# Patient Record
Sex: FEMALE | Race: WHITE | Employment: OTHER | URBAN - METROPOLITAN AREA
[De-identification: names, ages, dates, MRNs, and addresses within clinical notes are randomized per-mention and may not be internally consistent; named-entity substitution may affect disease eponyms.]

---

## 2017-01-03 ENCOUNTER — TELEPHONE (OUTPATIENT)
Dept: INTERNAL MEDICINE | Age: 72
End: 2017-01-03

## 2017-01-03 RX ORDER — ROSUVASTATIN CALCIUM 20 MG/1
TABLET, COATED ORAL
Qty: 30 TABLET | Refills: 5 | Status: SHIPPED | OUTPATIENT
Start: 2017-01-03 | End: 2017-07-05 | Stop reason: SDUPTHER

## 2017-01-19 ENCOUNTER — OFFICE VISIT (OUTPATIENT)
Dept: INTERNAL MEDICINE | Age: 72
End: 2017-01-19

## 2017-01-19 VITALS
DIASTOLIC BLOOD PRESSURE: 60 MMHG | WEIGHT: 150 LBS | BODY MASS INDEX: 29.29 KG/M2 | SYSTOLIC BLOOD PRESSURE: 104 MMHG | RESPIRATION RATE: 16 BRPM | HEART RATE: 60 BPM

## 2017-01-19 DIAGNOSIS — I69.322 DYSARTHRIA DUE TO RECENT CEREBROVASCULAR ACCIDENT (CVA): ICD-10-CM

## 2017-01-19 DIAGNOSIS — E78.00 PURE HYPERCHOLESTEROLEMIA: Primary | ICD-10-CM

## 2017-01-19 DIAGNOSIS — K29.30 CHRONIC SUPERFICIAL GASTRITIS WITHOUT BLEEDING: ICD-10-CM

## 2017-01-19 DIAGNOSIS — E55.9 VITAMIN D DEFICIENCY: ICD-10-CM

## 2017-01-19 DIAGNOSIS — M48.061 LUMBAR SPINAL STENOSIS: ICD-10-CM

## 2017-01-19 DIAGNOSIS — F33.40 DEPRESSION, MAJOR, RECURRENT, IN REMISSION (HCC): ICD-10-CM

## 2017-01-19 LAB
A/G RATIO: 1.6 (ref 1.1–2.2)
ALBUMIN SERPL-MCNC: 4.5 G/DL (ref 3.4–5)
ALP BLD-CCNC: 110 U/L (ref 40–129)
ALT SERPL-CCNC: 21 U/L (ref 10–40)
ANION GAP SERPL CALCULATED.3IONS-SCNC: 22 MMOL/L (ref 3–16)
AST SERPL-CCNC: 30 U/L (ref 15–37)
BILIRUB SERPL-MCNC: 0.4 MG/DL (ref 0–1)
BUN BLDV-MCNC: 20 MG/DL (ref 7–20)
CALCIUM SERPL-MCNC: 9.9 MG/DL (ref 8.3–10.6)
CHLORIDE BLD-SCNC: 99 MMOL/L (ref 99–110)
CHOLESTEROL, TOTAL: 178 MG/DL (ref 0–199)
CO2: 21 MMOL/L (ref 21–32)
CREAT SERPL-MCNC: 1 MG/DL (ref 0.6–1.2)
GFR AFRICAN AMERICAN: >60
GFR NON-AFRICAN AMERICAN: 54
GLOBULIN: 2.8 G/DL
GLUCOSE BLD-MCNC: 88 MG/DL (ref 70–99)
HDLC SERPL-MCNC: 76 MG/DL (ref 40–60)
LDL CHOLESTEROL CALCULATED: 88 MG/DL
POTASSIUM SERPL-SCNC: 4 MMOL/L (ref 3.5–5.1)
SODIUM BLD-SCNC: 142 MMOL/L (ref 136–145)
TOTAL PROTEIN: 7.3 G/DL (ref 6.4–8.2)
TRIGL SERPL-MCNC: 72 MG/DL (ref 0–150)
VITAMIN D 25-HYDROXY: 48.3 NG/ML
VLDLC SERPL CALC-MCNC: 14 MG/DL

## 2017-01-19 PROCEDURE — 1036F TOBACCO NON-USER: CPT | Performed by: INTERNAL MEDICINE

## 2017-01-19 PROCEDURE — 4040F PNEUMOC VAC/ADMIN/RCVD: CPT | Performed by: INTERNAL MEDICINE

## 2017-01-19 PROCEDURE — G8399 PT W/DXA RESULTS DOCUMENT: HCPCS | Performed by: INTERNAL MEDICINE

## 2017-01-19 PROCEDURE — G8598 ASA/ANTIPLAT THER USED: HCPCS | Performed by: INTERNAL MEDICINE

## 2017-01-19 PROCEDURE — G8420 CALC BMI NORM PARAMETERS: HCPCS | Performed by: INTERNAL MEDICINE

## 2017-01-19 PROCEDURE — 3017F COLORECTAL CA SCREEN DOC REV: CPT | Performed by: INTERNAL MEDICINE

## 2017-01-19 PROCEDURE — G8427 DOCREV CUR MEDS BY ELIG CLIN: HCPCS | Performed by: INTERNAL MEDICINE

## 2017-01-19 PROCEDURE — 36415 COLL VENOUS BLD VENIPUNCTURE: CPT | Performed by: INTERNAL MEDICINE

## 2017-01-19 PROCEDURE — 1090F PRES/ABSN URINE INCON ASSESS: CPT | Performed by: INTERNAL MEDICINE

## 2017-01-19 PROCEDURE — G8484 FLU IMMUNIZE NO ADMIN: HCPCS | Performed by: INTERNAL MEDICINE

## 2017-01-19 PROCEDURE — 3014F SCREEN MAMMO DOC REV: CPT | Performed by: INTERNAL MEDICINE

## 2017-01-19 PROCEDURE — 1123F ACP DISCUSS/DSCN MKR DOCD: CPT | Performed by: INTERNAL MEDICINE

## 2017-01-19 PROCEDURE — 99214 OFFICE O/P EST MOD 30 MIN: CPT | Performed by: INTERNAL MEDICINE

## 2017-01-19 RX ORDER — CHOLECALCIFEROL (VITAMIN D3) 1250 MCG
50000 CAPSULE ORAL SEE ADMIN INSTRUCTIONS
Qty: 4 CAPSULE | Refills: 5 | Status: SHIPPED | OUTPATIENT
Start: 2017-01-19 | End: 2018-01-02 | Stop reason: SDUPTHER

## 2017-01-19 RX ORDER — CITALOPRAM 20 MG/1
20 TABLET ORAL DAILY
Qty: 30 TABLET | Refills: 5 | Status: SHIPPED | OUTPATIENT
Start: 2017-01-19 | End: 2017-07-17 | Stop reason: SDUPTHER

## 2017-01-19 ASSESSMENT — ENCOUNTER SYMPTOMS
ABDOMINAL DISTENTION: 0
GASTROINTESTINAL NEGATIVE: 1
DIARRHEA: 0
CONSTIPATION: 0
BACK PAIN: 1
ABDOMINAL PAIN: 1
RESPIRATORY NEGATIVE: 1

## 2017-02-08 ENCOUNTER — TELEPHONE (OUTPATIENT)
Dept: INTERNAL MEDICINE | Age: 72
End: 2017-02-08

## 2017-02-14 ENCOUNTER — TELEPHONE (OUTPATIENT)
Dept: INTERNAL MEDICINE | Age: 72
End: 2017-02-14

## 2017-02-24 ENCOUNTER — OFFICE VISIT (OUTPATIENT)
Dept: INTERNAL MEDICINE | Age: 72
End: 2017-02-24

## 2017-02-24 VITALS — DIASTOLIC BLOOD PRESSURE: 58 MMHG | RESPIRATION RATE: 16 BRPM | SYSTOLIC BLOOD PRESSURE: 110 MMHG | HEART RATE: 60 BPM
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
## 2017-02-24 DIAGNOSIS — M79.651 RIGHT THIGH PAIN: ICD-10-CM

## 2017-02-24 DIAGNOSIS — F33.40 DEPRESSION, MAJOR, RECURRENT, IN REMISSION (HCC): ICD-10-CM

## 2017-02-24 PROCEDURE — G8420 CALC BMI NORM PARAMETERS: HCPCS | Performed by: INTERNAL MEDICINE

## 2017-02-24 PROCEDURE — G8484 FLU IMMUNIZE NO ADMIN: HCPCS | Performed by: INTERNAL MEDICINE

## 2017-02-24 PROCEDURE — G8598 ASA/ANTIPLAT THER USED: HCPCS | Performed by: INTERNAL MEDICINE

## 2017-02-24 PROCEDURE — 99214 OFFICE O/P EST MOD 30 MIN: CPT | Performed by: INTERNAL MEDICINE

## 2017-02-24 PROCEDURE — 3017F COLORECTAL CA SCREEN DOC REV: CPT | Performed by: INTERNAL MEDICINE

## 2017-02-24 PROCEDURE — G8399 PT W/DXA RESULTS DOCUMENT: HCPCS | Performed by: INTERNAL MEDICINE

## 2017-02-24 PROCEDURE — 1123F ACP DISCUSS/DSCN MKR DOCD: CPT | Performed by: INTERNAL MEDICINE

## 2017-02-24 PROCEDURE — 4040F PNEUMOC VAC/ADMIN/RCVD: CPT | Performed by: INTERNAL MEDICINE

## 2017-02-24 PROCEDURE — G8427 DOCREV CUR MEDS BY ELIG CLIN: HCPCS | Performed by: INTERNAL MEDICINE

## 2017-02-24 PROCEDURE — 1090F PRES/ABSN URINE INCON ASSESS: CPT | Performed by: INTERNAL MEDICINE

## 2017-02-24 PROCEDURE — 3014F SCREEN MAMMO DOC REV: CPT | Performed by: INTERNAL MEDICINE

## 2017-02-24 PROCEDURE — 1036F TOBACCO NON-USER: CPT | Performed by: INTERNAL MEDICINE

## 2017-02-24 RX ORDER — FUROSEMIDE 20 MG/1
20 TABLET ORAL DAILY PRN
Qty: 30 TABLET | Refills: 5 | Status: SHIPPED | OUTPATIENT
Start: 2017-02-24 | End: 2018-05-03 | Stop reason: SDUPTHER

## 2017-02-24 RX ORDER — TRAMADOL HYDROCHLORIDE 50 MG/1
50 TABLET ORAL EVERY 6 HOURS PRN
Qty: 30 TABLET | Refills: 0 | Status: SHIPPED | OUTPATIENT
Start: 2017-02-24 | End: 2017-03-06

## 2017-02-26 ASSESSMENT — ENCOUNTER SYMPTOMS
GASTROINTESTINAL NEGATIVE: 1
RESPIRATORY NEGATIVE: 1

## 2017-03-21 RX ORDER — TRAMADOL HYDROCHLORIDE 50 MG/1
TABLET ORAL
Qty: 30 TABLET | Refills: 0 | OUTPATIENT
Start: 2017-03-21 | End: 2017-04-19 | Stop reason: SDUPTHER

## 2017-04-10 ENCOUNTER — OFFICE VISIT (OUTPATIENT)
Dept: INTERNAL MEDICINE | Age: 72
End: 2017-04-10

## 2017-04-10 ENCOUNTER — CARE COORDINATOR VISIT (OUTPATIENT)
Dept: CARE COORDINATION | Age: 72
End: 2017-04-10

## 2017-04-10 VITALS — HEART RATE: 60 BPM | SYSTOLIC BLOOD PRESSURE: 136 MMHG | DIASTOLIC BLOOD PRESSURE: 68 MMHG | RESPIRATION RATE: 16 BRPM
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
## 2017-04-10 PROCEDURE — 4040F PNEUMOC VAC/ADMIN/RCVD: CPT | Performed by: INTERNAL MEDICINE

## 2017-04-10 PROCEDURE — 1090F PRES/ABSN URINE INCON ASSESS: CPT | Performed by: INTERNAL MEDICINE

## 2017-04-10 PROCEDURE — G8427 DOCREV CUR MEDS BY ELIG CLIN: HCPCS | Performed by: INTERNAL MEDICINE

## 2017-04-10 PROCEDURE — 1123F ACP DISCUSS/DSCN MKR DOCD: CPT | Performed by: INTERNAL MEDICINE

## 2017-04-10 PROCEDURE — 3014F SCREEN MAMMO DOC REV: CPT | Performed by: INTERNAL MEDICINE

## 2017-04-10 PROCEDURE — 3017F COLORECTAL CA SCREEN DOC REV: CPT | Performed by: INTERNAL MEDICINE

## 2017-04-10 PROCEDURE — 99213 OFFICE O/P EST LOW 20 MIN: CPT | Performed by: INTERNAL MEDICINE

## 2017-04-10 PROCEDURE — G8420 CALC BMI NORM PARAMETERS: HCPCS | Performed by: INTERNAL MEDICINE

## 2017-04-10 PROCEDURE — G8598 ASA/ANTIPLAT THER USED: HCPCS | Performed by: INTERNAL MEDICINE

## 2017-04-10 PROCEDURE — G8399 PT W/DXA RESULTS DOCUMENT: HCPCS | Performed by: INTERNAL MEDICINE

## 2017-04-10 PROCEDURE — 1036F TOBACCO NON-USER: CPT | Performed by: INTERNAL MEDICINE

## 2017-04-12 ASSESSMENT — ENCOUNTER SYMPTOMS
BACK PAIN: 1
RESPIRATORY NEGATIVE: 1
GASTROINTESTINAL NEGATIVE: 1

## 2017-04-20 RX ORDER — TRAMADOL HYDROCHLORIDE 50 MG/1
TABLET ORAL
Qty: 30 TABLET | Refills: 0 | Status: SHIPPED | OUTPATIENT
Start: 2017-04-20 | End: 2018-02-05

## 2017-04-21 ENCOUNTER — CARE COORDINATION (OUTPATIENT)
Dept: CARE COORDINATION | Age: 72
End: 2017-04-21

## 2017-05-01 ENCOUNTER — OFFICE VISIT (OUTPATIENT)
Dept: INTERNAL MEDICINE | Age: 72
End: 2017-05-01

## 2017-05-01 VITALS
BODY MASS INDEX: 28.32 KG/M2 | HEART RATE: 60 BPM | DIASTOLIC BLOOD PRESSURE: 50 MMHG | SYSTOLIC BLOOD PRESSURE: 98 MMHG | WEIGHT: 145 LBS

## 2017-05-01 DIAGNOSIS — M48.061 LUMBAR SPINAL STENOSIS: ICD-10-CM

## 2017-05-01 DIAGNOSIS — K59.01 SLOW TRANSIT CONSTIPATION: Primary | ICD-10-CM

## 2017-05-01 PROCEDURE — 1123F ACP DISCUSS/DSCN MKR DOCD: CPT | Performed by: INTERNAL MEDICINE

## 2017-05-01 PROCEDURE — 99213 OFFICE O/P EST LOW 20 MIN: CPT | Performed by: INTERNAL MEDICINE

## 2017-05-01 PROCEDURE — 1090F PRES/ABSN URINE INCON ASSESS: CPT | Performed by: INTERNAL MEDICINE

## 2017-05-01 PROCEDURE — G8399 PT W/DXA RESULTS DOCUMENT: HCPCS | Performed by: INTERNAL MEDICINE

## 2017-05-01 PROCEDURE — 3014F SCREEN MAMMO DOC REV: CPT | Performed by: INTERNAL MEDICINE

## 2017-05-01 PROCEDURE — G8598 ASA/ANTIPLAT THER USED: HCPCS | Performed by: INTERNAL MEDICINE

## 2017-05-01 PROCEDURE — 1036F TOBACCO NON-USER: CPT | Performed by: INTERNAL MEDICINE

## 2017-05-01 PROCEDURE — G8427 DOCREV CUR MEDS BY ELIG CLIN: HCPCS | Performed by: INTERNAL MEDICINE

## 2017-05-01 PROCEDURE — G8420 CALC BMI NORM PARAMETERS: HCPCS | Performed by: INTERNAL MEDICINE

## 2017-05-01 PROCEDURE — 3017F COLORECTAL CA SCREEN DOC REV: CPT | Performed by: INTERNAL MEDICINE

## 2017-05-01 PROCEDURE — 4040F PNEUMOC VAC/ADMIN/RCVD: CPT | Performed by: INTERNAL MEDICINE

## 2017-05-01 RX ORDER — DOCUSATE SODIUM 100 MG/1
100 CAPSULE, LIQUID FILLED ORAL 2 TIMES DAILY
Qty: 60 CAPSULE | Refills: 5 | Status: SHIPPED | OUTPATIENT
Start: 2017-05-01 | End: 2018-04-09

## 2017-05-01 ASSESSMENT — ENCOUNTER SYMPTOMS
RESPIRATORY NEGATIVE: 1
BACK PAIN: 1
GASTROINTESTINAL NEGATIVE: 1

## 2017-05-10 RX ORDER — BUPROPION HYDROCHLORIDE 300 MG/1
TABLET ORAL
Qty: 30 TABLET | Refills: 5 | Status: SHIPPED | OUTPATIENT
Start: 2017-05-10 | End: 2017-11-07 | Stop reason: SDUPTHER

## 2017-05-23 RX ORDER — CLOPIDOGREL BISULFATE 75 MG/1
TABLET ORAL
Qty: 30 TABLET | Refills: 5 | Status: SHIPPED | OUTPATIENT
Start: 2017-05-23 | End: 2018-01-02 | Stop reason: SDUPTHER

## 2017-05-23 RX ORDER — PANTOPRAZOLE SODIUM 20 MG/1
TABLET, DELAYED RELEASE ORAL
Qty: 60 TABLET | Refills: 5 | Status: SHIPPED | OUTPATIENT
Start: 2017-05-23 | End: 2017-11-01 | Stop reason: SDUPTHER

## 2017-06-01 ENCOUNTER — OFFICE VISIT (OUTPATIENT)
Dept: INTERNAL MEDICINE | Age: 72
End: 2017-06-01

## 2017-06-01 VITALS
HEART RATE: 64 BPM | WEIGHT: 142 LBS | DIASTOLIC BLOOD PRESSURE: 54 MMHG | BODY MASS INDEX: 27.73 KG/M2 | RESPIRATION RATE: 14 BRPM | SYSTOLIC BLOOD PRESSURE: 102 MMHG

## 2017-06-01 DIAGNOSIS — E78.00 PURE HYPERCHOLESTEROLEMIA: Primary | ICD-10-CM

## 2017-06-01 DIAGNOSIS — M48.061 LUMBAR SPINAL STENOSIS: ICD-10-CM

## 2017-06-01 DIAGNOSIS — R26.0 ATAXIA INVOLVING LEGS: ICD-10-CM

## 2017-06-01 DIAGNOSIS — I10 ESSENTIAL HYPERTENSION, BENIGN: ICD-10-CM

## 2017-06-01 DIAGNOSIS — I50.22 CHRONIC SYSTOLIC CONGESTIVE HEART FAILURE (HCC): ICD-10-CM

## 2017-06-01 LAB
A/G RATIO: 1.4 (ref 1.1–2.2)
ALBUMIN SERPL-MCNC: 4.1 G/DL (ref 3.4–5)
ALP BLD-CCNC: 105 U/L (ref 40–129)
ALT SERPL-CCNC: 18 U/L (ref 10–40)
ANION GAP SERPL CALCULATED.3IONS-SCNC: 18 MMOL/L (ref 3–16)
AST SERPL-CCNC: 24 U/L (ref 15–37)
BILIRUB SERPL-MCNC: 0.3 MG/DL (ref 0–1)
BUN BLDV-MCNC: 24 MG/DL (ref 7–20)
CALCIUM SERPL-MCNC: 9.3 MG/DL (ref 8.3–10.6)
CHLORIDE BLD-SCNC: 100 MMOL/L (ref 99–110)
CHOLESTEROL, TOTAL: 151 MG/DL (ref 0–199)
CO2: 21 MMOL/L (ref 21–32)
CREAT SERPL-MCNC: 1 MG/DL (ref 0.6–1.2)
GFR AFRICAN AMERICAN: >60
GFR NON-AFRICAN AMERICAN: 54
GLOBULIN: 2.9 G/DL
GLUCOSE BLD-MCNC: 95 MG/DL (ref 70–99)
HCT VFR BLD CALC: 34.9 % (ref 36–48)
HDLC SERPL-MCNC: 70 MG/DL (ref 40–60)
HEMOGLOBIN: 11.5 G/DL (ref 12–16)
LDL CHOLESTEROL CALCULATED: 65 MG/DL
MCH RBC QN AUTO: 32.3 PG (ref 26–34)
MCHC RBC AUTO-ENTMCNC: 33 G/DL (ref 31–36)
MCV RBC AUTO: 98 FL (ref 80–100)
PDW BLD-RTO: 14.4 % (ref 12.4–15.4)
PLATELET # BLD: 239 K/UL (ref 135–450)
PMV BLD AUTO: 7.1 FL (ref 5–10.5)
POTASSIUM SERPL-SCNC: 4.2 MMOL/L (ref 3.5–5.1)
RBC # BLD: 3.56 M/UL (ref 4–5.2)
SODIUM BLD-SCNC: 139 MMOL/L (ref 136–145)
TOTAL PROTEIN: 7 G/DL (ref 6.4–8.2)
TRIGL SERPL-MCNC: 78 MG/DL (ref 0–150)
VLDLC SERPL CALC-MCNC: 16 MG/DL
WBC # BLD: 4.5 K/UL (ref 4–11)

## 2017-06-01 PROCEDURE — G8598 ASA/ANTIPLAT THER USED: HCPCS | Performed by: INTERNAL MEDICINE

## 2017-06-01 PROCEDURE — 3014F SCREEN MAMMO DOC REV: CPT | Performed by: INTERNAL MEDICINE

## 2017-06-01 PROCEDURE — 3017F COLORECTAL CA SCREEN DOC REV: CPT | Performed by: INTERNAL MEDICINE

## 2017-06-01 PROCEDURE — 1036F TOBACCO NON-USER: CPT | Performed by: INTERNAL MEDICINE

## 2017-06-01 PROCEDURE — 1090F PRES/ABSN URINE INCON ASSESS: CPT | Performed by: INTERNAL MEDICINE

## 2017-06-01 PROCEDURE — 4040F PNEUMOC VAC/ADMIN/RCVD: CPT | Performed by: INTERNAL MEDICINE

## 2017-06-01 PROCEDURE — 99214 OFFICE O/P EST MOD 30 MIN: CPT | Performed by: INTERNAL MEDICINE

## 2017-06-01 PROCEDURE — 1123F ACP DISCUSS/DSCN MKR DOCD: CPT | Performed by: INTERNAL MEDICINE

## 2017-06-01 PROCEDURE — G8427 DOCREV CUR MEDS BY ELIG CLIN: HCPCS | Performed by: INTERNAL MEDICINE

## 2017-06-01 PROCEDURE — 36415 COLL VENOUS BLD VENIPUNCTURE: CPT | Performed by: INTERNAL MEDICINE

## 2017-06-01 PROCEDURE — G8399 PT W/DXA RESULTS DOCUMENT: HCPCS | Performed by: INTERNAL MEDICINE

## 2017-06-01 PROCEDURE — G8419 CALC BMI OUT NRM PARAM NOF/U: HCPCS | Performed by: INTERNAL MEDICINE

## 2017-06-01 ASSESSMENT — ENCOUNTER SYMPTOMS
BACK PAIN: 1
RESPIRATORY NEGATIVE: 1
GASTROINTESTINAL NEGATIVE: 1

## 2017-06-26 ENCOUNTER — TELEPHONE (OUTPATIENT)
Dept: INTERNAL MEDICINE | Age: 72
End: 2017-06-26

## 2017-07-06 RX ORDER — ROSUVASTATIN CALCIUM 20 MG/1
TABLET, COATED ORAL
Qty: 30 TABLET | Refills: 3 | Status: SHIPPED | OUTPATIENT
Start: 2017-07-06 | End: 2017-10-31 | Stop reason: SDUPTHER

## 2017-07-25 RX ORDER — LISINOPRIL 10 MG/1
TABLET ORAL
Qty: 30 TABLET | Refills: 2 | Status: SHIPPED | OUTPATIENT
Start: 2017-07-25 | End: 2017-10-16 | Stop reason: SDUPTHER

## 2017-08-07 RX ORDER — CARVEDILOL 25 MG/1
TABLET ORAL
Qty: 60 TABLET | Refills: 0 | Status: SHIPPED | OUTPATIENT
Start: 2017-08-07 | End: 2017-09-05 | Stop reason: SDUPTHER

## 2017-08-18 ENCOUNTER — OFFICE VISIT (OUTPATIENT)
Dept: INTERNAL MEDICINE | Age: 72
End: 2017-08-18

## 2017-08-18 VITALS
HEART RATE: 56 BPM | SYSTOLIC BLOOD PRESSURE: 110 MMHG | DIASTOLIC BLOOD PRESSURE: 60 MMHG | WEIGHT: 147 LBS | BODY MASS INDEX: 28.71 KG/M2 | RESPIRATION RATE: 16 BRPM

## 2017-08-18 DIAGNOSIS — I25.10 CORONARY ARTERY DISEASE INVOLVING NATIVE CORONARY ARTERY OF NATIVE HEART WITHOUT ANGINA PECTORIS: ICD-10-CM

## 2017-08-18 DIAGNOSIS — I50.22 CHRONIC SYSTOLIC CONGESTIVE HEART FAILURE (HCC): Primary | ICD-10-CM

## 2017-08-18 DIAGNOSIS — S72.142D INTERTROCHANTERIC FRACTURE OF LEFT HIP, CLOSED, WITH ROUTINE HEALING, SUBSEQUENT ENCOUNTER: ICD-10-CM

## 2017-08-18 PROCEDURE — 1123F ACP DISCUSS/DSCN MKR DOCD: CPT | Performed by: INTERNAL MEDICINE

## 2017-08-18 PROCEDURE — G8598 ASA/ANTIPLAT THER USED: HCPCS | Performed by: INTERNAL MEDICINE

## 2017-08-18 PROCEDURE — 3017F COLORECTAL CA SCREEN DOC REV: CPT | Performed by: INTERNAL MEDICINE

## 2017-08-18 PROCEDURE — 4040F PNEUMOC VAC/ADMIN/RCVD: CPT | Performed by: INTERNAL MEDICINE

## 2017-08-18 PROCEDURE — 3014F SCREEN MAMMO DOC REV: CPT | Performed by: INTERNAL MEDICINE

## 2017-08-18 PROCEDURE — G8399 PT W/DXA RESULTS DOCUMENT: HCPCS | Performed by: INTERNAL MEDICINE

## 2017-08-18 PROCEDURE — 1090F PRES/ABSN URINE INCON ASSESS: CPT | Performed by: INTERNAL MEDICINE

## 2017-08-18 PROCEDURE — G8427 DOCREV CUR MEDS BY ELIG CLIN: HCPCS | Performed by: INTERNAL MEDICINE

## 2017-08-18 PROCEDURE — G8419 CALC BMI OUT NRM PARAM NOF/U: HCPCS | Performed by: INTERNAL MEDICINE

## 2017-08-18 PROCEDURE — 99214 OFFICE O/P EST MOD 30 MIN: CPT | Performed by: INTERNAL MEDICINE

## 2017-08-18 PROCEDURE — 1036F TOBACCO NON-USER: CPT | Performed by: INTERNAL MEDICINE

## 2017-08-18 ASSESSMENT — ENCOUNTER SYMPTOMS
GASTROINTESTINAL NEGATIVE: 1
RESPIRATORY NEGATIVE: 1

## 2017-10-16 RX ORDER — LISINOPRIL 10 MG/1
TABLET ORAL
Qty: 30 TABLET | Refills: 0 | Status: SHIPPED | OUTPATIENT
Start: 2017-10-16 | End: 2017-10-24 | Stop reason: SINTOL

## 2017-10-24 ENCOUNTER — OFFICE VISIT (OUTPATIENT)
Dept: INTERNAL MEDICINE | Age: 72
End: 2017-10-24

## 2017-10-24 VITALS
SYSTOLIC BLOOD PRESSURE: 130 MMHG | HEART RATE: 51 BPM | DIASTOLIC BLOOD PRESSURE: 82 MMHG | BODY MASS INDEX: 26.58 KG/M2 | WEIGHT: 150 LBS | HEIGHT: 63 IN | OXYGEN SATURATION: 94 %

## 2017-10-24 DIAGNOSIS — K80.50 BILIARY COLIC: ICD-10-CM

## 2017-10-24 DIAGNOSIS — I10 ESSENTIAL HYPERTENSION, BENIGN: ICD-10-CM

## 2017-10-24 DIAGNOSIS — I25.10 CORONARY ARTERY DISEASE INVOLVING NATIVE CORONARY ARTERY OF NATIVE HEART WITHOUT ANGINA PECTORIS: ICD-10-CM

## 2017-10-24 DIAGNOSIS — E78.00 PURE HYPERCHOLESTEROLEMIA: ICD-10-CM

## 2017-10-24 DIAGNOSIS — R05.9 COUGH: ICD-10-CM

## 2017-10-24 DIAGNOSIS — Z87.891 PERSONAL HISTORY OF TOBACCO USE: ICD-10-CM

## 2017-10-24 DIAGNOSIS — R06.09 EXERTIONAL DYSPNEA: Primary | ICD-10-CM

## 2017-10-24 DIAGNOSIS — Z23 NEED FOR INFLUENZA VACCINATION: ICD-10-CM

## 2017-10-24 DIAGNOSIS — Z12.2 ENCOUNTER FOR SCREENING FOR LUNG CANCER: ICD-10-CM

## 2017-10-24 LAB
A/G RATIO: 1.4 (ref 1.1–2.2)
ALBUMIN SERPL-MCNC: 4.5 G/DL (ref 3.4–5)
ALP BLD-CCNC: 113 U/L (ref 40–129)
ALT SERPL-CCNC: 25 U/L (ref 10–40)
ANION GAP SERPL CALCULATED.3IONS-SCNC: 16 MMOL/L (ref 3–16)
AST SERPL-CCNC: 28 U/L (ref 15–37)
BILIRUB SERPL-MCNC: 0.3 MG/DL (ref 0–1)
BUN BLDV-MCNC: 22 MG/DL (ref 7–20)
CALCIUM SERPL-MCNC: 10 MG/DL (ref 8.3–10.6)
CHLORIDE BLD-SCNC: 99 MMOL/L (ref 99–110)
CHOLESTEROL, TOTAL: 179 MG/DL (ref 0–199)
CO2: 26 MMOL/L (ref 21–32)
CREAT SERPL-MCNC: 0.9 MG/DL (ref 0.6–1.2)
GFR AFRICAN AMERICAN: >60
GFR NON-AFRICAN AMERICAN: >60
GLOBULIN: 3.2 G/DL
GLUCOSE BLD-MCNC: 96 MG/DL (ref 70–99)
HCT VFR BLD CALC: 36.9 % (ref 36–48)
HDLC SERPL-MCNC: 80 MG/DL (ref 40–60)
HEMOGLOBIN: 12.4 G/DL (ref 12–16)
LDL CHOLESTEROL CALCULATED: 80 MG/DL
MCH RBC QN AUTO: 33.6 PG (ref 26–34)
MCHC RBC AUTO-ENTMCNC: 33.7 G/DL (ref 31–36)
MCV RBC AUTO: 99.7 FL (ref 80–100)
PDW BLD-RTO: 13.4 % (ref 12.4–15.4)
PLATELET # BLD: 240 K/UL (ref 135–450)
PMV BLD AUTO: 6.9 FL (ref 5–10.5)
POTASSIUM SERPL-SCNC: 4.6 MMOL/L (ref 3.5–5.1)
RBC # BLD: 3.7 M/UL (ref 4–5.2)
SODIUM BLD-SCNC: 141 MMOL/L (ref 136–145)
TOTAL PROTEIN: 7.7 G/DL (ref 6.4–8.2)
TRIGL SERPL-MCNC: 97 MG/DL (ref 0–150)
TSH REFLEX FT4: 1.83 UIU/ML (ref 0.27–4.2)
VLDLC SERPL CALC-MCNC: 19 MG/DL
WBC # BLD: 4.8 K/UL (ref 4–11)

## 2017-10-24 PROCEDURE — 90662 IIV NO PRSV INCREASED AG IM: CPT | Performed by: INTERNAL MEDICINE

## 2017-10-24 PROCEDURE — G8427 DOCREV CUR MEDS BY ELIG CLIN: HCPCS | Performed by: INTERNAL MEDICINE

## 2017-10-24 PROCEDURE — 4040F PNEUMOC VAC/ADMIN/RCVD: CPT | Performed by: INTERNAL MEDICINE

## 2017-10-24 PROCEDURE — 3017F COLORECTAL CA SCREEN DOC REV: CPT | Performed by: INTERNAL MEDICINE

## 2017-10-24 PROCEDURE — 99214 OFFICE O/P EST MOD 30 MIN: CPT | Performed by: INTERNAL MEDICINE

## 2017-10-24 PROCEDURE — G8598 ASA/ANTIPLAT THER USED: HCPCS | Performed by: INTERNAL MEDICINE

## 2017-10-24 PROCEDURE — G0296 VISIT TO DETERM LDCT ELIG: HCPCS | Performed by: INTERNAL MEDICINE

## 2017-10-24 PROCEDURE — 3014F SCREEN MAMMO DOC REV: CPT | Performed by: INTERNAL MEDICINE

## 2017-10-24 PROCEDURE — G8417 CALC BMI ABV UP PARAM F/U: HCPCS | Performed by: INTERNAL MEDICINE

## 2017-10-24 PROCEDURE — 1036F TOBACCO NON-USER: CPT | Performed by: INTERNAL MEDICINE

## 2017-10-24 PROCEDURE — G0008 ADMIN INFLUENZA VIRUS VAC: HCPCS | Performed by: INTERNAL MEDICINE

## 2017-10-24 PROCEDURE — 1090F PRES/ABSN URINE INCON ASSESS: CPT | Performed by: INTERNAL MEDICINE

## 2017-10-24 PROCEDURE — G8484 FLU IMMUNIZE NO ADMIN: HCPCS | Performed by: INTERNAL MEDICINE

## 2017-10-24 PROCEDURE — G8399 PT W/DXA RESULTS DOCUMENT: HCPCS | Performed by: INTERNAL MEDICINE

## 2017-10-24 PROCEDURE — 1123F ACP DISCUSS/DSCN MKR DOCD: CPT | Performed by: INTERNAL MEDICINE

## 2017-10-24 RX ORDER — LOSARTAN POTASSIUM 25 MG/1
25 TABLET ORAL DAILY
Qty: 30 TABLET | Refills: 3 | Status: SHIPPED | OUTPATIENT
Start: 2017-10-24 | End: 2018-02-19 | Stop reason: SDUPTHER

## 2017-10-24 ASSESSMENT — LIFESTYLE VARIABLES
AUDIT-C TOTAL SCORE: 3
HOW MANY STANDARD DRINKS CONTAINING ALCOHOL DO YOU HAVE ON A TYPICAL DAY: 0
HAVE YOU OR SOMEONE ELSE BEEN INJURED AS A RESULT OF YOUR DRINKING: 0
AUDIT TOTAL SCORE: 3
HAS A RELATIVE, FRIEND, DOCTOR, OR ANOTHER HEALTH PROFESSIONAL EXPRESSED CONCERN ABOUT YOUR DRINKING OR SUGGESTED YOU CUT DOWN: 0
HOW OFTEN DO YOU HAVE A DRINK CONTAINING ALCOHOL: 3
HOW OFTEN DURING THE LAST YEAR HAVE YOU FOUND THAT YOU WERE NOT ABLE TO STOP DRINKING ONCE YOU HAD STARTED: 0
HOW OFTEN DURING THE LAST YEAR HAVE YOU HAD A FEELING OF GUILT OR REMORSE AFTER DRINKING: 0
HOW OFTEN DURING THE LAST YEAR HAVE YOU FAILED TO DO WHAT WAS NORMALLY EXPECTED FROM YOU BECAUSE OF DRINKING: 0
HOW OFTEN DURING THE LAST YEAR HAVE YOU BEEN UNABLE TO REMEMBER WHAT HAPPENED THE NIGHT BEFORE BECAUSE YOU HAD BEEN DRINKING: 0
HOW OFTEN DURING THE LAST YEAR HAVE YOU NEEDED AN ALCOHOLIC DRINK FIRST THING IN THE MORNING TO GET YOURSELF GOING AFTER A NIGHT OF HEAVY DRINKING: 0
HOW OFTEN DO YOU HAVE SIX OR MORE DRINKS ON ONE OCCASION: 0

## 2017-10-24 ASSESSMENT — ENCOUNTER SYMPTOMS
VOMITING: 0
DIARRHEA: 0
SHORTNESS OF BREATH: 1
ABDOMINAL PAIN: 0
BLOOD IN STOOL: 0
NAUSEA: 0
CHEST TIGHTNESS: 0
COUGH: 1

## 2017-10-24 ASSESSMENT — ANXIETY QUESTIONNAIRES: GAD7 TOTAL SCORE: 0

## 2017-10-24 ASSESSMENT — PATIENT HEALTH QUESTIONNAIRE - PHQ9: SUM OF ALL RESPONSES TO PHQ QUESTIONS 1-9: 0

## 2017-10-24 NOTE — PROGRESS NOTES
Vaccine Information Sheet, \"Influenza - Inactivated\"  given to Erma Keith, or parent/legal guardian of  Erma Keith and verbalized understanding. Patient responses:    Have you ever had a reaction to a flu vaccine? No  Are you able to eat eggs without adverse effects? Yes  Do you have any current illness? No  Have you ever had Guillian [place] Syndrome? No    Flu vaccine given per order. Please see immunization tab. Low Dose CT (LDCT) Lung Screening criteria met   Age 50-69   Pack year smoking >30   Still smoking or less than 15 year since quit   No sign or symptoms of lung cancer   > 11 months since last LDCT     Risks and benefits of lung cancer screening with LDCT scans discussed:    Significance of positive screen - False-positive LDCT results often occur. 95% of all positive results do not lead to a diagnosis of cancer. Usually further imaging can resolve most false-positive results; however, some patients may require invasive procedures. Over diagnosis risk - 10% to 12% of screen-detected lung cancer cases are over diagnosedthat is, the cancer would not have been detected in the patient's lifetime without the screening. Need for follow up screens annually to continue lung cancer screening effectiveness     Risks associated with radiation from annual LDCT- Radiation exposure is about the same as for a mammogram, which is about 1/3 of the annual background radiation exposure from everyday life. Starting screening at age 54 is not likely to increase cancer risk from radiation exposure. Patients with comorbidities resulting in life expectancy of < 10 years, or that would preclude treatment of an abnormality identified on CT, should not be screened due to lack of benefit.     To obtain maximal benefit from this screening, smoking cessation and long-term abstinence from smoking is critical

## 2017-10-24 NOTE — PROGRESS NOTES
Left pupil is round and reactive. Neck: Normal range of motion. Neck supple. No thyromegaly present. Cardiovascular: Normal rate and normal heart sounds. No murmur heard. Pulmonary/Chest: Effort normal and breath sounds normal. No respiratory distress. She has no wheezes. She has no rales. Abdominal: Soft. She exhibits no distension. There is no tenderness. There is no guarding. Musculoskeletal: Normal range of motion. She exhibits no edema. Lymphadenopathy:     She has no cervical adenopathy. Neurological: She is oriented to person, place, and time. She has normal strength and normal reflexes. She appears lethargic. She displays no tremor. No cranial nerve deficit or sensory deficit. She exhibits normal muscle tone. GCS eye subscore is 4. GCS verbal subscore is 5. GCS motor subscore is 6. Normal CN II-XII   Skin: She is not diaphoretic. No erythema. Psychiatric: She has a normal mood and affect. Her behavior is normal. Judgment and thought content normal.        Assessment/Plan:   Allyn Mahajan was seen today for medicare awv. Diagnoses and all orders for this visit:    Exertional dyspnea  She has C likely has COPD. I will do pulmonary function test and decide treatment accordingly  -     Cancel: CT Chest WO Contrast; Future  -     Spirometry With OR Without Bronchodilator; Future    Cough- chronic   Cough could be secondary to possible COPD, also ACE inhibitor is a possible cause I will stop the lisinopril start him on losartan and check for COPD  -     Cancel: CT Chest WO Contrast; Future  -     Spirometry With OR Without Bronchodilator; Future    Essential hypertension, benign  Controlled change Ace with our plan for possible cough  -     losartan (COZAAR) 25 MG tablet; Take 1 tablet by mouth daily  -     CMP/CBC/Lipid; Future  -     TSH, Reflex to T4; Future    Pure hypercholesterolemia  -     CMP/CBC/Lipid;  Future  -     TSH, Reflex to T4; Future    Coronary artery disease involving native coronary artery of native heart without angina pectoris  -     CMP/CBC/Lipid; Future  -     TSH, Reflex to T4; Future    Encounter for screening for lung cancer  Screening with low-dose CT    Personal history of tobacco use  -     OH VISIT TO DISCUSS LUNG CA SCREEN W LDCT  -     CT Lung Screening; Future  Discussed adverse side effects of smoking    Need for influenza vaccination  -     INFLUENZA, HIGH DOSE, 65 YRS +, IM, PF, PREFILL SYR, 0.5ML (FLUZONE HD)    She's not interested in pneumonia vaccine    Patient was encouraged to call the office or be seen with MD for any worsening or lack of improvement.      Nancy Ma M.D.   10/24/2017, 7:03 PM

## 2017-10-31 RX ORDER — ROSUVASTATIN CALCIUM 20 MG/1
TABLET, COATED ORAL
Qty: 30 TABLET | Refills: 0 | Status: SHIPPED | OUTPATIENT
Start: 2017-10-31 | End: 2017-11-01 | Stop reason: SDUPTHER

## 2017-11-01 RX ORDER — PANTOPRAZOLE SODIUM 20 MG/1
TABLET, DELAYED RELEASE ORAL
Qty: 60 TABLET | Refills: 5 | Status: SHIPPED | OUTPATIENT
Start: 2017-11-01 | End: 2018-08-13 | Stop reason: SDUPTHER

## 2017-11-01 RX ORDER — ROSUVASTATIN CALCIUM 20 MG/1
TABLET, COATED ORAL
Qty: 30 TABLET | Refills: 0 | Status: SHIPPED | OUTPATIENT
Start: 2017-11-01 | End: 2018-01-03 | Stop reason: SDUPTHER

## 2017-11-03 ENCOUNTER — HOSPITAL ENCOUNTER (OUTPATIENT)
Dept: PULMONOLOGY | Age: 72
Discharge: HOME | End: 2017-11-03
Attending: INTERNAL MEDICINE | Admitting: INTERNAL MEDICINE

## 2017-11-03 DIAGNOSIS — Z87.891 PERSONAL HISTORY OF TOBACCO USE: ICD-10-CM

## 2017-11-03 DIAGNOSIS — Z87.891 PERSONAL HISTORY OF NICOTINE DEPENDENCE: ICD-10-CM

## 2017-11-03 RX ORDER — ALBUTEROL SULFATE 2.5 MG/3ML
2.5 SOLUTION RESPIRATORY (INHALATION) ONCE
Status: COMPLETED | OUTPATIENT
Start: 2017-11-03 | End: 2017-11-03

## 2017-11-03 RX ADMIN — ALBUTEROL SULFATE 2.5 MG: 2.5 SOLUTION RESPIRATORY (INHALATION) at 17:47

## 2017-11-04 DIAGNOSIS — S22.078A OTHER CLOSED FRACTURE OF NINTH THORACIC VERTEBRA, INITIAL ENCOUNTER (HCC): ICD-10-CM

## 2017-11-04 DIAGNOSIS — Z13.820 SCREENING FOR OSTEOPOROSIS: Primary | ICD-10-CM

## 2017-11-06 NOTE — PROCEDURES
[facility]              [address]                              PULMONARY FUNCTION    PATIENT NAME: Erin Barraza                      :         1945  MED REC NO:   [id number]                          ROOM:  ACCOUNT NO:   [de-identified]                          ADMIT DATE:  2017  PROVIDER:     Anabella Kate MD    DATE OF PROCEDURE:  2017    Forced vital capacity, expiratory flow rates, and FEV1 to FVC ratio  normal.  No change after bronchodilator. Lung volumes and diffusion  capacity normal.    IMPRESSION:  Normal pulmonary function study.         Jayleen Bueno MD    D: 2017 12:40:46       T: 2017 13:25:01     [email or web address]  Job#: [id number]     Doc#: [id number]

## 2017-11-07 RX ORDER — BUPROPION HYDROCHLORIDE 300 MG/1
TABLET ORAL
Qty: 30 TABLET | Refills: 0 | Status: SHIPPED | OUTPATIENT
Start: 2017-11-07 | End: 2017-11-13 | Stop reason: SDUPTHER

## 2017-11-07 RX ORDER — CARVEDILOL 25 MG/1
TABLET ORAL
Qty: 60 TABLET | Refills: 0 | Status: SHIPPED | OUTPATIENT
Start: 2017-11-07 | End: 2018-01-03 | Stop reason: SDUPTHER

## 2017-11-08 ENCOUNTER — TELEPHONE (OUTPATIENT)
Dept: INTERNAL MEDICINE | Age: 72
End: 2017-11-08

## 2017-11-13 RX ORDER — CARVEDILOL 25 MG/1
TABLET ORAL
Qty: 60 TABLET | Refills: 0 | Status: SHIPPED | OUTPATIENT
Start: 2017-11-13 | End: 2018-01-03 | Stop reason: SDUPTHER

## 2017-11-13 RX ORDER — CITALOPRAM 20 MG/1
20 TABLET ORAL DAILY
Qty: 30 TABLET | Refills: 0 | Status: SHIPPED | OUTPATIENT
Start: 2017-11-13 | End: 2017-12-15 | Stop reason: SDUPTHER

## 2017-11-13 RX ORDER — BUPROPION HYDROCHLORIDE 300 MG/1
TABLET ORAL
Qty: 30 TABLET | Refills: 0 | Status: SHIPPED | OUTPATIENT
Start: 2017-11-13 | End: 2018-01-05 | Stop reason: SDUPTHER

## 2017-11-27 ENCOUNTER — OFFICE VISIT (OUTPATIENT)
Dept: INTERNAL MEDICINE | Age: 72
End: 2017-11-27

## 2017-11-27 VITALS
WEIGHT: 153 LBS | BODY MASS INDEX: 27.1 KG/M2 | OXYGEN SATURATION: 96 % | HEART RATE: 57 BPM | DIASTOLIC BLOOD PRESSURE: 66 MMHG | SYSTOLIC BLOOD PRESSURE: 128 MMHG

## 2017-11-27 DIAGNOSIS — S22.000A COMPRESSION FRACTURE OF BODY OF THORACIC VERTEBRA (HCC): Primary | ICD-10-CM

## 2017-11-27 DIAGNOSIS — M80.00XA OSTEOPOROSIS WITH CURRENT PATHOLOGICAL FRACTURE, UNSPECIFIED OSTEOPOROSIS TYPE, INITIAL ENCOUNTER: ICD-10-CM

## 2017-11-27 PROCEDURE — 3014F SCREEN MAMMO DOC REV: CPT | Performed by: INTERNAL MEDICINE

## 2017-11-27 PROCEDURE — G8598 ASA/ANTIPLAT THER USED: HCPCS | Performed by: INTERNAL MEDICINE

## 2017-11-27 PROCEDURE — G8399 PT W/DXA RESULTS DOCUMENT: HCPCS | Performed by: INTERNAL MEDICINE

## 2017-11-27 PROCEDURE — 4040F PNEUMOC VAC/ADMIN/RCVD: CPT | Performed by: INTERNAL MEDICINE

## 2017-11-27 PROCEDURE — 99213 OFFICE O/P EST LOW 20 MIN: CPT | Performed by: INTERNAL MEDICINE

## 2017-11-27 PROCEDURE — G8417 CALC BMI ABV UP PARAM F/U: HCPCS | Performed by: INTERNAL MEDICINE

## 2017-11-27 PROCEDURE — 4005F PHARM THX FOR OP RXD: CPT | Performed by: INTERNAL MEDICINE

## 2017-11-27 PROCEDURE — 3017F COLORECTAL CA SCREEN DOC REV: CPT | Performed by: INTERNAL MEDICINE

## 2017-11-27 PROCEDURE — G8484 FLU IMMUNIZE NO ADMIN: HCPCS | Performed by: INTERNAL MEDICINE

## 2017-11-27 PROCEDURE — 1123F ACP DISCUSS/DSCN MKR DOCD: CPT | Performed by: INTERNAL MEDICINE

## 2017-11-27 PROCEDURE — 1090F PRES/ABSN URINE INCON ASSESS: CPT | Performed by: INTERNAL MEDICINE

## 2017-11-27 PROCEDURE — 1036F TOBACCO NON-USER: CPT | Performed by: INTERNAL MEDICINE

## 2017-11-27 PROCEDURE — G8427 DOCREV CUR MEDS BY ELIG CLIN: HCPCS | Performed by: INTERNAL MEDICINE

## 2017-11-27 RX ORDER — ALENDRONATE SODIUM 70 MG/1
70 TABLET ORAL
Qty: 4 TABLET | Refills: 3 | Status: SHIPPED | OUTPATIENT
Start: 2017-11-27 | End: 2018-03-12 | Stop reason: SDUPTHER

## 2017-11-27 ASSESSMENT — ENCOUNTER SYMPTOMS
CHEST TIGHTNESS: 0
VOMITING: 0
NAUSEA: 0
SHORTNESS OF BREATH: 0
ABDOMINAL PAIN: 0

## 2017-11-27 NOTE — PROGRESS NOTES
Outpatient Note for established Patient - regular Visit    History Obtained From:  patient, electronic medical record    CHIEF COMPLAINT:    Chief Complaint   Patient presents with    Osteoporosis     follow up-discuss surgery       HISTORY OF PRESENT ILLNESS:   The patient is a 74 y.o. female who is here today for :discussion of a possible back surgery. The patient was worried about her referral to the spine surgeon for the findings of T9 compression fracture. She has no pain, denies leg weakness/ numbness/ she has no BM problems and has her usual urinary incontinence     Past Medical History:        Diagnosis Date    CAD (coronary artery disease)     CHF [facility]congestive heart failure) ([facility])     DVT of lower extremity (deep venous thrombosis) ([facility] [address]     Hypertension     Lupus     Urinary incontinence        Social History:   TOBACCO:   reports that she has quit smoking. Her smoking use included Cigarettes. She started smoking about 3 years ago. She has a 15.00 pack-year smoking history. She has never used smokeless tobacco.  ETOH:   reports that she drinks alcohol. Current Medications:    Prior to Admission medications    Medication Sig Start Date End Date Taking?  Authorizing Provider   alendronate (FOSAMAX) 70 MG tablet Take 1 tablet by mouth every 7 days 11/27/17  Yes Perez Murphy MD   buPROPion (WELLBUTRIN XL) 300 MG extended release tablet TAKE 1 TABLET BY MOUTH EVERY MORNING 11/13/17  Yes Perez Murphy MD   carvedilol (COREG) 25 MG tablet TAKE 1 TABLET BY MOUTH TWICE DAILY WITH MEALS 11/13/17  Yes Perez Murphy MD   citalopram (CELEXA) 20 MG tablet TAKE 1 TABLET BY MOUTH DAILY 11/13/17  Yes Perez Murphy MD   carvedilol (COREG) 25 MG tablet TAKE 1 TABLET BY MOUTH TWICE DAILY WITH MEALS 11/7/17  Yes Perez Murphy MD   rosuvastatin (CRESTOR) 20 MG tablet TAKE 1 TABLET BY MOUTH DAILY 11/1/17  Yes Perez Murphy MD   pantoprazole (PROTONIX) 20 MG tablet TAKE 1 TABLET BY MOUTH TWICE DAILY  Patient taking differently: 20 mg Daily with lunch TAKE 1 TABLET BY MOUTH TWICE DAILY 11/1/17  Yes Artist Leventhal, MD   losartan (COZAAR) 25 MG tablet Take 1 tablet by mouth daily 10/24/17  Yes Artist Leventhal, MD   clopidogrel (PLAVIX) 75 MG tablet TAKE 1 TABLET BY MOUTH DAILY 5/23/17  Yes Inez Vega MD   docusate sodium (COLACE) 100 MG capsule Take 1 capsule by mouth 2 times daily 5/1/17  Yes Inez Vega MD   traMADol (ULTRAM) 50 MG tablet TAKE 1 TABLET BY MOUTH EVERY 6 HOURS AS NEEDED FOR PAIN 4/20/17  Yes Luann Stone MD   furosemide (LASIX) 20 MG tablet Take 1 tablet by mouth daily as needed (edema) 2/24/17  Yes Inez Vega MD   Cholecalciferol (VITAMIN D3) 61979 UNITS CAPS Take 50,000 Units by mouth See Admin Instructions every other week 1/19/17  Yes Inez Vega MD   Nutritional Supplements (ENSURE ACTIVE HIGH PROTEIN PO) Take 1 Can by mouth daily as needed   Yes Historical Provider, MD   acetaminophen 650 MG TABS Take 650 mg by mouth every 6 hours as needed for Pain or Fever (Fever >100.5 F (38 C)). 12/9/13  Yes Patricio Alicia MD   therapeutic multivitamin-minerals [facility]) tablet Take 1 tablet by mouth daily. Yes Historical Provider, MD   hydroxychloroquine (PLAQUENIL) 200 MG tablet Take 200 mg by mouth daily. Yes Historical Provider, MD       REVIEW OF SYSTEMS:  Review of Systems   Constitutional: Negative for chills and fever. Respiratory: Negative for chest tightness and shortness of breath. Cardiovascular: Negative for chest pain. Gastrointestinal: Negative for abdominal pain, nausea and vomiting. Physical Exam:      Vitals: /66 (Site: Left Arm, Position: Sitting, Cuff Size: Small Adult)   Pulse 57   Wt 153 lb (69.4 kg)   SpO2 96%   BMI 27.10 kg/m²     Body mass index is 27.1 kg/m².   Wt Readings from Last 3 Encounters:   11/27/17 153 lb (69.4 kg)   10/24/17 150 lb (68 kg)   08/18/17 147 lb (66.7 kg) Physical Exam   Constitutional: She is oriented to person, place, and time. She appears well-developed and well-nourished. No distress. HENT:   Head: Normocephalic and atraumatic. Right Ear: External ear normal.   Left Ear: External ear normal.   Mouth/Throat: Oropharynx is clear and moist. No oropharyngeal exudate. Eyes: Conjunctivae and EOM are normal. Right eye exhibits no discharge. Left eye exhibits no discharge. No scleral icterus. Neck: Normal range of motion. Neck supple. Cardiovascular: Normal rate and normal heart sounds. No murmur heard. Pulmonary/Chest: Effort normal and breath sounds normal. No respiratory distress. She has no wheezes. She has no rales. Abdominal: Soft. There is no tenderness. Lymphadenopathy:     She has no cervical adenopathy. Neurological: She is alert and oriented to person, place, and time. She has normal reflexes. No sensory deficit. She exhibits normal muscle tone. GCS eye subscore is 4. GCS verbal subscore is 5. GCS motor subscore is 6. Skin: No rash noted. She is not diaphoretic. Assessment/Plan:   Trenton Castro was seen today for osteoporosis. Diagnoses and all orders for this visit:    Compression fracture of body of thoracic vertebra ([address]  We have discussed the finding- she will see a spine surgeon and he will evaluate her    -     Akanksha Ruiz MD ([facility])    Osteoporosis with current pathological fracture, unspecified osteoporosis type, initial encounter  She has no GERD, no kidney disease. I will start Tx for osteoporosis   -     Akanksha Ruiz MD ([facility])    Other orders  -     alendronate (FOSAMAX) 70 MG tablet; Take 1 tablet by mouth every 7 days    Patient was encouraged to call the office or be seen with MD for any worsening or lack of improvement.      Yvonne Proctor M.D.   11/27/2017, 6:25 PM

## 2017-12-06 ENCOUNTER — OFFICE VISIT (OUTPATIENT)
Dept: ORTHOPEDIC SURGERY | Age: 72
End: 2017-12-06

## 2017-12-06 VITALS
WEIGHT: 153 LBS | SYSTOLIC BLOOD PRESSURE: 145 MMHG | BODY MASS INDEX: 27.11 KG/M2 | HEIGHT: 63 IN | HEART RATE: 59 BPM | DIASTOLIC BLOOD PRESSURE: 72 MMHG

## 2017-12-06 DIAGNOSIS — Z98.1 S/P LUMBAR FUSION: ICD-10-CM

## 2017-12-06 DIAGNOSIS — S22.070A CLOSED WEDGE COMPRESSION FRACTURE OF NINTH THORACIC VERTEBRA, INITIAL ENCOUNTER: Primary | ICD-10-CM

## 2017-12-06 PROCEDURE — 4040F PNEUMOC VAC/ADMIN/RCVD: CPT | Performed by: PHYSICIAN ASSISTANT

## 2017-12-06 PROCEDURE — 99203 OFFICE O/P NEW LOW 30 MIN: CPT | Performed by: PHYSICIAN ASSISTANT

## 2017-12-06 PROCEDURE — G8427 DOCREV CUR MEDS BY ELIG CLIN: HCPCS | Performed by: PHYSICIAN ASSISTANT

## 2017-12-06 PROCEDURE — G8417 CALC BMI ABV UP PARAM F/U: HCPCS | Performed by: PHYSICIAN ASSISTANT

## 2017-12-06 PROCEDURE — G8484 FLU IMMUNIZE NO ADMIN: HCPCS | Performed by: PHYSICIAN ASSISTANT

## 2017-12-06 PROCEDURE — 1090F PRES/ABSN URINE INCON ASSESS: CPT | Performed by: PHYSICIAN ASSISTANT

## 2017-12-06 PROCEDURE — 3014F SCREEN MAMMO DOC REV: CPT | Performed by: PHYSICIAN ASSISTANT

## 2017-12-06 PROCEDURE — 3017F COLORECTAL CA SCREEN DOC REV: CPT | Performed by: PHYSICIAN ASSISTANT

## 2017-12-06 NOTE — PROGRESS NOTES
History of present illness:   Ms. Fercho Singleton  is a pleasant 74 y.o. female with a PMH of urinary incontinence, HTN, DVT, CHF, and CAD kindly referred by Dr. Cayla Rodriguez for consultation regarding her LBP and right leg pain. She is s/p anterior and posterior lumbar fusion by Dr. Mat Rushing in 2014. She notes persistent pain ever since her surgery. She notes her surgery was complicated with a blood clot, myocardial infarction and CVA. She is now on blood thinners permanently. She had a fall in April. She then had extensive home physical therapy for several months and feels she is doing much better at this time. She rates her back pain 5/10 and leg pain 1/10. She describes the pain as aching. Her pain is only present with activity. She notes no pain with sitting. The leg pain radiates down the anterior aspect of her leg to her right thigh, but she contributes this to her femur fracture 5 years ago. She denies numbness and tingling in her legs. She denies weakness of her legs and denies bowel or bladder dysfunction. She uses a walker. She denies any frequent falls, balance changes, or upper extremity symptoms. She states she can sit as long as she likes and stand for a maximum of 10-30 minutes. The pain moderately disrupts her sleep. She has tried epidural injections in the distant past which increased her pain. She takes Tylenol. She takes Fosamax for osteoporosis. Past medical history:  Her past medical history has been reviewed and is significant for urinary incontinence, HTN, DVT, CHF, and CAD. Past surgical history:  Her past surgical history has been reviewed and she is s/p anterior and posterior fusion L4-mid sacrum in 2014 by Dr. Mat Rushing. Her medications and allergies were reviewed. Social history:  Her social history has been reviewed and she is a former smoker; 15 pack year.     Review of symptoms:  Patient's review of symptoms was reviewed and is significant for back pain and negative for

## 2017-12-15 ENCOUNTER — TELEPHONE (OUTPATIENT)
Dept: INTERNAL MEDICINE | Age: 72
End: 2017-12-15

## 2017-12-15 RX ORDER — CITALOPRAM 20 MG/1
20 TABLET ORAL DAILY
Qty: 30 TABLET | Refills: 5 | Status: SHIPPED | OUTPATIENT
Start: 2017-12-15 | End: 2018-06-12 | Stop reason: SDUPTHER

## 2018-01-02 ENCOUNTER — OFFICE VISIT (OUTPATIENT)
Dept: INTERNAL MEDICINE | Age: 73
End: 2018-01-02

## 2018-01-02 VITALS
OXYGEN SATURATION: 95 % | HEART RATE: 60 BPM | SYSTOLIC BLOOD PRESSURE: 124 MMHG | DIASTOLIC BLOOD PRESSURE: 82 MMHG | BODY MASS INDEX: 28.17 KG/M2 | WEIGHT: 159 LBS

## 2018-01-02 DIAGNOSIS — M80.00XS OSTEOPOROSIS WITH CURRENT PATHOLOGICAL FRACTURE, UNSPECIFIED OSTEOPOROSIS TYPE, SEQUELA: ICD-10-CM

## 2018-01-02 DIAGNOSIS — I50.22 CHRONIC SYSTOLIC CONGESTIVE HEART FAILURE (HCC): ICD-10-CM

## 2018-01-02 DIAGNOSIS — I25.10 CORONARY ARTERY DISEASE INVOLVING NATIVE CORONARY ARTERY OF NATIVE HEART WITHOUT ANGINA PECTORIS: ICD-10-CM

## 2018-01-02 DIAGNOSIS — E78.00 PURE HYPERCHOLESTEROLEMIA: ICD-10-CM

## 2018-01-02 DIAGNOSIS — I50.32 HEART FAILURE, DIASTOLIC, CHRONIC (HCC): ICD-10-CM

## 2018-01-02 DIAGNOSIS — I10 ESSENTIAL HYPERTENSION, BENIGN: Primary | ICD-10-CM

## 2018-01-02 DIAGNOSIS — S22.078D OTHER CLOSED FRACTURE OF NINTH THORACIC VERTEBRA WITH ROUTINE HEALING, SUBSEQUENT ENCOUNTER: ICD-10-CM

## 2018-01-02 DIAGNOSIS — F32.5 MAJOR DEPRESSION, SINGLE EPISODE, IN COMPLETE REMISSION (HCC): ICD-10-CM

## 2018-01-02 PROCEDURE — 99213 OFFICE O/P EST LOW 20 MIN: CPT | Performed by: INTERNAL MEDICINE

## 2018-01-02 RX ORDER — CLOPIDOGREL BISULFATE 75 MG/1
TABLET ORAL
Qty: 30 TABLET | Refills: 5 | Status: SHIPPED | OUTPATIENT
Start: 2018-01-02 | End: 2018-07-23 | Stop reason: SDUPTHER

## 2018-01-02 RX ORDER — CHOLECALCIFEROL (VITAMIN D3) 1250 MCG
50000 CAPSULE ORAL SEE ADMIN INSTRUCTIONS
Qty: 4 CAPSULE | Refills: 5 | Status: SHIPPED | OUTPATIENT
Start: 2018-01-02 | End: 2018-02-19 | Stop reason: SDUPTHER

## 2018-01-02 ASSESSMENT — ENCOUNTER SYMPTOMS
SHORTNESS OF BREATH: 0
VOMITING: 0
ABDOMINAL PAIN: 0
NAUSEA: 0
CHEST TIGHTNESS: 0

## 2018-01-02 NOTE — PROGRESS NOTES
Sitting, Cuff Size: Small Adult)   Pulse 60   Wt 159 lb (72.1 kg)   SpO2 95%   BMI 28.17 kg/m²     Body mass index is 28.17 kg/m². Wt Readings from Last 3 Encounters:   01/02/18 159 lb (72.1 kg)   12/06/17 153 lb (69.4 kg)   11/27/17 153 lb (69.4 kg)     Physical Exam   Constitutional: She is oriented to person, place, and time. She appears well-developed and well-nourished. No distress. HENT:   Head: Normocephalic and atraumatic. Right Ear: External ear normal.   Left Ear: External ear normal.   Mouth/Throat: Oropharynx is clear and moist. No oropharyngeal exudate. Eyes: Conjunctivae and EOM are normal. Right eye exhibits no discharge. Left eye exhibits no discharge. No scleral icterus. Neck: Normal range of motion. Neck supple. Cardiovascular: Normal rate and normal heart sounds. No murmur heard. Pulmonary/Chest: Effort normal and breath sounds normal. No respiratory distress. She has no wheezes. She has no rales. Abdominal: Soft. There is no tenderness. Lymphadenopathy:     She has no cervical adenopathy. Neurological: She is alert and oriented to person, place, and time. She has normal reflexes. She exhibits normal muscle tone. Skin: No rash noted. She is not diaphoretic. Psychiatric: She has a normal mood and affect. Her behavior is normal. Judgment and thought content normal.          Assessment/Plan:   Sriram Barnes was seen today for osteoporosis and congestive heart failure. Diagnoses and all orders for this visit:    Essential hypertension, benign  Well controlled- no changes in medication today. Osteoporosis with current pathological fracture, unspecified osteoporosis type, sequela  On Fosamax- no changes in meds    Chronic systolic congestive heart failure (HCC)  No acute symptoms- keep current med     Pure hypercholesterolemia  Well controlled- no changes in medication today.      Coronary artery disease involving native coronary artery of native heart without angina

## 2018-01-03 RX ORDER — ROSUVASTATIN CALCIUM 20 MG/1
TABLET, COATED ORAL
Qty: 30 TABLET | Refills: 0 | Status: SHIPPED | OUTPATIENT
Start: 2018-01-03 | End: 2018-01-29 | Stop reason: SDUPTHER

## 2018-01-03 RX ORDER — CARVEDILOL 25 MG/1
TABLET ORAL
Qty: 60 TABLET | Refills: 0 | Status: SHIPPED | OUTPATIENT
Start: 2018-01-03 | End: 2018-02-05 | Stop reason: SDUPTHER

## 2018-01-05 ENCOUNTER — TELEPHONE (OUTPATIENT)
Dept: INTERNAL MEDICINE | Age: 73
End: 2018-01-05

## 2018-01-05 RX ORDER — BUPROPION HYDROCHLORIDE 300 MG/1
TABLET ORAL
Qty: 30 TABLET | Refills: 5 | Status: SHIPPED | OUTPATIENT
Start: 2018-01-05 | End: 2018-07-05 | Stop reason: SDUPTHER

## 2018-01-05 NOTE — TELEPHONE ENCOUNTER
Patient called to follow up on the message below. She is out of the medication and stated that she requested it to be refilled at her visit on 1-3-2018. Please call pt with questions or as soon as the rx has been sent in.

## 2018-01-05 NOTE — TELEPHONE ENCOUNTER
Pt needs refill on buPROPion (WELLBUTRIN XL) 300 MG extended release tablet.   Pharmacy on file verified

## 2018-01-29 RX ORDER — ROSUVASTATIN CALCIUM 20 MG/1
TABLET, COATED ORAL
Qty: 30 TABLET | Refills: 3 | Status: SHIPPED | OUTPATIENT
Start: 2018-01-29 | End: 2018-05-28 | Stop reason: SDUPTHER

## 2018-02-05 ENCOUNTER — CARE COORDINATION (OUTPATIENT)
Dept: CASE MANAGEMENT | Age: 73
End: 2018-02-05

## 2018-02-05 ENCOUNTER — TELEPHONE (OUTPATIENT)
Dept: INTERNAL MEDICINE | Age: 73
End: 2018-02-05

## 2018-02-05 DIAGNOSIS — M48.061 SPINAL STENOSIS OF LUMBAR REGION, UNSPECIFIED WHETHER NEUROGENIC CLAUDICATION PRESENT: Primary | ICD-10-CM

## 2018-02-05 RX ORDER — TRAMADOL HYDROCHLORIDE 50 MG/1
TABLET ORAL
Qty: 30 TABLET | Refills: 0 | Status: CANCELLED | OUTPATIENT
Start: 2018-02-05

## 2018-02-05 RX ORDER — TRAMADOL HYDROCHLORIDE 50 MG/1
50 TABLET ORAL EVERY 12 HOURS PRN
Qty: 14 TABLET | Refills: 0 | Status: SHIPPED | OUTPATIENT
Start: 2018-02-05 | End: 2018-02-13 | Stop reason: SDUPTHER

## 2018-02-05 RX ORDER — CARVEDILOL 25 MG/1
TABLET ORAL
Qty: 60 TABLET | Refills: 3 | Status: SHIPPED | OUTPATIENT
Start: 2018-02-05 | End: 2018-06-04 | Stop reason: SDUPTHER

## 2018-02-05 NOTE — CARE COORDINATION
Care Transition  ED Follow up Call    Reason for ED visit:  fall   How are you feeling? In pain  Status:     worsened      Do you have any questions related to your discharge instructions? no    Review of Instructions:    Discharged with new prescription? no    Review Medications:  No:    Do you have any questions related to your medications? no    Understands what to report/when to return?:  Yes   Do you have a follow up appointment scheduled? No  Specific review if indicated (symptom, services, etc):   Spoke with pt's son who stated pt is in a lot of pain since fall. Stated he called PCP office requesting refill on Ultram 50MG tabs but has not heard back yet. CTC noted communication with PCP office today. Son stated he would appreciate a quick response because her knee if hurting. Advised son that PCP may want pt to come in for appt before prescribing medication.  CTC will route to PCP      Do you have any needs or concerns I can assist you with? yes - Pt needs refill on Ultram 50MG    Katie Hubbard, [address]  [phone]         appts/Provider:    Future Appointments  Date Time Provider Giuliana Spring   5/3/2018 9:00 AM Tonya He MD [facility]

## 2018-02-08 ENCOUNTER — TELEPHONE (OUTPATIENT)
Dept: INTERNAL MEDICINE | Age: 73
End: 2018-02-08

## 2018-02-08 ENCOUNTER — HOSPITAL ENCOUNTER (OUTPATIENT)
Dept: OTHER | Age: 73
Discharge: HOME | End: 2018-02-08
Attending: INTERNAL MEDICINE | Admitting: INTERNAL MEDICINE

## 2018-02-08 ENCOUNTER — OFFICE VISIT (OUTPATIENT)
Dept: INTERNAL MEDICINE | Age: 73
End: 2018-02-08

## 2018-02-08 VITALS
SYSTOLIC BLOOD PRESSURE: 118 MMHG | HEART RATE: 62 BPM | BODY MASS INDEX: 28.17 KG/M2 | WEIGHT: 159 LBS | DIASTOLIC BLOOD PRESSURE: 68 MMHG

## 2018-02-08 DIAGNOSIS — M54.50 ACUTE MIDLINE LOW BACK PAIN WITHOUT SCIATICA: ICD-10-CM

## 2018-02-08 DIAGNOSIS — R10.30 LOWER ABDOMINAL PAIN: ICD-10-CM

## 2018-02-08 DIAGNOSIS — K59.03 DRUG-INDUCED CONSTIPATION: ICD-10-CM

## 2018-02-08 DIAGNOSIS — R29.6 RECURRENT FALLS: ICD-10-CM

## 2018-02-08 DIAGNOSIS — R10.30 LOWER ABDOMINAL PAIN: Primary | ICD-10-CM

## 2018-02-08 PROCEDURE — 99213 OFFICE O/P EST LOW 20 MIN: CPT | Performed by: INTERNAL MEDICINE

## 2018-02-08 PROCEDURE — 1090F PRES/ABSN URINE INCON ASSESS: CPT | Performed by: INTERNAL MEDICINE

## 2018-02-08 PROCEDURE — G8417 CALC BMI ABV UP PARAM F/U: HCPCS | Performed by: INTERNAL MEDICINE

## 2018-02-08 PROCEDURE — 1036F TOBACCO NON-USER: CPT | Performed by: INTERNAL MEDICINE

## 2018-02-08 PROCEDURE — G8598 ASA/ANTIPLAT THER USED: HCPCS | Performed by: INTERNAL MEDICINE

## 2018-02-08 PROCEDURE — 1123F ACP DISCUSS/DSCN MKR DOCD: CPT | Performed by: INTERNAL MEDICINE

## 2018-02-08 PROCEDURE — G8399 PT W/DXA RESULTS DOCUMENT: HCPCS | Performed by: INTERNAL MEDICINE

## 2018-02-08 PROCEDURE — 4040F PNEUMOC VAC/ADMIN/RCVD: CPT | Performed by: INTERNAL MEDICINE

## 2018-02-08 PROCEDURE — G8484 FLU IMMUNIZE NO ADMIN: HCPCS | Performed by: INTERNAL MEDICINE

## 2018-02-08 PROCEDURE — 3017F COLORECTAL CA SCREEN DOC REV: CPT | Performed by: INTERNAL MEDICINE

## 2018-02-08 PROCEDURE — G8427 DOCREV CUR MEDS BY ELIG CLIN: HCPCS | Performed by: INTERNAL MEDICINE

## 2018-02-08 RX ORDER — LACTULOSE 10 G/15ML
10 SOLUTION ORAL EVERY EVENING
Qty: 1 BOTTLE | Refills: 1 | Status: SHIPPED | OUTPATIENT
Start: 2018-02-08 | End: 2018-04-09

## 2018-02-08 RX ORDER — AMOXICILLIN 250 MG
2 CAPSULE ORAL DAILY
Qty: 30 TABLET | Refills: 1 | Status: SHIPPED | OUTPATIENT
Start: 2018-02-08 | End: 2018-03-19 | Stop reason: SDUPTHER

## 2018-02-08 ASSESSMENT — ENCOUNTER SYMPTOMS
BACK PAIN: 1
SHORTNESS OF BREATH: 1
CONSTIPATION: 1
CHEST TIGHTNESS: 0
NAUSEA: 0
ABDOMINAL PAIN: 1
VOMITING: 0

## 2018-02-08 NOTE — PROGRESS NOTES
(Site: Right Arm, Position: Sitting, Cuff Size: Small Adult)   Pulse 62   Wt 159 lb (72.1 kg)   BMI 28.17 kg/m²     Body mass index is 28.17 kg/m². Wt Readings from Last 3 Encounters:   02/08/18 159 lb (72.1 kg)   01/02/18 159 lb (72.1 kg)   12/06/17 153 lb (69.4 kg)     Physical Exam   Constitutional: She is oriented to person, place, and time. She appears well-developed and well-nourished. No distress. HENT:   Head: Normocephalic and atraumatic. Right Ear: External ear normal.   Left Ear: External ear normal.   Mouth/Throat: Oropharynx is clear and moist. No oropharyngeal exudate. Eyes: Conjunctivae and EOM are normal. Right eye exhibits no discharge. Left eye exhibits no discharge. No scleral icterus. Neck: Normal range of motion. Neck supple. Cardiovascular: Normal rate and normal heart sounds. No murmur heard. Pulmonary/Chest: Effort normal and breath sounds normal. No respiratory distress. She has no wheezes. She has no rales. Abdominal: Soft. There is tenderness. Mild distention and minimal lower abdominal tenderness. No rebound/ peritoneal signs      Lymphadenopathy:     She has no cervical adenopathy. Neurological: She is alert and oriented to person, place, and time. She has normal reflexes. She exhibits normal muscle tone. Skin: No rash noted. She is not diaphoretic. Psychiatric: She has a normal mood and affect. Her behavior is normal. Judgment and thought content normal.          Assessment/Plan:   Christy Sutton was seen today for follow-up from hospital and constipation. Diagnoses and all orders for this visit:    Lower abdominal pain  -     Cancel: XR Abdomen Kub 1 VW; Future  -     XR ABDOMEN (KUB) (SINGLE AP VIEW); Future  XR abdomen : localized ileus (XR reviewed by me and discussed with radiologist). Pt refused rectal exam for possible stool impaction althouhg most liley drug induced constipation. I will start pharmacological Tx.  If no improvement- she will

## 2018-02-09 ENCOUNTER — TELEPHONE (OUTPATIENT)
Dept: INTERNAL MEDICINE | Age: 73
End: 2018-02-09

## 2018-02-13 ENCOUNTER — TELEPHONE (OUTPATIENT)
Dept: INTERNAL MEDICINE | Age: 73
End: 2018-02-13

## 2018-02-13 DIAGNOSIS — F33.40 DEPRESSION, MAJOR, RECURRENT, IN REMISSION (HCC): Primary | ICD-10-CM

## 2018-02-13 DIAGNOSIS — M54.2 CERVICALGIA: ICD-10-CM

## 2018-02-13 DIAGNOSIS — M48.061 SPINAL STENOSIS OF LUMBAR REGION, UNSPECIFIED WHETHER NEUROGENIC CLAUDICATION PRESENT: ICD-10-CM

## 2018-02-13 RX ORDER — TRAMADOL HYDROCHLORIDE 50 MG/1
50 TABLET ORAL EVERY 12 HOURS PRN
Qty: 60 TABLET | Refills: 0 | Status: SHIPPED | OUTPATIENT
Start: 2018-02-13 | End: 2018-03-15

## 2018-02-13 NOTE — TELEPHONE ENCOUNTER
I prescribed Tramadol  Since we just started Wellbutrin lets give it a few more days before changing more depressive medications

## 2018-02-16 ENCOUNTER — HOSPITAL ENCOUNTER (OUTPATIENT)
Dept: PHYSICAL THERAPY | Age: 73
Discharge: HOME | End: 2018-02-28
Attending: INTERNAL MEDICINE | Admitting: INTERNAL MEDICINE

## 2018-02-16 NOTE — CARE COORDINATION
Physical Therapy  Cancellation/No-show Note  Patient Name:  Author Michaelle  :  1945   Date:  2018  MRN: [id number]  Cancelled visits to date: 1  Eval 18  No-shows to date: 0    For today's appointment patient:  [x]  Cancelled  []  Rescheduled appointment  []  No-show     Reason given by patient:  []  Patient ill  []  Conflicting appointment  []  No transportation    []  Conflict with work  []  No reason given  [x]  Other:     Comments: pt called to cancel today due to the expander in her back was bothering her today, will call back to re-schedule     Electronically signed by:   Tyler Covarrubias, [address] [facility], DPT [id number]

## 2018-02-19 ENCOUNTER — TELEPHONE (OUTPATIENT)
Dept: INTERNAL MEDICINE | Age: 73
End: 2018-02-19

## 2018-02-19 DIAGNOSIS — I10 ESSENTIAL HYPERTENSION, BENIGN: ICD-10-CM

## 2018-02-19 DIAGNOSIS — E55.9 VITAMIN D DEFICIENCY: ICD-10-CM

## 2018-02-19 DIAGNOSIS — R29.6 RECURRENT FALLS: Primary | ICD-10-CM

## 2018-02-19 RX ORDER — CHOLECALCIFEROL (VITAMIN D3) 1250 MCG
50000 CAPSULE ORAL SEE ADMIN INSTRUCTIONS
Qty: 4 CAPSULE | Refills: 5 | Status: SHIPPED | OUTPATIENT
Start: 2018-02-19 | End: 2019-08-19 | Stop reason: ALTCHOICE

## 2018-02-19 RX ORDER — LOSARTAN POTASSIUM 25 MG/1
25 TABLET ORAL DAILY
Qty: 30 TABLET | Refills: 2 | Status: SHIPPED | OUTPATIENT
Start: 2018-02-19 | End: 2018-05-21 | Stop reason: SDUPTHER

## 2018-02-19 NOTE — TELEPHONE ENCOUNTER
Barber Bailey is going to hold off on stool softener for a few days. This has been sent to Dr. Paradise Peters.

## 2018-02-19 NOTE — TELEPHONE ENCOUNTER
Patient's caregiver is calling because pt isn't able to leave home to have physical therapy   Pt is still having back pain and she having bowel movent   Pt has used over the counter pain patches   Caregiver has stop the lactulose (CHRONULAC) 10 GM/15ML solution because the pain started having diarrhea on Sunday that's when he stop the medication   Pt has been given the stool softener caregiver wants to know she he continue with that  Pt has some depression the caregiver is wanting an order for home physical therapy  Please call caregiver aubree

## 2018-02-21 ENCOUNTER — TELEPHONE (OUTPATIENT)
Dept: INTERNAL MEDICINE | Age: 73
End: 2018-02-21

## 2018-02-27 ENCOUNTER — OFFICE VISIT (OUTPATIENT)
Dept: PSYCHOLOGY | Age: 73
End: 2018-02-27

## 2018-02-27 DIAGNOSIS — F33.40 DEPRESSION, MAJOR, RECURRENT, IN REMISSION (HCC): Primary | ICD-10-CM

## 2018-02-27 DIAGNOSIS — M48.061 SPINAL STENOSIS OF LUMBAR REGION, UNSPECIFIED WHETHER NEUROGENIC CLAUDICATION PRESENT: ICD-10-CM

## 2018-02-27 PROCEDURE — 90791 PSYCH DIAGNOSTIC EVALUATION: CPT | Performed by: PSYCHOLOGIST

## 2018-02-27 ASSESSMENT — PATIENT HEALTH QUESTIONNAIRE - PHQ9
5. POOR APPETITE OR OVEREATING: 3
4. FEELING TIRED OR HAVING LITTLE ENERGY: 1
1. LITTLE INTEREST OR PLEASURE IN DOING THINGS: 1
SUM OF ALL RESPONSES TO PHQ9 QUESTIONS 1 & 2: 3
SUM OF ALL RESPONSES TO PHQ QUESTIONS 1-9: 8
2. FEELING DOWN, DEPRESSED OR HOPELESS: 2
6. FEELING BAD ABOUT YOURSELF - OR THAT YOU ARE A FAILURE OR HAVE LET YOURSELF OR YOUR FAMILY DOWN: 0
3. TROUBLE FALLING OR STAYING ASLEEP: 1
7. TROUBLE CONCENTRATING ON THINGS, SUCH AS READING THE NEWSPAPER OR WATCHING TELEVISION: 0
9. THOUGHTS THAT YOU WOULD BE BETTER OFF DEAD, OR OF HURTING YOURSELF: 0
10. IF YOU CHECKED OFF ANY PROBLEMS, HOW DIFFICULT HAVE THESE PROBLEMS MADE IT FOR YOU TO DO YOUR WORK, TAKE CARE OF THINGS AT HOME, OR GET ALONG WITH OTHER PEOPLE: 1
8. MOVING OR SPEAKING SO SLOWLY THAT OTHER PEOPLE COULD HAVE NOTICED. OR THE OPPOSITE, BEING SO FIGETY OR RESTLESS THAT YOU HAVE BEEN MOVING AROUND A LOT MORE THAN USUAL: 0

## 2018-02-27 NOTE — PROGRESS NOTES
tablet 2    Cholecalciferol (VITAMIN D3) 21481 units CAPS Take 50,000 Units by mouth See Admin Instructions every other week 4 capsule 5    traMADol (ULTRAM) 50 MG tablet Take 1 tablet by mouth every 12 hours as needed for Pain for up to 30 days . Take lowest dose possible to manage pain. 60 tablet 0    senna-docusate (PERICOLACE) 8.6-50 MG per tablet Take 2 tablets by mouth daily 30 tablet 1    lactulose (CHRONULAC) 10 GM/15ML solution Take 15 mLs by mouth every evening 1 Bottle 1    carvedilol (COREG) 25 MG tablet TAKE ONE TABLET BY MOUTH TWICE DAILY WITH MEALS 60 tablet 3    rosuvastatin (CRESTOR) 20 MG tablet TAKE 1 TABLET BY MOUTH DAILY 30 tablet 3    buPROPion (WELLBUTRIN XL) 300 MG extended release tablet TAKE 1 TABLET BY MOUTH EVERY MORNING 30 tablet 5    clopidogrel (PLAVIX) 75 MG tablet TAKE 1 TABLET BY MOUTH DAILY 30 tablet 5    citalopram (CELEXA) 20 MG tablet Take 1 tablet by mouth daily 30 tablet 5    alendronate (FOSAMAX) 70 MG tablet Take 1 tablet by mouth every 7 days 4 tablet 3    pantoprazole (PROTONIX) 20 MG tablet TAKE 1 TABLET BY MOUTH TWICE DAILY (Patient taking differently: 20 mg Daily with lunch TAKE 1 TABLET BY MOUTH TWICE DAILY) 60 tablet 5    docusate sodium (COLACE) 100 MG capsule Take 1 capsule by mouth 2 times daily 60 capsule 5    furosemide (LASIX) 20 MG tablet Take 1 tablet by mouth daily as needed (edema) 30 tablet 5    Nutritional Supplements (ENSURE ACTIVE HIGH PROTEIN PO) Take 1 Can by mouth daily as needed      acetaminophen 650 MG TABS Take 650 mg by mouth every 6 hours as needed for Pain or Fever (Fever >100.5 F (38 C)). 60 tablet 1    therapeutic multivitamin-minerals (THERAGRAN-M) tablet Take 1 tablet by mouth daily.  hydroxychloroquine (PLAQUENIL) 200 MG tablet Take 200 mg by mouth daily. No current facility-administered medications for this visit.         Social History:   Social History     Social History    Marital status:

## 2018-03-01 ENCOUNTER — HOSPITAL ENCOUNTER (OUTPATIENT)
Dept: OTHER | Age: 73
Discharge: HOME | End: 2018-03-31
Attending: INTERNAL MEDICINE | Admitting: INTERNAL MEDICINE

## 2018-03-05 ENCOUNTER — TELEPHONE (OUTPATIENT)
Dept: INTERNAL MEDICINE | Age: 73
End: 2018-03-05

## 2018-03-05 DIAGNOSIS — R13.10 DYSPHAGIA, UNSPECIFIED TYPE: Primary | ICD-10-CM

## 2018-03-05 DIAGNOSIS — K59.00 CONSTIPATION, UNSPECIFIED CONSTIPATION TYPE: ICD-10-CM

## 2018-03-05 NOTE — TELEPHONE ENCOUNTER
Adam Helton from Dr. Wright Found office states that she spoke with pt son Denise Argueta. He said that he DOES NOT want his mom to have colonoscopy due to sedation and holding Plavix. Per Adam Helton at [facility], she had an Upper and Lower done last year and did fine. She also states that the procedure can be done without holding plavix. This has been sent to Dr. Magana Just to advise. Can patient get speech therapy?

## 2018-03-06 ENCOUNTER — OFFICE VISIT (OUTPATIENT)
Dept: INTERNAL MEDICINE | Age: 73
End: 2018-03-06

## 2018-03-06 ENCOUNTER — OFFICE VISIT (OUTPATIENT)
Dept: PSYCHOLOGY | Age: 73
End: 2018-03-06

## 2018-03-06 VITALS
WEIGHT: 151 LBS | OXYGEN SATURATION: 96 % | HEART RATE: 60 BPM | DIASTOLIC BLOOD PRESSURE: 62 MMHG | BODY MASS INDEX: 26.76 KG/M2 | SYSTOLIC BLOOD PRESSURE: 118 MMHG

## 2018-03-06 DIAGNOSIS — R13.10 DYSPHAGIA, UNSPECIFIED TYPE: Primary | ICD-10-CM

## 2018-03-06 DIAGNOSIS — F32.A DEPRESSIVE DISORDER: ICD-10-CM

## 2018-03-06 DIAGNOSIS — R10.13 DYSPEPSIA: ICD-10-CM

## 2018-03-06 PROCEDURE — G8399 PT W/DXA RESULTS DOCUMENT: HCPCS | Performed by: INTERNAL MEDICINE

## 2018-03-06 PROCEDURE — 1090F PRES/ABSN URINE INCON ASSESS: CPT | Performed by: INTERNAL MEDICINE

## 2018-03-06 PROCEDURE — 90832 PSYTX W PT 30 MINUTES: CPT | Performed by: PSYCHOLOGIST

## 2018-03-06 PROCEDURE — 4040F PNEUMOC VAC/ADMIN/RCVD: CPT | Performed by: INTERNAL MEDICINE

## 2018-03-06 PROCEDURE — G8417 CALC BMI ABV UP PARAM F/U: HCPCS | Performed by: INTERNAL MEDICINE

## 2018-03-06 PROCEDURE — 3017F COLORECTAL CA SCREEN DOC REV: CPT | Performed by: INTERNAL MEDICINE

## 2018-03-06 PROCEDURE — 1123F ACP DISCUSS/DSCN MKR DOCD: CPT | Performed by: INTERNAL MEDICINE

## 2018-03-06 PROCEDURE — 1036F TOBACCO NON-USER: CPT | Performed by: INTERNAL MEDICINE

## 2018-03-06 PROCEDURE — G8598 ASA/ANTIPLAT THER USED: HCPCS | Performed by: INTERNAL MEDICINE

## 2018-03-06 PROCEDURE — G8484 FLU IMMUNIZE NO ADMIN: HCPCS | Performed by: INTERNAL MEDICINE

## 2018-03-06 PROCEDURE — 99213 OFFICE O/P EST LOW 20 MIN: CPT | Performed by: INTERNAL MEDICINE

## 2018-03-06 PROCEDURE — G8427 DOCREV CUR MEDS BY ELIG CLIN: HCPCS | Performed by: INTERNAL MEDICINE

## 2018-03-06 ASSESSMENT — ENCOUNTER SYMPTOMS
BLOOD IN STOOL: 0
SHORTNESS OF BREATH: 0
VOMITING: 0
CHEST TIGHTNESS: 0
NAUSEA: 0
ABDOMINAL PAIN: 0

## 2018-03-06 NOTE — PROGRESS NOTES
Outpatient Note for established Patient - regular Visit    History Obtained From:  patient, electronic medical record  Is the patient new to me ? - No    HISTORY OF PRESENT ILLNESS:   The patient is a 74 y.o. female who is here today for :  She is c/o of difficulties with swallowing her pills. She also has difficulties swallowing her food. She has less problem with liquids. She is not loosing weight (that was a measurement error). She is also c/o dyspeptic symptoms. She quit smoking 2013 , used to smoke a pack a day. Past Medical History:        Diagnosis Date    CAD (coronary artery disease)     CHF (congestive heart failure) ([facility])     DVT of lower extremity (deep venous thrombosis) ([address]     Hypertension     Lupus     Urinary incontinence        Social History:   TOBACCO:   reports that she has quit smoking. Her smoking use included Cigarettes. She started smoking about 3 years ago. She has a 15.00 pack-year smoking history. She has never used smokeless tobacco.    ETOH:   reports that she drinks alcohol. Current Medications:    Prior to Admission medications    Medication Sig Start Date End Date Taking? Authorizing Provider   losartan (COZAAR) 25 MG tablet TAKE 1 TABLET BY MOUTH DAILY 2/19/18  Yes Camilo Carrero MD   Cholecalciferol (VITAMIN D3) 33555 units CAPS Take 50,000 Units by mouth See Admin Instructions every other week 2/19/18  Yes Camilo Carrero MD   traMADol (ULTRAM) 50 MG tablet Take 1 tablet by mouth every 12 hours as needed for Pain for up to 30 days . Take lowest dose possible to manage pain.  2/13/18 3/15/18 Yes Camilo Carrero MD   senna-docusate (PERICOLACE) 8.6-50 MG per tablet Take 2 tablets by mouth daily 2/8/18  Yes Camilo Carrero MD   lactulose (CHRONULAC) 10 GM/15ML solution Take 15 mLs by mouth every evening 2/8/18  Yes Camilo Carrero MD   carvedilol (COREG) 25 MG tablet TAKE ONE TABLET BY MOUTH TWICE DAILY WITH MEALS 2/5/18  Yes Amrita Vieyra MD   rosuvastatin (CRESTOR) 20 MG tablet TAKE 1 TABLET BY MOUTH DAILY 1/29/18  Yes Amrita Vieyra MD   buPROPion (WELLBUTRIN XL) 300 MG extended release tablet TAKE 1 TABLET BY MOUTH EVERY MORNING 1/5/18  Yes Amrita Vieyra MD   clopidogrel (PLAVIX) 75 MG tablet TAKE 1 TABLET BY MOUTH DAILY 1/2/18  Yes Amrita Vieyra MD   citalopram (CELEXA) 20 MG tablet Take 1 tablet by mouth daily 12/15/17  Yes Amrita Vieyra MD   alendronate (FOSAMAX) 70 MG tablet Take 1 tablet by mouth every 7 days 11/27/17  Yes Amrita Vieyra MD   pantoprazole (PROTONIX) 20 MG tablet TAKE 1 TABLET BY MOUTH TWICE DAILY  Patient taking differently: 20 mg Daily with lunch TAKE 1 TABLET BY MOUTH TWICE DAILY 11/1/17  Yes Amrita Vieyra MD   docusate sodium (COLACE) 100 MG capsule Take 1 capsule by mouth 2 times daily 5/1/17  Yes Ericka Rasmussen MD   furosemide (LASIX) 20 MG tablet Take 1 tablet by mouth daily as needed (edema) 2/24/17  Yes Ericka Rasmussen MD   Nutritional Supplements (ENSURE ACTIVE HIGH PROTEIN PO) Take 1 Can by mouth daily as needed   Yes Historical Provider, MD   acetaminophen 650 MG TABS Take 650 mg by mouth every 6 hours as needed for Pain or Fever (Fever >100.5 F (38 C)). 12/9/13  Yes Patrica Wade MD   therapeutic multivitamin-minerals [facility]) tablet Take 1 tablet by mouth daily. Yes Historical Provider, MD   hydroxychloroquine (PLAQUENIL) 200 MG tablet Take 200 mg by mouth daily. Yes Historical Provider, MD       REVIEW OF SYSTEMS:  Review of Systems   Constitutional: Positive for appetite change. Negative for chills, fever and unexpected weight change. Respiratory: Negative for chest tightness and shortness of breath. Cardiovascular: Negative for chest pain. Gastrointestinal: Negative for abdominal pain, blood in stool, nausea and vomiting.         Dysphagia          Physical Exam:      Vitals: /62 (Site: Left Arm, Position: Sitting, Cuff

## 2018-03-06 NOTE — PROGRESS NOTES
Behavioral Health Consultation  [address] Quyen Goncalves PsyD  Psychologist  3/6/2018  9:20 AM      Time spent with Patient: 30 minutes  This is patient's second [facility] appointment. Reason for Consult:  depression  Referring Provider: Halima Munoz MD  [id number] . [address] [address], [address] [place] [address]      Feedback given to PCP. S:  During the last visit pt goals to 1) create a list of at least 20 pleasurable activities you used to enjoy or currently enjoy engaging in 2) return for f/u in one week    Shared her list but was confused abou the directins. Didn't create a list of pleasurable activities - instead created a list of barriers that get in the way of doing these. Feels overwhelmed and frustrated by her limitations and multiple problem and appointments. Doesn't know how to prioritize them so she can feel she is taking somewhat of a break. Doesn't feel like herself at all. Rarely had down time - kept busy. Enjoyed her life. Doesn't do many activities now. Feels tired all the time and feels everything is too much of a hassle.      O:  MSE:    Appearance    alert, cooperative  Sleep disturbance Yes  Fatigue Yes  Loss of pleasure Yes  Impulsive behavior No  Speech    normal volume, normal rate  Mood    \"overwhelmed\"  Affect    Congruent to thought content and mood  Thought Content    intact  Thought Process    linear and goal directed  Associations    logical connections  Insight    Good  Judgment    Intact  Orientation    oriented to person, place, time, and general circumstances  Memory    recent and remote memory intact  Attention/Concentration    intact  Suicide Assessment    Denies SI      History:    Medications:   Current Outpatient Prescriptions   Medication Sig Dispense Refill    losartan (COZAAR) 25 MG tablet TAKE 1 TABLET BY MOUTH DAILY 30 tablet 2    Cholecalciferol (VITAMIN D3) 99346 units CAPS Take 50,000 Units by mouth See Admin Instructions every other week 4 capsule 5    traMADol status: Former Smoker     Packs/day: 0.50     Years: 30.00     Types: Cigarettes     Start date: 9/24/2014    Smokeless tobacco: Never Used    Alcohol use 0.0 oz/week      Comment: One drink twice weekly    Drug use: No    Sexual activity: Not on file     Other Topics Concern    Not on file     Social History Narrative    No narrative on file       TOBACCO:   reports that she has quit smoking. Her smoking use included Cigarettes. She started smoking about 3 years ago. She has a 15.00 pack-year smoking history. She has never used smokeless tobacco.  ETOH:   reports that she drinks alcohol. Family History:   Family History   Problem Relation Age of Onset    High Blood Pressure Brother          A:  Ms. Heidy Daly continues to struggle with depressed mood due to ongoing medical problems. She continues to be receptive and has responded well to behavioral interventions.      PHQ Scores 2/27/2018 10/24/2017 4/22/2016 11/18/2013   PHQ2 Score 3 0 0 0   PHQ9 Score 8 0 0 0     Interpretation of Total Score Depression Severity: 1-4 = Minimal depression, 5-9 = Mild depression, 10-14 = Moderate depression, 15-19 = Moderately severe depression, 20-27 = Severe depression        Diagnosis:    Depressive Disorder NOS        Plan:  Pt interventions:  [place]-setting to identify pt's primary goals for [facility] visit / overall health and Supportive techniques  behavioral activation interventions, treatment planning    Pt Behavioral Change Plan:  Pt set goals to 1) add to the list started today of at least 20 pleasurable activities you used to enjoy or currently enjoy engaging in 2) return for f/u in one week

## 2018-03-12 RX ORDER — ALENDRONATE SODIUM 70 MG/1
70 TABLET ORAL
Qty: 4 TABLET | Refills: 0 | Status: SHIPPED | OUTPATIENT
Start: 2018-03-12 | End: 2018-04-09 | Stop reason: SDUPTHER

## 2018-03-13 ENCOUNTER — OFFICE VISIT (OUTPATIENT)
Dept: PSYCHOLOGY | Age: 73
End: 2018-03-13

## 2018-03-13 DIAGNOSIS — F32.A DEPRESSIVE DISORDER: Primary | ICD-10-CM

## 2018-03-13 PROCEDURE — 90832 PSYTX W PT 30 MINUTES: CPT | Performed by: PSYCHOLOGIST

## 2018-03-13 ASSESSMENT — PATIENT HEALTH QUESTIONNAIRE - PHQ9
10. IF YOU CHECKED OFF ANY PROBLEMS, HOW DIFFICULT HAVE THESE PROBLEMS MADE IT FOR YOU TO DO YOUR WORK, TAKE CARE OF THINGS AT HOME, OR GET ALONG WITH OTHER PEOPLE: 1
6. FEELING BAD ABOUT YOURSELF - OR THAT YOU ARE A FAILURE OR HAVE LET YOURSELF OR YOUR FAMILY DOWN: 2
7. TROUBLE CONCENTRATING ON THINGS, SUCH AS READING THE NEWSPAPER OR WATCHING TELEVISION: 0
4. FEELING TIRED OR HAVING LITTLE ENERGY: 2
SUM OF ALL RESPONSES TO PHQ QUESTIONS 1-9: 11
2. FEELING DOWN, DEPRESSED OR HOPELESS: 2
1. LITTLE INTEREST OR PLEASURE IN DOING THINGS: 2
8. MOVING OR SPEAKING SO SLOWLY THAT OTHER PEOPLE COULD HAVE NOTICED. OR THE OPPOSITE, BEING SO FIGETY OR RESTLESS THAT YOU HAVE BEEN MOVING AROUND A LOT MORE THAN USUAL: 1
3. TROUBLE FALLING OR STAYING ASLEEP: 0
5. POOR APPETITE OR OVEREATING: 2
9. THOUGHTS THAT YOU WOULD BE BETTER OFF DEAD, OR OF HURTING YOURSELF: 0
SUM OF ALL RESPONSES TO PHQ9 QUESTIONS 1 & 2: 4

## 2018-03-13 NOTE — PROGRESS NOTES
Behavioral Health Consultation  900 Quyen Goncalves PsyD  Psychologist  3/13/2018  9:37 AM      Time spent with Patient: 30 minutes  This is patient's third [facility] appointment. Reason for Consult:  depression  Referring Provider: Amrita Vieyra MD  [id number] . [address] [address], [address] [place] [address]      Feedback given to PCP. S:  During the last visit pt set goals to 1) add to the list started today of at least 20 pleasurable activities you used to enjoy or currently enjoy engaging in 2) return for f/u in one week    Pt shared her list of pleasurable activities. Identified many activities she used to enjoy that are physically too difficult to engage in now, like walk both of her dogs. Also identified other activities she feels are too risky as it involves going out and she is fearful of falling more. Feels badly relying on others for help. Has always been independent and valued this so this impacts willingness to engage in some activities.      O:  MSE:    Appearance    alert, cooperative  Sleep disturbance No   Fatigue Yes  Loss of pleasure Yes  Impulsive behavior No  Speech    normal volume, normal rate  Mood    \"ok\"  Affect    Congruent to thought content and mood  Thought Content    intact  Thought Process    linear and goal directed  Associations    logical connections  Insight    Good  Judgment    Intact  Orientation    oriented to person, place, time, and general circumstances  Memory    recent and remote memory intact  Attention/Concentration    intact  Suicide Assessment    Denies SI      History:    Medications:   Current Outpatient Prescriptions   Medication Sig Dispense Refill    alendronate (FOSAMAX) 70 MG tablet TAKE 1 TABLET BY MOUTH EVERY 7 DAYS 4 tablet 0    losartan (COZAAR) 25 MG tablet TAKE 1 TABLET BY MOUTH DAILY 30 tablet 2    Cholecalciferol (VITAMIN D3) 54021 units CAPS Take 50,000 Units by mouth See Admin Instructions every other week 4 capsule 5    traMADol (ULTRAM) 50 MG tablet Take 1 tablet by mouth every 12 hours as needed for Pain for up to 30 days . Take lowest dose possible to manage pain. 60 tablet 0    senna-docusate (PERICOLACE) 8.6-50 MG per tablet Take 2 tablets by mouth daily 30 tablet 1    lactulose (CHRONULAC) 10 GM/15ML solution Take 15 mLs by mouth every evening 1 Bottle 1    carvedilol (COREG) 25 MG tablet TAKE ONE TABLET BY MOUTH TWICE DAILY WITH MEALS 60 tablet 3    rosuvastatin (CRESTOR) 20 MG tablet TAKE 1 TABLET BY MOUTH DAILY 30 tablet 3    buPROPion (WELLBUTRIN XL) 300 MG extended release tablet TAKE 1 TABLET BY MOUTH EVERY MORNING 30 tablet 5    clopidogrel (PLAVIX) 75 MG tablet TAKE 1 TABLET BY MOUTH DAILY 30 tablet 5    citalopram (CELEXA) 20 MG tablet Take 1 tablet by mouth daily 30 tablet 5    pantoprazole (PROTONIX) 20 MG tablet TAKE 1 TABLET BY MOUTH TWICE DAILY (Patient taking differently: 20 mg Daily with lunch TAKE 1 TABLET BY MOUTH TWICE DAILY) 60 tablet 5    docusate sodium (COLACE) 100 MG capsule Take 1 capsule by mouth 2 times daily 60 capsule 5    furosemide (LASIX) 20 MG tablet Take 1 tablet by mouth daily as needed (edema) 30 tablet 5    Nutritional Supplements (ENSURE ACTIVE HIGH PROTEIN PO) Take 1 Can by mouth daily as needed      acetaminophen 650 MG TABS Take 650 mg by mouth every 6 hours as needed for Pain or Fever (Fever >100.5 F (38 C)). 60 tablet 1    therapeutic multivitamin-minerals (THERAGRAN-M) tablet Take 1 tablet by mouth daily.  hydroxychloroquine (PLAQUENIL) 200 MG tablet Take 200 mg by mouth daily. No current facility-administered medications for this visit.         Social History:   Social History     Social History    Marital status:      Spouse name: N/A    Number of children: 1    Years of education: 15     Occupational History    Retired      Social History Main Topics    Smoking status: Former Smoker     Packs/day: 0.50     Years: 30.00     Types: Cigarettes     Start date:

## 2018-03-16 ENCOUNTER — CARE COORDINATION (OUTPATIENT)
Dept: CARE COORDINATION | Age: 73
End: 2018-03-16

## 2018-03-19 DIAGNOSIS — K59.03 DRUG-INDUCED CONSTIPATION: ICD-10-CM

## 2018-03-19 RX ORDER — DOCUSATE SODIUM AND SENNOSIDES 8.6; 5 MG/1; MG/1
2 TABLET ORAL DAILY
Qty: 30 TABLET | Refills: 5 | Status: SHIPPED | OUTPATIENT
Start: 2018-03-19 | End: 2018-08-03

## 2018-03-20 ENCOUNTER — OFFICE VISIT (OUTPATIENT)
Dept: PSYCHOLOGY | Age: 73
End: 2018-03-20

## 2018-03-20 DIAGNOSIS — F32.A DEPRESSIVE DISORDER: Primary | ICD-10-CM

## 2018-03-20 PROCEDURE — 90832 PSYTX W PT 30 MINUTES: CPT | Performed by: PSYCHOLOGIST

## 2018-03-20 ASSESSMENT — PATIENT HEALTH QUESTIONNAIRE - PHQ9
4. FEELING TIRED OR HAVING LITTLE ENERGY: 2
3. TROUBLE FALLING OR STAYING ASLEEP: 0
7. TROUBLE CONCENTRATING ON THINGS, SUCH AS READING THE NEWSPAPER OR WATCHING TELEVISION: 0
SUM OF ALL RESPONSES TO PHQ QUESTIONS 1-9: 9
6. FEELING BAD ABOUT YOURSELF - OR THAT YOU ARE A FAILURE OR HAVE LET YOURSELF OR YOUR FAMILY DOWN: 2
2. FEELING DOWN, DEPRESSED OR HOPELESS: 2
9. THOUGHTS THAT YOU WOULD BE BETTER OFF DEAD, OR OF HURTING YOURSELF: 0
SUM OF ALL RESPONSES TO PHQ9 QUESTIONS 1 & 2: 3
1. LITTLE INTEREST OR PLEASURE IN DOING THINGS: 1
10. IF YOU CHECKED OFF ANY PROBLEMS, HOW DIFFICULT HAVE THESE PROBLEMS MADE IT FOR YOU TO DO YOUR WORK, TAKE CARE OF THINGS AT HOME, OR GET ALONG WITH OTHER PEOPLE: 1
8. MOVING OR SPEAKING SO SLOWLY THAT OTHER PEOPLE COULD HAVE NOTICED. OR THE OPPOSITE, BEING SO FIGETY OR RESTLESS THAT YOU HAVE BEEN MOVING AROUND A LOT MORE THAN USUAL: 0
5. POOR APPETITE OR OVEREATING: 2

## 2018-03-20 NOTE — PROGRESS NOTES
Behavioral Health Consultation  900 Quyen Goncalves PsyD  Psychologist  3/20/2018  9:09 AM      Time spent with Patient: 30 minutes  This is patient's fourth [facility] appointment. Reason for Consult:  depression  Referring Provider: Radha Romero MD  [id number] . [address] [address], [address] [place] [address]      Feedback given to PCP. S:  During the last visit pt set goals to 1) go to [place] with friends once in the next week 2) rate mood pre and post going to lunch 3) return for f/u in one     Pt reports she had been doing well until yesterday. Physical therapist told her she will never be able to use a cane and will always have to use a walker. Is feeling disappointed. Did go to [place] with girlfriends. Had a great time and looked forward to it. Noticed mood even before, just having planned it, had improved. Struggles with finding the positive in her situation. , Doesn't like being looked at as handicapped. Holds onto what life was like before her falls, especially her most recent fall.          O:  MSE:    Appearance    alert, cooperative  Sleep disturbance No   Fatigue Yes  Loss of pleasure Yes  Impulsive behavior No  Speech    normal volume, normal rate  Mood    \"ok\"  Affect    Congruent to thought content and mood  Thought Content    intact  Thought Process    linear and goal directed  Associations    logical connections  Insight    Good  Judgment    Intact  Orientation    oriented to person, place, time, and general circumstances  Memory    recent and remote memory intact  Attention/Concentration    intact  Suicide Assessment    Denies SI      History:    Medications:   Current Outpatient Prescriptions   Medication Sig Dispense Refill    SENNA S 8.6-50 MG per tablet TAKE 2 TABLETS BY MOUTH DAILY 30 tablet 5    alendronate (FOSAMAX) 70 MG tablet TAKE 1 TABLET BY MOUTH EVERY 7 DAYS 4 tablet 0    losartan (COZAAR) 25 MG tablet TAKE 1 TABLET BY MOUTH DAILY 30 tablet 2    use: No    Sexual activity: Not on file     Other Topics Concern    Not on file     Social History Narrative    No narrative on file       TOBACCO:   reports that she has quit smoking. Her smoking use included Cigarettes. She started smoking about 3 years ago. She has a 15.00 pack-year smoking history. She has never used smokeless tobacco.  ETOH:   reports that she drinks alcohol. Family History:   Family History   Problem Relation Age of Onset    High Blood Pressure Brother          A:  Ms. Mauricio Bañuelos continues to struggle with depressed mood due to ongoing medical problems and the barriers it creates in maintaining her previous lifestyle. She actively worked on towards goals set during the last visit and has responded well to behavioral interventions.        PHQ Scores 3/20/2018 3/13/2018 2/27/2018 10/24/2017 4/22/2016 11/18/2013   PHQ2 Score 3 4 3 0 0 0   PHQ9 Score 9 11 8 0 0 0     Interpretation of Total Score Depression Severity: 1-4 = Minimal depression, 5-9 = Mild depression, 10-14 = Moderate depression, 15-19 = Moderately severe depression, 20-27 = Severe depression      Diagnosis:    Depressive Disorder NOS        Plan:  Pt interventions:  [place]-setting to identify pt's primary goals for [facility] visit / overall health and Supportive techniques  CBT interventions, behavioral activation interventions, ACT interventions, treatment planning and reinforced pt's skill use    Pt Behavioral Change Plan:  Pt set goals to 1) go to lunch on your own, as identified in pleasurable activities list, and rate mood pre and post 2) practice cognitive strategies to address all or nothing thinking 3) return for f/u in one week

## 2018-03-22 ENCOUNTER — CARE COORDINATION (OUTPATIENT)
Dept: CARE COORDINATION | Age: 73
End: 2018-03-22

## 2018-03-22 NOTE — CARE COORDINATION
[facility] placed phone call to follow-up after introductory letter was mailed. Detailed message left requesting a return phone call. Return phone numbers provided. [facility] will continue to attempt future outreaches.

## 2018-03-27 ENCOUNTER — OFFICE VISIT (OUTPATIENT)
Dept: PSYCHOLOGY | Age: 73
End: 2018-03-27

## 2018-03-27 DIAGNOSIS — F32.A DEPRESSIVE DISORDER: Primary | ICD-10-CM

## 2018-03-27 PROCEDURE — 90832 PSYTX W PT 30 MINUTES: CPT | Performed by: PSYCHOLOGIST

## 2018-03-27 ASSESSMENT — PATIENT HEALTH QUESTIONNAIRE - PHQ9
SUM OF ALL RESPONSES TO PHQ QUESTIONS 1-9: 5
9. THOUGHTS THAT YOU WOULD BE BETTER OFF DEAD, OR OF HURTING YOURSELF: 0
8. MOVING OR SPEAKING SO SLOWLY THAT OTHER PEOPLE COULD HAVE NOTICED. OR THE OPPOSITE, BEING SO FIGETY OR RESTLESS THAT YOU HAVE BEEN MOVING AROUND A LOT MORE THAN USUAL: 1
2. FEELING DOWN, DEPRESSED OR HOPELESS: 1
10. IF YOU CHECKED OFF ANY PROBLEMS, HOW DIFFICULT HAVE THESE PROBLEMS MADE IT FOR YOU TO DO YOUR WORK, TAKE CARE OF THINGS AT HOME, OR GET ALONG WITH OTHER PEOPLE: 1
1. LITTLE INTEREST OR PLEASURE IN DOING THINGS: 1
6. FEELING BAD ABOUT YOURSELF - OR THAT YOU ARE A FAILURE OR HAVE LET YOURSELF OR YOUR FAMILY DOWN: 1
SUM OF ALL RESPONSES TO PHQ9 QUESTIONS 1 & 2: 2
4. FEELING TIRED OR HAVING LITTLE ENERGY: 0
5. POOR APPETITE OR OVEREATING: 0
7. TROUBLE CONCENTRATING ON THINGS, SUCH AS READING THE NEWSPAPER OR WATCHING TELEVISION: 1
3. TROUBLE FALLING OR STAYING ASLEEP: 0

## 2018-03-27 NOTE — PROGRESS NOTES
Behavioral Health Consultation  900 Quyen Gnocalves PsyD  Psychologist  3/27/2018  9:27 AM      Time spent with Patient: 30 minutes  This is patient's fifth [facility] appointment. Reason for Consult:  depression  Referring Provider: Halima Munoz MD  [id number] MAHAMED Santiago  [address] [facility], [address] [place] [address]      Feedback given to PCP. S:  During the last visit pt set goals to 1) go to lunch on your own, as identified in pleasurable activities list, and rate mood pre and post 2) practice cognitive strategies to address all or nothing thinking 3) return for f/u in one week      Pt reports doing \"good. \" Pt reports she went to lunch on her own and really enjoyed it. Notices she didn't have any stress around this outing. Is going out more often and isn't putting as pressure on herself and trusting her abilities more. Feels more confident. Since mood has improved has started second guessing the problems she had been experiencing with mood and whether or not they were a big deal. Some stressors recently with son. Coping well.     O:  MSE:    Appearance    alert, cooperative  Sleep disturbance No   Fatigue No  Loss of pleasure Yes  Impulsive behavior No  Speech    normal volume, normal rate  Mood    \"good\"  Affect    Congruent to thought content and mood  Thought Content    intact  Thought Process    linear and goal directed  Associations    logical connections  Insight    Good  Judgment    Intact  Orientation    oriented to person, place, time, and general circumstances  Memory    recent and remote memory intact  Attention/Concentration    intact  Suicide Assessment    Denies SI      History:    Medications:   Current Outpatient Prescriptions   Medication Sig Dispense Refill    SENNA S 8.6-50 MG per tablet TAKE 2 TABLETS BY MOUTH DAILY 30 tablet 5    alendronate (FOSAMAX) 70 MG tablet TAKE 1 TABLET BY MOUTH EVERY 7 DAYS 4 tablet 0    losartan (COZAAR) 25 MG tablet TAKE 1 TABLET BY MOUTH DAILY 30 tablet 2    Cholecalciferol (VITAMIN D3) 60740 units CAPS Take 50,000 Units by mouth See Admin Instructions every other week 4 capsule 5    lactulose (CHRONULAC) 10 GM/15ML solution Take 15 mLs by mouth every evening 1 Bottle 1    carvedilol (COREG) 25 MG tablet TAKE ONE TABLET BY MOUTH TWICE DAILY WITH MEALS 60 tablet 3    rosuvastatin (CRESTOR) 20 MG tablet TAKE 1 TABLET BY MOUTH DAILY 30 tablet 3    buPROPion (WELLBUTRIN XL) 300 MG extended release tablet TAKE 1 TABLET BY MOUTH EVERY MORNING 30 tablet 5    clopidogrel (PLAVIX) 75 MG tablet TAKE 1 TABLET BY MOUTH DAILY 30 tablet 5    citalopram (CELEXA) 20 MG tablet Take 1 tablet by mouth daily 30 tablet 5    pantoprazole (PROTONIX) 20 MG tablet TAKE 1 TABLET BY MOUTH TWICE DAILY (Patient taking differently: 20 mg Daily with lunch TAKE 1 TABLET BY MOUTH TWICE DAILY) 60 tablet 5    docusate sodium (COLACE) 100 MG capsule Take 1 capsule by mouth 2 times daily 60 capsule 5    furosemide (LASIX) 20 MG tablet Take 1 tablet by mouth daily as needed (edema) 30 tablet 5    Nutritional Supplements (ENSURE ACTIVE HIGH PROTEIN PO) Take 1 Can by mouth daily as needed      acetaminophen 650 MG TABS Take 650 mg by mouth every 6 hours as needed for Pain or Fever (Fever >100.5 F (38 C)). 60 tablet 1    therapeutic multivitamin-minerals (THERAGRAN-M) tablet Take 1 tablet by mouth daily.  hydroxychloroquine (PLAQUENIL) 200 MG tablet Take 200 mg by mouth daily. No current facility-administered medications for this visit.         Social History:   Social History     Social History    Marital status:      Spouse name: N/A    Number of children: 1    Years of education: 15     Occupational History    Retired      Social History Main Topics    Smoking status: Former Smoker     Packs/day: 0.50     Years: 30.00     Types: Cigarettes     Start date: 9/24/2014    Smokeless tobacco: Never Used    Alcohol use 0.0 oz/week      Comment: One drink twice weekly    Drug use: No    Sexual activity: Not on file     Other Topics Concern    Not on file     Social History Narrative    No narrative on file       TOBACCO:   reports that she has quit smoking. Her smoking use included Cigarettes. She started smoking about 3 years ago. She has a 15.00 pack-year smoking history. She has never used smokeless tobacco.  ETOH:   reports that she drinks alcohol. Family History:   Family History   Problem Relation Age of Onset    High Blood Pressure Brother          A:  Ms. Adolph Melara mood has improved. She reports noticing less stress and sadness around her limitations in engaging in activities. She has found she has enjoyed going out more and feels more confident. Ms. Jean Marie Boles has responded well to behavioral interventions.        PHQ Scores 3/27/2018 3/20/2018 3/13/2018 2/27/2018 10/24/2017 4/22/2016 11/18/2013   PHQ2 Score 2 3 4 3 0 0 0   PHQ9 Score 5 9 11 8 0 0 0     Interpretation of Total Score Depression Severity: 1-4 = Minimal depression, 5-9 = Mild depression, 10-14 = Moderate depression, 15-19 = Moderately severe depression, 20-27 = Severe depression      Diagnosis:    Depressive Disorder NOS        Plan:  Pt interventions:  [place]-setting to identify pt's primary goals for [facility] visit / overall health and Supportive techniques  behavioral activation interventions and reinforced pt's skill use, treatment planning    Pt Behavioral Change Plan:  Pt set goals to 1) continue to accept offers to engage in more social activities 2) continue to practice cognitive strategies for all or nothing thinking 3) return for f/u in one week

## 2018-03-30 ENCOUNTER — HOSPITAL ENCOUNTER (OUTPATIENT)
Dept: GENERAL RADIOLOGY | Age: 73
Discharge: HOME | End: 2018-03-30
Attending: INTERNAL MEDICINE | Admitting: INTERNAL MEDICINE

## 2018-03-30 DIAGNOSIS — R13.10 DYSPHAGIA: ICD-10-CM

## 2018-03-30 DIAGNOSIS — R13.10 DYSPHAGIA, UNSPECIFIED TYPE: ICD-10-CM

## 2018-04-03 ENCOUNTER — OFFICE VISIT (OUTPATIENT)
Dept: PSYCHOLOGY | Age: 73
End: 2018-04-03

## 2018-04-03 DIAGNOSIS — F32.A DEPRESSIVE DISORDER: Primary | ICD-10-CM

## 2018-04-03 PROCEDURE — 90832 PSYTX W PT 30 MINUTES: CPT | Performed by: PSYCHOLOGIST

## 2018-04-03 ASSESSMENT — PATIENT HEALTH QUESTIONNAIRE - PHQ9
10. IF YOU CHECKED OFF ANY PROBLEMS, HOW DIFFICULT HAVE THESE PROBLEMS MADE IT FOR YOU TO DO YOUR WORK, TAKE CARE OF THINGS AT HOME, OR GET ALONG WITH OTHER PEOPLE: 1
9. THOUGHTS THAT YOU WOULD BE BETTER OFF DEAD, OR OF HURTING YOURSELF: 0
SUM OF ALL RESPONSES TO PHQ9 QUESTIONS 1 & 2: 2
6. FEELING BAD ABOUT YOURSELF - OR THAT YOU ARE A FAILURE OR HAVE LET YOURSELF OR YOUR FAMILY DOWN: 0
5. POOR APPETITE OR OVEREATING: 1
3. TROUBLE FALLING OR STAYING ASLEEP: 0
1. LITTLE INTEREST OR PLEASURE IN DOING THINGS: 1
SUM OF ALL RESPONSES TO PHQ QUESTIONS 1-9: 5
2. FEELING DOWN, DEPRESSED OR HOPELESS: 1
8. MOVING OR SPEAKING SO SLOWLY THAT OTHER PEOPLE COULD HAVE NOTICED. OR THE OPPOSITE, BEING SO FIGETY OR RESTLESS THAT YOU HAVE BEEN MOVING AROUND A LOT MORE THAN USUAL: 0
4. FEELING TIRED OR HAVING LITTLE ENERGY: 2

## 2018-04-04 ENCOUNTER — TELEPHONE (OUTPATIENT)
Dept: INTERNAL MEDICINE | Age: 73
End: 2018-04-04

## 2018-04-04 DIAGNOSIS — Z12.39 SCREENING FOR BREAST CANCER: Primary | ICD-10-CM

## 2018-04-09 ENCOUNTER — HOSPITAL ENCOUNTER (OUTPATIENT)
Dept: ENDOSCOPY | Age: 73
Discharge: HOME | End: 2018-04-09
Attending: INTERNAL MEDICINE | Admitting: INTERNAL MEDICINE

## 2018-04-09 VITALS
RESPIRATION RATE: 16 BRPM | HEART RATE: 56 BPM | TEMPERATURE: 97 F | SYSTOLIC BLOOD PRESSURE: 148 MMHG | HEIGHT: 60 IN | BODY MASS INDEX: 29.45 KG/M2 | DIASTOLIC BLOOD PRESSURE: 71 MMHG | OXYGEN SATURATION: 100 % | WEIGHT: 150 LBS

## 2018-04-09 RX ORDER — ALENDRONATE SODIUM 70 MG/1
70 TABLET ORAL
Qty: 4 TABLET | Refills: 0 | Status: SHIPPED | OUTPATIENT
Start: 2018-04-09 | End: 2018-05-03

## 2018-04-09 ASSESSMENT — PAIN - FUNCTIONAL ASSESSMENT: PAIN_FUNCTIONAL_ASSESSMENT: 0-10

## 2018-04-10 ENCOUNTER — OFFICE VISIT (OUTPATIENT)
Dept: PSYCHOLOGY | Age: 73
End: 2018-04-10

## 2018-04-10 DIAGNOSIS — F32.A DEPRESSIVE DISORDER: Primary | ICD-10-CM

## 2018-04-10 PROCEDURE — 90832 PSYTX W PT 30 MINUTES: CPT | Performed by: PSYCHOLOGIST

## 2018-04-10 ASSESSMENT — PATIENT HEALTH QUESTIONNAIRE - PHQ9
4. FEELING TIRED OR HAVING LITTLE ENERGY: 1
7. TROUBLE CONCENTRATING ON THINGS, SUCH AS READING THE NEWSPAPER OR WATCHING TELEVISION: 0
SUM OF ALL RESPONSES TO PHQ9 QUESTIONS 1 & 2: 1
2. FEELING DOWN, DEPRESSED OR HOPELESS: 1
1. LITTLE INTEREST OR PLEASURE IN DOING THINGS: 0
SUM OF ALL RESPONSES TO PHQ QUESTIONS 1-9: 4
10. IF YOU CHECKED OFF ANY PROBLEMS, HOW DIFFICULT HAVE THESE PROBLEMS MADE IT FOR YOU TO DO YOUR WORK, TAKE CARE OF THINGS AT HOME, OR GET ALONG WITH OTHER PEOPLE: 0
5. POOR APPETITE OR OVEREATING: 1
6. FEELING BAD ABOUT YOURSELF - OR THAT YOU ARE A FAILURE OR HAVE LET YOURSELF OR YOUR FAMILY DOWN: 1
3. TROUBLE FALLING OR STAYING ASLEEP: 0
9. THOUGHTS THAT YOU WOULD BE BETTER OFF DEAD, OR OF HURTING YOURSELF: 0
8. MOVING OR SPEAKING SO SLOWLY THAT OTHER PEOPLE COULD HAVE NOTICED. OR THE OPPOSITE, BEING SO FIGETY OR RESTLESS THAT YOU HAVE BEEN MOVING AROUND A LOT MORE THAN USUAL: 0

## 2018-04-11 ENCOUNTER — TELEPHONE (OUTPATIENT)
Dept: INTERNAL MEDICINE | Age: 73
End: 2018-04-11

## 2018-04-12 ENCOUNTER — HOSPITAL ENCOUNTER (OUTPATIENT)
Dept: MAMMOGRAPHY | Age: 73
Discharge: HOME | End: 2018-04-12
Admitting: INTERNAL MEDICINE

## 2018-04-12 DIAGNOSIS — M81.0 AGE-RELATED OSTEOPOROSIS WITHOUT CURRENT PATHOLOGICAL FRACTURE: ICD-10-CM

## 2018-04-12 DIAGNOSIS — Z13.820 SCREENING FOR OSTEOPOROSIS: ICD-10-CM

## 2018-04-12 DIAGNOSIS — Z12.31 VISIT FOR SCREENING MAMMOGRAM: ICD-10-CM

## 2018-04-19 ENCOUNTER — OFFICE VISIT (OUTPATIENT)
Dept: PSYCHOLOGY | Age: 73
End: 2018-04-19

## 2018-04-19 DIAGNOSIS — F32.A DEPRESSIVE DISORDER: Primary | ICD-10-CM

## 2018-04-19 PROCEDURE — 90832 PSYTX W PT 30 MINUTES: CPT | Performed by: PSYCHOLOGIST

## 2018-04-19 ASSESSMENT — PATIENT HEALTH QUESTIONNAIRE - PHQ9
6. FEELING BAD ABOUT YOURSELF - OR THAT YOU ARE A FAILURE OR HAVE LET YOURSELF OR YOUR FAMILY DOWN: 0
2. FEELING DOWN, DEPRESSED OR HOPELESS: 1
8. MOVING OR SPEAKING SO SLOWLY THAT OTHER PEOPLE COULD HAVE NOTICED. OR THE OPPOSITE, BEING SO FIGETY OR RESTLESS THAT YOU HAVE BEEN MOVING AROUND A LOT MORE THAN USUAL: 0
9. THOUGHTS THAT YOU WOULD BE BETTER OFF DEAD, OR OF HURTING YOURSELF: 0
10. IF YOU CHECKED OFF ANY PROBLEMS, HOW DIFFICULT HAVE THESE PROBLEMS MADE IT FOR YOU TO DO YOUR WORK, TAKE CARE OF THINGS AT HOME, OR GET ALONG WITH OTHER PEOPLE: 0
4. FEELING TIRED OR HAVING LITTLE ENERGY: 1
1. LITTLE INTEREST OR PLEASURE IN DOING THINGS: 1
7. TROUBLE CONCENTRATING ON THINGS, SUCH AS READING THE NEWSPAPER OR WATCHING TELEVISION: 0
3. TROUBLE FALLING OR STAYING ASLEEP: 0
SUM OF ALL RESPONSES TO PHQ9 QUESTIONS 1 & 2: 2
SUM OF ALL RESPONSES TO PHQ QUESTIONS 1-9: 4
5. POOR APPETITE OR OVEREATING: 1

## 2018-04-25 ENCOUNTER — OFFICE VISIT (OUTPATIENT)
Dept: PSYCHOLOGY | Age: 73
End: 2018-04-25

## 2018-04-25 ENCOUNTER — TELEPHONE (OUTPATIENT)
Dept: INTERNAL MEDICINE | Age: 73
End: 2018-04-25

## 2018-04-25 DIAGNOSIS — F32.A DEPRESSIVE DISORDER: Primary | ICD-10-CM

## 2018-04-25 PROCEDURE — 90832 PSYTX W PT 30 MINUTES: CPT | Performed by: PSYCHOLOGIST

## 2018-04-25 ASSESSMENT — PATIENT HEALTH QUESTIONNAIRE - PHQ9
5. POOR APPETITE OR OVEREATING: 0
2. FEELING DOWN, DEPRESSED OR HOPELESS: 1
1. LITTLE INTEREST OR PLEASURE IN DOING THINGS: 1
8. MOVING OR SPEAKING SO SLOWLY THAT OTHER PEOPLE COULD HAVE NOTICED. OR THE OPPOSITE, BEING SO FIGETY OR RESTLESS THAT YOU HAVE BEEN MOVING AROUND A LOT MORE THAN USUAL: 0
6. FEELING BAD ABOUT YOURSELF - OR THAT YOU ARE A FAILURE OR HAVE LET YOURSELF OR YOUR FAMILY DOWN: 0
9. THOUGHTS THAT YOU WOULD BE BETTER OFF DEAD, OR OF HURTING YOURSELF: 0
SUM OF ALL RESPONSES TO PHQ QUESTIONS 1-9: 4
10. IF YOU CHECKED OFF ANY PROBLEMS, HOW DIFFICULT HAVE THESE PROBLEMS MADE IT FOR YOU TO DO YOUR WORK, TAKE CARE OF THINGS AT HOME, OR GET ALONG WITH OTHER PEOPLE: 1
3. TROUBLE FALLING OR STAYING ASLEEP: 0
7. TROUBLE CONCENTRATING ON THINGS, SUCH AS READING THE NEWSPAPER OR WATCHING TELEVISION: 0
SUM OF ALL RESPONSES TO PHQ9 QUESTIONS 1 & 2: 2
4. FEELING TIRED OR HAVING LITTLE ENERGY: 2

## 2018-05-03 ENCOUNTER — OFFICE VISIT (OUTPATIENT)
Dept: PSYCHOLOGY | Age: 73
End: 2018-05-03

## 2018-05-03 ENCOUNTER — OFFICE VISIT (OUTPATIENT)
Dept: INTERNAL MEDICINE | Age: 73
End: 2018-05-03

## 2018-05-03 VITALS
WEIGHT: 151 LBS | BODY MASS INDEX: 29.49 KG/M2 | DIASTOLIC BLOOD PRESSURE: 68 MMHG | OXYGEN SATURATION: 97 % | SYSTOLIC BLOOD PRESSURE: 108 MMHG | HEART RATE: 62 BPM

## 2018-05-03 DIAGNOSIS — F32.A DEPRESSIVE DISORDER: Primary | ICD-10-CM

## 2018-05-03 DIAGNOSIS — M70.61 TROCHANTERIC BURSITIS OF RIGHT HIP: ICD-10-CM

## 2018-05-03 DIAGNOSIS — I10 ESSENTIAL HYPERTENSION, BENIGN: ICD-10-CM

## 2018-05-03 DIAGNOSIS — M32.9 SYSTEMIC LUPUS ERYTHEMATOSUS, UNSPECIFIED SLE TYPE, UNSPECIFIED ORGAN INVOLVEMENT STATUS (HCC): ICD-10-CM

## 2018-05-03 DIAGNOSIS — I10 ESSENTIAL HYPERTENSION, BENIGN: Primary | ICD-10-CM

## 2018-05-03 DIAGNOSIS — I50.22 CHRONIC SYSTOLIC CONGESTIVE HEART FAILURE (HCC): ICD-10-CM

## 2018-05-03 DIAGNOSIS — I63.9 RIGHT-SIDED CEREBROVASCULAR ACCIDENT (CVA) (HCC): ICD-10-CM

## 2018-05-03 DIAGNOSIS — E78.00 PURE HYPERCHOLESTEROLEMIA: ICD-10-CM

## 2018-05-03 DIAGNOSIS — I25.10 CORONARY ARTERY DISEASE INVOLVING NATIVE CORONARY ARTERY OF NATIVE HEART WITHOUT ANGINA PECTORIS: ICD-10-CM

## 2018-05-03 DIAGNOSIS — I50.22 CHRONIC SYSTOLIC CONGESTIVE HEART FAILURE (HCC): Primary | ICD-10-CM

## 2018-05-03 LAB
A/G RATIO: 1.5 (ref 1.1–2.2)
ALBUMIN SERPL-MCNC: 4.3 G/DL (ref 3.4–5)
ALP BLD-CCNC: 116 U/L (ref 40–129)
ALT SERPL-CCNC: 19 U/L (ref 10–40)
ANION GAP SERPL CALCULATED.3IONS-SCNC: 17 MMOL/L (ref 3–16)
AST SERPL-CCNC: 24 U/L (ref 15–37)
BASOPHILS ABSOLUTE: 0.1 K/UL (ref 0–0.2)
BASOPHILS RELATIVE PERCENT: 1 %
BILIRUB SERPL-MCNC: 0.3 MG/DL (ref 0–1)
BUN BLDV-MCNC: 28 MG/DL (ref 7–20)
CALCIUM SERPL-MCNC: 9.4 MG/DL (ref 8.3–10.6)
CHLORIDE BLD-SCNC: 99 MMOL/L (ref 99–110)
CHOLESTEROL, TOTAL: 140 MG/DL (ref 0–199)
CO2: 23 MMOL/L (ref 21–32)
CREAT SERPL-MCNC: 0.8 MG/DL (ref 0.6–1.2)
EOSINOPHILS ABSOLUTE: 0.1 K/UL (ref 0–0.6)
EOSINOPHILS RELATIVE PERCENT: 2.2 %
GFR AFRICAN AMERICAN: >60
GFR NON-AFRICAN AMERICAN: >60
GLOBULIN: 2.8 G/DL
GLUCOSE BLD-MCNC: 89 MG/DL (ref 70–99)
HCT VFR BLD CALC: 36.6 % (ref 36–48)
HDLC SERPL-MCNC: 66 MG/DL (ref 40–60)
HEMOGLOBIN: 12.2 G/DL (ref 12–16)
LDL CHOLESTEROL CALCULATED: 52 MG/DL
LYMPHOCYTES ABSOLUTE: 1.2 K/UL (ref 1–5.1)
LYMPHOCYTES RELATIVE PERCENT: 19.8 %
MCH RBC QN AUTO: 33.1 PG (ref 26–34)
MCHC RBC AUTO-ENTMCNC: 33.5 G/DL (ref 31–36)
MCV RBC AUTO: 98.8 FL (ref 80–100)
MONOCYTES ABSOLUTE: 0.6 K/UL (ref 0–1.3)
MONOCYTES RELATIVE PERCENT: 10 %
NEUTROPHILS ABSOLUTE: 3.9 K/UL (ref 1.7–7.7)
NEUTROPHILS RELATIVE PERCENT: 67 %
PDW BLD-RTO: 14.6 % (ref 12.4–15.4)
PLATELET # BLD: 267 K/UL (ref 135–450)
PMV BLD AUTO: 6.7 FL (ref 5–10.5)
POTASSIUM SERPL-SCNC: 4 MMOL/L (ref 3.5–5.1)
RBC # BLD: 3.7 M/UL (ref 4–5.2)
SODIUM BLD-SCNC: 139 MMOL/L (ref 136–145)
TOTAL PROTEIN: 7.1 G/DL (ref 6.4–8.2)
TRIGL SERPL-MCNC: 112 MG/DL (ref 0–150)
VLDLC SERPL CALC-MCNC: 22 MG/DL
WBC # BLD: 5.8 K/UL (ref 4–11)

## 2018-05-03 PROCEDURE — 1036F TOBACCO NON-USER: CPT | Performed by: INTERNAL MEDICINE

## 2018-05-03 PROCEDURE — G8598 ASA/ANTIPLAT THER USED: HCPCS | Performed by: INTERNAL MEDICINE

## 2018-05-03 PROCEDURE — 3017F COLORECTAL CA SCREEN DOC REV: CPT | Performed by: INTERNAL MEDICINE

## 2018-05-03 PROCEDURE — G8417 CALC BMI ABV UP PARAM F/U: HCPCS | Performed by: INTERNAL MEDICINE

## 2018-05-03 PROCEDURE — 4040F PNEUMOC VAC/ADMIN/RCVD: CPT | Performed by: INTERNAL MEDICINE

## 2018-05-03 PROCEDURE — 1123F ACP DISCUSS/DSCN MKR DOCD: CPT | Performed by: INTERNAL MEDICINE

## 2018-05-03 PROCEDURE — 99214 OFFICE O/P EST MOD 30 MIN: CPT | Performed by: INTERNAL MEDICINE

## 2018-05-03 PROCEDURE — G8427 DOCREV CUR MEDS BY ELIG CLIN: HCPCS | Performed by: INTERNAL MEDICINE

## 2018-05-03 PROCEDURE — 90832 PSYTX W PT 30 MINUTES: CPT | Performed by: PSYCHOLOGIST

## 2018-05-03 PROCEDURE — 1090F PRES/ABSN URINE INCON ASSESS: CPT | Performed by: INTERNAL MEDICINE

## 2018-05-03 PROCEDURE — G8399 PT W/DXA RESULTS DOCUMENT: HCPCS | Performed by: INTERNAL MEDICINE

## 2018-05-03 RX ORDER — FUROSEMIDE 20 MG/1
20 TABLET ORAL DAILY PRN
Qty: 30 TABLET | Refills: 5 | Status: SHIPPED | OUTPATIENT
Start: 2018-05-03 | End: 2019-04-08 | Stop reason: SDUPTHER

## 2018-05-03 ASSESSMENT — PATIENT HEALTH QUESTIONNAIRE - PHQ9
2. FEELING DOWN, DEPRESSED OR HOPELESS: 0
7. TROUBLE CONCENTRATING ON THINGS, SUCH AS READING THE NEWSPAPER OR WATCHING TELEVISION: 0
6. FEELING BAD ABOUT YOURSELF - OR THAT YOU ARE A FAILURE OR HAVE LET YOURSELF OR YOUR FAMILY DOWN: 0
1. LITTLE INTEREST OR PLEASURE IN DOING THINGS: 0
8. MOVING OR SPEAKING SO SLOWLY THAT OTHER PEOPLE COULD HAVE NOTICED. OR THE OPPOSITE, BEING SO FIGETY OR RESTLESS THAT YOU HAVE BEEN MOVING AROUND A LOT MORE THAN USUAL: 0
SUM OF ALL RESPONSES TO PHQ9 QUESTIONS 1 & 2: 0
9. THOUGHTS THAT YOU WOULD BE BETTER OFF DEAD, OR OF HURTING YOURSELF: 0
4. FEELING TIRED OR HAVING LITTLE ENERGY: 1
SUM OF ALL RESPONSES TO PHQ QUESTIONS 1-9: 1
3. TROUBLE FALLING OR STAYING ASLEEP: 0
10. IF YOU CHECKED OFF ANY PROBLEMS, HOW DIFFICULT HAVE THESE PROBLEMS MADE IT FOR YOU TO DO YOUR WORK, TAKE CARE OF THINGS AT HOME, OR GET ALONG WITH OTHER PEOPLE: 0
5. POOR APPETITE OR OVEREATING: 0

## 2018-05-03 ASSESSMENT — ENCOUNTER SYMPTOMS
NAUSEA: 0
SHORTNESS OF BREATH: 0
CHEST TIGHTNESS: 0
BLOOD IN STOOL: 0
ABDOMINAL PAIN: 0
VOMITING: 0

## 2018-05-09 ENCOUNTER — OFFICE VISIT (OUTPATIENT)
Dept: PSYCHOLOGY | Age: 73
End: 2018-05-09

## 2018-05-09 DIAGNOSIS — F32.A DEPRESSIVE DISORDER: Primary | ICD-10-CM

## 2018-05-09 PROCEDURE — 90832 PSYTX W PT 30 MINUTES: CPT | Performed by: PSYCHOLOGIST

## 2018-05-09 ASSESSMENT — PATIENT HEALTH QUESTIONNAIRE - PHQ9
6. FEELING BAD ABOUT YOURSELF - OR THAT YOU ARE A FAILURE OR HAVE LET YOURSELF OR YOUR FAMILY DOWN: 0
7. TROUBLE CONCENTRATING ON THINGS, SUCH AS READING THE NEWSPAPER OR WATCHING TELEVISION: 0
8. MOVING OR SPEAKING SO SLOWLY THAT OTHER PEOPLE COULD HAVE NOTICED. OR THE OPPOSITE, BEING SO FIGETY OR RESTLESS THAT YOU HAVE BEEN MOVING AROUND A LOT MORE THAN USUAL: 0
SUM OF ALL RESPONSES TO PHQ9 QUESTIONS 1 & 2: 0
4. FEELING TIRED OR HAVING LITTLE ENERGY: 0
1. LITTLE INTEREST OR PLEASURE IN DOING THINGS: 0
3. TROUBLE FALLING OR STAYING ASLEEP: 0
10. IF YOU CHECKED OFF ANY PROBLEMS, HOW DIFFICULT HAVE THESE PROBLEMS MADE IT FOR YOU TO DO YOUR WORK, TAKE CARE OF THINGS AT HOME, OR GET ALONG WITH OTHER PEOPLE: 0
5. POOR APPETITE OR OVEREATING: 0
2. FEELING DOWN, DEPRESSED OR HOPELESS: 0
9. THOUGHTS THAT YOU WOULD BE BETTER OFF DEAD, OR OF HURTING YOURSELF: 0
SUM OF ALL RESPONSES TO PHQ QUESTIONS 1-9: 0

## 2018-05-21 DIAGNOSIS — I10 ESSENTIAL HYPERTENSION, BENIGN: ICD-10-CM

## 2018-05-21 RX ORDER — LOSARTAN POTASSIUM 25 MG/1
25 TABLET ORAL DAILY
Qty: 30 TABLET | Refills: 3 | Status: SHIPPED | OUTPATIENT
Start: 2018-05-21 | End: 2018-09-17 | Stop reason: SDUPTHER

## 2018-05-29 RX ORDER — ROSUVASTATIN CALCIUM 20 MG/1
TABLET, COATED ORAL
Qty: 30 TABLET | Refills: 0 | Status: SHIPPED | OUTPATIENT
Start: 2018-05-29 | End: 2018-07-05 | Stop reason: SDUPTHER

## 2018-06-04 RX ORDER — CARVEDILOL 25 MG/1
TABLET ORAL
Qty: 60 TABLET | Refills: 5 | Status: SHIPPED | OUTPATIENT
Start: 2018-06-04 | End: 2018-12-03 | Stop reason: SDUPTHER

## 2018-06-11 ENCOUNTER — TELEPHONE (OUTPATIENT)
Dept: INTERNAL MEDICINE | Age: 73
End: 2018-06-11

## 2018-06-11 DIAGNOSIS — R32 URINARY INCONTINENCE, UNSPECIFIED TYPE: Primary | ICD-10-CM

## 2018-06-12 RX ORDER — CITALOPRAM 20 MG/1
20 TABLET ORAL DAILY
Qty: 30 TABLET | Refills: 0 | Status: SHIPPED | OUTPATIENT
Start: 2018-06-12 | End: 2018-07-16 | Stop reason: SDUPTHER

## 2018-07-05 DIAGNOSIS — E78.00 PURE HYPERCHOLESTEROLEMIA: ICD-10-CM

## 2018-07-05 DIAGNOSIS — F33.40 DEPRESSION, MAJOR, RECURRENT, IN REMISSION (HCC): Primary | ICD-10-CM

## 2018-07-05 RX ORDER — BUPROPION HYDROCHLORIDE 300 MG/1
TABLET ORAL
Qty: 30 TABLET | Refills: 5 | Status: SHIPPED | OUTPATIENT
Start: 2018-07-05 | End: 2018-12-31 | Stop reason: SDUPTHER

## 2018-07-05 RX ORDER — ROSUVASTATIN CALCIUM 20 MG/1
20 TABLET, COATED ORAL DAILY
Qty: 30 TABLET | Refills: 5 | Status: SHIPPED | OUTPATIENT
Start: 2018-07-05 | End: 2018-12-31 | Stop reason: SDUPTHER

## 2018-07-16 DIAGNOSIS — F33.40 DEPRESSION, MAJOR, RECURRENT, IN REMISSION (HCC): Primary | ICD-10-CM

## 2018-07-16 RX ORDER — CITALOPRAM 20 MG/1
20 TABLET ORAL DAILY
Qty: 30 TABLET | Refills: 5 | Status: SHIPPED | OUTPATIENT
Start: 2018-07-16 | End: 2019-01-15 | Stop reason: SDUPTHER

## 2018-07-23 RX ORDER — CLOPIDOGREL BISULFATE 75 MG/1
TABLET ORAL
Qty: 30 TABLET | Refills: 3 | Status: SHIPPED | OUTPATIENT
Start: 2018-07-23 | End: 2018-11-26 | Stop reason: SDUPTHER

## 2018-08-03 ENCOUNTER — OFFICE VISIT (OUTPATIENT)
Dept: INTERNAL MEDICINE | Age: 73
End: 2018-08-03

## 2018-08-03 VITALS
BODY MASS INDEX: 29.49 KG/M2 | SYSTOLIC BLOOD PRESSURE: 118 MMHG | OXYGEN SATURATION: 97 % | DIASTOLIC BLOOD PRESSURE: 68 MMHG | HEART RATE: 60 BPM | WEIGHT: 151 LBS

## 2018-08-03 DIAGNOSIS — R11.0 NAUSEA: Primary | ICD-10-CM

## 2018-08-03 DIAGNOSIS — F41.9 ANXIETY: ICD-10-CM

## 2018-08-03 PROCEDURE — G8427 DOCREV CUR MEDS BY ELIG CLIN: HCPCS | Performed by: INTERNAL MEDICINE

## 2018-08-03 PROCEDURE — 4040F PNEUMOC VAC/ADMIN/RCVD: CPT | Performed by: INTERNAL MEDICINE

## 2018-08-03 PROCEDURE — 1123F ACP DISCUSS/DSCN MKR DOCD: CPT | Performed by: INTERNAL MEDICINE

## 2018-08-03 PROCEDURE — 3017F COLORECTAL CA SCREEN DOC REV: CPT | Performed by: INTERNAL MEDICINE

## 2018-08-03 PROCEDURE — 1036F TOBACCO NON-USER: CPT | Performed by: INTERNAL MEDICINE

## 2018-08-03 PROCEDURE — G8598 ASA/ANTIPLAT THER USED: HCPCS | Performed by: INTERNAL MEDICINE

## 2018-08-03 PROCEDURE — 99213 OFFICE O/P EST LOW 20 MIN: CPT | Performed by: INTERNAL MEDICINE

## 2018-08-03 PROCEDURE — G8417 CALC BMI ABV UP PARAM F/U: HCPCS | Performed by: INTERNAL MEDICINE

## 2018-08-03 PROCEDURE — G8399 PT W/DXA RESULTS DOCUMENT: HCPCS | Performed by: INTERNAL MEDICINE

## 2018-08-03 PROCEDURE — 1090F PRES/ABSN URINE INCON ASSESS: CPT | Performed by: INTERNAL MEDICINE

## 2018-08-03 PROCEDURE — 1101F PT FALLS ASSESS-DOCD LE1/YR: CPT | Performed by: INTERNAL MEDICINE

## 2018-08-03 ASSESSMENT — ENCOUNTER SYMPTOMS
CHEST TIGHTNESS: 0
SHORTNESS OF BREATH: 0
ABDOMINAL PAIN: 0
NAUSEA: 1
VOMITING: 0

## 2018-08-03 NOTE — PROGRESS NOTES
Outpatient Note for established Patient - regular Visit    History Obtained From:  patient, electronic medical record  Is the patient new to me ? - No    HISTORY OF PRESENT ILLNESS:   The patient is a 74 y.o. female who is here today for :  1) nausea, which she think is coming from the Senna/ Colace that were recently added. Pt denies CP/SOB/palpitations/abdominal pain/V. Past Medical History:        Diagnosis Date    CAD (coronary artery disease)     CHF (congestive heart failure) (HCC)     DVT of lower extremity (deep venous thrombosis) (HCC)     Hypertension     Lupus     TIA (transient ischemic attack)     x3    Urinary incontinence        Social History:   TOBACCO:   reports that she quit smoking about 3 years ago. Her smoking use included Cigarettes. She started smoking about 53 years ago. She has a 15.00 pack-year smoking history. She has never used smokeless tobacco.  ETOH:   reports that she drinks alcohol. Current Medications:    Prior to Admission medications    Medication Sig Start Date End Date Taking?  Authorizing Provider   clopidogrel (PLAVIX) 75 MG tablet TAKE 1 TABLET BY MOUTH DAILY 7/23/18  Yes Tomas Chauhan MD   citalopram (CELEXA) 20 MG tablet Take 1 tablet by mouth daily 7/16/18  Yes Tomas Chauhan MD   rosuvastatin (CRESTOR) 20 MG tablet Take 1 tablet by mouth daily 7/5/18  Yes Tomas Chauhan MD   buPROPion (WELLBUTRIN XL) 300 MG extended release tablet TAKE 1 TABLET BY MOUTH EVERY MORNING 7/5/18  Yes Tomas Chauhan MD   Mirabegron ER (MYRBETRIQ) 25 MG TB24 Take 1 tablet by mouth daily 6/18/18  Yes Tomas Chauhan MD   carvedilol (COREG) 25 MG tablet TAKE ONE TABLET BY MOUTH TWICE DAILY WITH MEALS 6/4/18  Yes Tomas Chauhan MD   losartan (COZAAR) 25 MG tablet TAKE 1 TABLET BY MOUTH DAILY 5/21/18  Yes Tomas Chauhan MD   furosemide (LASIX) 20 MG tablet Take 1 tablet by mouth daily as needed (edema) 5/3/18  Yes Tomas Chauhan atraumatic. Mouth/Throat: No oropharyngeal exudate. Eyes: Conjunctivae and EOM are normal. Right eye exhibits no discharge. Left eye exhibits no discharge. No scleral icterus. Neck: Normal range of motion. Neck supple. Cardiovascular: Normal rate and normal heart sounds. No murmur heard. Pulmonary/Chest: Effort normal and breath sounds normal. No respiratory distress. She has no wheezes. She has no rales. Abdominal: Soft. She exhibits no distension. There is no tenderness. Musculoskeletal: She exhibits no deformity. Lymphadenopathy:        Head (right side): No submental and no submandibular adenopathy present. Head (left side): No submental and no submandibular adenopathy present. She has no cervical adenopathy. Right cervical: No superficial cervical and no deep cervical adenopathy present. Left cervical: No superficial cervical and no deep cervical adenopathy present. Neurological: She is alert and oriented to person, place, and time. She has normal reflexes. She exhibits normal muscle tone. GCS eye subscore is 4. GCS verbal subscore is 5. GCS motor subscore is 6. Skin: No rash noted. She is not diaphoretic. Psychiatric: She has a normal mood and affect. Her behavior is normal. Thought content normal.        Assessment/Plan:   Shyam Doran was seen today for hypertension and nausea. Diagnoses and all orders for this visit:    Nausea  Mild nausea  Recent endoscopy was ok  No red flags  Possible side effect- we will stop the Senna and she will francisco if she does not feel better     Anxiety  Personal difficulties that she has  She has a good support from the environment. She will see Dr Ne Burton in a few days. Mood and affect are good.      - Patient was encouraged to call the office (and ask to see me) or be seen by other provider for any worsening or lack    of improvement in his symptoms.     - Pt was asked to schedule an appointment in 3 months, and to let me know of any

## 2018-08-08 ENCOUNTER — OFFICE VISIT (OUTPATIENT)
Dept: PSYCHOLOGY | Age: 73
End: 2018-08-08

## 2018-08-08 DIAGNOSIS — F33.40 DEPRESSION, MAJOR, RECURRENT, IN REMISSION (HCC): Primary | ICD-10-CM

## 2018-08-08 PROCEDURE — 90832 PSYTX W PT 30 MINUTES: CPT | Performed by: PSYCHOLOGIST

## 2018-08-08 ASSESSMENT — PATIENT HEALTH QUESTIONNAIRE - PHQ9
7. TROUBLE CONCENTRATING ON THINGS, SUCH AS READING THE NEWSPAPER OR WATCHING TELEVISION: 0
10. IF YOU CHECKED OFF ANY PROBLEMS, HOW DIFFICULT HAVE THESE PROBLEMS MADE IT FOR YOU TO DO YOUR WORK, TAKE CARE OF THINGS AT HOME, OR GET ALONG WITH OTHER PEOPLE: 1
SUM OF ALL RESPONSES TO PHQ QUESTIONS 1-9: 7
3. TROUBLE FALLING OR STAYING ASLEEP: 1
5. POOR APPETITE OR OVEREATING: 0
6. FEELING BAD ABOUT YOURSELF - OR THAT YOU ARE A FAILURE OR HAVE LET YOURSELF OR YOUR FAMILY DOWN: 0
4. FEELING TIRED OR HAVING LITTLE ENERGY: 3
SUM OF ALL RESPONSES TO PHQ QUESTIONS 1-9: 7
1. LITTLE INTEREST OR PLEASURE IN DOING THINGS: 1
8. MOVING OR SPEAKING SO SLOWLY THAT OTHER PEOPLE COULD HAVE NOTICED. OR THE OPPOSITE, BEING SO FIGETY OR RESTLESS THAT YOU HAVE BEEN MOVING AROUND A LOT MORE THAN USUAL: 1
2. FEELING DOWN, DEPRESSED OR HOPELESS: 1
9. THOUGHTS THAT YOU WOULD BE BETTER OFF DEAD, OR OF HURTING YOURSELF: 0
SUM OF ALL RESPONSES TO PHQ9 QUESTIONS 1 & 2: 2

## 2018-08-13 RX ORDER — PANTOPRAZOLE SODIUM 20 MG/1
TABLET, DELAYED RELEASE ORAL
Qty: 60 TABLET | Refills: 2 | Status: SHIPPED | OUTPATIENT
Start: 2018-08-13 | End: 2018-11-12 | Stop reason: SDUPTHER

## 2018-08-22 ENCOUNTER — OFFICE VISIT (OUTPATIENT)
Dept: PSYCHOLOGY | Age: 73
End: 2018-08-22

## 2018-08-22 ENCOUNTER — TELEPHONE (OUTPATIENT)
Dept: INTERNAL MEDICINE | Age: 73
End: 2018-08-22

## 2018-08-22 DIAGNOSIS — F32.A DEPRESSIVE DISORDER: Primary | ICD-10-CM

## 2018-08-22 DIAGNOSIS — M21.611 BUNION OF GREAT TOE OF RIGHT FOOT: Primary | ICD-10-CM

## 2018-08-22 PROCEDURE — 90832 PSYTX W PT 30 MINUTES: CPT | Performed by: PSYCHOLOGIST

## 2018-08-22 ASSESSMENT — PATIENT HEALTH QUESTIONNAIRE - PHQ9
9. THOUGHTS THAT YOU WOULD BE BETTER OFF DEAD, OR OF HURTING YOURSELF: 0
10. IF YOU CHECKED OFF ANY PROBLEMS, HOW DIFFICULT HAVE THESE PROBLEMS MADE IT FOR YOU TO DO YOUR WORK, TAKE CARE OF THINGS AT HOME, OR GET ALONG WITH OTHER PEOPLE: 1
SUM OF ALL RESPONSES TO PHQ9 QUESTIONS 1 & 2: 4
8. MOVING OR SPEAKING SO SLOWLY THAT OTHER PEOPLE COULD HAVE NOTICED. OR THE OPPOSITE, BEING SO FIGETY OR RESTLESS THAT YOU HAVE BEEN MOVING AROUND A LOT MORE THAN USUAL: 1
2. FEELING DOWN, DEPRESSED OR HOPELESS: 3
3. TROUBLE FALLING OR STAYING ASLEEP: 2
1. LITTLE INTEREST OR PLEASURE IN DOING THINGS: 1
6. FEELING BAD ABOUT YOURSELF - OR THAT YOU ARE A FAILURE OR HAVE LET YOURSELF OR YOUR FAMILY DOWN: 0
4. FEELING TIRED OR HAVING LITTLE ENERGY: 1
7. TROUBLE CONCENTRATING ON THINGS, SUCH AS READING THE NEWSPAPER OR WATCHING TELEVISION: 0
5. POOR APPETITE OR OVEREATING: 0
SUM OF ALL RESPONSES TO PHQ QUESTIONS 1-9: 8
SUM OF ALL RESPONSES TO PHQ QUESTIONS 1-9: 8

## 2018-08-22 NOTE — TELEPHONE ENCOUNTER
Kingston Miller- pt son came into the office stating the pt needs a referral to a Orthopedic due to pt bad foot,   Kingston Miller states that her toe is turned in and is causing her to fall when she try's to walk on it, pt fell twice this week and now has a bruise   On her shoulder    Please call Kingston Miller when referral is in

## 2018-08-22 NOTE — PROGRESS NOTES
(MYRBETRIQ) 25 MG TB24 Take 1 tablet by mouth daily 30 tablet 2    carvedilol (COREG) 25 MG tablet TAKE ONE TABLET BY MOUTH TWICE DAILY WITH MEALS 60 tablet 5    losartan (COZAAR) 25 MG tablet TAKE 1 TABLET BY MOUTH DAILY 30 tablet 3    furosemide (LASIX) 20 MG tablet Take 1 tablet by mouth daily as needed (edema) 30 tablet 5    Cholecalciferol (VITAMIN D3) 71220 units CAPS Take 50,000 Units by mouth See Admin Instructions every other week 4 capsule 5    Nutritional Supplements (ENSURE ACTIVE HIGH PROTEIN PO) Take 1 Can by mouth daily as needed      acetaminophen 650 MG TABS Take 650 mg by mouth every 6 hours as needed for Pain or Fever (Fever >100.5 F (38 C)). 60 tablet 1    hydroxychloroquine (PLAQUENIL) 200 MG tablet Take 200 mg by mouth daily. No current facility-administered medications for this visit. Social History:   Social History     Social History    Marital status:      Spouse name: N/A    Number of children: 1    Years of education: 15     Occupational History    Retired      Social History Main Topics    Smoking status: Former Smoker     Packs/day: 0.50     Years: 30.00     Types: Cigarettes     Start date: 1965     Quit date: 9/24/2014    Smokeless tobacco: Never Used    Alcohol use 0.0 oz/week      Comment: One drink twice weekly    Drug use: No    Sexual activity: Not on file     Other Topics Concern    Not on file     Social History Narrative    No narrative on file       TOBACCO:   reports that she quit smoking about 3 years ago. Her smoking use included Cigarettes. She started smoking about 53 years ago. She has a 15.00 pack-year smoking history. She has never used smokeless tobacco.  ETOH:   reports that she drinks alcohol. Family History:   Family History   Problem Relation Age of Onset    High Blood Pressure Brother          A:  Ms. Angelo Vega depression continues and she has recently been struggling with some more stressors.  She has been trying to more effectively manage her stressors and prioritizing her needs and self-care. .Ms. Sameer Ortega continues to respond positively to behavioral interventions.      PHQ Scores 8/22/2018 8/8/2018 5/9/2018 5/3/2018 4/25/2018 4/19/2018 4/10/2018   PHQ2 Score 4 2 0 0 2 2 1   PHQ9 Score 8 7 0 1 4 4 4     Interpretation of Total Score Depression Severity: 1-4 = Minimal depression, 5-9 = Mild depression, 10-14 = Moderate depression, 15-19 = Moderately severe depression, 20-27 = Severe depression          Diagnosis:    Depressive Disorder NOS      Plan:  Pt interventions:  [place]-setting to identify pt's primary goals for [facility] visit / overall health and Supportive techniques  explored cost-benefits of identified options, and reinforced pt's skill use,treatment planning    Pt Behavioral Change Plan:  Pt set goals to 1) continue to weight costs and benefits of outline options 2) keep in mind your best interest and goals 3) return for f/u in 2 weeks

## 2018-09-05 ENCOUNTER — OFFICE VISIT (OUTPATIENT)
Dept: PSYCHOLOGY | Age: 73
End: 2018-09-05

## 2018-09-05 DIAGNOSIS — F32.A DEPRESSIVE DISORDER: Primary | ICD-10-CM

## 2018-09-05 PROCEDURE — 90832 PSYTX W PT 30 MINUTES: CPT | Performed by: PSYCHOLOGIST

## 2018-09-05 ASSESSMENT — PATIENT HEALTH QUESTIONNAIRE - PHQ9
9. THOUGHTS THAT YOU WOULD BE BETTER OFF DEAD, OR OF HURTING YOURSELF: 0
SUM OF ALL RESPONSES TO PHQ QUESTIONS 1-9: 5
6. FEELING BAD ABOUT YOURSELF - OR THAT YOU ARE A FAILURE OR HAVE LET YOURSELF OR YOUR FAMILY DOWN: 0
2. FEELING DOWN, DEPRESSED OR HOPELESS: 2
7. TROUBLE CONCENTRATING ON THINGS, SUCH AS READING THE NEWSPAPER OR WATCHING TELEVISION: 0
4. FEELING TIRED OR HAVING LITTLE ENERGY: 1
SUM OF ALL RESPONSES TO PHQ9 QUESTIONS 1 & 2: 3
SUM OF ALL RESPONSES TO PHQ QUESTIONS 1-9: 5
10. IF YOU CHECKED OFF ANY PROBLEMS, HOW DIFFICULT HAVE THESE PROBLEMS MADE IT FOR YOU TO DO YOUR WORK, TAKE CARE OF THINGS AT HOME, OR GET ALONG WITH OTHER PEOPLE: 1
3. TROUBLE FALLING OR STAYING ASLEEP: 0
5. POOR APPETITE OR OVEREATING: 0
8. MOVING OR SPEAKING SO SLOWLY THAT OTHER PEOPLE COULD HAVE NOTICED. OR THE OPPOSITE, BEING SO FIGETY OR RESTLESS THAT YOU HAVE BEEN MOVING AROUND A LOT MORE THAN USUAL: 1
1. LITTLE INTEREST OR PLEASURE IN DOING THINGS: 1

## 2018-09-05 NOTE — PROGRESS NOTES
TAKE 1 TABLET BY MOUTH EVERY MORNING 30 tablet 5    Mirabegron ER (MYRBETRIQ) 25 MG TB24 Take 1 tablet by mouth daily 30 tablet 2    carvedilol (COREG) 25 MG tablet TAKE ONE TABLET BY MOUTH TWICE DAILY WITH MEALS 60 tablet 5    losartan (COZAAR) 25 MG tablet TAKE 1 TABLET BY MOUTH DAILY 30 tablet 3    furosemide (LASIX) 20 MG tablet Take 1 tablet by mouth daily as needed (edema) 30 tablet 5    Cholecalciferol (VITAMIN D3) 05243 units CAPS Take 50,000 Units by mouth See Admin Instructions every other week 4 capsule 5    Nutritional Supplements (ENSURE ACTIVE HIGH PROTEIN PO) Take 1 Can by mouth daily as needed      acetaminophen 650 MG TABS Take 650 mg by mouth every 6 hours as needed for Pain or Fever (Fever >100.5 F (38 C)). 60 tablet 1    hydroxychloroquine (PLAQUENIL) 200 MG tablet Take 200 mg by mouth daily. No current facility-administered medications for this visit. Social History:   Social History     Social History    Marital status:      Spouse name: N/A    Number of children: 1    Years of education: 15     Occupational History    Retired      Social History Main Topics    Smoking status: Former Smoker     Packs/day: 0.50     Years: 30.00     Types: Cigarettes     Start date: 1965     Quit date: 9/24/2014    Smokeless tobacco: Never Used    Alcohol use 0.0 oz/week      Comment: One drink twice weekly    Drug use: No    Sexual activity: Not on file     Other Topics Concern    Not on file     Social History Narrative    No narrative on file       TOBACCO:   reports that she quit smoking about 3 years ago. Her smoking use included Cigarettes. She started smoking about 53 years ago. She has a 15.00 pack-year smoking history. She has never used smokeless tobacco.  ETOH:   reports that she drinks alcohol. Family History:   Family History   Problem Relation Age of Onset    High Blood Pressure Brother          A:  Ms. Adams Robertson depression continues to be stable.  She has continued to struggle with stress with her son and finances. She is working on setting more limits and boundaries in her relationship with her son. Ms. Princess Castleman continues to respond positively to behavioral interventions.      PHQ Scores 9/5/2018 8/22/2018 8/8/2018 5/9/2018 5/3/2018 4/25/2018 4/19/2018   PHQ2 Score 3 4 2 0 0 2 2   PHQ9 Score 5 8 7 0 1 4 4     Interpretation of Total Score Depression Severity: 1-4 = Minimal depression, 5-9 = Mild depression, 10-14 = Moderate depression, 15-19 = Moderately severe depression, 20-27 = Severe depression          Diagnosis:    Depressive Disorder NOS      Plan:  Pt interventions:  [place]-setting to identify pt's primary goals for [facility] visit / overall health and Supportive techniques  interpersonal effectiveness training, limit and boundary setting,  and reinforced pt's skill use,treatment planning    Pt Behavioral Change Plan:  Pt set goals to 1) set limits and boundaries with your son regarding finances 2) keep in mind your best interest and goals 3) return for f/u in 2 weeks

## 2018-09-10 DIAGNOSIS — R32 URINARY INCONTINENCE, UNSPECIFIED TYPE: ICD-10-CM

## 2018-09-10 RX ORDER — MIRABEGRON 25 MG/1
1 TABLET, FILM COATED, EXTENDED RELEASE ORAL DAILY
Qty: 30 TABLET | Refills: 2 | Status: SHIPPED | OUTPATIENT
Start: 2018-09-10 | End: 2018-09-20 | Stop reason: SDUPTHER

## 2018-09-17 DIAGNOSIS — I10 ESSENTIAL HYPERTENSION, BENIGN: ICD-10-CM

## 2018-09-17 DIAGNOSIS — K59.03 DRUG-INDUCED CONSTIPATION: ICD-10-CM

## 2018-09-17 RX ORDER — DOCUSATE SODIUM AND SENNOSIDES 8.6; 5 MG/1; MG/1
2 TABLET ORAL DAILY
Qty: 30 TABLET | Refills: 2 | Status: SHIPPED | OUTPATIENT
Start: 2018-09-17 | End: 2018-12-17 | Stop reason: SDUPTHER

## 2018-09-17 RX ORDER — LOSARTAN POTASSIUM 25 MG/1
25 TABLET ORAL DAILY
Qty: 30 TABLET | Refills: 2 | Status: SHIPPED | OUTPATIENT
Start: 2018-09-17 | End: 2018-12-24 | Stop reason: SDUPTHER

## 2018-09-19 ENCOUNTER — OFFICE VISIT (OUTPATIENT)
Dept: PSYCHOLOGY | Age: 73
End: 2018-09-19

## 2018-09-19 DIAGNOSIS — F43.9 STRESS: ICD-10-CM

## 2018-09-19 DIAGNOSIS — F32.A DEPRESSIVE DISORDER: Primary | ICD-10-CM

## 2018-09-19 PROCEDURE — 90832 PSYTX W PT 30 MINUTES: CPT | Performed by: PSYCHOLOGIST

## 2018-09-19 NOTE — PROGRESS NOTES
smoking about 3 years ago. Her smoking use included Cigarettes. She started smoking about 53 years ago. She has a 15.00 pack-year smoking history. She has never used smokeless tobacco.  ETOH:   reports that she drinks alcohol. Family History:   Family History   Problem Relation Age of Onset    High Blood Pressure Brother          A:  Ms. Connie Gama depression continues to be stable although ongoing stressors have been problematic. She has been working on practicing more effective stress management skills by increasing interpersonal effectiveness and limit setting. Ms. Cindy Springer continues to respond positively to behavioral interventions.          Diagnosis:    Depressive Disorder NOS  Stress      Plan:  Pt interventions:  [place]-setting to identify pt's primary goals for [facility] visit / overall health and Supportive techniques  interpersonal effectiveness training, reinforced pt's limit and boundary setting, coping ahead planning, assertiveness training, treatment planning and reinforced pt's skill use    Pt Behavioral Change Plan:  Pt set goals to 1)plan ahead for sit down meeting with son and lay out expectations 2) continue to set limits and boundaries with others regarding finances 3) return for f/u in 2 weeks

## 2018-09-20 ENCOUNTER — TELEPHONE (OUTPATIENT)
Dept: INTERNAL MEDICINE | Age: 73
End: 2018-09-20

## 2018-09-20 DIAGNOSIS — R32 URINARY INCONTINENCE, UNSPECIFIED TYPE: ICD-10-CM

## 2018-10-03 ENCOUNTER — OFFICE VISIT (OUTPATIENT)
Dept: PSYCHOLOGY | Age: 73
End: 2018-10-03
Payer: MEDICARE

## 2018-10-03 DIAGNOSIS — F32.A DEPRESSIVE DISORDER: Primary | ICD-10-CM

## 2018-10-03 PROCEDURE — 90832 PSYTX W PT 30 MINUTES: CPT | Performed by: PSYCHOLOGIST

## 2018-10-03 ASSESSMENT — PATIENT HEALTH QUESTIONNAIRE - PHQ9
6. FEELING BAD ABOUT YOURSELF - OR THAT YOU ARE A FAILURE OR HAVE LET YOURSELF OR YOUR FAMILY DOWN: 0
7. TROUBLE CONCENTRATING ON THINGS, SUCH AS READING THE NEWSPAPER OR WATCHING TELEVISION: 0
9. THOUGHTS THAT YOU WOULD BE BETTER OFF DEAD, OR OF HURTING YOURSELF: 0
1. LITTLE INTEREST OR PLEASURE IN DOING THINGS: 1
10. IF YOU CHECKED OFF ANY PROBLEMS, HOW DIFFICULT HAVE THESE PROBLEMS MADE IT FOR YOU TO DO YOUR WORK, TAKE CARE OF THINGS AT HOME, OR GET ALONG WITH OTHER PEOPLE: 1
SUM OF ALL RESPONSES TO PHQ QUESTIONS 1-9: 7
3. TROUBLE FALLING OR STAYING ASLEEP: 2
SUM OF ALL RESPONSES TO PHQ QUESTIONS 1-9: 7
SUM OF ALL RESPONSES TO PHQ9 QUESTIONS 1 & 2: 2
5. POOR APPETITE OR OVEREATING: 0
2. FEELING DOWN, DEPRESSED OR HOPELESS: 1
4. FEELING TIRED OR HAVING LITTLE ENERGY: 3
8. MOVING OR SPEAKING SO SLOWLY THAT OTHER PEOPLE COULD HAVE NOTICED. OR THE OPPOSITE, BEING SO FIGETY OR RESTLESS THAT YOU HAVE BEEN MOVING AROUND A LOT MORE THAN USUAL: 0

## 2018-10-12 ENCOUNTER — TELEPHONE (OUTPATIENT)
Dept: INTERNAL MEDICINE CLINIC | Age: 73
End: 2018-10-12

## 2018-10-12 NOTE — TELEPHONE ENCOUNTER
Spoke to  explained Dr. Hemant Gonzalez wants to keep same dose but he wants bp checked 2 times in am 2 times in pm for 5 days and send readings.

## 2018-10-12 NOTE — TELEPHONE ENCOUNTER
Javy notices every 1 1/2 hours before patient takes her carvedilol 25 mg BID her BP spikes, this morning it was 155/87. Please call to advise if dosage can be increased.

## 2018-10-17 ENCOUNTER — TELEPHONE (OUTPATIENT)
Dept: INTERNAL MEDICINE CLINIC | Age: 73
End: 2018-10-17

## 2018-10-17 ENCOUNTER — OFFICE VISIT (OUTPATIENT)
Dept: PSYCHOLOGY | Age: 73
End: 2018-10-17
Payer: MEDICARE

## 2018-10-17 DIAGNOSIS — F32.A DEPRESSIVE DISORDER: Primary | ICD-10-CM

## 2018-10-17 DIAGNOSIS — F43.9 STRESS: ICD-10-CM

## 2018-10-17 PROCEDURE — 90832 PSYTX W PT 30 MINUTES: CPT | Performed by: PSYCHOLOGIST

## 2018-10-17 ASSESSMENT — ANXIETY QUESTIONNAIRES
6. BECOMING EASILY ANNOYED OR IRRITABLE: 2-OVER HALF THE DAYS
4. TROUBLE RELAXING: 2-OVER HALF THE DAYS
7. FEELING AFRAID AS IF SOMETHING AWFUL MIGHT HAPPEN: 2-OVER HALF THE DAYS
5. BEING SO RESTLESS THAT IT IS HARD TO SIT STILL: 0-NOT AT ALL SURE
3. WORRYING TOO MUCH ABOUT DIFFERENT THINGS: 3-NEARLY EVERY DAY
1. FEELING NERVOUS, ANXIOUS, OR ON EDGE: 2-OVER HALF THE DAYS
GAD7 TOTAL SCORE: 12
2. NOT BEING ABLE TO STOP OR CONTROL WORRYING: 1-SEVERAL DAYS

## 2018-10-17 ASSESSMENT — PATIENT HEALTH QUESTIONNAIRE - PHQ9
SUM OF ALL RESPONSES TO PHQ9 QUESTIONS 1 & 2: 2
4. FEELING TIRED OR HAVING LITTLE ENERGY: 2
3. TROUBLE FALLING OR STAYING ASLEEP: 0
2. FEELING DOWN, DEPRESSED OR HOPELESS: 1
1. LITTLE INTEREST OR PLEASURE IN DOING THINGS: 1
7. TROUBLE CONCENTRATING ON THINGS, SUCH AS READING THE NEWSPAPER OR WATCHING TELEVISION: 0
5. POOR APPETITE OR OVEREATING: 0
8. MOVING OR SPEAKING SO SLOWLY THAT OTHER PEOPLE COULD HAVE NOTICED. OR THE OPPOSITE, BEING SO FIGETY OR RESTLESS THAT YOU HAVE BEEN MOVING AROUND A LOT MORE THAN USUAL: 0
10. IF YOU CHECKED OFF ANY PROBLEMS, HOW DIFFICULT HAVE THESE PROBLEMS MADE IT FOR YOU TO DO YOUR WORK, TAKE CARE OF THINGS AT HOME, OR GET ALONG WITH OTHER PEOPLE: 1
9. THOUGHTS THAT YOU WOULD BE BETTER OFF DEAD, OR OF HURTING YOURSELF: 0
6. FEELING BAD ABOUT YOURSELF - OR THAT YOU ARE A FAILURE OR HAVE LET YOURSELF OR YOUR FAMILY DOWN: 0
SUM OF ALL RESPONSES TO PHQ QUESTIONS 1-9: 4
SUM OF ALL RESPONSES TO PHQ QUESTIONS 1-9: 4

## 2018-10-17 NOTE — PROGRESS NOTES
tablet TAKE 2 TABLETS BY MOUTH DAILY 30 tablet 2    losartan (COZAAR) 25 MG tablet TAKE 1 TABLET BY MOUTH DAILY 30 tablet 2    pantoprazole (PROTONIX) 20 MG tablet TAKE 1 TABLET BY MOUTH TWICE DAILY 60 tablet 2    clopidogrel (PLAVIX) 75 MG tablet TAKE 1 TABLET BY MOUTH DAILY 30 tablet 3    citalopram (CELEXA) 20 MG tablet Take 1 tablet by mouth daily 30 tablet 5    rosuvastatin (CRESTOR) 20 MG tablet Take 1 tablet by mouth daily 30 tablet 5    buPROPion (WELLBUTRIN XL) 300 MG extended release tablet TAKE 1 TABLET BY MOUTH EVERY MORNING 30 tablet 5    carvedilol (COREG) 25 MG tablet TAKE ONE TABLET BY MOUTH TWICE DAILY WITH MEALS 60 tablet 5    furosemide (LASIX) 20 MG tablet Take 1 tablet by mouth daily as needed (edema) 30 tablet 5    Cholecalciferol (VITAMIN D3) 92196 units CAPS Take 50,000 Units by mouth See Admin Instructions every other week 4 capsule 5    Nutritional Supplements (ENSURE ACTIVE HIGH PROTEIN PO) Take 1 Can by mouth daily as needed      acetaminophen 650 MG TABS Take 650 mg by mouth every 6 hours as needed for Pain or Fever (Fever >100.5 F (38 C)). 60 tablet 1    hydroxychloroquine (PLAQUENIL) 200 MG tablet Take 200 mg by mouth daily. No current facility-administered medications for this visit. Social History:   Social History     Social History    Marital status:      Spouse name: N/A    Number of children: 1    Years of education: 15     Occupational History    Retired      Social History Main Topics    Smoking status: Former Smoker     Packs/day: 0.50     Years: 30.00     Types: Cigarettes     Start date: 1965     Quit date: 9/24/2014    Smokeless tobacco: Never Used    Alcohol use 0.0 oz/week      Comment: One drink twice weekly    Drug use: No    Sexual activity: Not on file     Other Topics Concern    Not on file     Social History Narrative    No narrative on file       TOBACCO:   reports that she quit smoking about 4 years ago.  Her smoking use

## 2018-10-26 ENCOUNTER — HOSPITAL ENCOUNTER (OUTPATIENT)
Dept: GENERAL RADIOLOGY | Age: 73
Discharge: HOME | End: 2018-10-26
Payer: MEDICARE

## 2018-10-26 ENCOUNTER — OFFICE VISIT (OUTPATIENT)
Dept: INTERNAL MEDICINE CLINIC | Age: 73
End: 2018-10-26
Payer: MEDICARE

## 2018-10-26 ENCOUNTER — HOSPITAL ENCOUNTER (OUTPATIENT)
Age: 73
Discharge: HOME | End: 2018-10-26
Payer: MEDICARE

## 2018-10-26 VITALS
OXYGEN SATURATION: 94 % | HEART RATE: 60 BPM | SYSTOLIC BLOOD PRESSURE: 128 MMHG | DIASTOLIC BLOOD PRESSURE: 78 MMHG | BODY MASS INDEX: 30.08 KG/M2 | WEIGHT: 154 LBS

## 2018-10-26 DIAGNOSIS — R06.02 SOB (SHORTNESS OF BREATH): ICD-10-CM

## 2018-10-26 DIAGNOSIS — R06.02 SOB (SHORTNESS OF BREATH): Primary | ICD-10-CM

## 2018-10-26 DIAGNOSIS — R05.9 COUGH: ICD-10-CM

## 2018-10-26 DIAGNOSIS — J18.9 PNEUMONIA OF BOTH LUNGS DUE TO INFECTIOUS ORGANISM, UNSPECIFIED PART OF LUNG: Primary | ICD-10-CM

## 2018-10-26 LAB
A/G RATIO: 1.5 (ref 1.1–2.2)
ALBUMIN SERPL-MCNC: 4.4 G/DL (ref 3.4–5)
ALP BLD-CCNC: 111 U/L (ref 40–129)
ALT SERPL-CCNC: 15 U/L (ref 10–40)
ANION GAP SERPL CALCULATED.3IONS-SCNC: 13 MMOL/L (ref 3–16)
AST SERPL-CCNC: 24 U/L (ref 15–37)
BASOPHILS ABSOLUTE: 0 K/UL (ref 0–0.2)
BASOPHILS RELATIVE PERCENT: 1 %
BILIRUB SERPL-MCNC: 0.3 MG/DL (ref 0–1)
BUN BLDV-MCNC: 22 MG/DL (ref 7–20)
CALCIUM SERPL-MCNC: 9.6 MG/DL (ref 8.3–10.6)
CHLORIDE BLD-SCNC: 100 MMOL/L (ref 99–110)
CO2: 26 MMOL/L (ref 21–32)
CREAT SERPL-MCNC: 0.8 MG/DL (ref 0.6–1.2)
EOSINOPHILS ABSOLUTE: 0.1 K/UL (ref 0–0.6)
EOSINOPHILS RELATIVE PERCENT: 2.8 %
GFR AFRICAN AMERICAN: >60
GFR NON-AFRICAN AMERICAN: >60
GLOBULIN: 2.9 G/DL
GLUCOSE BLD-MCNC: 93 MG/DL (ref 70–99)
HCT VFR BLD CALC: 36.4 % (ref 36–48)
HEMOGLOBIN: 12.2 G/DL (ref 12–16)
INFLUENZA A ANTIGEN, POC: NORMAL
INFLUENZA B ANTIGEN, POC: NORMAL
LYMPHOCYTES ABSOLUTE: 1.1 K/UL (ref 1–5.1)
LYMPHOCYTES RELATIVE PERCENT: 25.4 %
MCH RBC QN AUTO: 32.7 PG (ref 26–34)
MCHC RBC AUTO-ENTMCNC: 33.4 G/DL (ref 31–36)
MCV RBC AUTO: 97.9 FL (ref 80–100)
MONOCYTES ABSOLUTE: 0.5 K/UL (ref 0–1.3)
MONOCYTES RELATIVE PERCENT: 11.7 %
NEUTROPHILS ABSOLUTE: 2.7 K/UL (ref 1.7–7.7)
NEUTROPHILS RELATIVE PERCENT: 59.1 %
PDW BLD-RTO: 13.7 % (ref 12.4–15.4)
PLATELET # BLD: 243 K/UL (ref 135–450)
PMV BLD AUTO: 6.8 FL (ref 5–10.5)
POTASSIUM SERPL-SCNC: 4.3 MMOL/L (ref 3.5–5.1)
RBC # BLD: 3.71 M/UL (ref 4–5.2)
SODIUM BLD-SCNC: 139 MMOL/L (ref 136–145)
TOTAL PROTEIN: 7.3 G/DL (ref 6.4–8.2)
WBC # BLD: 4.5 K/UL (ref 4–11)

## 2018-10-26 PROCEDURE — 87804 INFLUENZA ASSAY W/OPTIC: CPT | Performed by: INTERNAL MEDICINE

## 2018-10-26 PROCEDURE — G8399 PT W/DXA RESULTS DOCUMENT: HCPCS | Performed by: INTERNAL MEDICINE

## 2018-10-26 PROCEDURE — 71046 X-RAY EXAM CHEST 2 VIEWS: CPT

## 2018-10-26 PROCEDURE — G8598 ASA/ANTIPLAT THER USED: HCPCS | Performed by: INTERNAL MEDICINE

## 2018-10-26 PROCEDURE — 1090F PRES/ABSN URINE INCON ASSESS: CPT | Performed by: INTERNAL MEDICINE

## 2018-10-26 PROCEDURE — 99213 OFFICE O/P EST LOW 20 MIN: CPT | Performed by: INTERNAL MEDICINE

## 2018-10-26 PROCEDURE — 1101F PT FALLS ASSESS-DOCD LE1/YR: CPT | Performed by: INTERNAL MEDICINE

## 2018-10-26 PROCEDURE — 1036F TOBACCO NON-USER: CPT | Performed by: INTERNAL MEDICINE

## 2018-10-26 PROCEDURE — 3017F COLORECTAL CA SCREEN DOC REV: CPT | Performed by: INTERNAL MEDICINE

## 2018-10-26 PROCEDURE — 1123F ACP DISCUSS/DSCN MKR DOCD: CPT | Performed by: INTERNAL MEDICINE

## 2018-10-26 PROCEDURE — 4040F PNEUMOC VAC/ADMIN/RCVD: CPT | Performed by: INTERNAL MEDICINE

## 2018-10-26 PROCEDURE — G8417 CALC BMI ABV UP PARAM F/U: HCPCS | Performed by: INTERNAL MEDICINE

## 2018-10-26 PROCEDURE — G8427 DOCREV CUR MEDS BY ELIG CLIN: HCPCS | Performed by: INTERNAL MEDICINE

## 2018-10-26 PROCEDURE — G8484 FLU IMMUNIZE NO ADMIN: HCPCS | Performed by: INTERNAL MEDICINE

## 2018-10-26 RX ORDER — AZITHROMYCIN 250 MG/1
TABLET, FILM COATED ORAL
Qty: 1 PACKET | Refills: 0 | Status: SHIPPED | OUTPATIENT
Start: 2018-10-26 | End: 2018-10-30

## 2018-10-26 RX ORDER — AMOXICILLIN 500 MG/1
1000 CAPSULE ORAL 3 TIMES DAILY
Qty: 30 CAPSULE | Refills: 0 | Status: SHIPPED | OUTPATIENT
Start: 2018-10-26 | End: 2018-10-31 | Stop reason: SDUPTHER

## 2018-10-26 ASSESSMENT — ENCOUNTER SYMPTOMS
CHEST TIGHTNESS: 0
SINUS PRESSURE: 0
NAUSEA: 0
VOMITING: 0
ABDOMINAL PAIN: 0
COUGH: 1
SORE THROAT: 1
SHORTNESS OF BREATH: 1

## 2018-10-26 NOTE — PROGRESS NOTES
by mouth daily 7/5/18  Yes Maegan Escalante MD   buPROPion (WELLBUTRIN XL) 300 MG extended release tablet TAKE 1 TABLET BY MOUTH EVERY MORNING 7/5/18  Yes Maegan Escalante MD   carvedilol (COREG) 25 MG tablet TAKE ONE TABLET BY MOUTH TWICE DAILY WITH MEALS 6/4/18  Yes Maegan Escalante MD   furosemide (LASIX) 20 MG tablet Take 1 tablet by mouth daily as needed (edema) 5/3/18  Yes Maegan Escalante MD   Cholecalciferol (VITAMIN D3) 79039 units CAPS Take 50,000 Units by mouth See Admin Instructions every other week 2/19/18  Yes Maegan Escalante MD   Nutritional Supplements (ENSURE ACTIVE HIGH PROTEIN PO) Take 1 Can by mouth daily as needed   Yes Historical Provider, MD   acetaminophen 650 MG TABS Take 650 mg by mouth every 6 hours as needed for Pain or Fever (Fever >100.5 F (38 C)). 12/9/13  Yes Hanna Lane MD   hydroxychloroquine (PLAQUENIL) 200 MG tablet Take 200 mg by mouth daily. Yes Historical Provider, MD   amoxicillin (AMOXIL) 500 MG capsule Take 2 capsules by mouth 3 times daily for 5 days 10/26/18 10/31/18  Maegan Escalante MD   azithromycin (ZITHROMAX Z-MARU) 250 MG tablet Take 2 tablets (500 mg) on Day 1, and then take 1 tablet (250 mg) on days 2 through 5. 10/26/18 10/30/18  Maegan Escalante MD       REVIEW OF SYSTEMS:  Review of Systems   Constitutional: Negative for activity change, appetite change, chills, fever and unexpected weight change. HENT: Positive for sneezing and sore throat. Negative for sinus pressure. Eyes: Negative for visual disturbance. Respiratory: Positive for cough and shortness of breath. Negative for chest tightness. Cardiovascular: Negative for chest pain. Gastrointestinal: Negative for abdominal pain, nausea and vomiting. Skin: Negative for rash. Allergic/Immunologic: Negative for immunocompromised state. Neurological: Negative for dizziness and headaches.    Psychiatric/Behavioral: Negative for dysphoric mood and suicidal

## 2018-10-31 DIAGNOSIS — J18.9 PNEUMONIA OF BOTH LUNGS DUE TO INFECTIOUS ORGANISM, UNSPECIFIED PART OF LUNG: ICD-10-CM

## 2018-10-31 RX ORDER — AZITHROMYCIN 250 MG/1
250 TABLET, FILM COATED ORAL DAILY
Qty: 3 TABLET | Refills: 0 | Status: SHIPPED | OUTPATIENT
Start: 2018-10-31 | End: 2018-11-03

## 2018-10-31 RX ORDER — AMOXICILLIN 500 MG/1
1000 CAPSULE ORAL 3 TIMES DAILY
Qty: 18 CAPSULE | Refills: 0 | Status: SHIPPED | OUTPATIENT
Start: 2018-10-31 | End: 2018-11-03

## 2018-11-01 ENCOUNTER — TELEPHONE (OUTPATIENT)
Dept: INTERNAL MEDICINE CLINIC | Age: 73
End: 2018-11-01

## 2018-11-02 ENCOUNTER — TELEPHONE (OUTPATIENT)
Dept: INTERNAL MEDICINE CLINIC | Age: 73
End: 2018-11-02

## 2018-11-02 DIAGNOSIS — B37.0 CANDIDA, ORAL: Primary | ICD-10-CM

## 2018-11-02 DIAGNOSIS — B37.31 CANDIDA VAGINITIS: ICD-10-CM

## 2018-11-02 RX ORDER — CLOTRIMAZOLE 10 MG/1
10 LOZENGE ORAL; TOPICAL
Qty: 50 TABLET | Refills: 0 | Status: SHIPPED | OUTPATIENT
Start: 2018-11-02 | End: 2018-11-12

## 2018-11-06 ENCOUNTER — TELEPHONE (OUTPATIENT)
Dept: INTERNAL MEDICINE CLINIC | Age: 73
End: 2018-11-06

## 2018-11-06 DIAGNOSIS — B37.9 YEAST INFECTION: Primary | ICD-10-CM

## 2018-11-06 RX ORDER — FLUCONAZOLE 100 MG/1
150 TABLET ORAL DAILY
Qty: 1.5 TABLET | Refills: 0 | Status: SHIPPED | OUTPATIENT
Start: 2018-11-06 | End: 2018-11-07

## 2018-11-07 ENCOUNTER — OFFICE VISIT (OUTPATIENT)
Dept: PSYCHOLOGY | Age: 73
End: 2018-11-07
Payer: MEDICARE

## 2018-11-07 ENCOUNTER — OFFICE VISIT (OUTPATIENT)
Dept: INTERNAL MEDICINE CLINIC | Age: 73
End: 2018-11-07
Payer: MEDICARE

## 2018-11-07 VITALS
SYSTOLIC BLOOD PRESSURE: 110 MMHG | BODY MASS INDEX: 29.88 KG/M2 | OXYGEN SATURATION: 96 % | DIASTOLIC BLOOD PRESSURE: 88 MMHG | HEART RATE: 59 BPM | WEIGHT: 153 LBS

## 2018-11-07 DIAGNOSIS — F32.A DEPRESSIVE DISORDER: Primary | ICD-10-CM

## 2018-11-07 DIAGNOSIS — I50.9 OTHER CONGESTIVE HEART FAILURE (HCC): ICD-10-CM

## 2018-11-07 DIAGNOSIS — B37.0 ORAL CANDIDA: Primary | ICD-10-CM

## 2018-11-07 PROCEDURE — 90832 PSYTX W PT 30 MINUTES: CPT | Performed by: PSYCHOLOGIST

## 2018-11-07 PROCEDURE — 3017F COLORECTAL CA SCREEN DOC REV: CPT | Performed by: INTERNAL MEDICINE

## 2018-11-07 PROCEDURE — G8482 FLU IMMUNIZE ORDER/ADMIN: HCPCS | Performed by: INTERNAL MEDICINE

## 2018-11-07 PROCEDURE — 1101F PT FALLS ASSESS-DOCD LE1/YR: CPT | Performed by: INTERNAL MEDICINE

## 2018-11-07 PROCEDURE — 4040F PNEUMOC VAC/ADMIN/RCVD: CPT | Performed by: INTERNAL MEDICINE

## 2018-11-07 PROCEDURE — 1123F ACP DISCUSS/DSCN MKR DOCD: CPT | Performed by: INTERNAL MEDICINE

## 2018-11-07 PROCEDURE — 1036F TOBACCO NON-USER: CPT | Performed by: INTERNAL MEDICINE

## 2018-11-07 PROCEDURE — 1090F PRES/ABSN URINE INCON ASSESS: CPT | Performed by: INTERNAL MEDICINE

## 2018-11-07 PROCEDURE — G8427 DOCREV CUR MEDS BY ELIG CLIN: HCPCS | Performed by: INTERNAL MEDICINE

## 2018-11-07 PROCEDURE — 99213 OFFICE O/P EST LOW 20 MIN: CPT | Performed by: INTERNAL MEDICINE

## 2018-11-07 PROCEDURE — G8598 ASA/ANTIPLAT THER USED: HCPCS | Performed by: INTERNAL MEDICINE

## 2018-11-07 PROCEDURE — G8399 PT W/DXA RESULTS DOCUMENT: HCPCS | Performed by: INTERNAL MEDICINE

## 2018-11-07 PROCEDURE — G8417 CALC BMI ABV UP PARAM F/U: HCPCS | Performed by: INTERNAL MEDICINE

## 2018-11-07 ASSESSMENT — PATIENT HEALTH QUESTIONNAIRE - PHQ9
1. LITTLE INTEREST OR PLEASURE IN DOING THINGS: 2
8. MOVING OR SPEAKING SO SLOWLY THAT OTHER PEOPLE COULD HAVE NOTICED. OR THE OPPOSITE, BEING SO FIGETY OR RESTLESS THAT YOU HAVE BEEN MOVING AROUND A LOT MORE THAN USUAL: 0
2. FEELING DOWN, DEPRESSED OR HOPELESS: 3
SUM OF ALL RESPONSES TO PHQ QUESTIONS 1-9: 10
SUM OF ALL RESPONSES TO PHQ QUESTIONS 1-9: 10
7. TROUBLE CONCENTRATING ON THINGS, SUCH AS READING THE NEWSPAPER OR WATCHING TELEVISION: 1
4. FEELING TIRED OR HAVING LITTLE ENERGY: 2
10. IF YOU CHECKED OFF ANY PROBLEMS, HOW DIFFICULT HAVE THESE PROBLEMS MADE IT FOR YOU TO DO YOUR WORK, TAKE CARE OF THINGS AT HOME, OR GET ALONG WITH OTHER PEOPLE: 1
9. THOUGHTS THAT YOU WOULD BE BETTER OFF DEAD, OR OF HURTING YOURSELF: 0
3. TROUBLE FALLING OR STAYING ASLEEP: 1
6. FEELING BAD ABOUT YOURSELF - OR THAT YOU ARE A FAILURE OR HAVE LET YOURSELF OR YOUR FAMILY DOWN: 0
5. POOR APPETITE OR OVEREATING: 1
SUM OF ALL RESPONSES TO PHQ9 QUESTIONS 1 & 2: 5

## 2018-11-07 ASSESSMENT — ANXIETY QUESTIONNAIRES
6. BECOMING EASILY ANNOYED OR IRRITABLE: 2-OVER HALF THE DAYS
7. FEELING AFRAID AS IF SOMETHING AWFUL MIGHT HAPPEN: 2-OVER HALF THE DAYS
1. FEELING NERVOUS, ANXIOUS, OR ON EDGE: 1-SEVERAL DAYS
5. BEING SO RESTLESS THAT IT IS HARD TO SIT STILL: 0-NOT AT ALL SURE
GAD7 TOTAL SCORE: 14
4. TROUBLE RELAXING: 3-NEARLY EVERY DAY
3. WORRYING TOO MUCH ABOUT DIFFERENT THINGS: 3-NEARLY EVERY DAY
2. NOT BEING ABLE TO STOP OR CONTROL WORRYING: 3-NEARLY EVERY DAY

## 2018-11-07 NOTE — PROGRESS NOTES
Negative for abdominal pain, nausea and vomiting. Genitourinary: Positive for vaginal pain (vaginal itch ). Negative for hematuria. Skin: Negative for rash. Allergic/Immunologic: Negative for immunocompromised state. Neurological: Negative for dizziness and headaches. Psychiatric/Behavioral: Negative for dysphoric mood and suicidal ideas. Physical Exam:      Vitals: /88 (Site: Right Upper Arm, Position: Sitting, Cuff Size: Small Adult)   Pulse 59   Wt 153 lb (69.4 kg)   SpO2 96%   BMI 29.88 kg/m²     Body mass index is 29.88 kg/m². Wt Readings from Last 3 Encounters:   11/07/18 153 lb (69.4 kg)   10/26/18 154 lb (69.9 kg)   08/03/18 151 lb (68.5 kg)     Physical Exam   Constitutional: She is oriented to person, place, and time. She appears well-developed and well-nourished. No distress. HENT:   Head: Normocephalic and atraumatic. Mouth/Throat: Oropharynx is clear and moist. No oropharyngeal exudate. Mild fungal infection in oral cavity    Eyes: Conjunctivae and EOM are normal. Right eye exhibits no discharge. Left eye exhibits no discharge. No scleral icterus. Neck: Normal range of motion. Neck supple. Cardiovascular: Normal rate and normal heart sounds. No murmur heard. Pulmonary/Chest: Effort normal and breath sounds normal. No respiratory distress. She has no wheezes. She has no rales. Abdominal: Soft. She exhibits no distension. There is no tenderness. Musculoskeletal: She exhibits no deformity. Lymphadenopathy:        Head (right side): No submental and no submandibular adenopathy present. Head (left side): No submental and no submandibular adenopathy present. She has no cervical adenopathy. Right cervical: No superficial cervical and no deep cervical adenopathy present. Left cervical: No superficial cervical and no deep cervical adenopathy present. Neurological: She is alert and oriented to person, place, and time. She has normal reflexes.

## 2018-11-13 ENCOUNTER — TELEPHONE (OUTPATIENT)
Dept: INTERNAL MEDICINE CLINIC | Age: 73
End: 2018-11-13

## 2018-11-13 RX ORDER — PANTOPRAZOLE SODIUM 20 MG/1
TABLET, DELAYED RELEASE ORAL
Qty: 60 TABLET | Refills: 2 | Status: SHIPPED | OUTPATIENT
Start: 2018-11-13 | End: 2019-02-04 | Stop reason: SDUPTHER

## 2018-11-13 NOTE — TELEPHONE ENCOUNTER
Dr Ed Mccoy asking if yeast is oral or vaginal. Pt caregiver states vaginal and it is really irritating her.

## 2018-11-14 ENCOUNTER — TELEPHONE (OUTPATIENT)
Dept: CASE MANAGEMENT | Age: 73
End: 2018-11-14

## 2018-11-14 NOTE — TELEPHONE ENCOUNTER
CT Lung screening completed 11/03/17-Recommended annual screening (due Nov.2018). Reminder letter mailed to patient.

## 2018-11-20 ENCOUNTER — TELEPHONE (OUTPATIENT)
Dept: INTERNAL MEDICINE CLINIC | Age: 73
End: 2018-11-20

## 2018-11-20 DIAGNOSIS — B37.9 YEAST INFECTION: Primary | ICD-10-CM

## 2018-11-20 NOTE — TELEPHONE ENCOUNTER
Lili Lee calling back stating pt is suffering, and is not feeling well at all due to the Thrush  pls call Lili Lee to advise w/ medical care

## 2018-11-20 NOTE — TELEPHONE ENCOUNTER
Patient still having problem with the yeast infection (vaginal and thrush). Patient was given oral antifungals and diflucan (?) but nothing is working.

## 2018-11-21 ENCOUNTER — NURSE ONLY (OUTPATIENT)
Dept: INTERNAL MEDICINE CLINIC | Age: 73
End: 2018-11-21
Payer: MEDICARE

## 2018-11-21 ENCOUNTER — OFFICE VISIT (OUTPATIENT)
Dept: PSYCHOLOGY | Age: 73
End: 2018-11-21
Payer: MEDICARE

## 2018-11-21 DIAGNOSIS — F32.A DEPRESSIVE DISORDER: Primary | ICD-10-CM

## 2018-11-21 DIAGNOSIS — Z23 NEED FOR INFLUENZA VACCINATION: Primary | ICD-10-CM

## 2018-11-21 PROCEDURE — 90832 PSYTX W PT 30 MINUTES: CPT | Performed by: PSYCHOLOGIST

## 2018-11-21 PROCEDURE — 90662 IIV NO PRSV INCREASED AG IM: CPT | Performed by: INTERNAL MEDICINE

## 2018-11-21 PROCEDURE — G0008 ADMIN INFLUENZA VIRUS VAC: HCPCS | Performed by: INTERNAL MEDICINE

## 2018-11-21 ASSESSMENT — ANXIETY QUESTIONNAIRES
6. BECOMING EASILY ANNOYED OR IRRITABLE: 2-OVER HALF THE DAYS
GAD7 TOTAL SCORE: 15
1. FEELING NERVOUS, ANXIOUS, OR ON EDGE: 2-OVER HALF THE DAYS
2. NOT BEING ABLE TO STOP OR CONTROL WORRYING: 3-NEARLY EVERY DAY
4. TROUBLE RELAXING: 2-OVER HALF THE DAYS
3. WORRYING TOO MUCH ABOUT DIFFERENT THINGS: 3-NEARLY EVERY DAY
5. BEING SO RESTLESS THAT IT IS HARD TO SIT STILL: 1-SEVERAL DAYS
7. FEELING AFRAID AS IF SOMETHING AWFUL MIGHT HAPPEN: 2-OVER HALF THE DAYS

## 2018-11-21 ASSESSMENT — PATIENT HEALTH QUESTIONNAIRE - PHQ9
6. FEELING BAD ABOUT YOURSELF - OR THAT YOU ARE A FAILURE OR HAVE LET YOURSELF OR YOUR FAMILY DOWN: 0
5. POOR APPETITE OR OVEREATING: 1
3. TROUBLE FALLING OR STAYING ASLEEP: 1
SUM OF ALL RESPONSES TO PHQ QUESTIONS 1-9: 7
9. THOUGHTS THAT YOU WOULD BE BETTER OFF DEAD, OR OF HURTING YOURSELF: 0
SUM OF ALL RESPONSES TO PHQ9 QUESTIONS 1 & 2: 2
SUM OF ALL RESPONSES TO PHQ QUESTIONS 1-9: 7
7. TROUBLE CONCENTRATING ON THINGS, SUCH AS READING THE NEWSPAPER OR WATCHING TELEVISION: 0
1. LITTLE INTEREST OR PLEASURE IN DOING THINGS: 1
4. FEELING TIRED OR HAVING LITTLE ENERGY: 2
8. MOVING OR SPEAKING SO SLOWLY THAT OTHER PEOPLE COULD HAVE NOTICED. OR THE OPPOSITE, BEING SO FIGETY OR RESTLESS THAT YOU HAVE BEEN MOVING AROUND A LOT MORE THAN USUAL: 1
10. IF YOU CHECKED OFF ANY PROBLEMS, HOW DIFFICULT HAVE THESE PROBLEMS MADE IT FOR YOU TO DO YOUR WORK, TAKE CARE OF THINGS AT HOME, OR GET ALONG WITH OTHER PEOPLE: 1
2. FEELING DOWN, DEPRESSED OR HOPELESS: 1

## 2018-11-21 NOTE — PROGRESS NOTES
not apply route Valid for 5 years from 11/7/18 1 each 0    Mirabegron ER (MYRBETRIQ) 25 MG TB24 Take 2 tablets daily (Patient taking differently: Take 1 tablet daily) 60 tablet 2    SENNA S 8.6-50 MG per tablet TAKE 2 TABLETS BY MOUTH DAILY 30 tablet 2    losartan (COZAAR) 25 MG tablet TAKE 1 TABLET BY MOUTH DAILY 30 tablet 2    clopidogrel (PLAVIX) 75 MG tablet TAKE 1 TABLET BY MOUTH DAILY 30 tablet 3    citalopram (CELEXA) 20 MG tablet Take 1 tablet by mouth daily 30 tablet 5    rosuvastatin (CRESTOR) 20 MG tablet Take 1 tablet by mouth daily 30 tablet 5    buPROPion (WELLBUTRIN XL) 300 MG extended release tablet TAKE 1 TABLET BY MOUTH EVERY MORNING 30 tablet 5    carvedilol (COREG) 25 MG tablet TAKE ONE TABLET BY MOUTH TWICE DAILY WITH MEALS 60 tablet 5    furosemide (LASIX) 20 MG tablet Take 1 tablet by mouth daily as needed (edema) 30 tablet 5    Cholecalciferol (VITAMIN D3) 48020 units CAPS Take 50,000 Units by mouth See Admin Instructions every other week 4 capsule 5    Nutritional Supplements (ENSURE ACTIVE HIGH PROTEIN PO) Take 1 Can by mouth daily as needed      acetaminophen 650 MG TABS Take 650 mg by mouth every 6 hours as needed for Pain or Fever (Fever >100.5 F (38 C)). 60 tablet 1    hydroxychloroquine (PLAQUENIL) 200 MG tablet Take 200 mg by mouth daily. No current facility-administered medications for this visit.         Social History:   Social History     Social History    Marital status:      Spouse name: N/A    Number of children: 1    Years of education: 15     Occupational History    Retired      Social History Main Topics    Smoking status: Former Smoker     Packs/day: 0.50     Years: 30.00     Types: Cigarettes     Start date: 1965     Quit date: 9/24/2014    Smokeless tobacco: Never Used    Alcohol use 0.0 oz/week      Comment: One drink twice weekly    Drug use: No    Sexual activity: Not on file     Other Topics Concern    Not on file     Social History

## 2018-11-26 RX ORDER — CLOPIDOGREL BISULFATE 75 MG/1
TABLET ORAL
Qty: 30 TABLET | Refills: 3 | Status: SHIPPED | OUTPATIENT
Start: 2018-11-26 | End: 2019-04-01 | Stop reason: SDUPTHER

## 2018-11-26 ASSESSMENT — ENCOUNTER SYMPTOMS
CHEST TIGHTNESS: 0
ABDOMINAL PAIN: 0
SHORTNESS OF BREATH: 0
NAUSEA: 0
VOMITING: 0

## 2018-12-03 DIAGNOSIS — I10 ESSENTIAL HYPERTENSION, BENIGN: ICD-10-CM

## 2018-12-03 DIAGNOSIS — M32.9 SYSTEMIC LUPUS ERYTHEMATOSUS, UNSPECIFIED SLE TYPE, UNSPECIFIED ORGAN INVOLVEMENT STATUS (HCC): Primary | ICD-10-CM

## 2018-12-04 ENCOUNTER — OFFICE VISIT (OUTPATIENT)
Dept: PSYCHOLOGY | Age: 73
End: 2018-12-04
Payer: MEDICARE

## 2018-12-04 DIAGNOSIS — F32.A DEPRESSIVE DISORDER: Primary | ICD-10-CM

## 2018-12-04 PROCEDURE — 90832 PSYTX W PT 30 MINUTES: CPT | Performed by: PSYCHOLOGIST

## 2018-12-04 ASSESSMENT — PATIENT HEALTH QUESTIONNAIRE - PHQ9
SUM OF ALL RESPONSES TO PHQ QUESTIONS 1-9: 7
7. TROUBLE CONCENTRATING ON THINGS, SUCH AS READING THE NEWSPAPER OR WATCHING TELEVISION: 1
4. FEELING TIRED OR HAVING LITTLE ENERGY: 1
9. THOUGHTS THAT YOU WOULD BE BETTER OFF DEAD, OR OF HURTING YOURSELF: 0
5. POOR APPETITE OR OVEREATING: 1
SUM OF ALL RESPONSES TO PHQ9 QUESTIONS 1 & 2: 2
8. MOVING OR SPEAKING SO SLOWLY THAT OTHER PEOPLE COULD HAVE NOTICED. OR THE OPPOSITE, BEING SO FIGETY OR RESTLESS THAT YOU HAVE BEEN MOVING AROUND A LOT MORE THAN USUAL: 1
1. LITTLE INTEREST OR PLEASURE IN DOING THINGS: 1
6. FEELING BAD ABOUT YOURSELF - OR THAT YOU ARE A FAILURE OR HAVE LET YOURSELF OR YOUR FAMILY DOWN: 0
2. FEELING DOWN, DEPRESSED OR HOPELESS: 1
SUM OF ALL RESPONSES TO PHQ QUESTIONS 1-9: 7
10. IF YOU CHECKED OFF ANY PROBLEMS, HOW DIFFICULT HAVE THESE PROBLEMS MADE IT FOR YOU TO DO YOUR WORK, TAKE CARE OF THINGS AT HOME, OR GET ALONG WITH OTHER PEOPLE: 1
3. TROUBLE FALLING OR STAYING ASLEEP: 1

## 2018-12-04 ASSESSMENT — ANXIETY QUESTIONNAIRES
4. TROUBLE RELAXING: 3-NEARLY EVERY DAY
6. BECOMING EASILY ANNOYED OR IRRITABLE: 2-OVER HALF THE DAYS
GAD7 TOTAL SCORE: 15
1. FEELING NERVOUS, ANXIOUS, OR ON EDGE: 2-OVER HALF THE DAYS
5. BEING SO RESTLESS THAT IT IS HARD TO SIT STILL: 1-SEVERAL DAYS
7. FEELING AFRAID AS IF SOMETHING AWFUL MIGHT HAPPEN: 1-SEVERAL DAYS
3. WORRYING TOO MUCH ABOUT DIFFERENT THINGS: 3-NEARLY EVERY DAY
2. NOT BEING ABLE TO STOP OR CONTROL WORRYING: 3-NEARLY EVERY DAY

## 2018-12-06 ENCOUNTER — TELEPHONE (OUTPATIENT)
Dept: INTERNAL MEDICINE CLINIC | Age: 73
End: 2018-12-06

## 2018-12-06 RX ORDER — CARVEDILOL 25 MG/1
TABLET ORAL
Qty: 60 TABLET | Refills: 5 | Status: SHIPPED | OUTPATIENT
Start: 2018-12-06 | End: 2019-05-27 | Stop reason: SDUPTHER

## 2018-12-06 RX ORDER — TRAMADOL HYDROCHLORIDE 50 MG/1
TABLET ORAL
Qty: 14 TABLET | Refills: 0 | OUTPATIENT
Start: 2018-12-06 | End: 2018-12-13

## 2018-12-17 DIAGNOSIS — K59.03 DRUG-INDUCED CONSTIPATION: ICD-10-CM

## 2018-12-17 RX ORDER — DOCUSATE SODIUM AND SENNOSIDES 8.6; 5 MG/1; MG/1
2 TABLET ORAL DAILY
Qty: 30 TABLET | Refills: 3 | Status: SHIPPED | OUTPATIENT
Start: 2018-12-17 | End: 2018-12-31 | Stop reason: DRUGHIGH

## 2018-12-24 DIAGNOSIS — I10 ESSENTIAL HYPERTENSION, BENIGN: ICD-10-CM

## 2018-12-24 RX ORDER — LOSARTAN POTASSIUM 25 MG/1
25 TABLET ORAL DAILY
Qty: 30 TABLET | Refills: 3 | Status: SHIPPED | OUTPATIENT
Start: 2018-12-24 | End: 2019-04-29 | Stop reason: SDUPTHER

## 2018-12-26 ENCOUNTER — TELEPHONE (OUTPATIENT)
Dept: INTERNAL MEDICINE CLINIC | Age: 73
End: 2018-12-26

## 2018-12-26 ENCOUNTER — OFFICE VISIT (OUTPATIENT)
Dept: INTERNAL MEDICINE CLINIC | Age: 73
End: 2018-12-26
Payer: MEDICARE

## 2018-12-26 VITALS
SYSTOLIC BLOOD PRESSURE: 120 MMHG | DIASTOLIC BLOOD PRESSURE: 82 MMHG | HEART RATE: 59 BPM | BODY MASS INDEX: 30.66 KG/M2 | OXYGEN SATURATION: 97 % | WEIGHT: 157 LBS

## 2018-12-26 DIAGNOSIS — R19.7 DIARRHEA, UNSPECIFIED TYPE: Primary | ICD-10-CM

## 2018-12-26 DIAGNOSIS — R19.7 DIARRHEA, UNSPECIFIED TYPE: ICD-10-CM

## 2018-12-26 DIAGNOSIS — B37.0 ORAL THRUSH: ICD-10-CM

## 2018-12-26 LAB
A/G RATIO: 1.4 (ref 1.1–2.2)
ALBUMIN SERPL-MCNC: 4.2 G/DL (ref 3.4–5)
ALP BLD-CCNC: 157 U/L (ref 40–129)
ALT SERPL-CCNC: 26 U/L (ref 10–40)
ANION GAP SERPL CALCULATED.3IONS-SCNC: 16 MMOL/L (ref 3–16)
AST SERPL-CCNC: 28 U/L (ref 15–37)
BILIRUB SERPL-MCNC: <0.2 MG/DL (ref 0–1)
BUN BLDV-MCNC: 24 MG/DL (ref 7–20)
CALCIUM SERPL-MCNC: 9.6 MG/DL (ref 8.3–10.6)
CHLORIDE BLD-SCNC: 100 MMOL/L (ref 99–110)
CO2: 25 MMOL/L (ref 21–32)
CREAT SERPL-MCNC: 0.9 MG/DL (ref 0.6–1.2)
GFR AFRICAN AMERICAN: >60
GFR NON-AFRICAN AMERICAN: >60
GLOBULIN: 2.9 G/DL
GLUCOSE BLD-MCNC: 91 MG/DL (ref 70–99)
HCT VFR BLD CALC: 36.4 % (ref 36–48)
HEMOGLOBIN: 12.2 G/DL (ref 12–16)
MCH RBC QN AUTO: 33 PG (ref 26–34)
MCHC RBC AUTO-ENTMCNC: 33.5 G/DL (ref 31–36)
MCV RBC AUTO: 98.6 FL (ref 80–100)
PDW BLD-RTO: 13.3 % (ref 12.4–15.4)
PLATELET # BLD: 247 K/UL (ref 135–450)
PMV BLD AUTO: 6.9 FL (ref 5–10.5)
POTASSIUM SERPL-SCNC: 4.2 MMOL/L (ref 3.5–5.1)
RBC # BLD: 3.69 M/UL (ref 4–5.2)
SODIUM BLD-SCNC: 141 MMOL/L (ref 136–145)
TOTAL PROTEIN: 7.1 G/DL (ref 6.4–8.2)
WBC # BLD: 4.5 K/UL (ref 4–11)

## 2018-12-26 PROCEDURE — 1090F PRES/ABSN URINE INCON ASSESS: CPT | Performed by: INTERNAL MEDICINE

## 2018-12-26 PROCEDURE — G8399 PT W/DXA RESULTS DOCUMENT: HCPCS | Performed by: INTERNAL MEDICINE

## 2018-12-26 PROCEDURE — G8417 CALC BMI ABV UP PARAM F/U: HCPCS | Performed by: INTERNAL MEDICINE

## 2018-12-26 PROCEDURE — 1101F PT FALLS ASSESS-DOCD LE1/YR: CPT | Performed by: INTERNAL MEDICINE

## 2018-12-26 PROCEDURE — G8482 FLU IMMUNIZE ORDER/ADMIN: HCPCS | Performed by: INTERNAL MEDICINE

## 2018-12-26 PROCEDURE — 3017F COLORECTAL CA SCREEN DOC REV: CPT | Performed by: INTERNAL MEDICINE

## 2018-12-26 PROCEDURE — 1036F TOBACCO NON-USER: CPT | Performed by: INTERNAL MEDICINE

## 2018-12-26 PROCEDURE — 99213 OFFICE O/P EST LOW 20 MIN: CPT | Performed by: INTERNAL MEDICINE

## 2018-12-26 PROCEDURE — 4040F PNEUMOC VAC/ADMIN/RCVD: CPT | Performed by: INTERNAL MEDICINE

## 2018-12-26 PROCEDURE — 1123F ACP DISCUSS/DSCN MKR DOCD: CPT | Performed by: INTERNAL MEDICINE

## 2018-12-26 PROCEDURE — G8598 ASA/ANTIPLAT THER USED: HCPCS | Performed by: INTERNAL MEDICINE

## 2018-12-26 PROCEDURE — G8427 DOCREV CUR MEDS BY ELIG CLIN: HCPCS | Performed by: INTERNAL MEDICINE

## 2018-12-26 ASSESSMENT — ENCOUNTER SYMPTOMS
CHEST TIGHTNESS: 0
SHORTNESS OF BREATH: 0
VOMITING: 0
DIARRHEA: 1
NAUSEA: 0
ABDOMINAL PAIN: 0

## 2018-12-26 NOTE — TELEPHONE ENCOUNTER
Pt son reports that patient continues to have diarrhea 2-3X per week for 4-5 weeks, and not feeling well. Care giver request a blood test be completed. Please advise.

## 2018-12-27 DIAGNOSIS — R74.8 ALKALINE PHOSPHATASE ELEVATION: Primary | ICD-10-CM

## 2018-12-28 DIAGNOSIS — R74.8 ALKALINE PHOSPHATASE ELEVATION: ICD-10-CM

## 2018-12-28 DIAGNOSIS — R19.7 DIARRHEA, UNSPECIFIED TYPE: ICD-10-CM

## 2018-12-28 LAB
ALP BLD-CCNC: 133 U/L (ref 40–129)
WHITE BLOOD CELLS (WBC), STOOL: NORMAL

## 2018-12-29 LAB
CRYPTOSPORIDIUM ANTIGEN STOOL: NORMAL
E HISTOLYTICA ANTIGEN STOOL: NORMAL
GIARDIA ANTIGEN STOOL: NORMAL

## 2018-12-31 ENCOUNTER — TELEPHONE (OUTPATIENT)
Dept: INTERNAL MEDICINE CLINIC | Age: 73
End: 2018-12-31

## 2018-12-31 DIAGNOSIS — K64.4 EXTERNAL HEMORRHOIDS: Primary | ICD-10-CM

## 2018-12-31 DIAGNOSIS — E78.00 PURE HYPERCHOLESTEROLEMIA: ICD-10-CM

## 2018-12-31 DIAGNOSIS — F33.40 DEPRESSION, MAJOR, RECURRENT, IN REMISSION (HCC): ICD-10-CM

## 2018-12-31 DIAGNOSIS — K59.03 DRUG-INDUCED CONSTIPATION: ICD-10-CM

## 2018-12-31 RX ORDER — BUPROPION HYDROCHLORIDE 300 MG/1
TABLET ORAL
Qty: 30 TABLET | Refills: 2 | Status: SHIPPED | OUTPATIENT
Start: 2018-12-31 | End: 2019-04-01 | Stop reason: SDUPTHER

## 2018-12-31 RX ORDER — ROSUVASTATIN CALCIUM 20 MG/1
20 TABLET, COATED ORAL DAILY
Qty: 30 TABLET | Refills: 2 | Status: SHIPPED | OUTPATIENT
Start: 2018-12-31 | End: 2019-04-01 | Stop reason: SDUPTHER

## 2018-12-31 RX ORDER — DIAPER,BRIEF,INFANT-TODD,DISP
EACH MISCELLANEOUS
Qty: 1 TUBE | Refills: 1 | Status: SHIPPED | OUTPATIENT
Start: 2018-12-31 | End: 2019-01-07

## 2018-12-31 RX ORDER — AMOXICILLIN 250 MG
1 CAPSULE ORAL EVERY OTHER DAY
Qty: 30 TABLET | Refills: 3 | Status: SHIPPED
Start: 2018-12-31 | End: 2019-02-07 | Stop reason: ALTCHOICE

## 2018-12-31 RX ORDER — CHOLECALCIFEROL (VITAMIN D3) 1250 MCG
CAPSULE ORAL
Qty: 4 CAPSULE | Refills: 2 | Status: SHIPPED | OUTPATIENT
Start: 2018-12-31 | End: 2019-06-10 | Stop reason: SDUPTHER

## 2019-01-02 ENCOUNTER — TELEPHONE (OUTPATIENT)
Dept: INTERNAL MEDICINE CLINIC | Age: 74
End: 2019-01-02

## 2019-01-02 ENCOUNTER — OFFICE VISIT (OUTPATIENT)
Dept: PSYCHOLOGY | Age: 74
End: 2019-01-02
Payer: MEDICARE

## 2019-01-02 DIAGNOSIS — F32.A DEPRESSIVE DISORDER: Primary | ICD-10-CM

## 2019-01-02 PROCEDURE — 90832 PSYTX W PT 30 MINUTES: CPT | Performed by: PSYCHOLOGIST

## 2019-01-02 ASSESSMENT — PATIENT HEALTH QUESTIONNAIRE - PHQ9
8. MOVING OR SPEAKING SO SLOWLY THAT OTHER PEOPLE COULD HAVE NOTICED. OR THE OPPOSITE, BEING SO FIGETY OR RESTLESS THAT YOU HAVE BEEN MOVING AROUND A LOT MORE THAN USUAL: 1
3. TROUBLE FALLING OR STAYING ASLEEP: 1
SUM OF ALL RESPONSES TO PHQ9 QUESTIONS 1 & 2: 3
2. FEELING DOWN, DEPRESSED OR HOPELESS: 2
5. POOR APPETITE OR OVEREATING: 0
SUM OF ALL RESPONSES TO PHQ QUESTIONS 1-9: 9
9. THOUGHTS THAT YOU WOULD BE BETTER OFF DEAD, OR OF HURTING YOURSELF: 0
1. LITTLE INTEREST OR PLEASURE IN DOING THINGS: 1
7. TROUBLE CONCENTRATING ON THINGS, SUCH AS READING THE NEWSPAPER OR WATCHING TELEVISION: 1
10. IF YOU CHECKED OFF ANY PROBLEMS, HOW DIFFICULT HAVE THESE PROBLEMS MADE IT FOR YOU TO DO YOUR WORK, TAKE CARE OF THINGS AT HOME, OR GET ALONG WITH OTHER PEOPLE: 1
6. FEELING BAD ABOUT YOURSELF - OR THAT YOU ARE A FAILURE OR HAVE LET YOURSELF OR YOUR FAMILY DOWN: 1
4. FEELING TIRED OR HAVING LITTLE ENERGY: 2
SUM OF ALL RESPONSES TO PHQ QUESTIONS 1-9: 9

## 2019-01-02 ASSESSMENT — ANXIETY QUESTIONNAIRES
GAD7 TOTAL SCORE: 11
6. BECOMING EASILY ANNOYED OR IRRITABLE: 2-OVER HALF THE DAYS
7. FEELING AFRAID AS IF SOMETHING AWFUL MIGHT HAPPEN: 2-OVER HALF THE DAYS
5. BEING SO RESTLESS THAT IT IS HARD TO SIT STILL: 0-NOT AT ALL SURE
4. TROUBLE RELAXING: 2-OVER HALF THE DAYS
2. NOT BEING ABLE TO STOP OR CONTROL WORRYING: 2-OVER HALF THE DAYS
1. FEELING NERVOUS, ANXIOUS, OR ON EDGE: 1-SEVERAL DAYS
3. WORRYING TOO MUCH ABOUT DIFFERENT THINGS: 2-OVER HALF THE DAYS

## 2019-01-15 DIAGNOSIS — F33.40 DEPRESSION, MAJOR, RECURRENT, IN REMISSION (HCC): ICD-10-CM

## 2019-01-15 RX ORDER — CITALOPRAM 20 MG/1
20 TABLET ORAL DAILY
Qty: 30 TABLET | Refills: 5 | Status: SHIPPED | OUTPATIENT
Start: 2019-01-15 | End: 2019-07-22 | Stop reason: SDUPTHER

## 2019-02-04 RX ORDER — PANTOPRAZOLE SODIUM 20 MG/1
TABLET, DELAYED RELEASE ORAL
Qty: 60 TABLET | Refills: 0 | Status: SHIPPED | OUTPATIENT
Start: 2019-02-04 | End: 2019-03-11 | Stop reason: SDUPTHER

## 2019-02-07 ENCOUNTER — OFFICE VISIT (OUTPATIENT)
Dept: INTERNAL MEDICINE CLINIC | Age: 74
End: 2019-02-07
Payer: MEDICARE

## 2019-02-07 VITALS
BODY MASS INDEX: 30.66 KG/M2 | SYSTOLIC BLOOD PRESSURE: 110 MMHG | WEIGHT: 157 LBS | HEART RATE: 66 BPM | DIASTOLIC BLOOD PRESSURE: 70 MMHG | OXYGEN SATURATION: 98 %

## 2019-02-07 DIAGNOSIS — F32.A DEPRESSIVE DISORDER: ICD-10-CM

## 2019-02-07 DIAGNOSIS — M32.9 SYSTEMIC LUPUS ERYTHEMATOSUS, UNSPECIFIED SLE TYPE, UNSPECIFIED ORGAN INVOLVEMENT STATUS (HCC): ICD-10-CM

## 2019-02-07 DIAGNOSIS — I25.10 CORONARY ARTERY DISEASE INVOLVING NATIVE CORONARY ARTERY OF NATIVE HEART WITHOUT ANGINA PECTORIS: ICD-10-CM

## 2019-02-07 DIAGNOSIS — E78.00 PURE HYPERCHOLESTEROLEMIA: ICD-10-CM

## 2019-02-07 DIAGNOSIS — F33.40 DEPRESSION, MAJOR, RECURRENT, IN REMISSION (HCC): ICD-10-CM

## 2019-02-07 DIAGNOSIS — J84.9 ILD (INTERSTITIAL LUNG DISEASE) (HCC): ICD-10-CM

## 2019-02-07 DIAGNOSIS — I10 ESSENTIAL HYPERTENSION, BENIGN: Primary | ICD-10-CM

## 2019-02-07 DIAGNOSIS — I50.9 OTHER CONGESTIVE HEART FAILURE (HCC): ICD-10-CM

## 2019-02-07 PROCEDURE — 1090F PRES/ABSN URINE INCON ASSESS: CPT | Performed by: INTERNAL MEDICINE

## 2019-02-07 PROCEDURE — 99214 OFFICE O/P EST MOD 30 MIN: CPT | Performed by: INTERNAL MEDICINE

## 2019-02-07 PROCEDURE — G8427 DOCREV CUR MEDS BY ELIG CLIN: HCPCS | Performed by: INTERNAL MEDICINE

## 2019-02-07 PROCEDURE — 1036F TOBACCO NON-USER: CPT | Performed by: INTERNAL MEDICINE

## 2019-02-07 PROCEDURE — G8417 CALC BMI ABV UP PARAM F/U: HCPCS | Performed by: INTERNAL MEDICINE

## 2019-02-07 PROCEDURE — G8399 PT W/DXA RESULTS DOCUMENT: HCPCS | Performed by: INTERNAL MEDICINE

## 2019-02-07 PROCEDURE — G8482 FLU IMMUNIZE ORDER/ADMIN: HCPCS | Performed by: INTERNAL MEDICINE

## 2019-02-07 PROCEDURE — 1123F ACP DISCUSS/DSCN MKR DOCD: CPT | Performed by: INTERNAL MEDICINE

## 2019-02-07 PROCEDURE — 1101F PT FALLS ASSESS-DOCD LE1/YR: CPT | Performed by: INTERNAL MEDICINE

## 2019-02-07 PROCEDURE — G8598 ASA/ANTIPLAT THER USED: HCPCS | Performed by: INTERNAL MEDICINE

## 2019-02-07 PROCEDURE — 4040F PNEUMOC VAC/ADMIN/RCVD: CPT | Performed by: INTERNAL MEDICINE

## 2019-02-07 ASSESSMENT — ENCOUNTER SYMPTOMS
SHORTNESS OF BREATH: 0
CHEST TIGHTNESS: 0
ABDOMINAL PAIN: 0
NAUSEA: 0
VOMITING: 0

## 2019-02-12 ENCOUNTER — OFFICE VISIT (OUTPATIENT)
Dept: PSYCHOLOGY | Age: 74
End: 2019-02-12
Payer: MEDICARE

## 2019-02-12 DIAGNOSIS — F32.A DEPRESSIVE DISORDER: Primary | ICD-10-CM

## 2019-02-12 PROCEDURE — 90832 PSYTX W PT 30 MINUTES: CPT | Performed by: PSYCHOLOGIST

## 2019-02-12 ASSESSMENT — PATIENT HEALTH QUESTIONNAIRE - PHQ9
2. FEELING DOWN, DEPRESSED OR HOPELESS: 3
SUM OF ALL RESPONSES TO PHQ9 QUESTIONS 1 & 2: 4
3. TROUBLE FALLING OR STAYING ASLEEP: 1
SUM OF ALL RESPONSES TO PHQ QUESTIONS 1-9: 9
8. MOVING OR SPEAKING SO SLOWLY THAT OTHER PEOPLE COULD HAVE NOTICED. OR THE OPPOSITE, BEING SO FIGETY OR RESTLESS THAT YOU HAVE BEEN MOVING AROUND A LOT MORE THAN USUAL: 1
7. TROUBLE CONCENTRATING ON THINGS, SUCH AS READING THE NEWSPAPER OR WATCHING TELEVISION: 1
4. FEELING TIRED OR HAVING LITTLE ENERGY: 2
10. IF YOU CHECKED OFF ANY PROBLEMS, HOW DIFFICULT HAVE THESE PROBLEMS MADE IT FOR YOU TO DO YOUR WORK, TAKE CARE OF THINGS AT HOME, OR GET ALONG WITH OTHER PEOPLE: 1
9. THOUGHTS THAT YOU WOULD BE BETTER OFF DEAD, OR OF HURTING YOURSELF: 0
6. FEELING BAD ABOUT YOURSELF - OR THAT YOU ARE A FAILURE OR HAVE LET YOURSELF OR YOUR FAMILY DOWN: 0
5. POOR APPETITE OR OVEREATING: 0
1. LITTLE INTEREST OR PLEASURE IN DOING THINGS: 1
SUM OF ALL RESPONSES TO PHQ QUESTIONS 1-9: 9

## 2019-02-12 ASSESSMENT — ANXIETY QUESTIONNAIRES
3. WORRYING TOO MUCH ABOUT DIFFERENT THINGS: 3-NEARLY EVERY DAY
5. BEING SO RESTLESS THAT IT IS HARD TO SIT STILL: 1-SEVERAL DAYS
1. FEELING NERVOUS, ANXIOUS, OR ON EDGE: 2-OVER HALF THE DAYS
7. FEELING AFRAID AS IF SOMETHING AWFUL MIGHT HAPPEN: 2-OVER HALF THE DAYS
2. NOT BEING ABLE TO STOP OR CONTROL WORRYING: 3-NEARLY EVERY DAY
6. BECOMING EASILY ANNOYED OR IRRITABLE: 2-OVER HALF THE DAYS
GAD7 TOTAL SCORE: 16
4. TROUBLE RELAXING: 3-NEARLY EVERY DAY

## 2019-03-08 ENCOUNTER — OFFICE VISIT (OUTPATIENT)
Dept: PSYCHOLOGY | Age: 74
End: 2019-03-08
Payer: MEDICARE

## 2019-03-08 DIAGNOSIS — F32.A DEPRESSIVE DISORDER: Primary | ICD-10-CM

## 2019-03-08 PROCEDURE — 90832 PSYTX W PT 30 MINUTES: CPT | Performed by: PSYCHOLOGIST

## 2019-03-08 ASSESSMENT — ANXIETY QUESTIONNAIRES
5. BEING SO RESTLESS THAT IT IS HARD TO SIT STILL: 1-SEVERAL DAYS
7. FEELING AFRAID AS IF SOMETHING AWFUL MIGHT HAPPEN: 3-NEARLY EVERY DAY
GAD7 TOTAL SCORE: 17
4. TROUBLE RELAXING: 2-OVER HALF THE DAYS
1. FEELING NERVOUS, ANXIOUS, OR ON EDGE: 3-NEARLY EVERY DAY
2. NOT BEING ABLE TO STOP OR CONTROL WORRYING: 3-NEARLY EVERY DAY
3. WORRYING TOO MUCH ABOUT DIFFERENT THINGS: 3-NEARLY EVERY DAY
6. BECOMING EASILY ANNOYED OR IRRITABLE: 2-OVER HALF THE DAYS

## 2019-03-08 ASSESSMENT — PATIENT HEALTH QUESTIONNAIRE - PHQ9
2. FEELING DOWN, DEPRESSED OR HOPELESS: 2
SUM OF ALL RESPONSES TO PHQ9 QUESTIONS 1 & 2: 3
10. IF YOU CHECKED OFF ANY PROBLEMS, HOW DIFFICULT HAVE THESE PROBLEMS MADE IT FOR YOU TO DO YOUR WORK, TAKE CARE OF THINGS AT HOME, OR GET ALONG WITH OTHER PEOPLE: 1
1. LITTLE INTEREST OR PLEASURE IN DOING THINGS: 1
SUM OF ALL RESPONSES TO PHQ QUESTIONS 1-9: 11
9. THOUGHTS THAT YOU WOULD BE BETTER OFF DEAD, OR OF HURTING YOURSELF: 0
3. TROUBLE FALLING OR STAYING ASLEEP: 3
4. FEELING TIRED OR HAVING LITTLE ENERGY: 2
5. POOR APPETITE OR OVEREATING: 0
SUM OF ALL RESPONSES TO PHQ QUESTIONS 1-9: 11
7. TROUBLE CONCENTRATING ON THINGS, SUCH AS READING THE NEWSPAPER OR WATCHING TELEVISION: 1
8. MOVING OR SPEAKING SO SLOWLY THAT OTHER PEOPLE COULD HAVE NOTICED. OR THE OPPOSITE, BEING SO FIGETY OR RESTLESS THAT YOU HAVE BEEN MOVING AROUND A LOT MORE THAN USUAL: 1
6. FEELING BAD ABOUT YOURSELF - OR THAT YOU ARE A FAILURE OR HAVE LET YOURSELF OR YOUR FAMILY DOWN: 1

## 2019-03-12 RX ORDER — PANTOPRAZOLE SODIUM 20 MG/1
TABLET, DELAYED RELEASE ORAL
Qty: 60 TABLET | Refills: 0 | Status: SHIPPED | OUTPATIENT
Start: 2019-03-12 | End: 2019-04-08 | Stop reason: SDUPTHER

## 2019-03-15 ENCOUNTER — OFFICE VISIT (OUTPATIENT)
Dept: PULMONOLOGY | Age: 74
End: 2019-03-15
Payer: MEDICARE

## 2019-03-15 VITALS
OXYGEN SATURATION: 97 % | HEIGHT: 60 IN | WEIGHT: 160 LBS | SYSTOLIC BLOOD PRESSURE: 110 MMHG | HEART RATE: 58 BPM | DIASTOLIC BLOOD PRESSURE: 64 MMHG | BODY MASS INDEX: 31.41 KG/M2

## 2019-03-15 DIAGNOSIS — M32.9 SYSTEMIC LUPUS ERYTHEMATOSUS, UNSPECIFIED SLE TYPE, UNSPECIFIED ORGAN INVOLVEMENT STATUS (HCC): Primary | ICD-10-CM

## 2019-03-15 DIAGNOSIS — J84.9 INTERSTITIAL LUNG DISEASE (HCC): ICD-10-CM

## 2019-03-15 PROCEDURE — G8598 ASA/ANTIPLAT THER USED: HCPCS | Performed by: INTERNAL MEDICINE

## 2019-03-15 PROCEDURE — 1036F TOBACCO NON-USER: CPT | Performed by: INTERNAL MEDICINE

## 2019-03-15 PROCEDURE — 4040F PNEUMOC VAC/ADMIN/RCVD: CPT | Performed by: INTERNAL MEDICINE

## 2019-03-15 PROCEDURE — G8417 CALC BMI ABV UP PARAM F/U: HCPCS | Performed by: INTERNAL MEDICINE

## 2019-03-15 PROCEDURE — 1101F PT FALLS ASSESS-DOCD LE1/YR: CPT | Performed by: INTERNAL MEDICINE

## 2019-03-15 PROCEDURE — G8482 FLU IMMUNIZE ORDER/ADMIN: HCPCS | Performed by: INTERNAL MEDICINE

## 2019-03-15 PROCEDURE — 1090F PRES/ABSN URINE INCON ASSESS: CPT | Performed by: INTERNAL MEDICINE

## 2019-03-15 PROCEDURE — 1123F ACP DISCUSS/DSCN MKR DOCD: CPT | Performed by: INTERNAL MEDICINE

## 2019-03-15 PROCEDURE — G8399 PT W/DXA RESULTS DOCUMENT: HCPCS | Performed by: INTERNAL MEDICINE

## 2019-03-15 PROCEDURE — 99204 OFFICE O/P NEW MOD 45 MIN: CPT | Performed by: INTERNAL MEDICINE

## 2019-03-15 PROCEDURE — G8427 DOCREV CUR MEDS BY ELIG CLIN: HCPCS | Performed by: INTERNAL MEDICINE

## 2019-03-15 RX ORDER — TRAMADOL HYDROCHLORIDE 50 MG/1
50 TABLET ORAL EVERY 6 HOURS PRN
COMMUNITY
End: 2019-09-13 | Stop reason: SDUPTHER

## 2019-04-01 DIAGNOSIS — E78.00 PURE HYPERCHOLESTEROLEMIA: ICD-10-CM

## 2019-04-01 DIAGNOSIS — R32 URINARY INCONTINENCE, UNSPECIFIED TYPE: ICD-10-CM

## 2019-04-01 DIAGNOSIS — F33.40 DEPRESSION, MAJOR, RECURRENT, IN REMISSION (HCC): ICD-10-CM

## 2019-04-02 ENCOUNTER — OFFICE VISIT (OUTPATIENT)
Dept: PSYCHOLOGY | Age: 74
End: 2019-04-02
Payer: MEDICARE

## 2019-04-02 DIAGNOSIS — F32.A DEPRESSIVE DISORDER: Primary | ICD-10-CM

## 2019-04-02 PROCEDURE — 90832 PSYTX W PT 30 MINUTES: CPT | Performed by: PSYCHOLOGIST

## 2019-04-02 ASSESSMENT — ANXIETY QUESTIONNAIRES
4. TROUBLE RELAXING: 2-OVER HALF THE DAYS
3. WORRYING TOO MUCH ABOUT DIFFERENT THINGS: 2-OVER HALF THE DAYS
2. NOT BEING ABLE TO STOP OR CONTROL WORRYING: 1-SEVERAL DAYS
1. FEELING NERVOUS, ANXIOUS, OR ON EDGE: 1-SEVERAL DAYS
6. BECOMING EASILY ANNOYED OR IRRITABLE: 1-SEVERAL DAYS
7. FEELING AFRAID AS IF SOMETHING AWFUL MIGHT HAPPEN: 3-NEARLY EVERY DAY
GAD7 TOTAL SCORE: 11
5. BEING SO RESTLESS THAT IT IS HARD TO SIT STILL: 1-SEVERAL DAYS

## 2019-04-02 ASSESSMENT — PATIENT HEALTH QUESTIONNAIRE - PHQ9
SUM OF ALL RESPONSES TO PHQ QUESTIONS 1-9: 2
1. LITTLE INTEREST OR PLEASURE IN DOING THINGS: 1
SUM OF ALL RESPONSES TO PHQ QUESTIONS 1-9: 2
2. FEELING DOWN, DEPRESSED OR HOPELESS: 1
SUM OF ALL RESPONSES TO PHQ9 QUESTIONS 1 & 2: 2

## 2019-04-02 NOTE — PROGRESS NOTES
Behavioral Health Consultation  [address] Quyen Goncalves PsyD  Psychologist  4/2/2019  10:28 AM      Time spent with Patient: 30 minutes  This is patient's twenty fourth [facility] appointment. Reason for Consult:  depression  Referring Provider: Prashanth David MD  [id number] . [id number] [address], [address] [place] [address]      Feedback given to PCP. S:  During the last visit pt set goals to 1) continue to set limits and boundaries 2) return for f/u in 3 weeks    Pt reports she is \"good\" overall. Has been focusing more on her problems which isn't great. Is concerned about how wobbly she is. Wonders if she has too high expectations for herself so gets disappointed. Still reading more and enjoys this. Not happy about her weight gain. Is on the fence about physical therapy again but again wonders about if she expects too much. Has been disappointed with it in the past.      O:  MSE:    Appearance    alert, cooperative  Sleep disturbance Yes  Fatigue Yes  Loss of pleasure Yes  Impulsive behavior No  Speech    normal volume, normal rate  Mood   \"good\"  Affect    Congruent to thought content and mood  Thought Content    intact  Thought Process    linear and goal directed  Associations    logical connections  Insight    Good  Judgment    Intact  Orientation    oriented to person, place, time, and general circumstances  Memory    recent and remote memory intact  Attention/Concentration    intact  Suicide Assessment    Denies SI      History:    Medications:   Current Outpatient Medications   Medication Sig Dispense Refill    traMADol (ULTRAM) 50 MG tablet Take 50 mg by mouth every 6 hours as needed for Pain.  pantoprazole (PROTONIX) 20 MG tablet TAKE 1 TABLET BY MOUTH TWICE DAILY 60 tablet 0    methylcellulose (CITRUCEL) oral powder Take 2 g by mouth every other day Take by mouth daily.       citalopram (CELEXA) 20 MG tablet TAKE 1 TABLET BY MOUTH DAILY 30 tablet 5    Cholecalciferol (VITAMIN D3) 14782 units CAPS TAKE 1 CAPSULE EVERY OTHER WEEK 4 capsule 2    rosuvastatin (CRESTOR) 20 MG tablet TAKE 1 TABLET BY MOUTH DAILY 30 tablet 2    buPROPion (WELLBUTRIN XL) 300 MG extended release tablet TAKE 1 TABLET BY MOUTH EVERY MORNING 30 tablet 2    losartan (COZAAR) 25 MG tablet TAKE 1 TABLET BY MOUTH DAILY 30 tablet 3    carvedilol (COREG) 25 MG tablet TAKE ONE TABLET BY MOUTH TWICE DAILY WITH MEALS 60 tablet 5    clopidogrel (PLAVIX) 75 MG tablet TAKE 1 TABLET BY MOUTH DAILY 30 tablet 3    Handicap Placard MISC by Does not apply route Valid for 5 years from 11/7/18 1 each 0    Mirabegron ER (MYRBETRIQ) 25 MG TB24 Take 2 tablets daily (Patient taking differently: Take 1 tablet daily) 60 tablet 2    furosemide (LASIX) 20 MG tablet Take 1 tablet by mouth daily as needed (edema) (Patient taking differently: Take 20 mg by mouth daily ) 30 tablet 5    Cholecalciferol (VITAMIN D3) 74398 units CAPS Take 50,000 Units by mouth See Admin Instructions every other week 4 capsule 5    Nutritional Supplements (ENSURE ACTIVE HIGH PROTEIN PO) Take 1 Can by mouth daily as needed      acetaminophen 650 MG TABS Take 650 mg by mouth every 6 hours as needed for Pain or Fever (Fever >100.5 F (38 C)). 60 tablet 1    hydroxychloroquine (PLAQUENIL) 200 MG tablet Take 200 mg by mouth daily. No current facility-administered medications for this visit.         Social History:   Social History     Socioeconomic History    Marital status:      Spouse name: Not on file    Number of children: 1    Years of education: 15    Highest education level: Not on file   Occupational History    Occupation: Retired   Social Needs    Financial resource strain: Not on file    Food insecurity:     Worry: Not on file     Inability: Not on file   [employer] needs:     Medical: Not on file     Non-medical: Not on file   Tobacco Use    Smoking status: Former Smoker     Packs/day: 0.50     Years: 30.00     Pack years: 15.00     Types: Cigarettes     Start date:      Last attempt to quit: 2014     Years since quittin.5    Smokeless tobacco: Never Used   Substance and Sexual Activity    Alcohol use: Yes     Alcohol/week: 0.0 oz     Comment: One drink twice weekly    Drug use: No    Sexual activity: Not on file   Lifestyle    Physical activity:     Days per week: Not on file     Minutes per session: Not on file    Stress: Not on file   Relationships    Social connections:     Talks on phone: Not on file     Gets together: Not on file     Attends Jew service: Not on file     Active member of club or organization: Not on file     Attends meetings of clubs or organizations: Not on file     Relationship status: Not on file    Intimate partner violence:     Fear of current or ex partner: Not on file     Emotionally abused: Not on file     Physically abused: Not on file     Forced sexual activity: Not on file   Other Topics Concern    Not on file   Social History Narrative    Not on file       TOBACCO:   reports that she quit smoking about 4 years ago. Her smoking use included cigarettes. She started smoking about 54 years ago. She has a 15.00 pack-year smoking history. She has never used smokeless tobacco.  ETOH:   reports that she drinks alcohol. Family History:   Family History   Problem Relation Age of Onset    High Blood Pressure Brother          A:  Ms. Nadine Dennison mood has improved overall since the last visit and has been stable. She continues to be active and engaged and responds positively to behavioral interventions.       PHQ Scores 2019 3/8/2019 2019 2019 2018 2018 2018   PHQ2 Score 2 3 4 3 2 2 5   PHQ9 Score 2 11 9 9 7 7 10     Interpretation of Total Score Depression Severity: 1-4 = Minimal depression, 5-9 = Mild depression, 10-14 = Moderate depression, 15-19 = Moderately severe depression, 20-27 = Severe depression    BLANCA 7 SCORE 2019 3/8/2019 2019 2019 2018 11/21/2018 11/7/2018   BLANCA-7 Total Score 11 17 16 11 15 15 14     Interpretation of BLANCA-7 score: 5-9 = mild anxiety, 10-14 = moderate anxiety, 15+ = severe anxiety. Recommend referral to behavioral health for scores 10 or greater.       Diagnosis:    Depressive Disorder NOS  Stress      Plan:  Pt interventions:  [place]-setting to identify pt's primary goals for [facility] visit / overall health and Supportive techniques  CBT interventions, ACT interventions, treatment planning and reinforced pt's skill use    Pt Behavioral Change Plan:  Pt set goals to 1) consider PT and reframe expectations for more physical activity rather than no longer using your walker 2) return for f/u in 3 weeks

## 2019-04-03 RX ORDER — ROSUVASTATIN CALCIUM 20 MG/1
20 TABLET, COATED ORAL DAILY
Qty: 30 TABLET | Refills: 5 | Status: SHIPPED | OUTPATIENT
Start: 2019-04-03 | End: 2019-10-13 | Stop reason: SDUPTHER

## 2019-04-03 RX ORDER — BUPROPION HYDROCHLORIDE 300 MG/1
TABLET ORAL
Qty: 30 TABLET | Refills: 5 | Status: SHIPPED | OUTPATIENT
Start: 2019-04-03 | End: 2019-09-12 | Stop reason: SDUPTHER

## 2019-04-03 RX ORDER — CLOPIDOGREL BISULFATE 75 MG/1
TABLET ORAL
Qty: 30 TABLET | Refills: 5 | Status: SHIPPED | OUTPATIENT
Start: 2019-04-03 | End: 2019-10-13 | Stop reason: SDUPTHER

## 2019-04-08 DIAGNOSIS — I50.22 CHRONIC SYSTOLIC CONGESTIVE HEART FAILURE (HCC): ICD-10-CM

## 2019-04-08 RX ORDER — FUROSEMIDE 20 MG/1
TABLET ORAL
Qty: 30 TABLET | Refills: 0 | Status: SHIPPED | OUTPATIENT
Start: 2019-04-08 | End: 2019-05-06 | Stop reason: SDUPTHER

## 2019-04-08 RX ORDER — PANTOPRAZOLE SODIUM 20 MG/1
TABLET, DELAYED RELEASE ORAL
Qty: 60 TABLET | Refills: 0 | Status: SHIPPED | OUTPATIENT
Start: 2019-04-08 | End: 2019-05-06 | Stop reason: SDUPTHER

## 2019-04-29 DIAGNOSIS — I10 ESSENTIAL HYPERTENSION, BENIGN: ICD-10-CM

## 2019-04-29 RX ORDER — LOSARTAN POTASSIUM 25 MG/1
25 TABLET ORAL DAILY
Qty: 30 TABLET | Refills: 2 | Status: SHIPPED | OUTPATIENT
Start: 2019-04-29 | End: 2019-08-01 | Stop reason: SDUPTHER

## 2019-05-02 ENCOUNTER — OFFICE VISIT (OUTPATIENT)
Dept: PSYCHOLOGY | Age: 74
End: 2019-05-02
Payer: MEDICARE

## 2019-05-02 ENCOUNTER — TELEPHONE (OUTPATIENT)
Dept: INTERNAL MEDICINE CLINIC | Age: 74
End: 2019-05-02

## 2019-05-02 DIAGNOSIS — F33.40 DEPRESSION, MAJOR, RECURRENT, IN REMISSION (HCC): Primary | ICD-10-CM

## 2019-05-02 PROCEDURE — 90832 PSYTX W PT 30 MINUTES: CPT | Performed by: PSYCHOLOGIST

## 2019-05-02 ASSESSMENT — PATIENT HEALTH QUESTIONNAIRE - PHQ9
8. MOVING OR SPEAKING SO SLOWLY THAT OTHER PEOPLE COULD HAVE NOTICED. OR THE OPPOSITE, BEING SO FIGETY OR RESTLESS THAT YOU HAVE BEEN MOVING AROUND A LOT MORE THAN USUAL: 2
SUM OF ALL RESPONSES TO PHQ QUESTIONS 1-9: 11
6. FEELING BAD ABOUT YOURSELF - OR THAT YOU ARE A FAILURE OR HAVE LET YOURSELF OR YOUR FAMILY DOWN: 1
2. FEELING DOWN, DEPRESSED OR HOPELESS: 2
3. TROUBLE FALLING OR STAYING ASLEEP: 1
SUM OF ALL RESPONSES TO PHQ9 QUESTIONS 1 & 2: 3
7. TROUBLE CONCENTRATING ON THINGS, SUCH AS READING THE NEWSPAPER OR WATCHING TELEVISION: 0
10. IF YOU CHECKED OFF ANY PROBLEMS, HOW DIFFICULT HAVE THESE PROBLEMS MADE IT FOR YOU TO DO YOUR WORK, TAKE CARE OF THINGS AT HOME, OR GET ALONG WITH OTHER PEOPLE: 1
SUM OF ALL RESPONSES TO PHQ QUESTIONS 1-9: 11
1. LITTLE INTEREST OR PLEASURE IN DOING THINGS: 1
9. THOUGHTS THAT YOU WOULD BE BETTER OFF DEAD, OR OF HURTING YOURSELF: 0
5. POOR APPETITE OR OVEREATING: 1
4. FEELING TIRED OR HAVING LITTLE ENERGY: 3

## 2019-05-02 ASSESSMENT — ANXIETY QUESTIONNAIRES
5. BEING SO RESTLESS THAT IT IS HARD TO SIT STILL: 1-SEVERAL DAYS
3. WORRYING TOO MUCH ABOUT DIFFERENT THINGS: 2-OVER HALF THE DAYS
4. TROUBLE RELAXING: 1-SEVERAL DAYS
1. FEELING NERVOUS, ANXIOUS, OR ON EDGE: 2-OVER HALF THE DAYS
GAD7 TOTAL SCORE: 14
7. FEELING AFRAID AS IF SOMETHING AWFUL MIGHT HAPPEN: 3-NEARLY EVERY DAY
2. NOT BEING ABLE TO STOP OR CONTROL WORRYING: 2-OVER HALF THE DAYS
6. BECOMING EASILY ANNOYED OR IRRITABLE: 3-NEARLY EVERY DAY

## 2019-05-02 NOTE — PROGRESS NOTES
Behavioral Health Consultation  900 Quyen Goncalves PsyD  Psychologist  5/2/2019  9:25 AM      Time spent with Patient: 30 minutes  This is patient's twenty fifth [facility] appointment. Reason for Consult:  depression  Referring Provider: Ryann Daugherty MD  [id number] MAHAMED Imanestuardo Dietrich  [address] [facility], [address] [facility]      Feedback given to PCP. S:  During the last visit Pt set goals to 1) consider PT and reframe expectations for more physical activity rather than no longer using your walker 2) return for f/u in 3 weeks    Pt reports she is \"ok. \" Depression stable. Continues to read regularly. Hasn't gone back to PT. Only really wants to go if it will work. Has mixed feelings about making an inconvenience for Amanda Baker to drive her to and from if it isn't for any good. No new stressors with son - he has made a payment to repay her so feels it's some progress. Continues to have limited finances herself which is a hard adjustment and increases depression and stress.     O:  MSE:    Appearance    alert, cooperative  Sleep disturbance Yes  Fatigue Yes  Loss of pleasure Yes  Impulsive behavior No  Speech    normal volume, normal rate  Mood   \"ok\"  Affect    Congruent to thought content and mood  Thought Content    intact  Thought Process    linear and goal directed  Associations    logical connections  Insight    Good  Judgment    Intact  Orientation    oriented to person, place, time, and general circumstances  Memory    recent and remote memory intact  Attention/Concentration    intact  Suicide Assessment    Denies SI      History:    Medications:   Current Outpatient Medications   Medication Sig Dispense Refill    losartan (COZAAR) 25 MG tablet TAKE 1 TABLET BY MOUTH DAILY 30 tablet 2    furosemide (LASIX) 20 MG tablet TAKE 1 TABLET BY MOUTH DAILY AS NEEDED FOR SWELLING 30 tablet 0    pantoprazole (PROTONIX) 20 MG tablet TAKE 1 TABLET BY MOUTH TWICE DAILY 60 tablet 0    rosuvastatin (CRESTOR) 20 MG tablet TAKE 1 TABLET BY MOUTH DAILY 30 tablet 5    clopidogrel (PLAVIX) 75 MG tablet TAKE 1 TABLET BY MOUTH DAILY 30 tablet 5    buPROPion (WELLBUTRIN XL) 300 MG extended release tablet TAKE 1 TABLET BY MOUTH EVERY MORNING 30 tablet 5    Mirabegron ER (MYRBETRIQ) 25 MG TB24 TAKE 2 TABLETS BY MOUTH DAILY 60 tablet 5    traMADol (ULTRAM) 50 MG tablet Take 50 mg by mouth every 6 hours as needed for Pain.  methylcellulose (CITRUCEL) oral powder Take 2 g by mouth every other day Take by mouth daily.  citalopram (CELEXA) 20 MG tablet TAKE 1 TABLET BY MOUTH DAILY 30 tablet 5    Cholecalciferol (VITAMIN D3) 37752 units CAPS TAKE 1 CAPSULE EVERY OTHER WEEK 4 capsule 2    carvedilol (COREG) 25 MG tablet TAKE ONE TABLET BY MOUTH TWICE DAILY WITH MEALS 60 tablet 5    Handicap Placard MISC by Does not apply route Valid for 5 years from 11/7/18 1 each 0    Cholecalciferol (VITAMIN D3) 58711 units CAPS Take 50,000 Units by mouth See Admin Instructions every other week 4 capsule 5    Nutritional Supplements (ENSURE ACTIVE HIGH PROTEIN PO) Take 1 Can by mouth daily as needed      acetaminophen 650 MG TABS Take 650 mg by mouth every 6 hours as needed for Pain or Fever (Fever >100.5 F (38 C)). 60 tablet 1    hydroxychloroquine (PLAQUENIL) 200 MG tablet Take 200 mg by mouth daily. No current facility-administered medications for this visit.         Social History:   Social History     Socioeconomic History    Marital status:      Spouse name: Not on file    Number of children: 1    Years of education: 15    Highest education level: Not on file   Occupational History    Occupation: Retired   Social Needs    Financial resource strain: Not on file    Food insecurity:     Worry: Not on file     Inability: Not on file   [employer] needs:     Medical: Not on file     Non-medical: Not on file   Tobacco Use    Smoking status: Former Smoker     Packs/day: 0.50     Years: 30.00     Pack years: 15.00     Types: Cigarettes     Start date:      Last attempt to quit: 2014     Years since quittin.6    Smokeless tobacco: Never Used   Substance and Sexual Activity    Alcohol use: Yes     Alcohol/week: 0.0 oz     Comment: One drink twice weekly    Drug use: No    Sexual activity: Not on file   Lifestyle    Physical activity:     Days per week: Not on file     Minutes per session: Not on file    Stress: Not on file   Relationships    Social connections:     Talks on phone: Not on file     Gets together: Not on file     Attends Advent service: Not on file     Active member of club or organization: Not on file     Attends meetings of clubs or organizations: Not on file     Relationship status: Not on file    Intimate partner violence:     Fear of current or ex partner: Not on file     Emotionally abused: Not on file     Physically abused: Not on file     Forced sexual activity: Not on file   Other Topics Concern    Not on file   Social History Narrative    Not on file       TOBACCO:   reports that she quit smoking about 4 years ago. Her smoking use included cigarettes. She started smoking about 54 years ago. She has a 15.00 pack-year smoking history. She has never used smokeless tobacco.  ETOH:   reports that she drinks alcohol. Family History:   Family History   Problem Relation Age of Onset    High Blood Pressure Brother          A:  Ms. Laxmi Forman mood continues to be stable. She continues to be active and engaged and responds positively to behavioral interventions.       PHQ Scores 2019 2019 3/8/2019 2019 2019 2018 2018   PHQ2 Score 3 2 3 4 3 2 2   PHQ9 Score 11 2 11 9 9 7 7     Interpretation of Total Score Depression Severity: 1-4 = Minimal depression, 5-9 = Mild depression, 10-14 = Moderate depression, 15-19 = Moderately severe depression, 20-27 = Severe depression    BLANCA 7 SCORE 2019 2019 3/8/2019 2019 2019 2018 2018   BLANCA-7 Total Score 14 11 17 16 11 15 15     Interpretation of BLANCA-7 score: 5-9 = mild anxiety, 10-14 = moderate anxiety, 15+ = severe anxiety. Recommend referral to behavioral health for scores 10 or greater.         Diagnosis:    Depressive Disorder NOS  Stress      Plan:  Pt interventions:  [place]-setting to identify pt's primary goals for [facility] visit / overall health and Supportive techniques  cost benefit ratio, ACT interventions, behavioral activation interventions, treatment planning and reinforced pt's skill use    Pt Behavioral Change Plan:  Pt set goals to 1) consider costs and benefits of starting PT again 2) return for f/u in 5 weeks

## 2019-05-02 NOTE — TELEPHONE ENCOUNTER
Pt's son wants to know if pt can take any ANTIHISTAMINES, has been battling with allergies a week now.

## 2019-05-06 DIAGNOSIS — I50.22 CHRONIC SYSTOLIC CONGESTIVE HEART FAILURE (HCC): ICD-10-CM

## 2019-05-06 RX ORDER — FUROSEMIDE 20 MG/1
TABLET ORAL
Qty: 30 TABLET | Refills: 2 | Status: SHIPPED | OUTPATIENT
Start: 2019-05-06 | End: 2019-08-08 | Stop reason: SDUPTHER

## 2019-05-06 RX ORDER — PANTOPRAZOLE SODIUM 20 MG/1
TABLET, DELAYED RELEASE ORAL
Qty: 60 TABLET | Refills: 1 | Status: SHIPPED | OUTPATIENT
Start: 2019-05-06 | End: 2019-07-08 | Stop reason: SDUPTHER

## 2019-05-08 ENCOUNTER — HOSPITAL ENCOUNTER (OUTPATIENT)
Age: 74
Discharge: HOME | End: 2019-05-08
Payer: MEDICARE

## 2019-05-08 ENCOUNTER — OFFICE VISIT (OUTPATIENT)
Dept: INTERNAL MEDICINE CLINIC | Age: 74
End: 2019-05-08
Payer: MEDICARE

## 2019-05-08 ENCOUNTER — HOSPITAL ENCOUNTER (OUTPATIENT)
Dept: GENERAL RADIOLOGY | Age: 74
Discharge: HOME | End: 2019-05-08
Payer: MEDICARE

## 2019-05-08 VITALS
DIASTOLIC BLOOD PRESSURE: 66 MMHG | WEIGHT: 161 LBS | BODY MASS INDEX: 31.61 KG/M2 | HEIGHT: 60 IN | SYSTOLIC BLOOD PRESSURE: 120 MMHG

## 2019-05-08 DIAGNOSIS — M25.511 RIGHT SHOULDER PAIN, UNSPECIFIED CHRONICITY: ICD-10-CM

## 2019-05-08 DIAGNOSIS — I50.9 OTHER CONGESTIVE HEART FAILURE (HCC): Primary | ICD-10-CM

## 2019-05-08 DIAGNOSIS — R74.8 ALKALINE PHOSPHATASE ELEVATION: ICD-10-CM

## 2019-05-08 DIAGNOSIS — E78.00 PURE HYPERCHOLESTEROLEMIA: ICD-10-CM

## 2019-05-08 DIAGNOSIS — R29.6 RECURRENT FALLS: ICD-10-CM

## 2019-05-08 DIAGNOSIS — I10 ESSENTIAL HYPERTENSION, BENIGN: ICD-10-CM

## 2019-05-08 DIAGNOSIS — E66.09 CLASS 1 OBESITY DUE TO EXCESS CALORIES WITHOUT SERIOUS COMORBIDITY WITH BODY MASS INDEX (BMI) OF 31.0 TO 31.9 IN ADULT: ICD-10-CM

## 2019-05-08 PROCEDURE — G8427 DOCREV CUR MEDS BY ELIG CLIN: HCPCS | Performed by: INTERNAL MEDICINE

## 2019-05-08 PROCEDURE — G8399 PT W/DXA RESULTS DOCUMENT: HCPCS | Performed by: INTERNAL MEDICINE

## 2019-05-08 PROCEDURE — 1036F TOBACCO NON-USER: CPT | Performed by: INTERNAL MEDICINE

## 2019-05-08 PROCEDURE — 73030 X-RAY EXAM OF SHOULDER: CPT

## 2019-05-08 PROCEDURE — 4040F PNEUMOC VAC/ADMIN/RCVD: CPT | Performed by: INTERNAL MEDICINE

## 2019-05-08 PROCEDURE — G8598 ASA/ANTIPLAT THER USED: HCPCS | Performed by: INTERNAL MEDICINE

## 2019-05-08 PROCEDURE — 1090F PRES/ABSN URINE INCON ASSESS: CPT | Performed by: INTERNAL MEDICINE

## 2019-05-08 PROCEDURE — G8417 CALC BMI ABV UP PARAM F/U: HCPCS | Performed by: INTERNAL MEDICINE

## 2019-05-08 PROCEDURE — 99214 OFFICE O/P EST MOD 30 MIN: CPT | Performed by: INTERNAL MEDICINE

## 2019-05-08 PROCEDURE — 1123F ACP DISCUSS/DSCN MKR DOCD: CPT | Performed by: INTERNAL MEDICINE

## 2019-05-08 ASSESSMENT — ENCOUNTER SYMPTOMS
BLOOD IN STOOL: 0
VOMITING: 0
CONSTIPATION: 0
ABDOMINAL PAIN: 0
DIARRHEA: 0
CHEST TIGHTNESS: 0
SHORTNESS OF BREATH: 0
NAUSEA: 0

## 2019-05-08 NOTE — PROGRESS NOTES
Outpatient Note for established Patient - regular Visit    History Obtained From:  patient, electronic medical record  Is the patient new to me ? - No    HISTORY OF PRESENT ILLNESS:   The patient is a 66 y.o. female who is here today for :  Hamilton Jim was seen today for medication check. She is on Citrucel for her constipation . Her  Is stating small size stool, do diarrhea/ constipation. Pt denies CP/SOB/palpitations/abdominal pain/N/V. No abdominal swelling. She follows Dr Giovanny Pompa. She has seen him last time    BP is controlled today, she takes her medications, no reported side effects. She is c/o depression/ anxiety mainly due to her frustration from not beeing able to loose weight  No suicidal ideation. Sh wants to do PT for her gait. She does have recurrent falls, no syncope/ dizziness  R shoulder pain since she fell 8 weeks   She hit her head on the counter top- no headaches/ vision changes   No tingling/ numbness/ weakness in hands/ legs. Lab Results   Component Value Date    CHOL 140 05/03/2018    CHOL 179 10/24/2017    CHOL 151 06/01/2017     Lab Results   Component Value Date    TRIG 112 05/03/2018    TRIG 97 10/24/2017    TRIG 78 06/01/2017     Lab Results   Component Value Date    HDL 66 (H) 05/03/2018    HDL 80 (H) 10/24/2017    HDL 70 (H) 06/01/2017     Lab Results   Component Value Date    LDLCALC 52 05/03/2018    [address] 80 10/24/2017    LDLCALC 65 06/01/2017     Lab Results   Component Value Date    LABVLDL 22 05/03/2018    LABVLDL 19 10/24/2017    LABVLDL 16 06/01/2017   she is on Crestor, no reported side effects    Past Medical History:        Diagnosis Date    CAD (coronary artery disease)     CHF (congestive heart failure) (HCC)     DVT of lower extremity (deep venous thrombosis) (HCC)     Hypertension     Lupus     TIA (transient ischemic attack)     x3    Urinary incontinence      Social History:   TOBACCO:   reports that she quit smoking about 4 years ago.  Her smoking use included cigarettes. She started smoking about 54 years ago. She has a 15.00 pack-year smoking history. She has never used smokeless tobacco.    ETOH:   reports that she drinks alcohol. Current Medications:    Prior to Admission medications    Medication Sig Start Date End Date Taking? Authorizing Provider   furosemide (LASIX) 20 MG tablet TAKE 1 TABLET BY MOUTH DAILY AS NEEDED FOR SWELLING 5/6/19  Yes Abundio Schlatter, MD   pantoprazole (PROTONIX) 20 MG tablet TAKE 1 TABLET BY MOUTH TWICE DAILY 5/6/19  Yes Abundio Schlatter, MD   losartan (COZAAR) 25 MG tablet TAKE 1 TABLET BY MOUTH DAILY 4/29/19  Yes Abundio Schlatter, MD   rosuvastatin (CRESTOR) 20 MG tablet TAKE 1 TABLET BY MOUTH DAILY 4/3/19  Yes Abundio Schlatter, MD   clopidogrel (PLAVIX) 75 MG tablet TAKE 1 TABLET BY MOUTH DAILY 4/3/19  Yes Abundio Schlatter, MD   buPROPion (WELLBUTRIN XL) 300 MG extended release tablet TAKE 1 TABLET BY MOUTH EVERY MORNING 4/3/19  Yes Abundio Schlatter, MD   Mirabegron ER (MYRBETRIQ) 25 MG TB24 TAKE 2 TABLETS BY MOUTH DAILY 4/3/19  Yes Abundio Schlatter, MD   traMADol (ULTRAM) 50 MG tablet Take 50 mg by mouth every 6 hours as needed for Pain. Yes Historical Provider, MD   methylcellulose (CITRUCEL) oral powder Take 2 g by mouth every other day Take by mouth daily.    Yes Historical Provider, MD   citalopram (CELEXA) 20 MG tablet TAKE 1 TABLET BY MOUTH DAILY 1/15/19  Yes Abundio Schlatter, MD   Cholecalciferol (VITAMIN D3) 92498 units CAPS TAKE 1 CAPSULE EVERY OTHER WEEK 12/31/18  Yes Abundio Schlatter, MD   carvedilol (COREG) 25 MG tablet TAKE ONE TABLET BY MOUTH TWICE DAILY WITH MEALS 12/6/18  Yes Abundio Schlatter, MD   Handicap Placard MISC by Does not apply route Valid for 5 years from 11/7/18 11/7/18  Yes Abundio Schlatter, MD   Cholecalciferol (VITAMIN D3) 40210 units CAPS Take 50,000 Units by mouth See Admin Instructions every other week 2/19/18  Yes Juanjo Hernandez wheezes. She has no rales. Abdominal: Soft. She exhibits no distension and no mass. There is no tenderness. There is no guarding. No swelling    Musculoskeletal: She exhibits no edema or deformity. Lymphadenopathy:        Head (right side): No submental and no submandibular adenopathy present. Head (left side): No submental and no submandibular adenopathy present. She has no cervical adenopathy. Right cervical: No superficial cervical and no deep cervical adenopathy present. Left cervical: No superficial cervical and no deep cervical adenopathy present. Neurological: She is alert and oriented to person, place, and time. She has normal reflexes. No sensory deficit. She exhibits normal muscle tone. GCS eye subscore is 4. GCS verbal subscore is 5. GCS motor subscore is 6. Skin: No rash noted. She is not diaphoretic. Psychiatric: She has a normal mood and affect. Her behavior is normal. Thought content normal.      Assessment/Plan:   Hamilton Jim was seen today for medication check. Diagnoses and all orders for this visit:    Other congestive heart failure ([address]  Well controlled- no changes in medication today. Essential hypertension, benign  Well controlled- no changes in medication today. Monitor BP   -     COMPREHENSIVE METABOLIC PANEL; Future    Pure hypercholesterolemia  Well controlled- no changes in medication today. No need to recheck today     Right shoulder pain, unspecified chronicity  likely r.c. Tear but since she had trauma I will snd for XR  Minimal head trauma , 8 weeks ago, pt does not have and did not have any symptoms including headaches/ neuro deficits. No need for imaging for now. -     XR SHOULDER RIGHT (MIN 2 VIEWS);  Future    Recurrent falls  No neuro deficit  dysequilibrium   I will refer to PT and she will let e know if not better   -     [facility]    Alkaline phosphatase elevation  Was already trending down  asymptomatic  No red flags  Recheck labs, keep GI f/u   -     COMPREHENSIVE METABOLIC PANEL; Future      - Patient was encouraged to callthe office (and ask to see me) or be seen by other provider for any worsening or lack    of improvement in his symptoms.     - Pt was asked toschedule an appointment in 4 months, and to let me know of any scheduling difficulties. Additional patients instructions (if given): There are no Patient Instructions on file for this visit.     Fredis Rosenberg M.D.   5/8/2019, 8:30 AM

## 2019-05-20 NOTE — TELEPHONE ENCOUNTER
Patient's caregiver calling to see if she can get an increase in her citalopram (CELEXA) 20 MG tablet [8    Please call to advise.

## 2019-05-27 DIAGNOSIS — I10 ESSENTIAL HYPERTENSION, BENIGN: ICD-10-CM

## 2019-05-29 ENCOUNTER — OFFICE VISIT (OUTPATIENT)
Dept: ORTHOPEDIC SURGERY | Age: 74
End: 2019-05-29
Payer: MEDICARE

## 2019-05-29 VITALS — HEIGHT: 60 IN | BODY MASS INDEX: 31.6 KG/M2 | WEIGHT: 160.94 LBS

## 2019-05-29 DIAGNOSIS — M12.811 ROTATOR CUFF ARTHROPATHY OF RIGHT SHOULDER: Primary | ICD-10-CM

## 2019-05-29 PROCEDURE — 1090F PRES/ABSN URINE INCON ASSESS: CPT | Performed by: ORTHOPAEDIC SURGERY

## 2019-05-29 PROCEDURE — 1123F ACP DISCUSS/DSCN MKR DOCD: CPT | Performed by: ORTHOPAEDIC SURGERY

## 2019-05-29 PROCEDURE — G8399 PT W/DXA RESULTS DOCUMENT: HCPCS | Performed by: ORTHOPAEDIC SURGERY

## 2019-05-29 PROCEDURE — 99213 OFFICE O/P EST LOW 20 MIN: CPT | Performed by: ORTHOPAEDIC SURGERY

## 2019-05-29 PROCEDURE — 1036F TOBACCO NON-USER: CPT | Performed by: ORTHOPAEDIC SURGERY

## 2019-05-29 PROCEDURE — G8427 DOCREV CUR MEDS BY ELIG CLIN: HCPCS | Performed by: ORTHOPAEDIC SURGERY

## 2019-05-29 PROCEDURE — 4040F PNEUMOC VAC/ADMIN/RCVD: CPT | Performed by: ORTHOPAEDIC SURGERY

## 2019-05-29 PROCEDURE — G8598 ASA/ANTIPLAT THER USED: HCPCS | Performed by: ORTHOPAEDIC SURGERY

## 2019-05-29 PROCEDURE — G8417 CALC BMI ABV UP PARAM F/U: HCPCS | Performed by: ORTHOPAEDIC SURGERY

## 2019-05-29 RX ORDER — CARVEDILOL 25 MG/1
TABLET ORAL
Qty: 60 TABLET | Refills: 2 | Status: SHIPPED | OUTPATIENT
Start: 2019-05-29 | End: 2019-09-10 | Stop reason: SDUPTHER

## 2019-05-29 ASSESSMENT — ENCOUNTER SYMPTOMS
ALLERGIC/IMMUNOLOGIC NEGATIVE: 1
ROS SKIN COMMENTS: SKIN CONDITION / CANCER
BACK PAIN: 1
SHORTNESS OF BREATH: 1
GASTROINTESTINAL NEGATIVE: 1
EYES NEGATIVE: 1

## 2019-05-29 NOTE — PROGRESS NOTES
and duodinitis , neg H pylori        Family History   Problem Relation Age of Onset    High Blood Pressure Brother        Social History     Socioeconomic History    Marital status:      Spouse name: None    Number of children: 1    Years of education: 15    Highest education level: None   Occupational History    Occupation: Retired   Social Needs    Financial resource strain: None    Food insecurity:     Worry: None     Inability: None    Transportation needs:     Medical: None     Non-medical: None   Tobacco Use    Smoking status: Former Smoker     Packs/day: 0.50     Years: 30.00     Pack years: 15.00     Types: Cigarettes     Start date:      Last attempt to quit: 2014     Years since quittin.6    Smokeless tobacco: Never Used   Substance and Sexual Activity    Alcohol use:  Yes     Alcohol/week: 0.0 oz     Comment: One drink twice weekly    Drug use: No    Sexual activity: None   Lifestyle    Physical activity:     Days per week: None     Minutes per session: None    Stress: None   Relationships    Social connections:     Talks on phone: None     Gets together: None     Attends Rastafari service: None     Active member of club or organization: None     Attends meetings of clubs or organizations: None     Relationship status: None    Intimate partner violence:     Fear of current or ex partner: None     Emotionally abused: None     Physically abused: None     Forced sexual activity: None   Other Topics Concern    None   Social History Narrative    None       Current Outpatient Medications   Medication Sig Dispense Refill    carvedilol (COREG) 25 MG tablet TAKE ONE TABLET BY MOUTH TWICE DAILY WITH MEALS 60 tablet 2    furosemide (LASIX) 20 MG tablet TAKE 1 TABLET BY MOUTH DAILY AS NEEDED FOR SWELLING 30 tablet 2    pantoprazole (PROTONIX) 20 MG tablet TAKE 1 TABLET BY MOUTH TWICE DAILY 60 tablet 1    losartan (COZAAR) 25 MG tablet TAKE 1 TABLET BY MOUTH DAILY 30 tablet 2    rosuvastatin (CRESTOR) 20 MG tablet TAKE 1 TABLET BY MOUTH DAILY 30 tablet 5    clopidogrel (PLAVIX) 75 MG tablet TAKE 1 TABLET BY MOUTH DAILY 30 tablet 5    buPROPion (WELLBUTRIN XL) 300 MG extended release tablet TAKE 1 TABLET BY MOUTH EVERY MORNING 30 tablet 5    Mirabegron ER (MYRBETRIQ) 25 MG TB24 TAKE 2 TABLETS BY MOUTH DAILY 60 tablet 5    traMADol (ULTRAM) 50 MG tablet Take 50 mg by mouth every 6 hours as needed for Pain.  methylcellulose (CITRUCEL) oral powder Take 2 g by mouth every other day Take by mouth daily.  citalopram (CELEXA) 20 MG tablet TAKE 1 TABLET BY MOUTH DAILY 30 tablet 5    Cholecalciferol (VITAMIN D3) 62716 units CAPS TAKE 1 CAPSULE EVERY OTHER WEEK 4 capsule 2    Handicap Placard MISC by Does not apply route Valid for 5 years from 11/7/18 1 each 0    Cholecalciferol (VITAMIN D3) 60776 units CAPS Take 50,000 Units by mouth See Admin Instructions every other week 4 capsule 5    Nutritional Supplements (ENSURE ACTIVE HIGH PROTEIN PO) Take 1 Can by mouth daily as needed      acetaminophen 650 MG TABS Take 650 mg by mouth every 6 hours as needed for Pain or Fever (Fever >100.5 F (38 C)). 60 tablet 1    hydroxychloroquine (PLAQUENIL) 200 MG tablet Take 200 mg by mouth daily. No current facility-administered medications for this visit. Allergies   Allergen Reactions    Medrol [Methylprednisolone]      multiple side effects , able to tolerate prednisone without side effects     Oxycontin [Oxycodone Hcl]      Pruritis, vomiting     Vicodin [Hydrocodone-Acetaminophen] Nausea Only       Vital signs:  Ht 5' (1.524 m)   Wt 160 lb 15 oz (73 kg)   BMI 31.43 kg/m²        Neuro: Alert & oriented x 3,  normal,  no focal deficits noted. Normal affect.   Eyes: sclera clear  Ears: Normal external ear  Mouth:  No perioral lesions  Pulm: Respirations unlabored and regular  Pulse: Regular rate    Skin: Warm, well perfused        Right shoulder exam    Inspection:  No gross deformities    Palpation: Mild to moderate tenderness overlying the rotator cuff footprint, not so much over the bicipital groove, or AC joint    Active ROM: 80° forward flexion, 70° abduction, 50° external rotation with the elbow at the side, and internal rotation to the L2    Passive ROM: Similar    Strength: 4-/5 strength testing in abduction in the scapular plane, 4+/5 resisted external rotation with the elbow at the side, 5/5 resisted internal rotation with the elbow at the side    Stability:  no gross instability    Neurovascular: Neurovascularly intact    Special Tests: Positive Lance's, positive Kennedy's, equivocal speed's, positive Martin. Left comparison shoulder exam    Inspection:  No gross deformities    Palpation: No significant tenderness overlying the rotator cuff footprint, bicipital groove, or AC joint    Active ROM: Near full range of motion    Passive ROM: Similar    Strength: 5/5 strength testing in abduction in the scapular plane, 5/5 resisted external rotation with the elbow at the side, 5/5 resisted internal rotation with the elbow at the side    Stability:  no gross instability    Neurovascular: Neurovascularly intact      Diagnostics:  Radiology:     X-rays of the right shoulder including Grashey, scapular Y and axillary views reviewed from previous: These x-rays show some superior migration of the humeral head indicative of large rotator cuff tear/rotator cuff tear arthropathy. She is very mild a.c. joint arthrosis. No signs of fractures or bony lesions    Impression: Superior migration of humeral head      Assessment:    1. Right shoulder weakness  2. Concern for a large rotator cuff tear/rotator cuff arthropathy    Plan: Roya Ribera is a very pleasant 68-year-old female who has a right shoulder rotator cuff tear/rotator cuff arthropathy we would like to have her undergo some physical therapy for rotator cuff strengthening here at the [facility].   If she continues to have issues and pain we could consider an injection. Since she had a history of multiple strokes and is on blood thinners she is not a candidate for surgery at this time. We would like to see how she does with conservative treatment measures and see her back in 4 weeks. Reynold Perry is in agreement with this plan. All questions were answered to patient's satisfaction and was encouraged to call with any further questions. Orders Placed This Encounter   Procedures    [facility]     Referral Priority:   Routine     Referral Type:   Eval and Treat     Referral Reason:   Specialty Services Required     Requested Specialty:   Physical Therapy     Number of Visits Requested:   [address] [place] Hardy, D.O. Clinical Fellow, [address] [place]  Date:    5/29/2019      The encounter with Megan Hardy was supervised by Dr. Cesar Beverly, who personally examined the patient and reviewed the plan. This dictation was performed with a verbal recognition program (DRAGON) and it was checked for errors. It is possible that there are still dictated errors within this office note. If so, please bring any errors to my attention for an addendum. All efforts were made to ensure that this office note is accurate.  _________________  I was physically present and personally supervised the Orthopaedic Sports Medicine Fellow in the evaluation and development of a treatment plan for this patient. I personally interviewed the patient and performed a physical examination. In addition, I discussed the patient's condition and treatment options with them. I have also reviewed and agree with the past medical, family and social history unless otherwise noted. All of the patient's questions were answered. Noemi Beverly MD, [facility]  5/29/2019

## 2019-05-29 NOTE — PROGRESS NOTES
Review of Systems   Constitutional: Negative. HENT:        Eye or hearing impairment   Eyes: Negative. Respiratory: Positive for shortness of breath. Difficulty breathing   Cardiovascular: Positive for chest pain and leg swelling. Heart attack, blood clots in leg or lungs, poor circulation   Gastrointestinal: Negative. Endocrine: Negative. Genitourinary: Positive for frequency. Loss of urine / incontinence    Musculoskeletal: Positive for back pain and neck pain. Fractures or other injuries   Skin:        Skin condition / cancer   Allergic/Immunologic: Negative. Neurological: Positive for dizziness.         Stroke, chronic pain   Hematological:        Excessive bleeding after surgery   Psychiatric/Behavioral:        Depression or other problems

## 2019-05-30 ENCOUNTER — TELEPHONE (OUTPATIENT)
Dept: INTERNAL MEDICINE CLINIC | Age: 74
End: 2019-05-30

## 2019-05-30 NOTE — TELEPHONE ENCOUNTER
Lm for Sisi March to inform not much to do can call doc that recommended PT to see what else they can do as well

## 2019-05-30 NOTE — TELEPHONE ENCOUNTER
Nisa Deleon called and would like you to call regarding right shoulder injury. There is a tear in the shoulder and they want her to do physical therapy but he does not want her to do it at her age. Please call to advise.

## 2019-06-04 ENCOUNTER — HOSPITAL ENCOUNTER (OUTPATIENT)
Dept: PHYSICAL THERAPY | Age: 74
Setting detail: THERAPIES SERIES
Discharge: HOME | End: 2019-06-04
Payer: MEDICARE

## 2019-06-04 PROCEDURE — 97162 PT EVAL MOD COMPLEX 30 MIN: CPT

## 2019-06-04 PROCEDURE — 97530 THERAPEUTIC ACTIVITIES: CPT

## 2019-06-04 PROCEDURE — 97110 THERAPEUTIC EXERCISES: CPT

## 2019-06-04 NOTE — PROGRESS NOTES
Physical Therapy  Initial Assessment  Date: 2019  Patient Name: Rabia Henry  MRN: [id number]  : 1945     Treatment Diagnosis: Unsteady gait    Restrictions       Subjective   General  Chart Reviewed: Yes  Patient assessed for rehabilitation services?: Yes  Additional Pertinent Hx: CAD, CHF, DVT, HTN, SLE, TIA, UI,  femury surgery R, L hip ORIF , lumbar fusion   Family / Caregiver Present: Yes( caregiver)  Referring Practitioner: Rosario Allen MD  Referral Date : 19  Diagnosis: Recurrent falls (R29.6 ICD-10-CM)  PT Visit Information  PT Insurance Information: Medicare  Subjective  Subjective: Pt is 66year old female here with caregiver with reports of  chronic falls and gait difficulties \" for several years\". Pt has h/o of multiple surgeries including R femur ORIF, L hip ORIF and lumbar fusions with TIA's during hospitalizations. Per caregiver,  R foot drop occured gradually and wears \"ankle brace off and on but has not worn for about 9 months\". Pt has ondergone several courses of in home therapy and was told \"not to use cane and not to be alone\". Pt has been suing rollater walker for at least 5 years. She lives with 24/7 caregiver who has told her not to get up and walk by herself when he leaves to go the store but falls have occurred in the bathroom when she has gone to the bathroom unattended. Pt reports having low back pain when walking \" over 100 yards\" but her chief complaint is weakness in the arms and legs. She describes difficulty getting up from the couch she watches TV and requires assist from her caregiver to stand each time unless getting up from a sturdy chair with arms. She suffered a recent fall when standing to manage her bills at a counter at home but denies injury. Pt also reports feeling short of breath at times but denies having to be on O2.  Per pt and caregiver, pt has been referred by a different MD for treatment of R shoulder but have elected not to pursue this since in favor of addressing her walking and balance issues since these are most limiting at this time. Pain is present in the low back when walking extended distances such as when she takes walks in her home for several minutes for exercise. The pain is described as sharp and aching from 0-6/10 - currently 0/10 when seated. PLOF: Increasing falls and weakness in legs with gait difficulty over the past year        Vision/Hearing  Vision  Vision: Within Functional Limits  Hearing  Hearing: Exceptions to [facility]  Hearing Exceptions: Bilateral hearing aid;Hard of hearing/hearing concerns(currently not wearing hearing aids)    Orientation- alert to self and generally to situation. Poor historian for details- relies on caregiver       Social/Functional History  Social/Functional History  Lives With: Other- 24/7 caregiver who frequently leaves to attend arrands  Type of Home: House  Home Layout: Able to Live on Main level with bedroom/bathroom  Home Access: Stairs to enter with rails  Entrance Stairs - Number of Steps: 1  Bathroom Shower/Tub: Walk-in shower  Bathroom Toilet: Handicap height  Bathroom Equipment: Grab bars in shower;Grab bars around toilet, shower seat  Home Equipment: 4 wheeled walker; Alert Cam Garcia Help From: caregiver for all tasks  ADL Assistance: Needs assistance  Homemaking Assistance: Needs assistance  Homemaking Responsibilities: No  Ambulation Assistance: SBA with rolator walker(per caregiver- advises pt not to walk alone including trips to the bathroom due to fall risk)  Active : No  Leisure & Hobbies: Takes care of her dogs- lets them out; reading; manages bills    Objective     Observation/Palpation  Observation:  Forward flexed posture to R standing, B knee flexed R>L         Motor Control  Gross Motor?: Exceptions  Comments: mild bradykinesa  Coordination  Heel to Shin: (slow)  Additional Measures  Flexibility: Moderate restriction B hip flexors  Special Tests: TUG=32.11 sec with Rollater walker, heavy struggle to stand using of B UE to stand  Other: Gait speed 1.4 ft/sec Rollator walker, short scuffing strides, increased knee flexion mid stance  Sensation  Overall Sensation Status: WFL  Bed mobility  Bridging: Modified independent   Rolling to Right: Modified independent  Supine to Sit: Modified independent  Sit to Supine: Modified independent  Comment: slow and effortful  Transfers  Sit to Stand: Modified independent  Stand to sit: Modified independent  Comment: slow and effortful    ROM:  R and L LE:  hip flexion 100 with stiffness and mild low back discomfort, hip abduction ~ 30 deg with pain and stiffness, knee flexion/extension WFL    Strength:   R hip flexion 3/5, hip abduction 3-/5, knee extension 4-/5, knee flexion 3+/5, ankle DF 4/5  L hip flexion 3/5, hip abduction 3-/5, knee extension 4-/5, knee flexion 3+/5, ankle DF 4/5          Balance  Sitting - Static: Good  Sitting - Dynamic: Good  Standing - Static: Fair;-  Standing - Dynamic: Fair;-    Assessment   Conditions Requiring Skilled Therapeutic Intervention  Body structures, Functions, Activity limitations: Decreased functional mobility ; Decreased ROM; Decreased strength;Decreased endurance;Decreased balance; Increased Pain  Treatment Diagnosis: Unsteady gait  Assessment: Pt is 66year old female with multiple comorbidities presenting with B LE weakness, B hip stiffness, aberrant posture, decreased balance per TUG score, increased falls risk per frequent near falls and history of repeated falls and slow gait speed that interferes with her ability to mobilize within her environment without reliance on caregiver or risk of injury. Pt is eager to reduce need for caregiver assist and reduce fall risk when walking and completing daily activities or attending outings.    Prognosis: Fair  Decision Making: Medium Complexity  Patient Education: Role of PT and POC; home safety and strategies to increase ease of getting up and down from

## 2019-06-05 NOTE — PLAN OF CARE
Outpatient Physical Therapy  Phone: [phone] Fax: [phone]    To: Referring Practitioner: Zak Jc MD  From: Shayne Horn, PT   Date: 2019  Patient: Mary Cisneros     : 1945 MRN: [id number]  Diagnosis: Diagnosis: Recurrent falls (R29.6 ICD-10-CM)   Treatment Diagnosis: Treatment Diagnosis: Unsteady gait     Physical Therapy Certification/Re-Certification Form  Dear Zak Jc MD  The following patient has been evaluated for physical therapy services and for therapy to continue, Medicare requires monthly physician review of the treatment plan. Please review the attached evaluation and/or summary of the patient's plan of care, and verify that you agree therapy should continue by signing the attached document and sending it back to our office.     Plan of Care/Treatment to date:  [x] Therapeutic Exercise (Review/Progress HEP and provide verbal/tactile cueing for activities related to strengthening, flexibility,  endurance, ROM.)       [x] Therapeutic Activity (Provide verbal/tactile cueing for dynamic activities to promote functional tasks.)          [x] Gait Training (Provide verbal/tactile/visual cueing for facilitation of normalized gait pattern without or with the least restrictive AD to decrease pain and/or risk for falling.)          [x] Neuromuscular Re-education (Review/Progress HEP and provide verbal/tactile cueing for activities related to improving balance, coordination, kinesthetic sense, posture, motor skill, proprioception.)         [x] Manual Therapy (Provide manual therapy to mobilize soft tissue/joints for the purpose of modulating pain, promoting relaxation, increasing ROM, reducing/eliminating soft tissue swelling/inflammation/tightness, improving soft tissue extensibility)   [] Dry Needling    [] Aquatic Therapy (Facilitate muscle relaxation and increases peripheral circulation; stimulates body awareness, balance, and trunk stability.) Signature:________________________________Date:__________________  By signing above, therapists plan is approved by physician

## 2019-06-05 NOTE — FLOWSHEET NOTE
pain  Long term goals  Time Frame for Long term goals : 6 weeks  Long term goal 1: Pt will demonstrate increased functional strength and balance per TUG score of less than 18 seconds with walker  Long term goal 2: Pt will walk in house with walker including trips to the bathroom using walker with 70% confidence or better  Long term goal 3: Pt will achieve gait speed of 1.9 ft/sec or better with walker to reflect improved health and function when walking for exercise in her home and when attending outings  Long term goal 4: Pt will report no falls or near falls using safe transfer methods  Long term goal 5: Pt and caregiver will be independent with progressive HEP    Plan:    Plan of care initiated    Plan for Next Session: Advance LE strengthening, gait and balance drills      Next MD visit;    Unknown     Timed Code Treatment Minutes: TA=17, TE=9  Total Treatment Minutes: 52 ( eval= mod)    Electronically signed by:   Robert Block, PT #[id number]      Exercise/Equipment Resistance/Repetitions Other comments   See HEP above

## 2019-06-06 ENCOUNTER — OFFICE VISIT (OUTPATIENT)
Dept: PSYCHOLOGY | Age: 74
End: 2019-06-06
Payer: MEDICARE

## 2019-06-06 DIAGNOSIS — F33.40 DEPRESSION, MAJOR, RECURRENT, IN REMISSION (HCC): Primary | ICD-10-CM

## 2019-06-06 PROCEDURE — 90832 PSYTX W PT 30 MINUTES: CPT | Performed by: PSYCHOLOGIST

## 2019-06-06 ASSESSMENT — PATIENT HEALTH QUESTIONNAIRE - PHQ9
SUM OF ALL RESPONSES TO PHQ QUESTIONS 1-9: 5
4. FEELING TIRED OR HAVING LITTLE ENERGY: 2
3. TROUBLE FALLING OR STAYING ASLEEP: 1
8. MOVING OR SPEAKING SO SLOWLY THAT OTHER PEOPLE COULD HAVE NOTICED. OR THE OPPOSITE, BEING SO FIGETY OR RESTLESS THAT YOU HAVE BEEN MOVING AROUND A LOT MORE THAN USUAL: 0
9. THOUGHTS THAT YOU WOULD BE BETTER OFF DEAD, OR OF HURTING YOURSELF: 0
10. IF YOU CHECKED OFF ANY PROBLEMS, HOW DIFFICULT HAVE THESE PROBLEMS MADE IT FOR YOU TO DO YOUR WORK, TAKE CARE OF THINGS AT HOME, OR GET ALONG WITH OTHER PEOPLE: 2
SUM OF ALL RESPONSES TO PHQ QUESTIONS 1-9: 5
2. FEELING DOWN, DEPRESSED OR HOPELESS: 2
7. TROUBLE CONCENTRATING ON THINGS, SUCH AS READING THE NEWSPAPER OR WATCHING TELEVISION: 0
5. POOR APPETITE OR OVEREATING: 0
6. FEELING BAD ABOUT YOURSELF - OR THAT YOU ARE A FAILURE OR HAVE LET YOURSELF OR YOUR FAMILY DOWN: 0
1. LITTLE INTEREST OR PLEASURE IN DOING THINGS: 0
SUM OF ALL RESPONSES TO PHQ9 QUESTIONS 1 & 2: 2

## 2019-06-06 ASSESSMENT — ANXIETY QUESTIONNAIRES
2. NOT BEING ABLE TO STOP OR CONTROL WORRYING: 3-NEARLY EVERY DAY
3. WORRYING TOO MUCH ABOUT DIFFERENT THINGS: 3-NEARLY EVERY DAY
GAD7 TOTAL SCORE: 19
5. BEING SO RESTLESS THAT IT IS HARD TO SIT STILL: 1-SEVERAL DAYS
1. FEELING NERVOUS, ANXIOUS, OR ON EDGE: 3-NEARLY EVERY DAY
6. BECOMING EASILY ANNOYED OR IRRITABLE: 3-NEARLY EVERY DAY
7. FEELING AFRAID AS IF SOMETHING AWFUL MIGHT HAPPEN: 3-NEARLY EVERY DAY
4. TROUBLE RELAXING: 3-NEARLY EVERY DAY

## 2019-06-06 NOTE — PROGRESS NOTES
DAILY WITH MEALS 60 tablet 2    furosemide (LASIX) 20 MG tablet TAKE 1 TABLET BY MOUTH DAILY AS NEEDED FOR SWELLING 30 tablet 2    pantoprazole (PROTONIX) 20 MG tablet TAKE 1 TABLET BY MOUTH TWICE DAILY 60 tablet 1    losartan (COZAAR) 25 MG tablet TAKE 1 TABLET BY MOUTH DAILY 30 tablet 2    rosuvastatin (CRESTOR) 20 MG tablet TAKE 1 TABLET BY MOUTH DAILY 30 tablet 5    clopidogrel (PLAVIX) 75 MG tablet TAKE 1 TABLET BY MOUTH DAILY 30 tablet 5    buPROPion (WELLBUTRIN XL) 300 MG extended release tablet TAKE 1 TABLET BY MOUTH EVERY MORNING 30 tablet 5    Mirabegron ER (MYRBETRIQ) 25 MG TB24 TAKE 2 TABLETS BY MOUTH DAILY 60 tablet 5    traMADol (ULTRAM) 50 MG tablet Take 50 mg by mouth every 6 hours as needed for Pain.  methylcellulose (CITRUCEL) oral powder Take 2 g by mouth every other day Take by mouth daily.  citalopram (CELEXA) 20 MG tablet TAKE 1 TABLET BY MOUTH DAILY 30 tablet 5    Cholecalciferol (VITAMIN D3) 39939 units CAPS TAKE 1 CAPSULE EVERY OTHER WEEK 4 capsule 2    Handicap Placard MISC by Does not apply route Valid for 5 years from 11/7/18 1 each 0    Cholecalciferol (VITAMIN D3) 72170 units CAPS Take 50,000 Units by mouth See Admin Instructions every other week 4 capsule 5    Nutritional Supplements (ENSURE ACTIVE HIGH PROTEIN PO) Take 1 Can by mouth daily as needed      acetaminophen 650 MG TABS Take 650 mg by mouth every 6 hours as needed for Pain or Fever (Fever >100.5 F (38 C)). 60 tablet 1    hydroxychloroquine (PLAQUENIL) 200 MG tablet Take 200 mg by mouth daily. No current facility-administered medications for this visit.         Social History:   Social History     Socioeconomic History    Marital status:      Spouse name: Not on file    Number of children: 1    Years of education: 15    Highest education level: Not on file   Occupational History    Occupation: Retired   Social Needs    Financial resource strain: Not on file    Food insecurity: Worry: Not on file     Inability: Not on file    Transportation needs:     Medical: Not on file     Non-medical: Not on file   Tobacco Use    Smoking status: Former Smoker     Packs/day: 0.50     Years: 30.00     Pack years: 15.00     Types: Cigarettes     Start date:      Last attempt to quit: 2014     Years since quittin.7    Smokeless tobacco: Never Used   Substance and Sexual Activity    Alcohol use: Yes     Alcohol/week: 0.0 oz     Comment: One drink twice weekly    Drug use: No    Sexual activity: Not on file   Lifestyle    Physical activity:     Days per week: Not on file     Minutes per session: Not on file    Stress: Not on file   Relationships    Social connections:     Talks on phone: Not on file     Gets together: Not on file     Attends Jain service: Not on file     Active member of club or organization: Not on file     Attends meetings of clubs or organizations: Not on file     Relationship status: Not on file    Intimate partner violence:     Fear of current or ex partner: Not on file     Emotionally abused: Not on file     Physically abused: Not on file     Forced sexual activity: Not on file   Other Topics Concern    Not on file   Social History Narrative    Not on file       TOBACCO:   reports that she quit smoking about 4 years ago. Her smoking use included cigarettes. She started smoking about 54 years ago. She has a 15.00 pack-year smoking history. She has never used smokeless tobacco.  ETOH:   reports that she drinks alcohol. Family History:   Family History   Problem Relation Age of Onset    High Blood Pressure Brother          A:  Ms. Mary Beth Harrington anxiety has worsened since the last visit as she has been struggling with more stressors, including health related. She struggles with motivation to engage in additional treatment needed as she feels overwhelmed and tired. Ms. Vickie Hill continues to be active and engaged and responds positively to behavioral interventions. Pt's PCP referred pt to psychiatry to discuss pt's request for medication change. Pt is not interested in scheduling an additional provider so this writer did curbside consult with psychiatry. Also consulted with pt's PCP re: med change recommendations. PHQ Scores 6/6/2019 5/2/2019 4/2/2019 3/8/2019 2/12/2019 1/2/2019 12/4/2018   PHQ2 Score 2 3 2 3 4 3 2   PHQ9 Score 5 11 2 11 9 9 7     Interpretation of Total Score Depression Severity: 1-4 = Minimal depression, 5-9 = Mild depression, 10-14 = Moderate depression, 15-19 = Moderately severe depression, 20-27 = Severe depression    BLANCA 7 SCORE 6/6/2019 5/2/2019 4/2/2019 3/8/2019 2/12/2019 1/2/2019 12/4/2018   BLANCA-7 Total Score 19 14 11 17 16 11 15     Interpretation of BLANCA-7 score: 5-9 = mild anxiety, 10-14 = moderate anxiety, 15+ = severe anxiety. Recommend referral to behavioral health for scores 10 or greater.         Diagnosis:    Depressive Disorder NOS  Stress      Plan:  Pt interventions:  Letcher-setting to identify pt's primary goals for [facility] visit / overall health and Supportive techniques  consult with psychiatry and pt's PCP, ACT interventions, behavioral activation interventions, treatment planning and reinforced pt's skill use    Pt Behavioral Change Plan:  Pt set goals to 1) continue to follow through with PT and remind self about long term gain 2) return for f/u in 2weeks

## 2019-06-10 RX ORDER — CHOLECALCIFEROL (VITAMIN D3) 1250 MCG
CAPSULE ORAL
Qty: 4 CAPSULE | Refills: 0 | Status: SHIPPED | OUTPATIENT
Start: 2019-06-10 | End: 2019-08-01 | Stop reason: SDUPTHER

## 2019-06-14 ENCOUNTER — HOSPITAL ENCOUNTER (OUTPATIENT)
Dept: PHYSICAL THERAPY | Age: 74
Setting detail: THERAPIES SERIES
Discharge: HOME | End: 2019-06-14
Payer: MEDICARE

## 2019-06-19 ENCOUNTER — TELEPHONE (OUTPATIENT)
Dept: INTERNAL MEDICINE CLINIC | Age: 74
End: 2019-06-19

## 2019-06-19 ENCOUNTER — HOSPITAL ENCOUNTER (OUTPATIENT)
Dept: PHYSICAL THERAPY | Age: 74
Setting detail: THERAPIES SERIES
Discharge: HOME | End: 2019-06-19
Payer: MEDICARE

## 2019-06-19 DIAGNOSIS — R74.8 ALKALINE PHOSPHATASE ELEVATION: ICD-10-CM

## 2019-06-19 DIAGNOSIS — I10 ESSENTIAL HYPERTENSION, BENIGN: ICD-10-CM

## 2019-06-19 LAB
A/G RATIO: 1.5 (ref 1.1–2.2)
ALBUMIN SERPL-MCNC: 4.6 G/DL (ref 3.4–5)
ALP BLD-CCNC: 118 U/L (ref 40–129)
ALT SERPL-CCNC: 21 U/L (ref 10–40)
ANION GAP SERPL CALCULATED.3IONS-SCNC: 14 MMOL/L (ref 3–16)
AST SERPL-CCNC: 29 U/L (ref 15–37)
BILIRUB SERPL-MCNC: 0.3 MG/DL (ref 0–1)
BUN BLDV-MCNC: 24 MG/DL (ref 7–20)
CALCIUM SERPL-MCNC: 9.9 MG/DL (ref 8.3–10.6)
CHLORIDE BLD-SCNC: 101 MMOL/L (ref 99–110)
CO2: 24 MMOL/L (ref 21–32)
CREAT SERPL-MCNC: 1.1 MG/DL (ref 0.6–1.2)
GFR AFRICAN AMERICAN: 58
GFR NON-AFRICAN AMERICAN: 48
GLOBULIN: 3.1 G/DL
GLUCOSE BLD-MCNC: 92 MG/DL (ref 70–99)
POTASSIUM SERPL-SCNC: 4.4 MMOL/L (ref 3.5–5.1)
SODIUM BLD-SCNC: 139 MMOL/L (ref 136–145)
TOTAL PROTEIN: 7.7 G/DL (ref 6.4–8.2)

## 2019-06-19 PROCEDURE — 97530 THERAPEUTIC ACTIVITIES: CPT

## 2019-06-19 PROCEDURE — 97110 THERAPEUTIC EXERCISES: CPT

## 2019-06-19 NOTE — TELEPHONE ENCOUNTER
Erin Kitchen pt caregiver stated the following:  Pt fell back and hurt right wrist and elbow. Pt is refusing to go to ER. Right thumb is swollen, can move wrist, move elbow. Care giver is requesting an Xray.   Please call

## 2019-06-19 NOTE — FLOWSHEET NOTE
Physical Therapy Daily Treatment Note  Date:  2019    Patient Name:  Red Chavez    :  1945  MRN: [id number]    Restrictions/Precaution:  Additional Pertinent Hx: CAD, CHF, DVT, HTN, SLE, TIA, UI,  femury surgery R, L hip ORIF , lumbar fusion         Medical/Treatment Diagnosis Information:  · Diagnosis: Recurrent falls (R29.6 ICD-10-CM)  · Treatment Diagnosis: Unsteady gait  Insurance/Certification information:  PT Insurance Information: Medicare  Physician Information:  Referring Practitioner: Abundio Schlatter, MD  Plan of care signed (Y/N):  Y  Visit# / total visits:    Pain level: 010       Subjective: Subjective: Pt is 66year old female here with caregiver with reports of  chronic falls and gait difficulties \" for several years\". Pt has h/o of multiple surgeries including R femur ORIF, L hip ORIF and lumbar fusions with TIA's during hospitalizations. Per caregiver,  R foot drop occured gradually and wears \"ankle brace off and on but has not worn for about 9 months\". Pt has ondergone several courses of in home therapy and was told \"not to use cane and not to be alone\". Pt has been suing rollater walker for at least 5 years. She lives with 24/7 caregiver who has told her not to get up and walk by herself when he leaves to go the store but falls have occurred in the bathroom when she has gone to the bathroom unattended. Pt reports having low back pain when walking \" over 100 yards\" but her chief complaint is weakness in the arms and legs. She describes difficulty getting up from the couch she watches TV and requires assist from her caregiver to stand each time unless getting up from a sturdy chair with arms. She suffered a recent fall when standing to manage her bills at a counter at home but denies injury. Pt also reports feeling short of breath at times but denies having to be on O2.  Per pt and caregiver, pt has been referred by a different MD for treatment of R shoulder but have elected not to pursue this since in favor of addressing her walking and balance issues since these are most limiting at this time. Pain is present in the low back when walking extended distances such as when she takes walks in her home for several minutes for exercise. The pain is described as sharp and aching from 4-10/10 but is currently 0/10 when seated. PLOF: Increasing falls and weakness in legs with gait difficulty over the past year     6/19/19:Pt admits having soreness in inner thighs from exercises and did not do them as much. Struggled with sit to stand exercise from having to use arms to help. Objective:   O2 96% after sitting rest break  Gait speed . 96 ft/sec with Rolator walker, partial step through, R foot inversion     -Observation: Pt here with caregiver.    -Exercises: see flow sheet below    -Home Exercise Program:  Patient instructed in and demonstrates good understanding of HEP. Printed HEP sheet issued. 6/4/19: sit to stand from chair, supine hip abduction slides  6/19/19: sit to stand from chair with arms and pillow to raise height, gluteal sets, seated marches    -Other Therapeutic Activities:   Increased time required to complete all exercises with correct breathing technique and to move through comfortable ROM. Advised to complete all exercises in smaller bouts to gradually increased endurance and tolerance  Educated regarding benefit of adherence to HEP to gradually increase flexibility, ROM and strength. Pt verbalized understanding      -Manual Treatments: NA    -Modalities: NA     Assessment/Treatment/Activity Tolerance:  [x] Patient tolerated treatment well [x] Patient limited by fatique  [x] Patient limited by pain  [] Patient limited by other medical complications  [] Other:  Rest breaks required due to fatigue and mild SOB. No increased pain or soreness reported post session.  Pt somewhat apprehensive during exercises due to fear of pain with h/o lumbar fusion and overall challenge of activity. Goals:  Short term goals  Time Frame for Short term goals: 3 weeks  Short term goal 1: Pt will demonstrate increased functional strength and balance per TUG score of less than 24 seconds with walker  Short term goal 2: Pt will report increased confidence walking in her home with walker to 50%  Short term goal 3: Pt will report increased confidence to 50% by her ability to get out of car   Short term goal 4: Pt will complete HEP with supervision with good compliance and no increased back pain  Long term goals  Time Frame for Long term goals : 6 weeks  Long term goal 1: Pt will demonstrate increased functional strength and balance per TUG score of less than 18 seconds with walker  Long term goal 2: Pt will walk in house with walker including trips to the bathroom using walker with 70% confidence or better  Long term goal 3: Pt will achieve gait speed of 1.9 ft/sec or better with walker to reflect improved health and function when walking for exercise in her home and when attending outings  Long term goal 4: Pt will report no falls or near falls using safe transfer methods  Long term goal 5: Pt and caregiver will be independent with progressive HEP    Plan:    Plan of care initiated    Plan for Next Session: Advance LE strengthening, gait and balance drills      Next MD visit;    Unknown     Timed Code Treatment Minutes: TA=12, TE=42  Total Treatment Minutes: 54     Electronically signed by:   Gurjit Page, PT #[id number]        ---Elevate HOB for breathing and comfort    Exercise/Equipment Resistance/Repetitions Other comments   Nustep Seat 8, Lv2 UE 10, x 5 min Assist required to safely transfer off and on seat   Sit to stand From Rolator walker 2 x 5 Locked using , fatigued   Hip abd slides X 10 R and L Maxi tube to decrease friction, ROM limited by stiffness, cues to avoid painful ROM   Gluteal sets Supine hold 5 x 10 Increased time to instruct correct breathing technique   Seated em BALDERAS 10 R and L

## 2019-06-20 ENCOUNTER — HOSPITAL ENCOUNTER (OUTPATIENT)
Age: 74
Discharge: HOME | End: 2019-06-20
Payer: MEDICARE

## 2019-06-20 ENCOUNTER — HOSPITAL ENCOUNTER (OUTPATIENT)
Dept: GENERAL RADIOLOGY | Age: 74
Discharge: HOME | End: 2019-06-20
Payer: MEDICARE

## 2019-06-20 ENCOUNTER — OFFICE VISIT (OUTPATIENT)
Dept: INTERNAL MEDICINE CLINIC | Age: 74
End: 2019-06-20
Payer: MEDICARE

## 2019-06-20 ENCOUNTER — OFFICE VISIT (OUTPATIENT)
Dept: PSYCHOLOGY | Age: 74
End: 2019-06-20
Payer: MEDICARE

## 2019-06-20 VITALS
RESPIRATION RATE: 12 BRPM | HEART RATE: 59 BPM | WEIGHT: 160 LBS | SYSTOLIC BLOOD PRESSURE: 110 MMHG | BODY MASS INDEX: 31.41 KG/M2 | OXYGEN SATURATION: 97 % | DIASTOLIC BLOOD PRESSURE: 54 MMHG | HEIGHT: 60 IN

## 2019-06-20 DIAGNOSIS — F33.40 DEPRESSION, MAJOR, RECURRENT, IN REMISSION (HCC): Primary | ICD-10-CM

## 2019-06-20 DIAGNOSIS — M25.531 RIGHT WRIST PAIN: ICD-10-CM

## 2019-06-20 DIAGNOSIS — M25.531 RIGHT WRIST PAIN: Primary | ICD-10-CM

## 2019-06-20 PROCEDURE — G8399 PT W/DXA RESULTS DOCUMENT: HCPCS | Performed by: INTERNAL MEDICINE

## 2019-06-20 PROCEDURE — 99213 OFFICE O/P EST LOW 20 MIN: CPT | Performed by: INTERNAL MEDICINE

## 2019-06-20 PROCEDURE — G8427 DOCREV CUR MEDS BY ELIG CLIN: HCPCS | Performed by: INTERNAL MEDICINE

## 2019-06-20 PROCEDURE — 4040F PNEUMOC VAC/ADMIN/RCVD: CPT | Performed by: INTERNAL MEDICINE

## 2019-06-20 PROCEDURE — 1036F TOBACCO NON-USER: CPT | Performed by: INTERNAL MEDICINE

## 2019-06-20 PROCEDURE — 90832 PSYTX W PT 30 MINUTES: CPT | Performed by: PSYCHOLOGIST

## 2019-06-20 PROCEDURE — 1123F ACP DISCUSS/DSCN MKR DOCD: CPT | Performed by: INTERNAL MEDICINE

## 2019-06-20 PROCEDURE — 1090F PRES/ABSN URINE INCON ASSESS: CPT | Performed by: INTERNAL MEDICINE

## 2019-06-20 PROCEDURE — G8598 ASA/ANTIPLAT THER USED: HCPCS | Performed by: INTERNAL MEDICINE

## 2019-06-20 PROCEDURE — G8417 CALC BMI ABV UP PARAM F/U: HCPCS | Performed by: INTERNAL MEDICINE

## 2019-06-20 PROCEDURE — 73110 X-RAY EXAM OF WRIST: CPT

## 2019-06-20 ASSESSMENT — ENCOUNTER SYMPTOMS
COUGH: 0
BACK PAIN: 0
CHEST TIGHTNESS: 0
EYE REDNESS: 0
NAUSEA: 0
ABDOMINAL PAIN: 0
SHORTNESS OF BREATH: 0

## 2019-06-20 ASSESSMENT — ANXIETY QUESTIONNAIRES
4. TROUBLE RELAXING: 2-OVER HALF THE DAYS
6. BECOMING EASILY ANNOYED OR IRRITABLE: 2-OVER HALF THE DAYS
1. FEELING NERVOUS, ANXIOUS, OR ON EDGE: 1-SEVERAL DAYS
3. WORRYING TOO MUCH ABOUT DIFFERENT THINGS: 3-NEARLY EVERY DAY
5. BEING SO RESTLESS THAT IT IS HARD TO SIT STILL: 0-NOT AT ALL SURE
GAD7 TOTAL SCORE: 13
7. FEELING AFRAID AS IF SOMETHING AWFUL MIGHT HAPPEN: 3-NEARLY EVERY DAY
2. NOT BEING ABLE TO STOP OR CONTROL WORRYING: 2-OVER HALF THE DAYS

## 2019-06-20 ASSESSMENT — PATIENT HEALTH QUESTIONNAIRE - PHQ9
4. FEELING TIRED OR HAVING LITTLE ENERGY: 2
7. TROUBLE CONCENTRATING ON THINGS, SUCH AS READING THE NEWSPAPER OR WATCHING TELEVISION: 1
9. THOUGHTS THAT YOU WOULD BE BETTER OFF DEAD, OR OF HURTING YOURSELF: 0
SUM OF ALL RESPONSES TO PHQ9 QUESTIONS 1 & 2: 2
8. MOVING OR SPEAKING SO SLOWLY THAT OTHER PEOPLE COULD HAVE NOTICED. OR THE OPPOSITE, BEING SO FIGETY OR RESTLESS THAT YOU HAVE BEEN MOVING AROUND A LOT MORE THAN USUAL: 1
1. LITTLE INTEREST OR PLEASURE IN DOING THINGS: 1
SUM OF ALL RESPONSES TO PHQ QUESTIONS 1-9: 8
2. FEELING DOWN, DEPRESSED OR HOPELESS: 1
10. IF YOU CHECKED OFF ANY PROBLEMS, HOW DIFFICULT HAVE THESE PROBLEMS MADE IT FOR YOU TO DO YOUR WORK, TAKE CARE OF THINGS AT HOME, OR GET ALONG WITH OTHER PEOPLE: 1
6. FEELING BAD ABOUT YOURSELF - OR THAT YOU ARE A FAILURE OR HAVE LET YOURSELF OR YOUR FAMILY DOWN: 0
3. TROUBLE FALLING OR STAYING ASLEEP: 1
SUM OF ALL RESPONSES TO PHQ QUESTIONS 1-9: 8
5. POOR APPETITE OR OVEREATING: 1
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
## 2019-06-20 NOTE — PROGRESS NOTES
Behavioral Health Consultation  [address] Quyen Goncalves PsyD  Psychologist  6/20/2019  10:15 AM      Time spent with Patient: 18 minutes  This is patient's twenty seventh [facility] appointment. Reason for Consult:  depression  Referring Provider: Rosario Allen MD  [phone] MAHAMED Nicole  [address] [place] [address], [address] [facility]      Feedback given to PCP. S:  During the last visit pt set goals to 1) continue to follow through with PT and remind self about long term gain 2) return for f/u in 2 weeks    Pt reports she fell yesterday and thinks she may have fractured her wrist. Is in pain. Sees provider later today. Has been doing PT regularly. Feels better about going but frustrated with lack of progress. Mood was improved prior to falling. Wasn't as depressed or anxious overall. Been continuing to read for fun. Still has a lot of stress with son - saw him over the weekend and he didn't bring a check to pay her back. Has accepted that he won't do some things she hopes he will.        O:  MSE:    Appearance    alert, cooperative  Sleep disturbance Yes  Fatigue Yes  Loss of pleasure Yes  Impulsive behavior No  Speech    normal volume, normal rate  Mood   \"OK\"  Affect    Congruent to thought content and mood  Thought Content    intact  Thought Process    linear and goal directed  Associations    logical connections  Insight    Good  Judgment    Intact  Orientation    oriented to person, place, time, and general circumstances  Memory    recent and remote memory intact  Attention/Concentration    intact  Suicide Assessment    Denies SI      History:    Medications:   Current Outpatient Medications   Medication Sig Dispense Refill    Cholecalciferol (VITAMIN D3) 59192 units CAPS TAKE 1 CAPSULE EVERY OTHER WEEK 4 capsule 0    carvedilol (COREG) 25 MG tablet TAKE ONE TABLET BY MOUTH TWICE DAILY WITH MEALS 60 tablet 2    furosemide (LASIX) 20 MG tablet TAKE 1 TABLET BY MOUTH DAILY AS NEEDED FOR SWELLING 30 tablet 2    pantoprazole (PROTONIX) 20 MG tablet TAKE 1 TABLET BY MOUTH TWICE DAILY 60 tablet 1    losartan (COZAAR) 25 MG tablet TAKE 1 TABLET BY MOUTH DAILY 30 tablet 2    rosuvastatin (CRESTOR) 20 MG tablet TAKE 1 TABLET BY MOUTH DAILY 30 tablet 5    clopidogrel (PLAVIX) 75 MG tablet TAKE 1 TABLET BY MOUTH DAILY 30 tablet 5    buPROPion (WELLBUTRIN XL) 300 MG extended release tablet TAKE 1 TABLET BY MOUTH EVERY MORNING 30 tablet 5    Mirabegron ER (MYRBETRIQ) 25 MG TB24 TAKE 2 TABLETS BY MOUTH DAILY 60 tablet 5    traMADol (ULTRAM) 50 MG tablet Take 50 mg by mouth every 6 hours as needed for Pain.  methylcellulose (CITRUCEL) oral powder Take 2 g by mouth every other day Take by mouth daily.  citalopram (CELEXA) 20 MG tablet TAKE 1 TABLET BY MOUTH DAILY 30 tablet 5    Handicap Placard MISC by Does not apply route Valid for 5 years from 11/7/18 1 each 0    Cholecalciferol (VITAMIN D3) 12272 units CAPS Take 50,000 Units by mouth See Admin Instructions every other week 4 capsule 5    Nutritional Supplements (ENSURE ACTIVE HIGH PROTEIN PO) Take 1 Can by mouth daily as needed      acetaminophen 650 MG TABS Take 650 mg by mouth every 6 hours as needed for Pain or Fever (Fever >100.5 F (38 C)). 60 tablet 1    hydroxychloroquine (PLAQUENIL) 200 MG tablet Take 200 mg by mouth daily. No current facility-administered medications for this visit.         Social History:   Social History     Socioeconomic History    Marital status:      Spouse name: Not on file    Number of children: 1    Years of education: 15    Highest education level: Not on file   Occupational History    Occupation: Retired   Social Needs    Financial resource strain: Not on file    Food insecurity:     Worry: Not on file     Inability: Not on file   [employer] needs:     Medical: Not on file     Non-medical: Not on file   Tobacco Use    Smoking status: Former Smoker     Packs/day: 0.50     Years: 30.00     Pack years: 15.00     Types: Cigarettes     Start date:      Last attempt to quit: 2014     Years since quittin.7    Smokeless tobacco: Never Used   Substance and Sexual Activity    Alcohol use: Yes     Alcohol/week: 0.0 oz     Comment: One drink twice weekly    Drug use: No    Sexual activity: Not on file   Lifestyle    Physical activity:     Days per week: Not on file     Minutes per session: Not on file    Stress: Not on file   Relationships    Social connections:     Talks on phone: Not on file     Gets together: Not on file     Attends Hindu service: Not on file     Active member of club or organization: Not on file     Attends meetings of clubs or organizations: Not on file     Relationship status: Not on file    Intimate partner violence:     Fear of current or ex partner: Not on file     Emotionally abused: Not on file     Physically abused: Not on file     Forced sexual activity: Not on file   Other Topics Concern    Not on file   Social History Narrative    Not on file       TOBACCO:   reports that she quit smoking about 4 years ago. Her smoking use included cigarettes. She started smoking about 54 years ago. She has a 15.00 pack-year smoking history. She has never used smokeless tobacco.  ETOH:   reports that she drinks alcohol. Family History:   Family History   Problem Relation Age of Onset    High Blood Pressure Brother          A:  Ms. Arielle Stephens mood had improved somewhat after the last visit until she fell yesterday and has been experiencing increased pain. She has been continuing with PT and has been managing stressors more effectively. She was active and engaged during the visit. She continues to respond well to behavioral interventions.        PHQ Scores 2019 2019 2019 2019 3/8/2019 2019 2019   PHQ2 Score 2 2 3 2 3 4 3   PHQ9 Score 8 5 11 2 11 9 9     Interpretation of Total Score Depression Severity: 1-4 = Minimal depression, 5-9 = Mild depression, 10-14 = Moderate depression, 15-19 = Moderately severe depression, 20-27 = Severe depression    BLANCA 7 SCORE 6/20/2019 6/6/2019 5/2/2019 4/2/2019 3/8/2019 2/12/2019 1/2/2019   BLANCA-7 Total Score 13 19 14 11 17 16 11     Interpretation of BLANCA-7 score: 5-9 = mild anxiety, 10-14 = moderate anxiety, 15+ = severe anxiety. Recommend referral to behavioral health for scores 10 or greater.         Diagnosis:    Depressive Disorder NOS  Stress      Plan:  Pt interventions:  [place]-setting to identify pt's primary goals for DIGNANCLETHA FARIAS [facility] visit / overall health and Supportive techniques  ACT interventions and reinforced pt's skill use, treatment planning    Pt Behavioral Change Plan:  Pt set goals to 1) continue to follow through with PT 2) return for f/u in one month

## 2019-06-21 ENCOUNTER — HOSPITAL ENCOUNTER (OUTPATIENT)
Dept: PHYSICAL THERAPY | Age: 74
Setting detail: THERAPIES SERIES
Discharge: HOME | End: 2019-06-21
Payer: MEDICARE

## 2019-06-21 PROCEDURE — 97110 THERAPEUTIC EXERCISES: CPT

## 2019-06-21 PROCEDURE — 97530 THERAPEUTIC ACTIVITIES: CPT

## 2019-06-21 NOTE — FLOWSHEET NOTE
Physical Therapy Daily Treatment Note  Date:  2019    Patient Name:  Ale Vieira    :  1945  MRN: [id number]    Restrictions/Precaution:  Additional Pertinent Hx: CAD, CHF, DVT, HTN, SLE, TIA, UI,  femury surgery R, L hip ORIF , lumbar fusion         Medical/Treatment Diagnosis Information:  · Diagnosis: Recurrent falls (R29.6 ICD-10-CM)  · Treatment Diagnosis: Unsteady gait  Insurance/Certification information:  PT Insurance Information: Medicare  Physician Information:  Referring Practitioner: Gatito An MD  Plan of care signed (Y/N):  Y  Visit# / total visits:  3/12  Pain level: 010       Subjective: Subjective: Pt is 66year old female here with caregiver with reports of  chronic falls and gait difficulties \" for several years\". Pt has h/o of multiple surgeries including R femur ORIF, L hip ORIF and lumbar fusions with TIA's during hospitalizations. Per caregiver,  R foot drop occured gradually and wears \"ankle brace off and on but has not worn for about 9 months\". Pt has ondergone several courses of in home therapy and was told \"not to use cane and not to be alone\". Pt has been suing rollater walker for at least 5 years. She lives with 24/7 caregiver who has told her not to get up and walk by herself when he leaves to go the store but falls have occurred in the bathroom when she has gone to the bathroom unattended. Pt reports having low back pain when walking \" over 100 yards\" but her chief complaint is weakness in the arms and legs. She describes difficulty getting up from the couch she watches TV and requires assist from her caregiver to stand each time unless getting up from a sturdy chair with arms. She suffered a recent fall when standing to manage her bills at a counter at home but denies injury. Pt also reports feeling short of breath at times but denies having to be on O2.  Per pt and caregiver, pt has been referred by a different MD for treatment of R shoulder but

## 2019-06-24 ENCOUNTER — TELEPHONE (OUTPATIENT)
Dept: INTERNAL MEDICINE CLINIC | Age: 74
End: 2019-06-24

## 2019-06-24 DIAGNOSIS — R25.2 MUSCLE CRAMPING: Primary | ICD-10-CM

## 2019-06-24 NOTE — TELEPHONE ENCOUNTER
Patient caregiver called stating patient is having muscle spasms in her left leg and would like you to check her potassium levels.      Please call to advise

## 2019-06-26 ENCOUNTER — HOSPITAL ENCOUNTER (OUTPATIENT)
Dept: PHYSICAL THERAPY | Age: 74
Setting detail: THERAPIES SERIES
Discharge: HOME | End: 2019-06-26
Payer: MEDICARE

## 2019-06-26 PROCEDURE — 97116 GAIT TRAINING THERAPY: CPT

## 2019-06-26 PROCEDURE — 97110 THERAPEUTIC EXERCISES: CPT

## 2019-06-26 NOTE — FLOWSHEET NOTE
Physical Therapy Daily Treatment Note  Date:  2019    Patient Name:  Armin Valdovinos    :  1945  MRN: [id number]    Restrictions/Precaution:  Additional Pertinent Hx: CAD, CHF, DVT, HTN, SLE, TIA, UI,  femury surgery R, L hip ORIF , lumbar fusion         Medical/Treatment Diagnosis Information:  · Diagnosis: Recurrent falls (R29.6 ICD-10-CM)  · Treatment Diagnosis: Unsteady gait  Insurance/Certification information:  PT Insurance Information: Medicare  Physician Information:  Referring Practitioner: Jayant Baron MD  Plan of care signed (Y/N):  Y  Visit# / total visits:    Pain level: 010       Subjective: Subjective: Pt is 66year old female here with caregiver with reports of  chronic falls and gait difficulties \" for several years\". Pt has h/o of multiple surgeries including R femur ORIF, L hip ORIF and lumbar fusions with TIA's during hospitalizations. Per caregiver,  R foot drop occured gradually and wears \"ankle brace off and on but has not worn for about 9 months\". Pt has ondergone several courses of in home therapy and was told \"not to use cane and not to be alone\". Pt has been suing rollater walker for at least 5 years. She lives with 24/7 caregiver who has told her not to get up and walk by herself when he leaves to go the store but falls have occurred in the bathroom when she has gone to the bathroom unattended. Pt reports having low back pain when walking \" over 100 yards\" but her chief complaint is weakness in the arms and legs. She describes difficulty getting up from the couch she watches TV and requires assist from her caregiver to stand each time unless getting up from a sturdy chair with arms. She suffered a recent fall when standing to manage her bills at a counter at home but denies injury. Pt also reports feeling short of breath at times but denies having to be on O2.  Per pt and caregiver, pt has been referred by a different MD for treatment of R shoulder but have elected not to pursue this since in favor of addressing her walking and balance issues since these are most limiting at this time. Pain is present in the low back when walking extended distances such as when she takes walks in her home for several minutes for exercise. The pain is described as sharp and aching from 4-10/10 but is currently 0/10 when seated. PLOF: Increasing falls and weakness in legs with gait difficulty over the past year     6/19/19:Pt admits having soreness in inner thighs from exercises and did not do them as much. Struggled with sit to stand exercise from having to use arms to help. 6/20/19Thelaurence Mendez backwards in [address] [facility] walker going into restaurant. Was sitting as caregiver pulled walker through doorway- ag on threshold then tipped back. Sustained R elbow contusion and R wrist sprain. Wrist x-ray negative but did not have elbow x-ray. Denies pain but feel sore to touch.    6/26/19: Reports feeling sore in the R thigh but this is not new. Legs feel weak and struggles with sit to stand exercise. Denies elbow pain but reports bruising B elbows. Objective:           -Observation:   Bruising B elbows ( R elbow swelling), R wrist swellling  Bradykinetic movement  Posterior LOB with delayed posture reaction sit to stand  Slow gait using 4 WW, narrow strides, partial step through      -Exercises: see flow sheet below    -Home Exercise Program:  Patient instructed in and demonstrates good understanding of HEP. Printed HEP sheet issued.   6/4/19: sit to stand from chair, supine hip abduction slides  6/19/19: sit to stand from chair with arms and pillow to raise height, gluteal sets, seated marches  6/21/19: seated clamshells green or red band, bridges  6/26/19: standing hip abd at counter with SBA, seated marches green band, practice walking with step through sequence      -Other Therapeutic Activities:   Reminders to walk several laps with walker in home  Avoid sitting longer than 1-2 hours          -Manual Treatments: NA    -Modalities: NA     Assessment/Treatment/Activity Tolerance:  [x] Patient tolerated treatment well [x] Patient limited by fatique  [] Patient limited by pain  [] Patient limited by other medical complications  [x] Other:  Rest breaks required due to fatigue and low back discomfort    No increased pain or soreness reported post session. Requires rest breaks due to fatigue- limited endurance for sit to stand with early muscle fatigue. Pt is a high fall risk due to delayed postural reactions and bradykinetic movement. Goals:  Short term goals  Time Frame for Short term goals: 3 weeks  Short term goal 1: Pt will demonstrate increased functional strength and balance per TUG score of less than 24 seconds with walker  Short term goal 2: Pt will report increased confidence walking in her home with walker to 50%  Short term goal 3: Pt will report increased confidence to 50% by her ability to get out of car   Short term goal 4: Pt will complete HEP with supervision with good compliance and no increased back pain  Long term goals  Time Frame for Long term goals : 6 weeks  Long term goal 1: Pt will demonstrate increased functional strength and balance per TUG score of less than 18 seconds with walker  Long term goal 2: Pt will walk in house with walker including trips to the bathroom using walker with 70% confidence or better  Long term goal 3: Pt will achieve gait speed of 1.9 ft/sec or better with walker to reflect improved health and function when walking for exercise in her home and when attending outings  Long term goal 4: Pt will report no falls or near falls using safe transfer methods  Long term goal 5: Pt and caregiver will be independent with progressive HEP    Plan:    Plan of care initiated    Plan for Next Session: Advance LE strengthening, gait and balance drills      Next MD visit;    Unknown     Timed Code Treatment Minutes: TE=48, Gt=8  Total Treatment Minutes: 56    Electronically signed by:   Gurjit Page, PT #[id number]        ---Elevate HOB for breathing and comfort    Exercise/Equipment Resistance/Repetitions Other comments   Nustep Seat 8, Lv3, L UE 10 only, x 5 min Assist required to safely transfer off and on seat   Sit to stand From Rolator walker 3 x 5   Cues to scoot to edge of chair with feet under hips,  L UE assist only, delayed postural reactions with posterior LOB, rest breaks needed in b/w reps due to fatigue   Hip abd  2 x10 R and L standing // bars for UE support, cues to avoid forward lean and avoid downward gaze   Gluteal sets Supine hold 5 x 10 Cues to avoid breath holding   Seated marches 2 x 20 R and L  Greenband   Seated clamshells Green band hold 5\"  2 x 10    Bridge               Gait With 4WW 30' x 3 laps    // bars 4 laps single UE support  Emphasis - step through sequence with forward gaze  Note L LE weakness during stance             Balance   Shoulder width stance x 30 sec   Narrow stance x 30 sec  Feet together x 30 sec // bars, light UE support if needed, forward gaze, erect posture

## 2019-06-28 ENCOUNTER — HOSPITAL ENCOUNTER (OUTPATIENT)
Dept: PHYSICAL THERAPY | Age: 74
Setting detail: THERAPIES SERIES
Discharge: HOME | End: 2019-06-28
Payer: MEDICARE

## 2019-06-28 PROCEDURE — 97112 NEUROMUSCULAR REEDUCATION: CPT

## 2019-06-28 PROCEDURE — 97110 THERAPEUTIC EXERCISES: CPT

## 2019-06-28 PROCEDURE — 97116 GAIT TRAINING THERAPY: CPT

## 2019-06-28 NOTE — FLOWSHEET NOTE
Physical Therapy Daily Treatment Note  Date:  2019    Patient Name:  Juan Alberto Connolly    :  1945  MRN: [id number]    Restrictions/Precaution:  Additional Pertinent Hx: CAD, CHF, DVT, HTN, SLE, TIA, UI,  femury surgery R, L hip ORIF , lumbar fusion         Medical/Treatment Diagnosis Information:  · Diagnosis: Recurrent falls (R29.6 ICD-10-CM)  · Treatment Diagnosis: Unsteady gait  Insurance/Certification information:  PT Insurance Information: Medicare  Physician Information:  Referring Practitioner: Laurie Cole MD  Plan of care signed (Y/N):  Y  Visit# / total visits:    Pain level: 2/10 Aching in calves      Subjective: Subjective: Pt is 66year old female here with caregiver with reports of  chronic falls and gait difficulties \" for several years\". Pt has h/o of multiple surgeries including R femur ORIF, L hip ORIF and lumbar fusions with TIA's during hospitalizations. Per caregiver,  R foot drop occured gradually and wears \"ankle brace off and on but has not worn for about 9 months\". Pt has ondergone several courses of in home therapy and was told \"not to use cane and not to be alone\". Pt has been suing rollater walker for at least 5 years. She lives with 24/7 caregiver who has told her not to get up and walk by herself when he leaves to go the store but falls have occurred in the bathroom when she has gone to the bathroom unattended. Pt reports having low back pain when walking \" over 100 yards\" but her chief complaint is weakness in the arms and legs. She describes difficulty getting up from the couch she watches TV and requires assist from her caregiver to stand each time unless getting up from a sturdy chair with arms. She suffered a recent fall when standing to manage her bills at a counter at home but denies injury. Pt also reports feeling short of breath at times but denies having to be on O2.  Per pt and caregiver, pt has been referred by a different MD for treatment of R shoulder but have elected not to pursue this since in favor of addressing her walking and balance issues since these are most limiting at this time. Pain is present in the low back when walking extended distances such as when she takes walks in her home for several minutes for exercise. The pain is described as sharp and aching from 4-10/10 but is currently 0/10 when seated. PLOF: Increasing falls and weakness in legs with gait difficulty over the past year     6/19/19:Pt admits having soreness in inner thighs from exercises and did not do them as much. Struggled with sit to stand exercise from having to use arms to help. 6/20/19Berosalie Blasanos backwards in [address] walker going into restaurant. Was sitting as caregiver pulled walker through doorway- aght on threshold then tipped back. Sustained R elbow contusion and R wrist sprain. Wrist x-ray negative but did not have elbow x-ray. Denies pain but feel sore to touch.    6/26/19: Reports feeling sore in the R thigh but this is not new. Legs feel weak and struggles with sit to stand exercise. Denies elbow pain but reports bruising B elbows. 6/28/19: Felt more low back pain when standing the day after last session. Better today in the low back but calves feel sore. Objective:           -Observation:   Stood from chair in waiting room with minimal effort but sat abruptly due to post LOB  Improved step through sequence during gait using 4WW        -Exercises: see flow sheet below    -Home Exercise Program:  Patient instructed in and demonstrates good understanding of HEP. Printed HEP sheet issued.   6/4/19: sit to stand from chair, supine hip abduction slides  6/19/19: sit to stand from chair with arms and pillow to raise height, gluteal sets, seated marches  6/21/19: seated clamshells green or red band, bridges  6/26/19: standing hip abd at counter with SBA, seated marches green band, practice walking with step through sequence      -Other Therapeutic Activities: Reminders to walk several laps with walker in home  Avoid sitting longer than 1-2 hours          -Manual Treatments:   Gentle HS supine, AAROM hip flexion and abduction supine R and L    -Modalities: NA     Assessment/Treatment/Activity Tolerance:  [x] Patient tolerated treatment well [x] Patient limited by fatique  [x] Patient limited by pain- low back  [] Patient limited by other medical complications  [x] Other:  Rest breaks required due to fatigue and low back discomfort    No increased pain or soreness reported post session reported. Improving strength to complete sit to stand with decreased effort. Goals:  Short term goals  Time Frame for Short term goals: 3 weeks  Short term goal 1: Pt will demonstrate increased functional strength and balance per TUG score of less than 24 seconds with walker  Short term goal 2: Pt will report increased confidence walking in her home with walker to 50%  Short term goal 3: Pt will report increased confidence to 50% by her ability to get out of car   Short term goal 4: Pt will complete HEP with supervision with good compliance and no increased back pain  Long term goals  Time Frame for Long term goals : 6 weeks  Long term goal 1: Pt will demonstrate increased functional strength and balance per TUG score of less than 18 seconds with walker  Long term goal 2: Pt will walk in house with walker including trips to the bathroom using walker with 70% confidence or better  Long term goal 3: Pt will achieve gait speed of 1.9 ft/sec or better with walker to reflect improved health and function when walking for exercise in her home and when attending outings  Long term goal 4: Pt will report no falls or near falls using safe transfer methods  Long term goal 5: Pt and caregiver will be independent with progressive HEP    Plan:    Plan of care initiated    Plan for Next Session: Advance LE strengthening, gait and balance drills      Next MD visit;    Unknown     Timed Code Treatment Minutes: TE=32, Gt=15, NR= 8  Total Treatment Minutes: 55    Electronically signed by:   Melisa Keene, PT #[id number]        ---Elevate HOB for breathing and comfort    Exercise/Equipment Resistance/Repetitions Other comments   Nustep Seat 7, Lv2 and3, L UE 10 only, x 5 min 55 to 79 SPM   Sit to stand    Cues to scoot to edge of chair with feet under hips,  L UE assist only, delayed postural reactions with posterior LOB, rest breaks needed in b/w reps due to fatigue   Hip abd  1 x10 R and L standing      Supine x 10 R and L  // bars for UE support, cues to avoid forward lean and avoid downward gaze    maxitube   Gluteal sets Supine hold 5 x 10 Cues to avoid breath holding   Seated marches      Green band   Seated clamshells     Bridge Hold 3\" x 10              Gait With 4WW 30' x 3 laps    // bars 4 laps single UE support x 1 lap then B UE support x 3 laps Emphasis - step through sequence with forward gaze  Note step to R LE- reports    FSU/BSD 2\" x 10 lead L then x 10 lead R  B UE support // bars, cues to maintain forward gaze        Balance   Shoulder width stance x 30 sec   Narrow stance x 30 sec  Feet together x 30 sec // bars, light UE support if needed, forward gaze, erect posture       Standing marches B UE support 1 x10, 1 x 5 ( stopped due to dizziness)

## 2019-07-03 ENCOUNTER — HOSPITAL ENCOUNTER (OUTPATIENT)
Dept: PHYSICAL THERAPY | Age: 74
Setting detail: THERAPIES SERIES
Discharge: HOME | End: 2019-07-03
Payer: MEDICARE

## 2019-07-03 PROCEDURE — 97110 THERAPEUTIC EXERCISES: CPT

## 2019-07-03 PROCEDURE — 97116 GAIT TRAINING THERAPY: CPT

## 2019-07-05 ENCOUNTER — APPOINTMENT (OUTPATIENT)
Dept: PHYSICAL THERAPY | Age: 74
End: 2019-07-05
Payer: MEDICARE

## 2019-07-09 RX ORDER — PANTOPRAZOLE SODIUM 20 MG/1
TABLET, DELAYED RELEASE ORAL
Qty: 60 TABLET | Refills: 2 | Status: SHIPPED | OUTPATIENT
Start: 2019-07-09 | End: 2019-10-21 | Stop reason: SDUPTHER

## 2019-07-10 ENCOUNTER — HOSPITAL ENCOUNTER (OUTPATIENT)
Dept: PHYSICAL THERAPY | Age: 74
Setting detail: THERAPIES SERIES
Discharge: HOME | End: 2019-07-10
Payer: MEDICARE

## 2019-07-10 PROCEDURE — 97116 GAIT TRAINING THERAPY: CPT

## 2019-07-10 PROCEDURE — 97110 THERAPEUTIC EXERCISES: CPT

## 2019-07-10 NOTE — FLOWSHEET NOTE
( caregiver assist)  Short term goal 4: Pt will complete HEP with supervision with good compliance and no increased back pain  PART MET   Long term goals  Time Frame for Long term goals : 6 weeks  Long term goal 1: Pt will demonstrate increased functional strength and balance per TUG score of less than 18 seconds with walker  Long term goal 2: Pt will walk in house with walker including trips to the bathroom using walker with 70% confidence or better  Long term goal 3: Pt will achieve gait speed of 1.9 ft/sec or better with walker to reflect improved health and function when walking for exercise in her home and when attending outings  Long term goal 4: Pt will report no falls or near falls using safe transfer methods  Long term goal 5: Pt and caregiver will be independent with progressive HEP    Plan:    Plan of care initiated   6/4/19    Plan for Next Session: Advance LE strengthening, gait and balance drills      Next MD visit;    Unknown     Timed Code Treatment Minutes: TE=35, Gt=23  Total Treatment Minutes: 62    Electronically signed by:   Sheena Gowers, PT #[id number]        ---Elevate HOB for breathing and comfort    Exercise/Equipment Resistance/Repetitions Other comments   Nustep Seat 7, Lv2 and3, L UE 10 only, x 7 min 55 to 79 SPM- cues to maintain chuck   Sit to stand From 19\" chair 6+4 Struggled to clear surface without bracing LE against edge of seat, assist level varied from CGA to mod assist with posterior LOB   Hip abd  1 x10 R and L standing      Supine x 10 R and L    Lateral stepping against yellow band x 10 R and L // bars  // bars for UE support, cues to avoid forward lean and avoid downward gaze    No maxitube- reduced struggle and effort   Gluteal sets Supine hold 5 x 10 Cues to avoid breath holding   Seated marches      Green band   Seated clamshells     Bridge     Calf stretch Hold 30\" x 1 R and L Hold counter for balance support   Ethan hip flex stretch Hold 10\" x 5 R and   Hold 30\" x 1

## 2019-07-12 ENCOUNTER — HOSPITAL ENCOUNTER (OUTPATIENT)
Dept: PHYSICAL THERAPY | Age: 74
Setting detail: THERAPIES SERIES
Discharge: HOME | End: 2019-07-12
Payer: MEDICARE

## 2019-07-12 PROCEDURE — 97110 THERAPEUTIC EXERCISES: CPT

## 2019-07-12 PROCEDURE — 97112 NEUROMUSCULAR REEDUCATION: CPT

## 2019-07-12 PROCEDURE — 97116 GAIT TRAINING THERAPY: CPT

## 2019-07-17 ENCOUNTER — HOSPITAL ENCOUNTER (OUTPATIENT)
Dept: PHYSICAL THERAPY | Age: 74
Setting detail: THERAPIES SERIES
Discharge: HOME | End: 2019-07-17
Payer: MEDICARE

## 2019-07-17 PROCEDURE — 97140 MANUAL THERAPY 1/> REGIONS: CPT

## 2019-07-17 PROCEDURE — 97116 GAIT TRAINING THERAPY: CPT

## 2019-07-17 PROCEDURE — 97110 THERAPEUTIC EXERCISES: CPT

## 2019-07-18 ENCOUNTER — OFFICE VISIT (OUTPATIENT)
Dept: PSYCHOLOGY | Age: 74
End: 2019-07-18
Payer: MEDICARE

## 2019-07-18 DIAGNOSIS — F32.A DEPRESSIVE DISORDER: Primary | ICD-10-CM

## 2019-07-18 PROCEDURE — 90832 PSYTX W PT 30 MINUTES: CPT | Performed by: PSYCHOLOGIST

## 2019-07-18 ASSESSMENT — PATIENT HEALTH QUESTIONNAIRE - PHQ9
7. TROUBLE CONCENTRATING ON THINGS, SUCH AS READING THE NEWSPAPER OR WATCHING TELEVISION: 1
3. TROUBLE FALLING OR STAYING ASLEEP: 2
5. POOR APPETITE OR OVEREATING: 1
2. FEELING DOWN, DEPRESSED OR HOPELESS: 2
SUM OF ALL RESPONSES TO PHQ9 QUESTIONS 1 & 2: 2
SUM OF ALL RESPONSES TO PHQ QUESTIONS 1-9: 10
8. MOVING OR SPEAKING SO SLOWLY THAT OTHER PEOPLE COULD HAVE NOTICED. OR THE OPPOSITE, BEING SO FIGETY OR RESTLESS THAT YOU HAVE BEEN MOVING AROUND A LOT MORE THAN USUAL: 2
4. FEELING TIRED OR HAVING LITTLE ENERGY: 1
1. LITTLE INTEREST OR PLEASURE IN DOING THINGS: 0
6. FEELING BAD ABOUT YOURSELF - OR THAT YOU ARE A FAILURE OR HAVE LET YOURSELF OR YOUR FAMILY DOWN: 1
10. IF YOU CHECKED OFF ANY PROBLEMS, HOW DIFFICULT HAVE THESE PROBLEMS MADE IT FOR YOU TO DO YOUR WORK, TAKE CARE OF THINGS AT HOME, OR GET ALONG WITH OTHER PEOPLE: 2
SUM OF ALL RESPONSES TO PHQ QUESTIONS 1-9: 10
9. THOUGHTS THAT YOU WOULD BE BETTER OFF DEAD, OR OF HURTING YOURSELF: 0

## 2019-07-18 ASSESSMENT — ANXIETY QUESTIONNAIRES
3. WORRYING TOO MUCH ABOUT DIFFERENT THINGS: 3-NEARLY EVERY DAY
6. BECOMING EASILY ANNOYED OR IRRITABLE: 2-OVER HALF THE DAYS
GAD7 TOTAL SCORE: 18
1. FEELING NERVOUS, ANXIOUS, OR ON EDGE: 3-NEARLY EVERY DAY
2. NOT BEING ABLE TO STOP OR CONTROL WORRYING: 3-NEARLY EVERY DAY
4. TROUBLE RELAXING: 3-NEARLY EVERY DAY
7. FEELING AFRAID AS IF SOMETHING AWFUL MIGHT HAPPEN: 2-OVER HALF THE DAYS
5. BEING SO RESTLESS THAT IT IS HARD TO SIT STILL: 2-OVER HALF THE DAYS

## 2019-07-18 NOTE — PROGRESS NOTES
insecurity:     Worry: Not on file     Inability: Not on file    Transportation needs:     Medical: Not on file     Non-medical: Not on file   Tobacco Use    Smoking status: Former Smoker     Packs/day: 0.50     Years: 30.00     Pack years: 15.00     Types: Cigarettes     Start date:      Last attempt to quit: 2014     Years since quittin.8    Smokeless tobacco: Never Used   Substance and Sexual Activity    Alcohol use: Yes     Alcohol/week: 0.0 standard drinks     Comment: One drink twice weekly    Drug use: No    Sexual activity: Not on file   Lifestyle    Physical activity:     Days per week: Not on file     Minutes per session: Not on file    Stress: Not on file   Relationships    Social connections:     Talks on phone: Not on file     Gets together: Not on file     Attends Tenriism service: Not on file     Active member of club or organization: Not on file     Attends meetings of clubs or organizations: Not on file     Relationship status: Not on file    Intimate partner violence:     Fear of current or ex partner: Not on file     Emotionally abused: Not on file     Physically abused: Not on file     Forced sexual activity: Not on file   Other Topics Concern    Not on file   Social History Narrative    Not on file       TOBACCO:   reports that she quit smoking about 4 years ago. Her smoking use included cigarettes. She started smoking about 54 years ago. She has a 15.00 pack-year smoking history. She has never used smokeless tobacco.  ETOH:   reports that she drinks alcohol. Family History:   Family History   Problem Relation Age of Onset    High Blood Pressure Brother          A:  Ms. Blair's mood hads been stable. She reports some stressors have improved and some have worsened. She has been more active and engaged in activities out of the home which has been helpful for her overall mood. She has found PT to be helpful as well.  She continues to be active and engaged and responds well to behavioral interventions. PHQ Scores 7/18/2019 6/20/2019 6/6/2019 5/2/2019 4/2/2019 3/8/2019 2/12/2019   PHQ2 Score 2 2 2 3 2 3 4   PHQ9 Score 10 8 5 11 2 11 9     Interpretation of Total Score Depression Severity: 1-4 = Minimal depression, 5-9 = Mild depression, 10-14 = Moderate depression, 15-19 = Moderately severe depression, 20-27 = Severe depression        BLANCA 7 SCORE 7/18/2019 6/20/2019 6/6/2019 5/2/2019 4/2/2019 3/8/2019 2/12/2019   BLANCA-7 Total Score 18 13 19 14 11 17 16     Interpretation of BLANCA-7 score: 5-9 = mild anxiety, 10-14 = moderate anxiety, 15+ = severe anxiety. Recommend referral to behavioral health for scores 10 or greater.           Diagnosis:    Depressive Disorder NOS  Stress      Plan:  Pt interventions:  [place]-setting to identify pt's primary goals for [facility] visit / overall health and Supportive techniques  ACT interventions and reinforced pt's skill use, behavioral activation interventions, interpersonal effectiveness skills training,  treatment planning    Pt Behavioral Change Plan:  Pt set goals to 1) continue to engage in pleasurable activities out of the house regularly 2) work on responding during conflict when not emotionally flooded 3) return for f/u in one month

## 2019-07-22 DIAGNOSIS — F33.40 DEPRESSION, MAJOR, RECURRENT, IN REMISSION (HCC): ICD-10-CM

## 2019-07-22 RX ORDER — CITALOPRAM 20 MG/1
20 TABLET ORAL DAILY
Qty: 30 TABLET | Refills: 3 | Status: SHIPPED | OUTPATIENT
Start: 2019-07-22 | End: 2019-10-10 | Stop reason: SDUPTHER

## 2019-07-25 ENCOUNTER — HOSPITAL ENCOUNTER (OUTPATIENT)
Dept: PHYSICAL THERAPY | Age: 74
Setting detail: THERAPIES SERIES
Discharge: HOME | End: 2019-07-25
Payer: MEDICARE

## 2019-07-25 PROCEDURE — 97110 THERAPEUTIC EXERCISES: CPT

## 2019-07-25 PROCEDURE — 97116 GAIT TRAINING THERAPY: CPT

## 2019-07-25 PROCEDURE — 97530 THERAPEUTIC ACTIVITIES: CPT

## 2019-07-25 NOTE — FLOWSHEET NOTE
goals  Time Frame for Short term goals: 3 weeks  Short term goal 1: Pt will demonstrate increased functional strength and balance per TUG score of less than 24 seconds with walker  PART MET ( 25.2 sec) avg score  Short term goal 2: Pt will report increased confidence walking in her home with walker to 50%  MET ( 80% with Rolator walker but low back pain continues)  Short term goal 3: Pt will report increased confidence to 50% by her ability to get out of car  NOT MET ( caregiver assist)  Short term goal 4: Pt will complete HEP with supervision with good compliance and no increased back pain  PART MET   Long term goals  Time Frame for Long term goals : 6 weeks  Long term goal 1: Pt will demonstrate increased functional strength and balance per TUG score of less than 18 seconds with walker  Long term goal 2: Pt will walk in house with walker including trips to the bathroom using walker with 70% confidence or better  Long term goal 3: Pt will achieve gait speed of 1.9 ft/sec or better with walker to reflect improved health and function when walking for exercise in her home and when attending outings  Long term goal 4: Pt will report no falls or near falls using safe transfer methods  Long term goal 5: Pt and caregiver will be independent with progressive HEP    Plan:    Plan of care initiated   6/4/19    Plan for Next Session: Advance LE strengthening, gait and balance drills      Next MD visit;    Unknown     Timed Code Treatment Minutes: TE=35, Gt=15, TA=10  Total Treatment Minutes: 55    Electronically signed by:   Kady Shelby, PT, DPT, NCS            Exercise/Equipment Resistance/Repetitions Other comments   Nustep Seat 8, L3, L UE 10 only, x 10 min 55 to 70 SPM- cues to maintain chuck   Sit to stand 5x sit to stand from chair with hands on therapists shoulders and therapist TCs to facilitate extensor activation Cues for anterior weight shift   Hip abd  1 x10 R and L standing           // bars for UE

## 2019-07-26 ENCOUNTER — APPOINTMENT (OUTPATIENT)
Dept: PHYSICAL THERAPY | Age: 74
End: 2019-07-26
Payer: MEDICARE

## 2019-07-31 ENCOUNTER — HOSPITAL ENCOUNTER (OUTPATIENT)
Dept: PHYSICAL THERAPY | Age: 74
Setting detail: THERAPIES SERIES
Discharge: HOME | End: 2019-07-31
Payer: MEDICARE

## 2019-07-31 PROCEDURE — 97116 GAIT TRAINING THERAPY: CPT

## 2019-07-31 PROCEDURE — 97110 THERAPEUTIC EXERCISES: CPT

## 2019-08-01 DIAGNOSIS — I10 ESSENTIAL HYPERTENSION, BENIGN: ICD-10-CM

## 2019-08-01 RX ORDER — LOSARTAN POTASSIUM 25 MG/1
25 TABLET ORAL DAILY
Qty: 30 TABLET | Refills: 0 | Status: SHIPPED | OUTPATIENT
Start: 2019-08-01 | End: 2019-09-10 | Stop reason: SDUPTHER

## 2019-08-01 RX ORDER — CHOLECALCIFEROL (VITAMIN D3) 1250 MCG
CAPSULE ORAL
Qty: 4 CAPSULE | Refills: 0 | Status: SHIPPED | OUTPATIENT
Start: 2019-08-01 | End: 2019-10-13 | Stop reason: SDUPTHER

## 2019-08-02 ENCOUNTER — HOSPITAL ENCOUNTER (OUTPATIENT)
Dept: PHYSICAL THERAPY | Age: 74
Setting detail: THERAPIES SERIES
Discharge: HOME | End: 2019-08-02
Payer: MEDICARE

## 2019-08-02 PROCEDURE — 97530 THERAPEUTIC ACTIVITIES: CPT

## 2019-08-02 PROCEDURE — 97110 THERAPEUTIC EXERCISES: CPT

## 2019-08-02 PROCEDURE — 97116 GAIT TRAINING THERAPY: CPT

## 2019-08-07 ENCOUNTER — HOSPITAL ENCOUNTER (OUTPATIENT)
Dept: PHYSICAL THERAPY | Age: 74
Setting detail: THERAPIES SERIES
Discharge: HOME | End: 2019-08-07
Payer: MEDICARE

## 2019-08-07 PROCEDURE — 97112 NEUROMUSCULAR REEDUCATION: CPT

## 2019-08-07 PROCEDURE — 97116 GAIT TRAINING THERAPY: CPT

## 2019-08-07 PROCEDURE — 97110 THERAPEUTIC EXERCISES: CPT

## 2019-08-07 NOTE — FLOWSHEET NOTE
different MD for treatment of R shoulder but have elected not to pursue this since in favor of addressing her walking and balance issues since these are most limiting at this time. Pain is present in the low back when walking extended distances such as when she takes walks in her home for several minutes for exercise. The pain is described as sharp and aching from 4-10/10 but is currently 0/10 when seated. 8/7/19:  Having more soreness on outside of hip. Thinks it is from lying on side to do clamshell exercises and maybe from walking more in the house since her caregiver had shoulder surgery. Caregiver admits he usually pushes her in the house in the Rolator walker after 3:00pm because she gets too tired. Objective:     TUG= 25.2 sec with walker  Gait speed= 2.03 ft/sec with Rolator walker partial step through   Strength: ankle DF R and L 4+/5, ankle eversion 4/5 B, ankle PF 2+ to 3-/5 B  AAROM hip abduction L 12 deg, R 15 deg  Hip flexor length significantly restricted B R>L  Hs flexibility at 90/90 (-) 50 deg R and L  30 sec chair rise 5 reps from      -Observation:   Minimal struggle to stand from waiting room chair        -Exercises: see flow sheet below    -Home Exercise Program:  Patient instructed in and demonstrates good understanding of HEP. Printed HEP sheet issued.   6/4/19: sit to stand from chair, supine hip abduction slides  6/19/19: sit to stand from chair with arms and pillow to raise height, gluteal sets, seated marches  6/21/19: seated clamshells green or red band, bridges  6/26/19: standing hip abd at counter with SBA, seated marches green band, practice walking with step through sequence  7/3/19: Hip flexor stretch edge of bed, use chair vs walker to complete sit to stand exercises  7/17/19: S/L clamshell no band  8/7/19: standing hip abd red band, stop clamshells      -Other Therapeutic Activities:  Discussed benefit of pacing her walking and to being pushed passively in the walker that MET  ( pt admits she does not do all exercises as recommended each day)    Plan:    Plan of care initiated   6/4/19    Plan for Next Session: Advance LE strengthening, gait and balance drills      Next MD visit;    Unknown     Timed Code Treatment Minutes: TE=17, Gt=8, NR=30  Total Treatment Minutes: 54    Electronically signed by:   José Manuel Goodwin, PT #[id number]            Exercise/Equipment Resistance/Repetitions Other comments   Nustep Seat 7, L2-3,  UE 11 only x 10 min 55 to 79 SPM- cues to maintain chuck ( struggled to maintain chuck at L3)   Sit to stand 10x, 5x  Wide stance, cues to scoot out to edge of surface to assist with COG over SATYA, light use of hands; seated rest break needed   Hip abd  1 x10 R and L standing yellow band  X 10 R and L red band         // bars for UE support, cues to avoid forward lean, no pain       Gluteal sets    Seated marches Green band    clamshells            Cues to keep heel together   Bridge     Calf stretch Hold counter for balance support   Ethan hip flex stretch supine   HS stretch  Cues to relax ankle to avoid gastroc stretch/discomfort, avoid spine flexion   Gait             // bars 6 laps B UE support    4 laps single UE support     Cues to use step through sequence, forward gaze,longer strides    Pt apprehensive during initially with improving balance confidence and lateral weight shift control over time     B UE support // bars, cues to maintain forward gaze   Balance   Standing balance feet shoulder width apart EO with upright posture and axial extension hold 6\" x 10In front of chair      Step forward/ backward over yard stick  Forward gaze x 10 lead left, x 10 lead rightCues to use slow large movements- standing rest break due to complaints of dizziness      B UE support, cues to clear stick by increasing stride length      Step taps 4\"  x 20 down gaze, x 20 forward gaze B UE support

## 2019-08-08 DIAGNOSIS — I50.22 CHRONIC SYSTOLIC CONGESTIVE HEART FAILURE (HCC): ICD-10-CM

## 2019-08-08 RX ORDER — FUROSEMIDE 20 MG/1
TABLET ORAL
Qty: 30 TABLET | Refills: 0 | Status: SHIPPED | OUTPATIENT
Start: 2019-08-08 | End: 2019-09-23 | Stop reason: SDUPTHER

## 2019-08-09 ENCOUNTER — HOSPITAL ENCOUNTER (OUTPATIENT)
Dept: PHYSICAL THERAPY | Age: 74
Setting detail: THERAPIES SERIES
Discharge: HOME | End: 2019-08-09
Payer: MEDICARE

## 2019-08-09 PROCEDURE — 97112 NEUROMUSCULAR REEDUCATION: CPT

## 2019-08-09 PROCEDURE — 97110 THERAPEUTIC EXERCISES: CPT

## 2019-08-09 PROCEDURE — 97116 GAIT TRAINING THERAPY: CPT

## 2019-08-09 NOTE — FLOWSHEET NOTE
-Modalities: NA      Assessment/Treatment/Activity Tolerance:  [x] Patient tolerated treatment well [] Patient limited by fatique  [] Patient limited by pain [] Patient limited by other medical complications  [x] Other:  Pt reported feeling dizzy during standing balance exercise that eased with sitting rest break  Note lateral lean and hesitation when ambulation with single UE support in // bars due to hip weakness B. Pt advised to use rolling walker at all times due to fall risk. Note decreased low back pain sensitivity and improving tolerance to standing exercises with less interference due to low back pain. However, poor endurance continues to play a role of impaired function for this pt. Delayed postural reactions continue to contribute to fall risk due to frequent posterior LOB.           Goals:  Short term goals  Time Frame for Short term goals: 3 weeks  Short term goal 1: Pt will demonstrate increased functional strength and balance per TUG score of less than 24 seconds with walker  PART MET ( 25.2 sec) avg score  Short term goal 2: Pt will report increased confidence walking in her home with walker to 50%  MET ( 80% with Rolator with less pain)  Short term goal 3: Pt will report increased confidence to 50% by her ability to get out of car  PART MET ( caregiver assist)  Short term goal 4: Pt will complete HEP with supervision with good compliance and no increased back pain  PART MET   Long term goals  Time Frame for Long term goals : 6 weeks  Long term goal 1: Pt will demonstrate increased functional strength and balance per TUG score of less than 18 seconds with walker  PART MET  Long term goal 2: Pt will walk in house with walker including trips to the bathroom using walker with 70% confidence or better   MET  Long term goal 3: Pt will achieve gait speed of 1.9 ft/sec or better with walker to reflect improved health and function when walking for exercise in her home and when attending outings  MET  Long

## 2019-08-14 ENCOUNTER — HOSPITAL ENCOUNTER (OUTPATIENT)
Dept: PHYSICAL THERAPY | Age: 74
Setting detail: THERAPIES SERIES
Discharge: HOME | End: 2019-08-14
Payer: MEDICARE

## 2019-08-14 PROCEDURE — 97116 GAIT TRAINING THERAPY: CPT

## 2019-08-14 PROCEDURE — 97110 THERAPEUTIC EXERCISES: CPT

## 2019-08-14 NOTE — FLOWSHEET NOTE
#[id number]            Exercise/Equipment Resistance/Repetitions Other comments   Nustep Seat 8, L3,  UE 11 only x 10 min 70 SPM- cues to maintain chuck-less struggle to maintain chuck at L3   Sit to stand  Limited due to leg muscle fatigue, frequent use of B UE to avoid posterior LOB   Hip abd  Seated against green band x 10    Supine x 10 R and L B UE support               Gluteal sets    Seated em hendersoninés            Cues to keep heel together   Bridge     Calf stretch Hold counter for balance support   Ethan hip flex stretch Hold 10\" x 5 R  Then gentle CR stretchsupine   HS stretch Supine gentle CR R and L supine Cues to relax ankle to avoid gastroc stretch/discomfort, avoid spine flexion   Gait             // bars 6 laps B UE support    t     Cues to use step through sequence, forward gaze, longer strides    Note lateral lean and limited knee control of stance leg next to supporting arm     B UE support // bars, cues to maintain forward gaze   Balance   In front of chair           to challenge balance in sagittal plane and improve ankle strategy      Step forward/ backward over yard stick  Forward gaze x 10 lead left, x 10 lead rightCues to use slow large movements- standing rest break due to complaints of dizziness      B UE support, cues to clear stick by increasing stride length and step height      B UE support

## 2019-08-16 ENCOUNTER — HOSPITAL ENCOUNTER (OUTPATIENT)
Dept: PHYSICAL THERAPY | Age: 74
Setting detail: THERAPIES SERIES
Discharge: HOME | End: 2019-08-16
Payer: MEDICARE

## 2019-08-16 PROCEDURE — 97110 THERAPEUTIC EXERCISES: CPT

## 2019-08-16 PROCEDURE — 97116 GAIT TRAINING THERAPY: CPT

## 2019-08-16 NOTE — FLOWSHEET NOTE
Physical Therapy Daily Treatment Note  Date:  2019    Patient Name:  Sheree Perez    :  1945  MRN: [id number]    Restrictions/Precaution:  Additional Pertinent Hx: CAD, CHF, DVT, HTN, SLE, TIA, UI,  femury surgery R, L hip ORIF , lumbar fusion         Medical/Treatment Diagnosis Information:  · Diagnosis: Recurrent falls (R29.6 ICD-10-CM)  · Treatment Diagnosis: Unsteady gait  Insurance/Certification information:  PT Insurance Information: Medicare  Physician Information:  Referring Practitioner: Kevin Jones MD  Plan of care signed (Y/N):  Y  Visit# / total visits:  , + per updated POC  Pain level: 2/10 Low back pain      Subjective: Eval 19 Pt is 66year old female here with caregiver with reports of  chronic falls and gait difficulties \" for several years\". Pt has h/o of multiple surgeries including R femur ORIF, L hip ORIF and lumbar fusions with TIA's during hospitalizations. Per caregiver,  R foot drop occured gradually and wears \"ankle brace off and on but has not worn for about 9 months\". Pt has ondergone several courses of in home therapy and was told \"not to use cane and not to be alone\". Pt has been suing rollater walker for at least 5 years. She lives with 24/7 caregiver who has told her not to get up and walk by herself when he leaves to go the store but falls have occurred in the bathroom when she has gone to the bathroom unattended. Pt reports having low back pain when walking \" over 100 yards\" but her chief complaint is weakness in the arms and legs. She describes difficulty getting up from the couch she watches TV and requires assist from her caregiver to stand each time unless getting up from a sturdy chair with arms. She suffered a recent fall when standing to manage her bills at a counter at home but denies injury. Pt also reports feeling short of breath at times but denies having to be on O2.  Per pt and caregiver, pt has been referred by a different MD for treatment of R shoulder but have elected not to pursue this since in favor of addressing her walking and balance issues since these are most limiting at this time. Pain is present in the low back when walking extended distances such as when she takes walks in her home for several minutes for exercise. The pain is described as sharp and aching from 4-10/10 but is currently 0/10 when seated. 8/16/19: Denies issues or concerns after the last session. Has been walking in the house using her walker in the evenings but is slower due to lack of energy. .      Objective:   HR 60 BPM on arrival, O2 94%  TUG= 27.2 sec with  Rolator walker      -Observation: Forward leaning gait slow chuck using Rolator walker, step through sequence        -Exercises: see flow sheet below    -Home Exercise Program:  Patient instructed in and demonstrates good understanding of HEP. Printed HEP sheet issued.   6/4/19: sit to stand from chair, supine hip abduction slides  6/19/19: sit to stand from chair with arms and pillow to raise height, gluteal sets, seated marches  6/21/19: seated clamshells green or red band, bridges  6/26/19: standing hip abd at counter with SBA, seated marches green band, practice walking with step through sequence  7/3/19: Hip flexor stretch edge of bed, use chair vs walker to complete sit to stand exercises  7/17/19: S/L clamshell no band  8/7/19: standing hip abd red band, stop clamshells      -Other Therapeutic Activities:  Discussed benefit of having pt talk to her PCP about her fatigue and low energy levels    -Manual Treatments:   STM  TFL and rectus then gentle CR stretch in Ethan position R and L  Gentle HS CR stretch supine R and L    -Modalities: NA      Assessment/Treatment/Activity Tolerance:  [x] Patient tolerated treatment well [x] Patient limited by fatique  [] Patient limited by pain [] Patient limited by other medical complications  [] Other:    Standing or sitting rest breaks required #[id number]            Exercise/Equipment Resistance/Repetitions Other comments   Nustep Seat 8, L3,  UE 11 only x 10 min 70 SPM- maintained chuck with fewer cues   Sit to stand From 23\" 2 x 10 no UE support Used chair edge to brace due to posterior LOB during final reps, less fatigue    Hip abd  Seated against green band x 10     B UE support               Gluteal sets    Seated marches 2 x 20Cues to avoid posterior lean    clamshells            Cues to keep heel together   Bridge     Calf stretch Hold counter for balance support   Ethan hip flex stretch Hold 10\" x 5 R and L via active knee flexion  Then gentle CR stretch via PT R and L   HS stretch Supine gentle CR R and L supine Cues to relax ankle to avoid gastroc stretch/discomfort, avoid spine flexion   Gait With Rolator walker level surface with cues to use step through sequence    Serpentine 15\" x 4 laps with Rolator walker with emphasis on clearing cones during turns using step through sequence    TUG test maneuver x 4 with Rolator walker Tactile cues to advance L LE     B UE support // bars, cues to maintain forward gaze   Balance   In front of chair           to challenge balance in sagittal plane and improve ankle strategy      Cues to use slow large movements- standing rest break due to complaints of dizziness      B UE support, cues to clear stick by increasing stride length and step height      Step taps 4\"  2 x 20 down gazeB UE support, fatigued

## 2019-08-19 ENCOUNTER — APPOINTMENT (OUTPATIENT)
Dept: CT IMAGING | Age: 74
End: 2019-08-19
Payer: MEDICARE

## 2019-08-19 ENCOUNTER — APPOINTMENT (OUTPATIENT)
Dept: GENERAL RADIOLOGY | Age: 74
End: 2019-08-19
Payer: MEDICARE

## 2019-08-19 ENCOUNTER — HOSPITAL ENCOUNTER (EMERGENCY)
Age: 74
Discharge: HOME | End: 2019-08-19
Attending: EMERGENCY MEDICINE
Payer: MEDICARE

## 2019-08-19 VITALS
RESPIRATION RATE: 16 BRPM | SYSTOLIC BLOOD PRESSURE: 108 MMHG | TEMPERATURE: 97.9 F | OXYGEN SATURATION: 94 % | DIASTOLIC BLOOD PRESSURE: 51 MMHG | HEART RATE: 58 BPM
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
## 2019-08-19 DIAGNOSIS — S09.90XA MINOR HEAD INJURY, INITIAL ENCOUNTER: ICD-10-CM
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
## 2019-08-19 DIAGNOSIS — N39.0 ACUTE UTI: ICD-10-CM

## 2019-08-19 DIAGNOSIS — S20.221A CONTUSION OF UPPER BACK, RIGHT, INITIAL ENCOUNTER: ICD-10-CM

## 2019-08-19 LAB
BACTERIA: ABNORMAL /HPF
BILIRUBIN URINE: NEGATIVE
BLOOD, URINE: NEGATIVE
CLARITY: ABNORMAL
COLOR: YELLOW
EPITHELIAL CELLS, UA: ABNORMAL /HPF
GLUCOSE URINE: NEGATIVE MG/DL
KETONES, URINE: NEGATIVE MG/DL
LEUKOCYTE ESTERASE, URINE: ABNORMAL
MICROSCOPIC EXAMINATION: YES
NITRITE, URINE: NEGATIVE
PH UA: 6 (ref 5–8)
PROTEIN UA: ABNORMAL MG/DL
RBC UA: ABNORMAL /HPF (ref 0–2)
SPECIFIC GRAVITY UA: >=1.03 (ref 1–1.03)
URINE TYPE: ABNORMAL
UROBILINOGEN, URINE: 1 E.U./DL
WBC UA: ABNORMAL /HPF (ref 0–5)

## 2019-08-19 PROCEDURE — 70450 CT HEAD/BRAIN W/O DYE: CPT

## 2019-08-19 PROCEDURE — 87086 URINE CULTURE/COLONY COUNT: CPT

## 2019-08-19 PROCEDURE — 71046 X-RAY EXAM CHEST 2 VIEWS: CPT

## 2019-08-19 PROCEDURE — 72125 CT NECK SPINE W/O DYE: CPT

## 2019-08-19 PROCEDURE — 90715 TDAP VACCINE 7 YRS/> IM: CPT | Performed by: PHYSICIAN ASSISTANT

## 2019-08-19 PROCEDURE — 87077 CULTURE AEROBIC IDENTIFY: CPT

## 2019-08-19 PROCEDURE — 90471 IMMUNIZATION ADMIN: CPT | Performed by: PHYSICIAN ASSISTANT

## 2019-08-19 PROCEDURE — 6360000002 HC RX W HCPCS: Performed by: PHYSICIAN ASSISTANT

## 2019-08-19 PROCEDURE — 81001 URINALYSIS AUTO W/SCOPE: CPT

## 2019-08-19 PROCEDURE — 87186 SC STD MICRODIL/AGAR DIL: CPT

## 2019-08-19 PROCEDURE — 99284 EMERGENCY DEPT VISIT MOD MDM: CPT

## 2019-08-19 PROCEDURE — 73010 X-RAY EXAM OF SHOULDER BLADE: CPT

## 2019-08-19 RX ORDER — CEPHALEXIN 500 MG/1
500 CAPSULE ORAL 4 TIMES DAILY
Qty: 28 CAPSULE | Refills: 0 | Status: ON HOLD | OUTPATIENT
Start: 2019-08-19 | End: 2019-08-29 | Stop reason: HOSPADM

## 2019-08-19 RX ADMIN — TETANUS TOXOID, REDUCED DIPHTHERIA TOXOID AND ACELLULAR PERTUSSIS VACCINE, ADSORBED 0.5 ML: 5; 2.5; 8; 8; 2.5 SUSPENSION INTRAMUSCULAR at 13:00

## 2019-08-19 ASSESSMENT — ENCOUNTER SYMPTOMS
SHORTNESS OF BREATH: 0
COUGH: 0
NAUSEA: 0
ABDOMINAL PAIN: 0
SORE THROAT: 0
VOMITING: 0

## 2019-08-19 ASSESSMENT — PAIN SCALES - GENERAL: PAINLEVEL_OUTOF10: 5

## 2019-08-19 ASSESSMENT — PAIN DESCRIPTION - PROGRESSION: CLINICAL_PROGRESSION: GRADUALLY WORSENING

## 2019-08-19 ASSESSMENT — PAIN DESCRIPTION - PAIN TYPE: TYPE: ACUTE PAIN

## 2019-08-19 ASSESSMENT — PAIN DESCRIPTION - DESCRIPTORS: DESCRIPTORS: ACHING

## 2019-08-19 ASSESSMENT — PAIN DESCRIPTION - LOCATION: LOCATION: HEAD

## 2019-08-19 ASSESSMENT — PAIN DESCRIPTION - FREQUENCY: FREQUENCY: CONTINUOUS

## 2019-08-19 NOTE — ED PROVIDER NOTES
[address] ENCOUNTER          PHYSICIAN ASSISTANT NOTE       Date of evaluation: 8/19/2019    Chief Complaint     Fall (hit head last night 2300) and Headache (started this morning)      History of Present Illness     Linda Goel is a 66 y.o. female, with a history of anxiety, depression, hypertension, lupus, coronary artery disease and CHF, who presents to the ED with complaints of having suffered 2 falls yesterday while at home. She is accompanied by her caregiver. The patient states yesterday morning she was walking with her walker when she turned in her feet got tied up causing her to fall against the wall then to the ground. She did hit her head on the wall but did not lose consciousness. She complains of right upper back pain after that fall. Later last evening she bent over to  her dog and fell once again. The patient did not sustain any injury as result of that fall. She has been ambulatory since the falls. She does complain of a mild right-sided headache with no swelling or bruising or injury to the skin noted. Today 5 out of 10 in severity. She denies neck pain. Denies antecedent dizziness, lightheadedness, chest pain or shortness of breath. No abdominal pain or changes in urinary or bowel habits. No nausea or vomiting. No paresthesias, numbness or weakness in the extremities. Her tetanus was updated in 2008. Her caregiver did notice an abrasion to her left wrist.  She otherwise has no complaints. Her caregiver states she goes about 4 to 5 months and then will have a cluster of falls like this. Review of Systems     Review of Systems   Constitutional: Negative for chills and fever. HENT: Negative for congestion and sore throat. Respiratory: Negative for cough and shortness of breath. Cardiovascular: Negative for chest pain, palpitations and leg swelling. Gastrointestinal: Negative for abdominal pain, nausea and vomiting. Genitourinary: Negative for dysuria, frequency and urgency. Musculoskeletal: Positive for arthralgias (right shoulder blade) and gait problem (frequent falls). Negative for myalgias, neck pain and neck stiffness. Skin: Positive for wound (abrasion left wrist). Negative for rash. Neurological: Positive for headaches (right parietal). Negative for dizziness, syncope, weakness, light-headedness and numbness. All other systems reviewed and are negative. Past Medical, Surgical, Family, and Social History     She has a past medical history of Anxiety, CAD (coronary artery disease), CHF (congestive heart failure) ([facility] [address] Depression, DVT of lower extremity (deep venous thrombosis) ([facility] [address] Hypertension, Lupus ([facility] [address] TIA (transient ischemic attack), and Urinary incontinence. She has a past surgical history that includes Colonoscopy (7/12/2010); Upper gastrointestinal endoscopy (7/19/10); lumbar laminectomy (8/3/2010); Femur Surgery (Right); hip surgery (Left, 12/03/2013); back surgery (9/24/2014 ); and Upper gastrointestinal endoscopy (01/2017). Her family history includes High Blood Pressure in her brother. She reports that she quit smoking about 4 years ago. Her smoking use included cigarettes. She started smoking about 54 years ago. She has a 15.00 pack-year smoking history. She has never used smokeless tobacco. She reports that she drinks alcohol. She reports that she does not use drugs. Medications     Previous Medications    ACETAMINOPHEN 650 MG TABS    Take 650 mg by mouth every 6 hours as needed for Pain or Fever (Fever >100.5 F (38 C)).     BUPROPION (WELLBUTRIN XL) 300 MG EXTENDED RELEASE TABLET    TAKE 1 TABLET BY MOUTH EVERY MORNING    CARVEDILOL (COREG) 25 MG TABLET    TAKE ONE TABLET BY MOUTH TWICE DAILY WITH MEALS    CHOLECALCIFEROL (VITAMIN D3) 48359 UNITS CAPS    TAKE 1 CAPSULE EVERY OTHER WEEK    CITALOPRAM (CELEXA) 20 MG TABLET    TAKE 1 TABLET BY MOUTH DAILY    CLOPIDOGREL (PLAVIX) 75 MG

## 2019-08-19 NOTE — ED NOTES
Pt given dc instructions, verbalized understanding. Pt educated on side effects of new prescriptions and how to take, verbalized understanding. Pt has no further questions or concerns at this time. Pt in no signs of distress. Pt taken to vehicle in wheelchair, made it into care safely with caregiver.         Raina Bustillos RN  08/19/19 [id number]

## 2019-08-19 NOTE — ED TRIAGE NOTES
Pt has caregiver here that states she fell twice last night. PT was trying to help the dogs but is a high fall risk and to not get out of bed alone. Pt takes Plavix and caregiver states she hit the top right of head. Pt also c/o right shoulder pain and hip pain. Pt came in today though for a headache that started this morning. Pt has only had tylenol this morning that helped a little. Pt's caregiver did not give any narcotics due to fall and hitting head.

## 2019-08-20 ENCOUNTER — TELEPHONE (OUTPATIENT)
Dept: INTERNAL MEDICINE CLINIC | Age: 74
End: 2019-08-20

## 2019-08-20 NOTE — TELEPHONE ENCOUNTER
Pt fell a couple times on Sunday went to ED and was diagnosed with a mild concussion. Pt is now complaining about pain. Pt is currently on Tramadol but is currently taking 1 pill a day but would like to know if she can take 2.     Please advise

## 2019-08-21 LAB
ORGANISM: ABNORMAL
URINE CULTURE, ROUTINE: ABNORMAL
URINE CULTURE, ROUTINE: ABNORMAL

## 2019-08-22 ENCOUNTER — APPOINTMENT (OUTPATIENT)
Dept: GENERAL RADIOLOGY | Age: 74
End: 2019-08-22
Payer: MEDICARE

## 2019-08-22 ENCOUNTER — HOSPITAL ENCOUNTER (EMERGENCY)
Age: 74
Discharge: HOME | End: 2019-08-22
Attending: EMERGENCY MEDICINE
Payer: MEDICARE

## 2019-08-22 ENCOUNTER — APPOINTMENT (OUTPATIENT)
Dept: CT IMAGING | Age: 74
End: 2019-08-22
Payer: MEDICARE

## 2019-08-22 ENCOUNTER — TELEPHONE (OUTPATIENT)
Dept: INTERNAL MEDICINE CLINIC | Age: 74
End: 2019-08-22

## 2019-08-22 VITALS
WEIGHT: 150 LBS | RESPIRATION RATE: 18 BRPM | HEIGHT: 60 IN | BODY MASS INDEX: 29.45 KG/M2 | OXYGEN SATURATION: 94 % | DIASTOLIC BLOOD PRESSURE: 67 MMHG | SYSTOLIC BLOOD PRESSURE: 107 MMHG | HEART RATE: 60 BPM | TEMPERATURE: 98 F

## 2019-08-22 DIAGNOSIS — M54.6 PAIN IN THORACIC SPINE: ICD-10-CM

## 2019-08-22 DIAGNOSIS — R53.1 GENERAL WEAKNESS: Primary | ICD-10-CM

## 2019-08-22 LAB
A/G RATIO: 0.9 (ref 1.1–2.2)
ALBUMIN SERPL-MCNC: 3.6 G/DL (ref 3.4–5)
ALP BLD-CCNC: 94 U/L (ref 40–129)
ALT SERPL-CCNC: 15 U/L (ref 10–40)
AMORPHOUS: ABNORMAL /HPF
ANION GAP SERPL CALCULATED.3IONS-SCNC: 11 MMOL/L (ref 3–16)
AST SERPL-CCNC: 24 U/L (ref 15–37)
BACTERIA: ABNORMAL /HPF
BASOPHILS ABSOLUTE: 0 K/UL (ref 0–0.2)
BASOPHILS RELATIVE PERCENT: 0.8 %
BILIRUB SERPL-MCNC: 0.4 MG/DL (ref 0–1)
BILIRUBIN URINE: NEGATIVE
BLOOD, URINE: NEGATIVE
BUN BLDV-MCNC: 26 MG/DL (ref 7–20)
CALCIUM SERPL-MCNC: 9.3 MG/DL (ref 8.3–10.6)
CASTS: ABNORMAL /LPF
CHLORIDE BLD-SCNC: 100 MMOL/L (ref 99–110)
CLARITY: CLEAR
CO2: 22 MMOL/L (ref 21–32)
COLOR: YELLOW
CREAT SERPL-MCNC: 1.2 MG/DL (ref 0.6–1.2)
EKG ATRIAL RATE: 58 BPM
EKG DIAGNOSIS: NORMAL
EKG P AXIS: 95 DEGREES
EKG P-R INTERVAL: 204 MS
EKG Q-T INTERVAL: 416 MS
EKG QRS DURATION: 82 MS
EKG QTC CALCULATION (BAZETT): 408 MS
EKG R AXIS: -40 DEGREES
EKG T AXIS: 136 DEGREES
EKG VENTRICULAR RATE: 58 BPM
EOSINOPHILS ABSOLUTE: 0.1 K/UL (ref 0–0.6)
EOSINOPHILS RELATIVE PERCENT: 1.2 %
EPITHELIAL CELLS, UA: ABNORMAL /HPF
GFR AFRICAN AMERICAN: 53
GFR NON-AFRICAN AMERICAN: 44
GLOBULIN: 3.8 G/DL
GLUCOSE BLD-MCNC: 100 MG/DL (ref 70–99)
GLUCOSE URINE: NEGATIVE MG/DL
HCT VFR BLD CALC: 34.7 % (ref 36–48)
HEMOGLOBIN: 11.5 G/DL (ref 12–16)
KETONES, URINE: NEGATIVE MG/DL
LEUKOCYTE ESTERASE, URINE: NEGATIVE
LYMPHOCYTES ABSOLUTE: 1.1 K/UL (ref 1–5.1)
LYMPHOCYTES RELATIVE PERCENT: 20.9 %
MCH RBC QN AUTO: 32.9 PG (ref 26–34)
MCHC RBC AUTO-ENTMCNC: 33.1 G/DL (ref 31–36)
MCV RBC AUTO: 99.5 FL (ref 80–100)
MICROSCOPIC EXAMINATION: YES
MONOCYTES ABSOLUTE: 0.7 K/UL (ref 0–1.3)
MONOCYTES RELATIVE PERCENT: 13 %
MUCUS: ABNORMAL /LPF
NEUTROPHILS ABSOLUTE: 3.4 K/UL (ref 1.7–7.7)
NEUTROPHILS RELATIVE PERCENT: 64.1 %
NITRITE, URINE: NEGATIVE
PDW BLD-RTO: 13.5 % (ref 12.4–15.4)
PH UA: 6 (ref 5–8)
PLATELET # BLD: 212 K/UL (ref 135–450)
PMV BLD AUTO: 6.7 FL (ref 5–10.5)
POTASSIUM REFLEX MAGNESIUM: 4.3 MMOL/L (ref 3.5–5.1)
PROTEIN UA: ABNORMAL MG/DL
RBC # BLD: 3.49 M/UL (ref 4–5.2)
RBC UA: ABNORMAL /HPF (ref 0–2)
SODIUM BLD-SCNC: 133 MMOL/L (ref 136–145)
SPECIFIC GRAVITY UA: >=1.03 (ref 1–1.03)
TOTAL PROTEIN: 7.4 G/DL (ref 6.4–8.2)
TROPONIN: <0.01 NG/ML
URINE TYPE: ABNORMAL
UROBILINOGEN, URINE: 0.2 E.U./DL
WBC # BLD: 5.2 K/UL (ref 4–11)
WBC UA: ABNORMAL /HPF (ref 0–5)

## 2019-08-22 PROCEDURE — 84484 ASSAY OF TROPONIN QUANT: CPT

## 2019-08-22 PROCEDURE — 99285 EMERGENCY DEPT VISIT HI MDM: CPT

## 2019-08-22 PROCEDURE — 81001 URINALYSIS AUTO W/SCOPE: CPT

## 2019-08-22 PROCEDURE — 71045 X-RAY EXAM CHEST 1 VIEW: CPT

## 2019-08-22 PROCEDURE — 80053 COMPREHEN METABOLIC PANEL: CPT

## 2019-08-22 PROCEDURE — 72128 CT CHEST SPINE W/O DYE: CPT

## 2019-08-22 PROCEDURE — 85025 COMPLETE CBC W/AUTO DIFF WBC: CPT

## 2019-08-22 PROCEDURE — 93005 ELECTROCARDIOGRAM TRACING: CPT | Performed by: EMERGENCY MEDICINE

## 2019-08-22 RX ORDER — METHOCARBAMOL 500 MG/1
500 TABLET, FILM COATED ORAL EVERY 6 HOURS PRN
Qty: 16 TABLET | Refills: 0 | Status: ON HOLD | OUTPATIENT
Start: 2019-08-22 | End: 2019-08-29

## 2019-08-22 ASSESSMENT — ENCOUNTER SYMPTOMS
DIARRHEA: 0
VOMITING: 1
ABDOMINAL PAIN: 0
NAUSEA: 0
COUGH: 0
SHORTNESS OF BREATH: 0
CHOKING: 0
SORE THROAT: 0
BACK PAIN: 1

## 2019-08-22 ASSESSMENT — PAIN DESCRIPTION - PAIN TYPE: TYPE: ACUTE PAIN

## 2019-08-22 ASSESSMENT — PAIN DESCRIPTION - LOCATION: LOCATION: BACK

## 2019-08-22 ASSESSMENT — PAIN SCALES - GENERAL: PAINLEVEL_OUTOF10: 5

## 2019-08-22 ASSESSMENT — PAIN DESCRIPTION - ORIENTATION: ORIENTATION: MID

## 2019-08-22 NOTE — ED PROVIDER NOTES
([facility] [address] Depression, DVT of lower extremity (deep venous thrombosis) ([facility] [address] Hypertension, Lupus ([facility] [address] TIA (transient ischemic attack), and Urinary incontinence. She has a past surgical history that includes Colonoscopy (7/12/2010); Upper gastrointestinal endoscopy (7/19/10); lumbar laminectomy (8/3/2010); Femur Surgery (Right); hip surgery (Left, 12/03/2013); back surgery (9/24/2014 ); and Upper gastrointestinal endoscopy (01/2017). Her family history includes High Blood Pressure in her brother. She reports that she quit smoking about 4 years ago. Her smoking use included cigarettes. She started smoking about 54 years ago. She has a 15.00 pack-year smoking history. She has never used smokeless tobacco. She reports that she drinks alcohol. She reports that she does not use drugs. Medications     Previous Medications    ACETAMINOPHEN 650 MG TABS    Take 650 mg by mouth every 6 hours as needed for Pain or Fever (Fever >100.5 F (38 C)). BUPROPION (WELLBUTRIN XL) 300 MG EXTENDED RELEASE TABLET    TAKE 1 TABLET BY MOUTH EVERY MORNING    CARVEDILOL (COREG) 25 MG TABLET    TAKE ONE TABLET BY MOUTH TWICE DAILY WITH MEALS    CEPHALEXIN (KEFLEX) 500 MG CAPSULE    Take 1 capsule by mouth 4 times daily for 7 days    CHOLECALCIFEROL (VITAMIN D3) 57523 UNITS CAPS    TAKE 1 CAPSULE EVERY OTHER WEEK    CITALOPRAM (CELEXA) 20 MG TABLET    TAKE 1 TABLET BY MOUTH DAILY    CLOPIDOGREL (PLAVIX) 75 MG TABLET    TAKE 1 TABLET BY MOUTH DAILY    FUROSEMIDE (LASIX) 20 MG TABLET    TAKE 1 TABLET BY MOUTH DAILY AS NEEDED FOR SWELLING    HANDICAP PLACARD MISC    by Does not apply route Valid for 5 years from 11/7/18    HYDROXYCHLOROQUINE (PLAQUENIL) 200 MG TABLET    Take 200 mg by mouth daily.     LOSARTAN (COZAAR) 25 MG TABLET    TAKE 1 TABLET BY MOUTH DAILY    METHYLCELLULOSE (CITRUCEL) ORAL POWDER    Take 2 g by mouth every other day     MIRABEGRON ER (MYRBETRIQ) 25 MG TB24    TAKE 2 TABLETS BY MOUTH DAILY    NUTRITIONAL SUPPLEMENTS changes    RADIOLOGY:  XR CHEST PORTABLE   Final Result      Scattered pulmonary scarring similar to 8/19/2019. Borderline cardiomegaly. CT THORACIC SPINE WO CONTRAST   Final Result   1. Chronic vertebral body compression fractures involving T9 and T11.   2. Disc osteophyte complex right paracentral T10-T11, retropulsed cortex, with mild indentation of the ventricles spinal cord.    3. Stable retropulsed cortex posterior inferior T9 vertebral body             LABS:   Results for orders placed or performed during the hospital encounter of 08/22/19   CBC Auto Differential   Result Value Ref Range    WBC 5.2 4.0 - 11.0 K/uL    RBC 3.49 (L) 4.00 - 5.20 M/uL    Hemoglobin 11.5 (L) 12.0 - 16.0 g/dL    Hematocrit 34.7 (L) 36.0 - 48.0 %    MCV 99.5 80.0 - 100.0 fL    MCH 32.9 26.0 - 34.0 pg    MCHC 33.1 31.0 - 36.0 g/dL    RDW 13.5 12.4 - 15.4 %    Platelets 362 242 - 383 K/uL    MPV 6.7 5.0 - 10.5 fL    Neutrophils % 64.1 %    Lymphocytes % 20.9 %    Monocytes % 13.0 %    Eosinophils % 1.2 %    Basophils % 0.8 %    Neutrophils # 3.4 1.7 - 7.7 K/uL    Lymphocytes # 1.1 1.0 - 5.1 K/uL    Monocytes # 0.7 0.0 - 1.3 K/uL    Eosinophils # 0.1 0.0 - 0.6 K/uL    Basophils # 0.0 0.0 - 0.2 K/uL   Comprehensive Metabolic Panel w/ Reflex to MG   Result Value Ref Range    Sodium 133 (L) 136 - 145 mmol/L    Potassium reflex Magnesium 4.3 3.5 - 5.1 mmol/L    Chloride 100 99 - 110 mmol/L    CO2 22 21 - 32 mmol/L    Anion Gap 11 3 - 16    Glucose 100 (H) 70 - 99 mg/dL    BUN 26 (H) 7 - 20 mg/dL    CREATININE 1.2 0.6 - 1.2 mg/dL    GFR Non- 44 (A) >60    GFR  53 (A) >60    Calcium 9.3 8.3 - 10.6 mg/dL    Total Protein 7.4 6.4 - 8.2 g/dL    Alb 3.6 3.4 - 5.0 g/dL    Albumin/Globulin Ratio 0.9 (L) 1.1 - 2.2    Total Bilirubin 0.4 0.0 - 1.0 mg/dL    Alkaline Phosphatase 94 40 - 129 U/L    ALT 15 10 - 40 U/L    AST 24 15 - 37 U/L    Globulin 3.8 g/dL   Urinalysis, reflex to microscopic   Result Value Ref

## 2019-08-22 NOTE — FLOWSHEET NOTE
08/22/19 1327   Encounter Summary   Services provided to: Patient   Referral/Consult From: Rounding   Continue Visiting   (Seen 8/22/19, MOHINDER. )   Complexity of Encounter Moderate   Length of Encounter 15 minutes   Routine   Type Initial   Assessment Approachable   Intervention Nurtured hope   Outcome Expressed gratitude

## 2019-08-22 NOTE — CONSULTS
NEUROSURGERY CONSULT NOTE    Ronaldo Stock  [id number]   1945 8/22/2019    Requesting physician: No admitting provider for patient encounter. Reason for consultation: Thoracic vertebral fractures    History of present illness: Patient is a 66 y.o. female w/ PMH of CAD, CHF, anxiety, depression, DVT, TIA, and HTN who presented on 8/22/2019 to [place] [address] ED c/o weakness. The patient states that she has been having increasing falls recently. She has a history of multiple falls, but has been falling more frequently. She had 2 on Sunday and was seen here for that. In addition, she complains of back pain. She states that she has a history of multiple previous back surgeries and her caregiver is worried that \"something is loose\". She reports increased weakness in her bilateral legs when she tries to walk, but denies numbness or tingling. She has urinary incontinence at baseline. No fevers, chest pain, or shortness of breath. CT Head and Cervical spine from 2 days ago were negative for any acute abnormalities and CT Thoracic today was negative for any acute abnormalities, but it did show chronic T9 and T11 fractures. ROS:   GENERAL:  Denies fever or recent illness.  Denies weight changes   EYES:  Denies vision change or diplopia  EARS:  Denies hearing loss  CARDIAC:  Denies chest pain  RESPIRATORY:  Denies shortness of breath  SKIN:  Denies rash or lesions   HEM:  Denies excessive bruising  PSYCH:  Denies anxiety or depression  NEURO:  Denies headache, numbness or tingling or lateralizing weakness   :  Denies urinary difficulty  GI: Denies nausea, vomiting, diarrhea or constipation  MUSCULOSKELETAL:  Endorses thoracic back pain that radiates around to the chest bilaterally when coughing or taking in deep breaths    Allergies   Allergen Reactions    Medrol [Methylprednisolone]      multiple side effects , able to tolerate prednisone without side effects     Oxycontin [Oxycodone Hcl]      Pruritis, vomiting  Vicodin [Hydrocodone-Acetaminophen] Nausea Only       Past Medical History:   Diagnosis Date    Anxiety     CAD (coronary artery disease)     CHF (congestive heart failure) (HCC)     Depression     DVT of lower extremity (deep venous thrombosis) (HCC)     Hypertension     Lupus (HCC)     TIA (transient ischemic attack)     x3    Urinary incontinence         Past Surgical History:   Procedure Laterality Date    BACK SURGERY  2014     DR. Morrison     COLONOSCOPY  2010    WNL-repeat in 10 years    FEMUR SURGERY Right     HIP SURGERY Left 2013    left hip trochanteric femoral nailing    LUMBAR LAMINECTOMY  8/3/2010    Dr. Laisha Quinn, L5-S1    UPPER GASTROINTESTINAL ENDOSCOPY  7/19/10    reflux with no Barretts/no H-pylori    UPPER GASTROINTESTINAL ENDOSCOPY  2017    Dr. Elif Yee - gastritis and duodinitis , neg H pylori        Social History     Occupational History    Occupation: Retired   Tobacco Use    Smoking status: Former Smoker     Packs/day: 0.50     Years: 30.00     Pack years: 15.00     Types: Cigarettes     Start date:      Last attempt to quit: 2014     Years since quittin.9    Smokeless tobacco: Never Used   Substance and Sexual Activity    Alcohol use: Yes     Alcohol/week: 0.0 standard drinks     Comment: 2 cocktails on weekends    Drug use: No    Sexual activity: Not on file        Family History   Problem Relation Age of Onset    High Blood Pressure Brother         No outpatient medications have been marked as taking for the 19 encounter [facility] Encounter). No current facility-administered medications for this encounter.       Current Outpatient Medications   Medication Sig Dispense Refill    cephALEXin (KEFLEX) 500 MG capsule Take 1 capsule by mouth 4 times daily for 7 days 28 capsule 0    furosemide (LASIX) 20 MG tablet TAKE 1 TABLET BY MOUTH DAILY AS NEEDED FOR SWELLING (Patient taking differently: Take 20 mg by mouth daily ) 30 tablet 0    Cholecalciferol (VITAMIN D3) 18252 units CAPS TAKE 1 CAPSULE EVERY OTHER WEEK 4 capsule 0    losartan (COZAAR) 25 MG tablet TAKE 1 TABLET BY MOUTH DAILY 30 tablet 0    citalopram (CELEXA) 20 MG tablet TAKE 1 TABLET BY MOUTH DAILY 30 tablet 3    pantoprazole (PROTONIX) 20 MG tablet TAKE 1 TABLET BY MOUTH TWICE DAILY 60 tablet 2    carvedilol (COREG) 25 MG tablet TAKE ONE TABLET BY MOUTH TWICE DAILY WITH MEALS 60 tablet 2    rosuvastatin (CRESTOR) 20 MG tablet TAKE 1 TABLET BY MOUTH DAILY 30 tablet 5    clopidogrel (PLAVIX) 75 MG tablet TAKE 1 TABLET BY MOUTH DAILY 30 tablet 5    buPROPion (WELLBUTRIN XL) 300 MG extended release tablet TAKE 1 TABLET BY MOUTH EVERY MORNING 30 tablet 5    Mirabegron ER (MYRBETRIQ) 25 MG TB24 TAKE 2 TABLETS BY MOUTH DAILY (Patient taking differently: TAKE 1 TABLET BY MOUTH DAILY) 60 tablet 5    traMADol (ULTRAM) 50 MG tablet Take 50 mg by mouth every 6 hours as needed for Pain.  methylcellulose (CITRUCEL) oral powder Take 2 g by mouth every other day       Handicap Placard MISC by Does not apply route Valid for 5 years from 11/7/18 1 each 0    Nutritional Supplements (ENSURE ACTIVE HIGH PROTEIN PO) Take 1 Can by mouth daily as needed      acetaminophen 650 MG TABS Take 650 mg by mouth every 6 hours as needed for Pain or Fever (Fever >100.5 F (38 C)). 60 tablet 1    hydroxychloroquine (PLAQUENIL) 200 MG tablet Take 200 mg by mouth daily. Objective:  /67   Pulse 60   Temp 98 °F (36.7 °C)   Resp 18   Ht 5' (1.524 m)   Wt 150 lb (68 kg)   SpO2 94%   BMI 29.29 kg/m²     Physical Exam:   Patient seen and examined  GCS:  4 - Opens eyes on own  5 - Alert and oriented  6 - Follows simple motor commands  General: Well developed. Alert and cooperative in no acute distress. HENT: atraumatic, neck supple  Eyes: Optic discs: Not tested  Pulmonary: unlabored respiratory effort  Cardiovascular:  Warm well perfused.  No peripheral Priority: High    mixed Incontinence 08/23/2012     Priority: Medium    Cervicalgia      Priority: Medium    Depression, major, recurrent, in remission ([facility]      Priority: Medium    Osteoporosis      Priority: Medium    Closed fracture of part of neck of femur (HCC)      Priority: Medium    Urge incontinence      Priority: Medium    Intertrochanteric fracture of left hip (HCC) 12/09/2013     Priority: Low    Vitamin D deficiency 08/23/2012     Priority: Low    Hearing loss 11/22/2011     Priority: Low    Peptic ulcer      Priority: Low    Interstitial lung disease ([facility] 03/15/2019    Depressive disorder 03/06/2018    Bunion of great toe of right foot 10/19/2016    Right-sided cerebrovascular accident (CVA) ([facility] 08/25/2016    Acute left-sided weakness 08/22/2016    Coronary artery disease involving native coronary artery of native heart without angina pectoris 12/29/2015    Ataxia involving legs 07/02/2015    Allergic rhinitis 04/02/2015       Assessment:  Patient is a 66 y.o. female w/chronic T9 & T11 fractures. Plan:  1. No emergent neurosurgical intervention indicated  2. Muscle spasms: Robaxin Q6H PRN  3. Pain control: Managed by medical team  4. Follow up w/Dr. Rik Palomo at [facility] if symptoms worsen  5. Thank you for consult. Will sign off. Please call with any questions or decline in neurological status    DISPO: OK to DC home    Patient was discussed with Dr. Berta Yusuf who agrees with above assessment and plan.      Electronically signed by: LONA Rodney, 8/22/2019 4:09 PM  [phone]

## 2019-08-22 NOTE — ED NOTES
Patient prepared for and ready to be discharged. Patient discharged at this time in no acute distress after verbalizing understanding of discharge instructions. Patient left after receiving After Visit Summary instructions.         Kee Solano RN  08/22/19 [id number]

## 2019-08-25 ENCOUNTER — HOSPITAL ENCOUNTER (INPATIENT)
Age: 74
LOS: 3 days | Discharge: NURSING, REHAB OR LONG-TERM CARE | DRG: 542 | End: 2019-08-29
Attending: EMERGENCY MEDICINE | Admitting: INTERNAL MEDICINE
Payer: MEDICARE

## 2019-08-25 DIAGNOSIS — M54.6 PAIN IN THORACIC SPINE: Primary | ICD-10-CM

## 2019-08-25 DIAGNOSIS — M54.9 INTRACTABLE BACK PAIN: ICD-10-CM

## 2019-08-25 PROCEDURE — 99284 EMERGENCY DEPT VISIT MOD MDM: CPT

## 2019-08-25 ASSESSMENT — PAIN DESCRIPTION - DESCRIPTORS: DESCRIPTORS: ACHING;DISCOMFORT

## 2019-08-25 ASSESSMENT — PAIN DESCRIPTION - ORIENTATION: ORIENTATION: LEFT;RIGHT

## 2019-08-25 ASSESSMENT — PAIN DESCRIPTION - PAIN TYPE: TYPE: CHRONIC PAIN

## 2019-08-25 ASSESSMENT — PAIN DESCRIPTION - FREQUENCY: FREQUENCY: INTERMITTENT

## 2019-08-25 ASSESSMENT — PAIN DESCRIPTION - ONSET: ONSET: ON-GOING

## 2019-08-25 ASSESSMENT — PAIN SCALES - GENERAL: PAINLEVEL_OUTOF10: 8

## 2019-08-25 ASSESSMENT — PAIN DESCRIPTION - LOCATION: LOCATION: BACK;RIB CAGE

## 2019-08-26 ENCOUNTER — APPOINTMENT (OUTPATIENT)
Dept: GENERAL RADIOLOGY | Age: 74
DRG: 542 | End: 2019-08-26
Payer: MEDICARE

## 2019-08-26 PROBLEM — S22.079G: Status: ACTIVE | Noted: 2019-08-26

## 2019-08-26 PROBLEM — R52 INTRACTABLE PAIN: Status: ACTIVE | Noted: 2019-08-26

## 2019-08-26 LAB
ANION GAP SERPL CALCULATED.3IONS-SCNC: 12 MMOL/L (ref 3–16)
ANION GAP SERPL CALCULATED.3IONS-SCNC: 12 MMOL/L (ref 3–16)
BASE EXCESS VENOUS: 1 MMOL/L (ref -2–3)
BASOPHILS ABSOLUTE: 0 K/UL (ref 0–0.2)
BASOPHILS ABSOLUTE: 0 K/UL (ref 0–0.2)
BASOPHILS RELATIVE PERCENT: 0.7 %
BASOPHILS RELATIVE PERCENT: 0.7 %
BUN BLDV-MCNC: 16 MG/DL (ref 7–20)
BUN BLDV-MCNC: 18 MG/DL (ref 7–20)
CALCIUM SERPL-MCNC: 9.4 MG/DL (ref 8.3–10.6)
CALCIUM SERPL-MCNC: 9.4 MG/DL (ref 8.3–10.6)
CARBOXYHEMOGLOBIN: 1.5 % (ref 0–1.5)
CHLORIDE BLD-SCNC: 98 MMOL/L (ref 99–110)
CHLORIDE BLD-SCNC: 99 MMOL/L (ref 99–110)
CO2: 23 MMOL/L (ref 21–32)
CO2: 23 MMOL/L (ref 21–32)
CREAT SERPL-MCNC: 1 MG/DL (ref 0.6–1.2)
CREAT SERPL-MCNC: 1 MG/DL (ref 0.6–1.2)
EOSINOPHILS ABSOLUTE: 0.1 K/UL (ref 0–0.6)
EOSINOPHILS ABSOLUTE: 0.2 K/UL (ref 0–0.6)
EOSINOPHILS RELATIVE PERCENT: 2.3 %
EOSINOPHILS RELATIVE PERCENT: 2.3 %
GFR AFRICAN AMERICAN: >60
GFR AFRICAN AMERICAN: >60
GFR NON-AFRICAN AMERICAN: 54
GFR NON-AFRICAN AMERICAN: 54
GLUCOSE BLD-MCNC: 105 MG/DL (ref 70–99)
GLUCOSE BLD-MCNC: 89 MG/DL (ref 70–99)
HCO3 VENOUS: 24.3 MMOL/L (ref 24–28)
HCT VFR BLD CALC: 33.4 % (ref 36–48)
HCT VFR BLD CALC: 33.4 % (ref 36–48)
HEMOGLOBIN, VEN, REDUCED: 12.4 %
HEMOGLOBIN: 11.5 G/DL (ref 12–16)
HEMOGLOBIN: 11.5 G/DL (ref 12–16)
LYMPHOCYTES ABSOLUTE: 1.1 K/UL (ref 1–5.1)
LYMPHOCYTES ABSOLUTE: 1.2 K/UL (ref 1–5.1)
LYMPHOCYTES RELATIVE PERCENT: 15 %
LYMPHOCYTES RELATIVE PERCENT: 21.4 %
MCH RBC QN AUTO: 33.2 PG (ref 26–34)
MCH RBC QN AUTO: 33.3 PG (ref 26–34)
MCHC RBC AUTO-ENTMCNC: 34.3 G/DL (ref 31–36)
MCHC RBC AUTO-ENTMCNC: 34.4 G/DL (ref 31–36)
MCV RBC AUTO: 96.6 FL (ref 80–100)
MCV RBC AUTO: 96.7 FL (ref 80–100)
METHEMOGLOBIN VENOUS: 0.5 % (ref 0–1.5)
MONOCYTES ABSOLUTE: 0.7 K/UL (ref 0–1.3)
MONOCYTES ABSOLUTE: 0.8 K/UL (ref 0–1.3)
MONOCYTES RELATIVE PERCENT: 11.6 %
MONOCYTES RELATIVE PERCENT: 12.2 %
NEUTROPHILS ABSOLUTE: 3.7 K/UL (ref 1.7–7.7)
NEUTROPHILS ABSOLUTE: 5.1 K/UL (ref 1.7–7.7)
NEUTROPHILS RELATIVE PERCENT: 63.4 %
NEUTROPHILS RELATIVE PERCENT: 70.4 %
O2 SAT, VEN: 87 %
PCO2, VEN: 35.7 MMHG (ref 41–51)
PDW BLD-RTO: 13 % (ref 12.4–15.4)
PDW BLD-RTO: 13.1 % (ref 12.4–15.4)
PH VENOUS: 7.45 (ref 7.35–7.45)
PLATELET # BLD: 220 K/UL (ref 135–450)
PLATELET # BLD: 229 K/UL (ref 135–450)
PMV BLD AUTO: 6.6 FL (ref 5–10.5)
PMV BLD AUTO: 6.6 FL (ref 5–10.5)
PO2, VEN: 52.4 MMHG (ref 25–40)
POTASSIUM REFLEX MAGNESIUM: 4.1 MMOL/L (ref 3.5–5.1)
POTASSIUM SERPL-SCNC: 4.1 MMOL/L (ref 3.5–5.1)
RBC # BLD: 3.46 M/UL (ref 4–5.2)
RBC # BLD: 3.46 M/UL (ref 4–5.2)
SODIUM BLD-SCNC: 133 MMOL/L (ref 136–145)
SODIUM BLD-SCNC: 134 MMOL/L (ref 136–145)
TCO2 CALC VENOUS: 25 MMOL/L
WBC # BLD: 5.8 K/UL (ref 4–11)
WBC # BLD: 7.2 K/UL (ref 4–11)

## 2019-08-26 PROCEDURE — 97162 PT EVAL MOD COMPLEX 30 MIN: CPT

## 2019-08-26 PROCEDURE — 2580000003 HC RX 258: Performed by: STUDENT IN AN ORGANIZED HEALTH CARE EDUCATION/TRAINING PROGRAM

## 2019-08-26 PROCEDURE — 97530 THERAPEUTIC ACTIVITIES: CPT

## 2019-08-26 PROCEDURE — 6370000000 HC RX 637 (ALT 250 FOR IP): Performed by: STUDENT IN AN ORGANIZED HEALTH CARE EDUCATION/TRAINING PROGRAM

## 2019-08-26 PROCEDURE — 71046 X-RAY EXAM CHEST 2 VIEWS: CPT

## 2019-08-26 PROCEDURE — 1200000000 HC SEMI PRIVATE

## 2019-08-26 PROCEDURE — 85025 COMPLETE CBC W/AUTO DIFF WBC: CPT

## 2019-08-26 PROCEDURE — 97535 SELF CARE MNGMENT TRAINING: CPT

## 2019-08-26 PROCEDURE — 80048 BASIC METABOLIC PNL TOTAL CA: CPT

## 2019-08-26 PROCEDURE — 97166 OT EVAL MOD COMPLEX 45 MIN: CPT

## 2019-08-26 PROCEDURE — 97116 GAIT TRAINING THERAPY: CPT

## 2019-08-26 PROCEDURE — 99222 1ST HOSP IP/OBS MODERATE 55: CPT | Performed by: INTERNAL MEDICINE

## 2019-08-26 PROCEDURE — 82803 BLOOD GASES ANY COMBINATION: CPT

## 2019-08-26 PROCEDURE — 6360000002 HC RX W HCPCS: Performed by: STUDENT IN AN ORGANIZED HEALTH CARE EDUCATION/TRAINING PROGRAM

## 2019-08-26 PROCEDURE — 36415 COLL VENOUS BLD VENIPUNCTURE: CPT

## 2019-08-26 RX ORDER — TRAMADOL HYDROCHLORIDE 50 MG/1
50 TABLET ORAL ONCE
Status: COMPLETED | OUTPATIENT
Start: 2019-08-26 | End: 2019-08-26

## 2019-08-26 RX ORDER — CITALOPRAM 20 MG/1
20 TABLET ORAL DAILY
Status: DISCONTINUED | OUTPATIENT
Start: 2019-08-26 | End: 2019-08-29 | Stop reason: HOSPADM

## 2019-08-26 RX ORDER — LOSARTAN POTASSIUM 25 MG/1
25 TABLET ORAL DAILY
Status: DISCONTINUED | OUTPATIENT
Start: 2019-08-26 | End: 2019-08-29 | Stop reason: HOSPADM

## 2019-08-26 RX ORDER — HYDROXYCHLOROQUINE SULFATE 200 MG/1
200 TABLET, FILM COATED ORAL
Status: DISCONTINUED | OUTPATIENT
Start: 2019-08-26 | End: 2019-08-29 | Stop reason: HOSPADM

## 2019-08-26 RX ORDER — SODIUM CHLORIDE 0.9 % (FLUSH) 0.9 %
10 SYRINGE (ML) INJECTION EVERY 12 HOURS SCHEDULED
Status: DISCONTINUED | OUTPATIENT
Start: 2019-08-26 | End: 2019-08-29 | Stop reason: HOSPADM

## 2019-08-26 RX ORDER — BUPROPION HYDROCHLORIDE 150 MG/1
300 TABLET ORAL DAILY
Status: DISCONTINUED | OUTPATIENT
Start: 2019-08-26 | End: 2019-08-29 | Stop reason: HOSPADM

## 2019-08-26 RX ORDER — ONDANSETRON 2 MG/ML
4 INJECTION INTRAMUSCULAR; INTRAVENOUS EVERY 6 HOURS PRN
Status: DISCONTINUED | OUTPATIENT
Start: 2019-08-26 | End: 2019-08-29 | Stop reason: HOSPADM

## 2019-08-26 RX ORDER — HYDROXYCHLOROQUINE SULFATE 200 MG/1
200 TABLET, FILM COATED ORAL DAILY
Status: DISCONTINUED | OUTPATIENT
Start: 2019-08-26 | End: 2019-08-26

## 2019-08-26 RX ORDER — CLOPIDOGREL BISULFATE 75 MG/1
75 TABLET ORAL
Status: DISCONTINUED | OUTPATIENT
Start: 2019-08-26 | End: 2019-08-29 | Stop reason: HOSPADM

## 2019-08-26 RX ORDER — CARVEDILOL 25 MG/1
25 TABLET ORAL 2 TIMES DAILY
Status: DISCONTINUED | OUTPATIENT
Start: 2019-08-26 | End: 2019-08-29 | Stop reason: HOSPADM

## 2019-08-26 RX ORDER — FUROSEMIDE 20 MG/1
20 TABLET ORAL DAILY
Status: DISCONTINUED | OUTPATIENT
Start: 2019-08-26 | End: 2019-08-26

## 2019-08-26 RX ORDER — ROSUVASTATIN CALCIUM 20 MG/1
20 TABLET, COATED ORAL DAILY
Status: DISCONTINUED | OUTPATIENT
Start: 2019-08-26 | End: 2019-08-29 | Stop reason: HOSPADM

## 2019-08-26 RX ORDER — CEPHALEXIN 250 MG/1
500 CAPSULE ORAL EVERY 6 HOURS SCHEDULED
Status: COMPLETED | OUTPATIENT
Start: 2019-08-26 | End: 2019-08-27

## 2019-08-26 RX ORDER — FUROSEMIDE 10 MG/ML
20 INJECTION INTRAMUSCULAR; INTRAVENOUS 2 TIMES DAILY
Status: DISCONTINUED | OUTPATIENT
Start: 2019-08-26 | End: 2019-08-29 | Stop reason: HOSPADM

## 2019-08-26 RX ORDER — ACETAMINOPHEN 325 MG/1
650 TABLET ORAL EVERY 4 HOURS PRN
Status: DISCONTINUED | OUTPATIENT
Start: 2019-08-26 | End: 2019-08-29 | Stop reason: HOSPADM

## 2019-08-26 RX ORDER — HYDROCODONE BITARTRATE AND ACETAMINOPHEN 7.5; 325 MG/1; MG/1
1 TABLET ORAL EVERY 6 HOURS PRN
Status: DISCONTINUED | OUTPATIENT
Start: 2019-08-26 | End: 2019-08-29 | Stop reason: HOSPADM

## 2019-08-26 RX ORDER — PANTOPRAZOLE SODIUM 20 MG/1
20 TABLET, DELAYED RELEASE ORAL 2 TIMES DAILY
Status: DISCONTINUED | OUTPATIENT
Start: 2019-08-26 | End: 2019-08-29 | Stop reason: HOSPADM

## 2019-08-26 RX ORDER — METHOCARBAMOL 500 MG/1
500 TABLET, FILM COATED ORAL EVERY 6 HOURS PRN
Status: DISCONTINUED | OUTPATIENT
Start: 2019-08-26 | End: 2019-08-29 | Stop reason: HOSPADM

## 2019-08-26 RX ORDER — SODIUM CHLORIDE 0.9 % (FLUSH) 0.9 %
10 SYRINGE (ML) INJECTION PRN
Status: DISCONTINUED | OUTPATIENT
Start: 2019-08-26 | End: 2019-08-29 | Stop reason: HOSPADM

## 2019-08-26 RX ORDER — SODIUM CHLORIDE 9 MG/ML
INJECTION, SOLUTION INTRAVENOUS CONTINUOUS
Status: DISCONTINUED | OUTPATIENT
Start: 2019-08-26 | End: 2019-08-26

## 2019-08-26 RX ORDER — CLOPIDOGREL BISULFATE 75 MG/1
75 TABLET ORAL DAILY
Status: DISCONTINUED | OUTPATIENT
Start: 2019-08-26 | End: 2019-08-26

## 2019-08-26 RX ADMIN — HYDROCODONE BITARTRATE AND ACETAMINOPHEN 1 TABLET: 7.5; 325 TABLET ORAL at 20:54

## 2019-08-26 RX ADMIN — PANTOPRAZOLE SODIUM 20 MG: 20 TABLET, DELAYED RELEASE ORAL at 20:55

## 2019-08-26 RX ADMIN — FUROSEMIDE 20 MG: 10 INJECTION, SOLUTION INTRAMUSCULAR; INTRAVENOUS at 09:31

## 2019-08-26 RX ADMIN — CARVEDILOL 25 MG: 25 TABLET, FILM COATED ORAL at 09:30

## 2019-08-26 RX ADMIN — CEPHALEXIN 500 MG: 250 CAPSULE ORAL at 23:31

## 2019-08-26 RX ADMIN — Medication 10 ML: at 21:23

## 2019-08-26 RX ADMIN — LOSARTAN POTASSIUM 25 MG: 25 TABLET, FILM COATED ORAL at 12:17

## 2019-08-26 RX ADMIN — CLOPIDOGREL BISULFATE 75 MG: 75 TABLET, FILM COATED ORAL at 12:17

## 2019-08-26 RX ADMIN — METHOCARBAMOL TABLETS 500 MG: 500 TABLET, COATED ORAL at 06:43

## 2019-08-26 RX ADMIN — CEPHALEXIN 500 MG: 250 CAPSULE ORAL at 18:20

## 2019-08-26 RX ADMIN — CITALOPRAM HYDROBROMIDE 20 MG: 20 TABLET ORAL at 09:30

## 2019-08-26 RX ADMIN — ROSUVASTATIN CALCIUM 20 MG: 20 TABLET, COATED ORAL at 20:54

## 2019-08-26 RX ADMIN — METHOCARBAMOL TABLETS 500 MG: 500 TABLET, COATED ORAL at 19:36

## 2019-08-26 RX ADMIN — BUPROPION HYDROCHLORIDE 300 MG: 150 TABLET, FILM COATED, EXTENDED RELEASE ORAL at 09:29

## 2019-08-26 RX ADMIN — Medication 10 ML: at 10:27

## 2019-08-26 RX ADMIN — PANTOPRAZOLE SODIUM 20 MG: 20 TABLET, DELAYED RELEASE ORAL at 09:30

## 2019-08-26 RX ADMIN — TRAMADOL HYDROCHLORIDE 50 MG: 50 TABLET, FILM COATED ORAL at 01:29

## 2019-08-26 RX ADMIN — HYDROXYCHLOROQUINE SULFATE 200 MG: 200 TABLET, FILM COATED ORAL at 12:17

## 2019-08-26 ASSESSMENT — PAIN DESCRIPTION - PAIN TYPE
TYPE: CHRONIC PAIN
TYPE: CHRONIC PAIN

## 2019-08-26 ASSESSMENT — ENCOUNTER SYMPTOMS
SHORTNESS OF BREATH: 1
GASTROINTESTINAL NEGATIVE: 1
RESPIRATORY NEGATIVE: 1
BACK PAIN: 1
ALLERGIC/IMMUNOLOGIC NEGATIVE: 1
EYES NEGATIVE: 1

## 2019-08-26 ASSESSMENT — PAIN DESCRIPTION - ONSET
ONSET: ON-GOING
ONSET: ON-GOING

## 2019-08-26 ASSESSMENT — PAIN DESCRIPTION - PROGRESSION
CLINICAL_PROGRESSION: GRADUALLY WORSENING
CLINICAL_PROGRESSION: GRADUALLY WORSENING

## 2019-08-26 ASSESSMENT — PAIN DESCRIPTION - DESCRIPTORS
DESCRIPTORS: DISCOMFORT
DESCRIPTORS: DISCOMFORT

## 2019-08-26 ASSESSMENT — PAIN DESCRIPTION - FREQUENCY
FREQUENCY: INTERMITTENT
FREQUENCY: INTERMITTENT

## 2019-08-26 ASSESSMENT — PAIN SCALES - GENERAL
PAINLEVEL_OUTOF10: 3
PAINLEVEL_OUTOF10: 6
PAINLEVEL_OUTOF10: 8

## 2019-08-26 ASSESSMENT — PAIN DESCRIPTION - LOCATION
LOCATION: BACK
LOCATION: BACK

## 2019-08-26 ASSESSMENT — PAIN DESCRIPTION - ORIENTATION
ORIENTATION: MID
ORIENTATION: MID

## 2019-08-26 ASSESSMENT — PAIN - FUNCTIONAL ASSESSMENT: PAIN_FUNCTIONAL_ASSESSMENT: PREVENTS OR INTERFERES SOME ACTIVE ACTIVITIES AND ADLS

## 2019-08-26 NOTE — CARE COORDINATION
Case Management Assessment           Initial Evaluation                Date / Time of Evaluation: 8/26/2019 1:18 PM                 Assessment Completed by: Megan Coffey     Consult received to assist with discharge planning. Pt is from home with her caregiver Dee Dee Tejeda but she is deconditioned and falls a lot and he is concerned about caring for her by himself now. The patient has been to [facility] in the past and would like to go there to rehab. Her second choice would be [facility] if not [facility]. Referral faxed to Katherine at [email or web address] to review and if able to accept will need a HENS but no precert will be needed. Patient Name: Anton Abrams     YOB: 1945  Diagnosis: Intractable pain [R52]  Intractable pain [R52]     Date / Time: 8/25/2019 11:31 PM    Patient Admission Status: Inpatient    If patient is discharged prior to next notation, then this note serves as note for discharge by case management.      Current PCP: Loree Null MD  Clinic Patient: No    Chart Reviewed: Yes  Patient/ Family Interviewed: Yes    Initial assessment completed at bedside with: patient and Dee Dee Tejeda    Hospitalization in the last 30 days: No    Emergency Contacts:  Extended Emergency Contact Information  Primary Emergency Contact: Anya Strange [address] [place] [address] Phone: [phone]  Relation: Other  Secondary Emergency Contact: [address] [place] [address] Phone: [phone]  Work Phone: [phone]  Relation: Child    Advance Directives:   Code Status: [address]: No  Agent: NA  Contact Number: NA    Copy present: No     In paper Chart: No    Scanned into EMR No    Financial  Payor: MEDICARE / Plan: MEDICARE PART A AND B / Product Type: *No Product type* /     Pre-cert required for SNF: No    Pharmacy    [facility] [address] [facility], [address] -  [phone]  [address] Selina Dumont  Phone: [phone] Fax: [phone]      Potential assistance Purchasing Medications: Potential Assistance Purchasing Medications: Yes  Does Patient want to participate in local refill/ meds to beds program?: Yes    Meds To Beds General Rules:  1. Can ONLY be done Monday- Friday between 8:30am-5pm  2. Prescription(s) must be in pharmacy by 3pm to be filled same day  3. Copy of patient's insurance/ prescription drug card and patient face sheet must be sent along with the prescription(s)  4. Cost of Rx cannot be added to hospital bill. If financial assistance is needed, please contact unit  or ;  or  CANNOT provide pharmacy voucher for patients co-pays  5. Patients can then  the prescription on their way out of the hospital at discharge, or pharmacy can deliver to the bedside if staff is available. (payment due at time of pick-up or delivery - cash, check, or card accepted)     Able to afford home medications/ co-pay costs: Yes    ADLS  Support Systems: Friends/Neighbors, Family Members    PT AM-PAC:   /24  OT AM-PAC: 14 /24    [place]: two level but stays on the first floor  Steps: 1    Plans to RETURN to current housing: No  Barriers to RETURNING to current housing: will need therapy prior      DISCHARGE PLAN:  Disposition: [address] [place] [address]  for discharge: may need transport/Javy was not sure if he could take her there safely     Factors facilitating achievement of predicted outcomes: Caregiver support, Cooperative and Pleasant    Barriers to discharge: none noted    Additional Case Management Notes: DILLAN    Lissette Church and her family were provided with choice of provider; she and her family are in agreement with the discharge plan.     Care Transition patient: No    Severino Bal RN  [facility]  Case Management Department  Ph: [phone]   Fax: [phone]

## 2019-08-26 NOTE — PROGRESS NOTES
Caregiver informed RN that patient has a UTI and she needs her antibiotics. Caregiver stated \"I don't give a damn about no urine sample, give her the damn antibiotic. \" RN educated caregiver and patient on urine sample. RN made MD aware.

## 2019-08-26 NOTE — H&P
MD   clopidogrel (PLAVIX) 75 MG tablet TAKE 1 TABLET BY MOUTH DAILY 4/3/19   Diego Mcdonnell MD   buPROPion (WELLBUTRIN XL) 300 MG extended release tablet TAKE 1 TABLET BY MOUTH EVERY MORNING 4/3/19   Diego Mcdonnell MD   Mirabegron ER (MYRBETRIQ) 25 MG TB24 TAKE 2 TABLETS BY MOUTH DAILY  Patient taking differently: TAKE 1 TABLET BY MOUTH DAILY 4/3/19   Diego Mcdonnell MD   traMADol (ULTRAM) 50 MG tablet Take 50 mg by mouth every 6 hours as needed for Pain. Historical Provider, MD   methylcellulose (CITRUCEL) oral powder Take 2 g by mouth every other day     Historical Provider, MD   Christian Ogden [address] by Does not apply route Valid for 5 years from 11/7/18 11/7/18   Diego Mcdonnell MD   Nutritional Supplements (ENSURE ACTIVE HIGH PROTEIN PO) Take 1 Can by mouth daily as needed    Historical Provider, MD   acetaminophen 650 MG TABS Take 650 mg by mouth every 6 hours as needed for Pain or Fever (Fever >100.5 F (38 C)). 12/9/13   Ama Iraheta MD   hydroxychloroquine (PLAQUENIL) 200 MG tablet Take 200 mg by mouth daily. Historical Provider, MD       Allergies: Medrol [methylprednisolone]; Oxycontin [oxycodone hcl]; and Vicodin [hydrocodone-acetaminophen]    Social History:      The patient currently lives in a private residence with her son. TOBACCO:   reports that she quit smoking about 4 years ago. Her smoking use included cigarettes. She started smoking about 54 years ago. She has a 15.00 pack-year smoking history. She has never used smokeless tobacco.  ETOH:   reports that she drinks alcohol. Family History:      Reviewed in detail and negative for DM, CAD, Cancer, CVA. Positive as follows:        Problem Relation Age of Onset    High Blood Pressure Brother        REVIEW OF SYSTEMS:   Pertinent positives as noted in theHPI. All other systems reviewed and negative. ROS: Review of Systems   Constitutional: Positive for fatigue. HENT: Negative. Eyes: Negative. Respiratory: Positive for shortness of breath. Cardiovascular: Negative. Gastrointestinal: Negative. Endocrine: Negative. Genitourinary: Negative. Musculoskeletal: Positive for arthralgias, back pain, gait problem and myalgias. Skin: Negative. Allergic/Immunologic: Negative. Neurological: Positive for weakness. Psychiatric/Behavioral: Negative. PHYSICAL EXAM PERFORMED:    /70   Pulse 62   Temp 98.2 °F (36.8 °C) (Oral)   Resp 20   Ht 5' (1.524 m)   Wt 155 lb (70.3 kg)   SpO2 92%   Breastfeeding? No   BMI 30.27 kg/m²     General appearance:  No apparent distress, appears stated age and cooperative. HEENT:  Normal cephalic, atraumatic without obviousdeformity. Pupils equal, round, and reactive to light. Extra ocular muscles intact. Conjunctivae/corneas clear. Neck: Supple, with full range of motion. No jugular venous distention. Trachea midline. Respiratory:Normal respiratory effort. Clear to auscultation, bilaterally without Rales/Wheezes/Rhonchi. Cardiovascular:Regular rate and rhythm with normal S1/S2 without murmurs, rubs or gallops. Abdomen: Soft, non-tender,non-distended with normal bowel sounds. Musculoskeletal:  Thoracic spine is tender to palpation. Skin: Skin color, texture, turgor normal.  No rashes or lesions. Neurologic:  Neurovascularly intact without any focal sensory/motor deficits. Cranial nerves: II-XII intact, grossly non-focal.  Psychiatric:  Alert and oriented, thought content appropriate, normal insight  Capillary Refill: Brisk,< 3 seconds   Peripheral Pulses: +2 palpable, equal bilaterally     Labs:     No results for input(s): WBC, HGB, HCT, PLT in the last 72 hours. No results for input(s): NA, K, CL, CO2, BUN, CREATININE, CALCIUM, PHOS in the last 72 hours. Invalid input(s): MAGNES  No results for input(s): AST, ALT, BILIDIR, BILITOT, ALKPHOS in the last 72 hours. No results for input(s): INR in the last 72 hours.   No results for lower back pain -The patient presented to the ED complaining of chronic, intractable lower back pain with radiation to the ribs.  On her last visit to the ED, CT scans of the thoracic and cervical spine were obtained and were remarkable for several chronic compression fractures.   -Percocet pain panel   -PT/OT  -Consult social work for placement   -Continue home methocarbamol     HFpEF  -Continue home furosemide 20 mg daily     HTN   -Continue home losartan 25 mg daily  -Resume home carvedilol 25 my bid     HLD  -Continue home rosuvastatin     Anxiety and depression  -Continue home bupropion 300 mg daily   -Continue home citalopram 20 mg daily     Lupus  -Resume home hydroxychloroquine       DVT Prophylaxis: Lovenox   Diet: Cardiac  Code Status: Full code    PT/OT Eval Status: Consulted     Dispo - General medicine/surgery floors     I will discuss the patient with the senior resident and Dr. Manoj Mcgrath MD.     Andreia Garvin MD  Internal Medicine Resident, PGY-2  [facility]

## 2019-08-26 NOTE — PROGRESS NOTES
Physical Therapy    Facility/Department: [facility] 5T ORTHO/NEURO  Initial Assessment    NAME: Jose David Richardson  : 1945  MRN: [id number]    Date of Service: 2019    Discharge Recommendations:    Jose David Richardson scored a 16/24 on the AM-PAC short mobility form. Current research shows that an AM-PAC score of 17 or less is typically not associated with a discharge to the patient's home setting. Based on the patients AM-PAC score and their current functional mobility deficits, it is recommended that the patient have 3-5 sessions per week of Physical Therapy at d/c to increase the patients independence. PT Equipment Recommendations  Equipment Needed: No(defer)    Assessment   Body structures, Functions, Activity limitations: Decreased functional mobility ; Decreased strength;Decreased endurance;Decreased safe awareness;Decreased balance; Increased Pain  Assessment: Patient tolerated session fair with mobility being limited due to decreased activity tolerance and quick fatigue with mobility. Patient able to ambulate into bathroom but required seated rest break in bathroom and an additional seated rest break when returning to bedside chair. Patient needs verbal cues for safety awareness throughout session and requires signficantly increased time to complete all tasks. Patient currently functioning below baseline level. Recommend continued skilled PT upon discharge. Will follow while in acute care setting to maximize independence with mobility. Treatment Diagnosis: impaired mobility associated with intractable pain  Prognosis: Good  Decision Making: Medium Complexity  Patient Education: Educated on role of PT, safety with mobility, use of call light, d/c recommendations; patient verbalized understanding.    REQUIRES PT FOLLOW UP: Yes  Activity Tolerance  Activity Tolerance: Patient limited by fatigue;Patient limited by endurance       Patient Diagnosis(es): The primary encounter diagnosis was Pain in thoracic pain  Follows Commands: Within Functional Limits  Other (Comment): increased verbal cues  General Comment  Comments: Patient supine in bed upon arrival.  Subjective  Subjective: Patient agreeable to PT evaluation with encouragement. Pain Screening  Patient Currently in Pain: Yes(\"a little\" back pain, not rated, RN aware)  Vital Signs  Patient Currently in Pain: Yes(\"a little\" back pain, not rated, RN aware)       Orientation  Orientation  Overall Orientation Status: Impaired  Orientation Level: Disoriented to time;Oriented to situation;Oriented to place;Oriented to person  Social/Functional History  Social/Functional History  Lives With: Other (comment)(Caregiver Javy)  Type of Home: House  Home Layout: Two level(pt stays on first floor)  Home Access: Stairs to enter without rails(1 KAVEH from garage)  Bathroom Shower/Tub: Walk-in shower  Bathroom Toilet: Standard(raised toilet w/ rails)  Bathroom Equipment: Grab bars in shower, Shower chair, Grab bars around toilet, Hand-held shower(\"there are grab bars everywhere\")  Home Equipment: 4 wheeled walker, Rolling walker, Cane, Alert Button  ADL Assistance: Needs assistance(assist for bathing/dressing and shower transfers, toilets independently)  Homemaking Responsibilities: No(caregiver completes)  Ambulation Assistance: Independent(using 3MX)  Transfer Assistance: Needs assistance(sba for all mobility)  Active : No  Additional Comments: caregiver provides 24hr assistance. was working with OP PT. Per caregiver, pt falls every 6-8 weeks \"She gets over confident and walks away from her walker\"  Cognition   Cognition  Overall Cognitive Status: Exceptions  Following Commands:  Follows one step commands with increased time  Memory: Decreased recall of recent events  Initiation: Requires cues for some  Sequencing: Requires cues for some    Objective          AROM RLE (degrees)  RLE AROM: WFL  AROM LLE (degrees)  LLE AROM : WFL  Strength RLE  Strength RLE: [facility]  Strength LLE  Strength LLE: WFL        Bed mobility  Supine to Sit: Maximum assistance(head of bed elevated, use of bedrail, increased time to complete tasks, verbal cues for logroll technique)  Scooting: Stand by assistance(to scoot toward edge of bed, increased time to complete tasks)  Comment: patient sitting up in bedside chair at end of session  Transfers  Sit to Stand: Moderate Assistance;Dependent/Total(fluctuating mod x 1-2, 2 trials from edge of bed, 2 trials from toilet and one trial from bedside commode, verbal cues for UE placement but patient pulls up on walker despite cues)  Stand to sit: Minimal Assistance(verbal cues for safety and full turn prior to sitting)  Ambulation  Ambulation?: Yes  Ambulation 1  Surface: level tile  Device: Rolling Walker  Assistance: Minimal assistance; Moderate assistance(fluctuates between min/mod assist x 1)  Quality of Gait: forward flexed posture with downward gaze (able to correct with cues but does not maintain corrections), significantly decreased step length/height bilaterally  Distance: 15', 8', 10'  Comments: patient with quick fatigue with mobility, requiring seated rest breaks due to decreased endurance  Stairs/Curb  Stairs?: No     Balance  Sitting - Static: Good  Standing - Static: Fair(CGA with UE support)  Standing - Dynamic: Poor(min/mod assist to ambulate with FWW)        Plan   Plan  Times per week: 2-5  Current Treatment Recommendations: Strengthening, Functional Mobility Training, Transfer Training, Gait Training, Safety Education & Training, Balance Training, Endurance Training, Patient/Caregiver Education & Training  Safety Devices  Type of devices: Left in chair, Chair alarm in place, Call light within reach, Nurse notified    OutComes Score                                                  AM-PAC Score  -PAC Inpatient Mobility Raw Score : 16 (08/26/19 1517)  -PAC Inpatient T-Scale Score : 40.78 (08/26/19 1517)  Mobility Inpatient CMS 0-100% Score: 54.16 (08/26/19 1517)  Mobility Inpatient CMS G-Code Modifier : CK (08/26/19 1517)          Goals  Short term goals  Time Frame for Short term goals: discharge  Short term goal 1: patient will perform bed mobility with supervision  Short term goal 2: patient will perform transfers sit<>stand with minimal assist x 1  Short term goal 3: patient will ambulate 48' with FWW and minimal assist x 1  Patient Goals   Patient goals : none stated       Therapy Time   Individual Concurrent Group Co-treatment   Time In 1005         Time Out 1104         Minutes 59         Timed Code Treatment Minutes: 44 Minutes    Timed Code Treatment Minutes:  44 Minutes    Total Treatment Minutes:    44 minutes treatment + 15 minutes evaluation = 59 total treatment minutes     If patient is discharged from the hospital prior to next treatment session, this note will serve as the discharge summary.      [address]ira [address]

## 2019-08-26 NOTE — PROGRESS NOTES
shower(\"there are grab bars everywhere\")  Home Equipment: 4 wheeled walker, Rolling walker, Cane, Alert Button  ADL Assistance: Needs assistance(assist for bathing/dressing and shower transfers, toilets independently)  Homemaking Responsibilities: No(caregiver completes)  Ambulation Assistance: Independent(using 8FP)  Transfer Assistance: Needs assistance(sba for all mobility)  Active : No  Additional Comments: caregiver provides 24hr assistance. was working with OP PT. Per caregiver, pt falls every 6-8 weeks \"She gets over confident and walks away from her walker\"       Objective   Vision: Impaired  Vision Exceptions: Wears glasses at all times  Hearing: Exceptions to [facility]  Hearing Exceptions: Bilateral hearing aid;Hard of hearing/hearing concerns(hearing aids are at home)        Orientation  Overall Orientation Status: Impaired  Orientation Level: Disoriented to time;Oriented to place;Oriented to situation;Oriented to person     Balance  Sitting Balance: Stand by assistance  Standing Balance: Moderate assistance(-min A, posterior lean at times)  Standing Balance  Time: 3 min, 30 seconds, 2 min x2  Activity: mobility to/from bathroom, toileting      Functional Mobility  Functional - Mobility Device: Rolling Walker  Activity: To/from bathroom(15' +6' +10')  Assist Level: Moderate assistance(-min A with fluctuating balance and assist needed to turn walker)  Functional Mobility Comments: Very slow paced, head flexed and looking at ground, would briefly lift head with cues.  Required seated rest on bsc 1/2 from bathroom to recliner 2/2 fatigue      Toilet Transfers  Toilet - Technique: Ambulating(w/ RW)  Equipment Used: Standard toilet(grab bar on R (had difficulty grasping bar 2/2 shoulder deficits))  Toilet Transfer: Dependent/Total(mod A of 2 sit>Stand x2 reps)  Toilet Transfers Comments: increase time to weightshift anteriorly during sit>Stand       ADL  Grooming: (assist to comb hair while pt rested 2/2

## 2019-08-26 NOTE — ED PROVIDER NOTES
SUPPLEMENTS (ENSURE ACTIVE HIGH PROTEIN PO)    Take 1 Can by mouth daily as needed    PANTOPRAZOLE (PROTONIX) 20 MG TABLET    TAKE 1 TABLET BY MOUTH TWICE DAILY    ROSUVASTATIN (CRESTOR) 20 MG TABLET    TAKE 1 TABLET BY MOUTH DAILY    TRAMADOL (ULTRAM) 50 MG TABLET    Take 50 mg by mouth every 6 hours as needed for Pain. Allergies     She is allergic to medrol [methylprednisolone]; oxycontin [oxycodone hcl]; and vicodin [hydrocodone-acetaminophen]. Physical Exam     INITIAL VITALS: BP: (!) 145/69, Temp: 98.2 °F (36.8 °C), Pulse: 62, Resp: 20, SpO2: 97 %   Physical Exam   Constitutional: She is oriented to person, place, and time. She appears well-developed and well-nourished. Patient is lying in bed awake and appears uncomfortable. HENT:   Head: Normocephalic and atraumatic. Eyes: Pupils are equal, round, and reactive to light. Conjunctivae and EOM are normal.   Cardiovascular: Normal rate, regular rhythm, normal heart sounds and intact distal pulses. Pulmonary/Chest: Effort normal and breath sounds normal.   Musculoskeletal: She exhibits tenderness (Thoracic spine tenderness to palpation. ). Neurological: She is alert and oriented to person, place, and time. She has normal strength. No sensory deficit. Skin: Skin is warm and dry. Capillary refill takes less than 2 seconds. Psychiatric: She has a normal mood and affect. Diagnostic Results       RADIOLOGY:  No orders to display       LABS:   No results found for this visit on 08/25/19. RECENT VITALS:  BP: 135/70, Temp: 98.2 °F (36.8 °C),Pulse: 62, Resp: 20, SpO2: 92 %     Procedures       ED Course     Nursing Notes, Past Medical Hx, Past Surgical Hx, Social Hx, Allergies, and FamilyHx were reviewed.     The patient was giventhe following medications:  Orders Placed This Encounter   Medications    traMADol (ULTRAM) tablet 50 mg       CONSULTS:  [address] [place] [address] / Rey Avila / Gallo [place]

## 2019-08-27 LAB
AMORPHOUS: ABNORMAL /HPF
BACTERIA: ABNORMAL /HPF
BASOPHILS ABSOLUTE: 0 K/UL (ref 0–0.2)
BASOPHILS RELATIVE PERCENT: 0.8 %
BILIRUBIN URINE: NEGATIVE
BLOOD, URINE: NEGATIVE
CLARITY: CLEAR
COLOR: YELLOW
EOSINOPHILS ABSOLUTE: 0.2 K/UL (ref 0–0.6)
EOSINOPHILS RELATIVE PERCENT: 3 %
EPITHELIAL CELLS, UA: ABNORMAL /HPF
GLUCOSE URINE: NEGATIVE MG/DL
HCT VFR BLD CALC: 33.6 % (ref 36–48)
HEMOGLOBIN: 11.4 G/DL (ref 12–16)
KETONES, URINE: NEGATIVE MG/DL
LEUKOCYTE ESTERASE, URINE: ABNORMAL
LV EF: 55 %
LVEF MODALITY: NORMAL
LYMPHOCYTES ABSOLUTE: 1.3 K/UL (ref 1–5.1)
LYMPHOCYTES RELATIVE PERCENT: 21.6 %
MCH RBC QN AUTO: 32.9 PG (ref 26–34)
MCHC RBC AUTO-ENTMCNC: 33.9 G/DL (ref 31–36)
MCV RBC AUTO: 97.3 FL (ref 80–100)
MICROSCOPIC EXAMINATION: YES
MONOCYTES ABSOLUTE: 0.8 K/UL (ref 0–1.3)
MONOCYTES RELATIVE PERCENT: 13.5 %
NEUTROPHILS ABSOLUTE: 3.7 K/UL (ref 1.7–7.7)
NEUTROPHILS RELATIVE PERCENT: 61.1 %
NITRITE, URINE: POSITIVE
PDW BLD-RTO: 13.2 % (ref 12.4–15.4)
PH UA: 6 (ref 5–8)
PLATELET # BLD: 239 K/UL (ref 135–450)
PMV BLD AUTO: 6.3 FL (ref 5–10.5)
PROTEIN UA: ABNORMAL MG/DL
RBC # BLD: 3.46 M/UL (ref 4–5.2)
RBC UA: ABNORMAL /HPF (ref 0–2)
SPECIFIC GRAVITY UA: >=1.03 (ref 1–1.03)
URINE REFLEX TO CULTURE: YES
URINE TYPE: ABNORMAL
UROBILINOGEN, URINE: 0.2 E.U./DL
WBC # BLD: 6.1 K/UL (ref 4–11)
WBC UA: ABNORMAL /HPF (ref 0–5)

## 2019-08-27 PROCEDURE — 87077 CULTURE AEROBIC IDENTIFY: CPT

## 2019-08-27 PROCEDURE — 36415 COLL VENOUS BLD VENIPUNCTURE: CPT

## 2019-08-27 PROCEDURE — 87186 SC STD MICRODIL/AGAR DIL: CPT

## 2019-08-27 PROCEDURE — 6360000002 HC RX W HCPCS: Performed by: STUDENT IN AN ORGANIZED HEALTH CARE EDUCATION/TRAINING PROGRAM

## 2019-08-27 PROCEDURE — 81001 URINALYSIS AUTO W/SCOPE: CPT

## 2019-08-27 PROCEDURE — 1200000000 HC SEMI PRIVATE

## 2019-08-27 PROCEDURE — 6370000000 HC RX 637 (ALT 250 FOR IP): Performed by: STUDENT IN AN ORGANIZED HEALTH CARE EDUCATION/TRAINING PROGRAM

## 2019-08-27 PROCEDURE — 87086 URINE CULTURE/COLONY COUNT: CPT

## 2019-08-27 PROCEDURE — 85025 COMPLETE CBC W/AUTO DIFF WBC: CPT

## 2019-08-27 PROCEDURE — 2580000003 HC RX 258: Performed by: STUDENT IN AN ORGANIZED HEALTH CARE EDUCATION/TRAINING PROGRAM

## 2019-08-27 PROCEDURE — 93306 TTE W/DOPPLER COMPLETE: CPT

## 2019-08-27 PROCEDURE — 99232 SBSQ HOSP IP/OBS MODERATE 35: CPT | Performed by: INTERNAL MEDICINE

## 2019-08-27 RX ADMIN — FUROSEMIDE 20 MG: 10 INJECTION, SOLUTION INTRAMUSCULAR; INTRAVENOUS at 17:57

## 2019-08-27 RX ADMIN — ROSUVASTATIN CALCIUM 20 MG: 20 TABLET, COATED ORAL at 21:27

## 2019-08-27 RX ADMIN — FUROSEMIDE 20 MG: 10 INJECTION, SOLUTION INTRAMUSCULAR; INTRAVENOUS at 09:34

## 2019-08-27 RX ADMIN — PANTOPRAZOLE SODIUM 20 MG: 20 TABLET, DELAYED RELEASE ORAL at 09:35

## 2019-08-27 RX ADMIN — HYDROCODONE BITARTRATE AND ACETAMINOPHEN 1 TABLET: 7.5; 325 TABLET ORAL at 21:28

## 2019-08-27 RX ADMIN — CARVEDILOL 25 MG: 25 TABLET, FILM COATED ORAL at 21:27

## 2019-08-27 RX ADMIN — CEPHALEXIN 500 MG: 250 CAPSULE ORAL at 05:44

## 2019-08-27 RX ADMIN — LOSARTAN POTASSIUM 25 MG: 25 TABLET, FILM COATED ORAL at 12:37

## 2019-08-27 RX ADMIN — Medication 10 ML: at 09:38

## 2019-08-27 RX ADMIN — HYDROXYCHLOROQUINE SULFATE 200 MG: 200 TABLET, FILM COATED ORAL at 12:37

## 2019-08-27 RX ADMIN — PANTOPRAZOLE SODIUM 20 MG: 20 TABLET, DELAYED RELEASE ORAL at 21:28

## 2019-08-27 RX ADMIN — BUPROPION HYDROCHLORIDE 300 MG: 150 TABLET, FILM COATED, EXTENDED RELEASE ORAL at 09:34

## 2019-08-27 RX ADMIN — METHOCARBAMOL TABLETS 500 MG: 500 TABLET, COATED ORAL at 09:34

## 2019-08-27 RX ADMIN — CITALOPRAM HYDROBROMIDE 20 MG: 20 TABLET ORAL at 09:35

## 2019-08-27 RX ADMIN — CLOPIDOGREL BISULFATE 75 MG: 75 TABLET, FILM COATED ORAL at 12:37

## 2019-08-27 RX ADMIN — HYDROCODONE BITARTRATE AND ACETAMINOPHEN 1 TABLET: 7.5; 325 TABLET ORAL at 15:27

## 2019-08-27 RX ADMIN — CARVEDILOL 25 MG: 25 TABLET, FILM COATED ORAL at 09:35

## 2019-08-27 RX ADMIN — ENOXAPARIN SODIUM 40 MG: 40 INJECTION SUBCUTANEOUS at 09:35

## 2019-08-27 ASSESSMENT — PAIN SCALES - GENERAL
PAINLEVEL_OUTOF10: 6
PAINLEVEL_OUTOF10: 6
PAINLEVEL_OUTOF10: 2
PAINLEVEL_OUTOF10: 5

## 2019-08-27 ASSESSMENT — ENCOUNTER SYMPTOMS
SHORTNESS OF BREATH: 0
CHOKING: 0
EYE PAIN: 0
CHEST TIGHTNESS: 0
RHINORRHEA: 0
ABDOMINAL PAIN: 0
TROUBLE SWALLOWING: 0
ABDOMINAL DISTENTION: 0
ALLERGIC/IMMUNOLOGIC NEGATIVE: 1
FACIAL SWELLING: 0
BACK PAIN: 1
APNEA: 0
COUGH: 1
GASTROINTESTINAL NEGATIVE: 1
SORE THROAT: 0
EYES NEGATIVE: 1
EYE REDNESS: 0

## 2019-08-27 ASSESSMENT — PAIN DESCRIPTION - PROGRESSION: CLINICAL_PROGRESSION: GRADUALLY WORSENING

## 2019-08-27 ASSESSMENT — PAIN DESCRIPTION - PAIN TYPE: TYPE: CHRONIC PAIN

## 2019-08-27 ASSESSMENT — PAIN DESCRIPTION - LOCATION: LOCATION: BACK

## 2019-08-27 NOTE — PROGRESS NOTES
Pulmonary edema. Small bilateral pleural effusions. Assessment/Plan:   Caryle Due is a 66 y.o. female with a past medical history of anxiety, CAD, CHF, depression, DVT, hypertension, lupus, TIA, and urinary incontinence who presented to [facility] emergency department on 8/26/19 complaining of severe (10/10) spine in her thoracic and lumbar spine. The patient's caregiver states that he has been giving her tramadol at home without any meaningful pain relief. He decided to bring the patient to the emergency department again this evening because he is concerned that he is no longer able to take care of her and is interested in skilled nursing and/or rehabilitation placement. The patient is admitted to internal medicine for further management.      Intractable lower back pain -The patient presented to the ED complaining of chronic, intractable lower back pain with radiation to the ribs. On her last visit to the ED, CT scans of the thoracic and cervical spine were obtained and were remarkable for several chronic compression fractures.   -Percocet pain panel   -PT/OT  -Consult social work for placement;  Faxed paperwork to SNF yesterday, waiting on response.   -Continue home methocarbamol      HFpEF  -Continue home furosemide 20 mg daily      HTN   -Continue home losartan 25 mg daily  -Resume home carvedilol 25 my bid      HLD  -Continue home rosuvastatin      Anxiety and depression  -Continue home bupropion 300 mg daily   -Continue home citalopram 20 mg daily      Lupus  -Resume home hydroxychloroquine     Code Status: Full Code  DIET LOW SODIUM 2 GM;   PPX: lovenox  DISPO: floor    Xiomy Whitmore MD, PGY-1  08/27/19  9:15 AM    This patient has been staffed and discussed with Dr. Nelson Story MD.

## 2019-08-27 NOTE — FLOWSHEET NOTE
08/27/19 1100   Encounter Summary   Volunteer Visit   (Received communion from [facility].)   [address] Patient received communion

## 2019-08-27 NOTE — CARE COORDINATION
Called Katherine this morning and [facility] is able to accept patient. No precert needed and HENS has been submitted. Expected d/c on Thursday 08/29.     Electronically signed by Catherine Cadet RN on 8/27/2019 at [address] [facility]  [phone]

## 2019-08-28 ENCOUNTER — APPOINTMENT (OUTPATIENT)
Dept: PHYSICAL THERAPY | Age: 74
End: 2019-08-28
Payer: MEDICARE

## 2019-08-28 PROBLEM — R29.6 RECURRENT FALLS: Status: ACTIVE | Noted: 2019-08-28

## 2019-08-28 LAB
BASOPHILS ABSOLUTE: 0.1 K/UL (ref 0–0.2)
BASOPHILS RELATIVE PERCENT: 0.8 %
EOSINOPHILS ABSOLUTE: 0.2 K/UL (ref 0–0.6)
EOSINOPHILS RELATIVE PERCENT: 3.1 %
HCT VFR BLD CALC: 32.6 % (ref 36–48)
HEMOGLOBIN: 11.2 G/DL (ref 12–16)
LYMPHOCYTES ABSOLUTE: 1.1 K/UL (ref 1–5.1)
LYMPHOCYTES RELATIVE PERCENT: 13.9 %
MCH RBC QN AUTO: 33.2 PG (ref 26–34)
MCHC RBC AUTO-ENTMCNC: 34.2 G/DL (ref 31–36)
MCV RBC AUTO: 97 FL (ref 80–100)
MONOCYTES ABSOLUTE: 0.8 K/UL (ref 0–1.3)
MONOCYTES RELATIVE PERCENT: 10.8 %
NEUTROPHILS ABSOLUTE: 5.4 K/UL (ref 1.7–7.7)
NEUTROPHILS RELATIVE PERCENT: 71.4 %
PDW BLD-RTO: 12.8 % (ref 12.4–15.4)
PLATELET # BLD: 236 K/UL (ref 135–450)
PMV BLD AUTO: 6.2 FL (ref 5–10.5)
RBC # BLD: 3.36 M/UL (ref 4–5.2)
WBC # BLD: 7.6 K/UL (ref 4–11)

## 2019-08-28 PROCEDURE — 36415 COLL VENOUS BLD VENIPUNCTURE: CPT

## 2019-08-28 PROCEDURE — 6360000002 HC RX W HCPCS: Performed by: STUDENT IN AN ORGANIZED HEALTH CARE EDUCATION/TRAINING PROGRAM

## 2019-08-28 PROCEDURE — 85025 COMPLETE CBC W/AUTO DIFF WBC: CPT

## 2019-08-28 PROCEDURE — 2580000003 HC RX 258: Performed by: STUDENT IN AN ORGANIZED HEALTH CARE EDUCATION/TRAINING PROGRAM

## 2019-08-28 PROCEDURE — 6370000000 HC RX 637 (ALT 250 FOR IP): Performed by: STUDENT IN AN ORGANIZED HEALTH CARE EDUCATION/TRAINING PROGRAM

## 2019-08-28 PROCEDURE — 1200000000 HC SEMI PRIVATE

## 2019-08-28 PROCEDURE — 99231 SBSQ HOSP IP/OBS SF/LOW 25: CPT | Performed by: INTERNAL MEDICINE

## 2019-08-28 RX ADMIN — METHOCARBAMOL TABLETS 500 MG: 500 TABLET, COATED ORAL at 09:05

## 2019-08-28 RX ADMIN — HYDROXYCHLOROQUINE SULFATE 200 MG: 200 TABLET, FILM COATED ORAL at 13:29

## 2019-08-28 RX ADMIN — CITALOPRAM HYDROBROMIDE 20 MG: 20 TABLET ORAL at 09:05

## 2019-08-28 RX ADMIN — HYDROCODONE BITARTRATE AND ACETAMINOPHEN 1 TABLET: 7.5; 325 TABLET ORAL at 05:36

## 2019-08-28 RX ADMIN — Medication 10 ML: at 09:28

## 2019-08-28 RX ADMIN — ROSUVASTATIN CALCIUM 20 MG: 20 TABLET, COATED ORAL at 09:05

## 2019-08-28 RX ADMIN — ENOXAPARIN SODIUM 40 MG: 40 INJECTION SUBCUTANEOUS at 17:07

## 2019-08-28 RX ADMIN — HYDROCODONE BITARTRATE AND ACETAMINOPHEN 1 TABLET: 7.5; 325 TABLET ORAL at 21:44

## 2019-08-28 RX ADMIN — HYDROCODONE BITARTRATE AND ACETAMINOPHEN 1 TABLET: 7.5; 325 TABLET ORAL at 13:29

## 2019-08-28 RX ADMIN — CLOPIDOGREL BISULFATE 75 MG: 75 TABLET, FILM COATED ORAL at 13:33

## 2019-08-28 RX ADMIN — LOSARTAN POTASSIUM 25 MG: 25 TABLET, FILM COATED ORAL at 09:05

## 2019-08-28 RX ADMIN — Medication 10 ML: at 04:59

## 2019-08-28 RX ADMIN — Medication 10 ML: at 21:46

## 2019-08-28 RX ADMIN — MAGNESIUM HYDROXIDE 30 ML: 400 SUSPENSION ORAL at 09:15

## 2019-08-28 RX ADMIN — PANTOPRAZOLE SODIUM 20 MG: 20 TABLET, DELAYED RELEASE ORAL at 21:44

## 2019-08-28 RX ADMIN — METHOCARBAMOL TABLETS 500 MG: 500 TABLET, COATED ORAL at 20:00

## 2019-08-28 RX ADMIN — BUPROPION HYDROCHLORIDE 300 MG: 150 TABLET, FILM COATED, EXTENDED RELEASE ORAL at 09:02

## 2019-08-28 RX ADMIN — CARVEDILOL 25 MG: 25 TABLET, FILM COATED ORAL at 09:05

## 2019-08-28 RX ADMIN — PANTOPRAZOLE SODIUM 20 MG: 20 TABLET, DELAYED RELEASE ORAL at 09:05

## 2019-08-28 RX ADMIN — FUROSEMIDE 20 MG: 10 INJECTION, SOLUTION INTRAMUSCULAR; INTRAVENOUS at 09:15

## 2019-08-28 RX ADMIN — FUROSEMIDE 20 MG: 10 INJECTION, SOLUTION INTRAMUSCULAR; INTRAVENOUS at 17:07

## 2019-08-28 ASSESSMENT — PAIN SCALES - GENERAL
PAINLEVEL_OUTOF10: 7
PAINLEVEL_OUTOF10: 8
PAINLEVEL_OUTOF10: 4
PAINLEVEL_OUTOF10: 5
PAINLEVEL_OUTOF10: 3
PAINLEVEL_OUTOF10: 5
PAINLEVEL_OUTOF10: 3
PAINLEVEL_OUTOF10: 6
PAINLEVEL_OUTOF10: 0

## 2019-08-28 NOTE — PLAN OF CARE
Problem: Falls - Risk of:  Goal: Will remain free from falls  Description  Will remain free from falls  8/28/2019 0354 by Indra Guzmán RN  Outcome: Met This Shift   Pt free from injury this shift and free of falls. 2/4 rails up on bed and bed is in the lowest position. Wheels locked and bed alarm set. Socks on pt and ID bands on pt. Call light in reach of pt and pt educated to call out to get up x2 GB and walker. Will continue to monitor for safety. Problem: Pain:  Goal: Control of acute pain  Description  Control of acute pain  Outcome: Met This Shift   Controlled with PO. 7/10 prior to medication and 6/10 after interventions per orders.

## 2019-08-29 ENCOUNTER — HOSPITAL ENCOUNTER (OUTPATIENT)
Dept: PHYSICAL THERAPY | Age: 74
Setting detail: THERAPIES SERIES
Discharge: HOME | End: 2019-08-29
Payer: MEDICARE

## 2019-08-29 VITALS
SYSTOLIC BLOOD PRESSURE: 108 MMHG | BODY MASS INDEX: 32.2 KG/M2 | WEIGHT: 164.02 LBS | RESPIRATION RATE: 17 BRPM | HEIGHT: 60 IN | OXYGEN SATURATION: 92 % | TEMPERATURE: 98.3 F | HEART RATE: 58 BPM | DIASTOLIC BLOOD PRESSURE: 68 MMHG

## 2019-08-29 PROBLEM — I50.33 ACUTE ON CHRONIC HEART FAILURE WITH PRESERVED EJECTION FRACTION (HCC): Status: ACTIVE | Noted: 2019-08-29

## 2019-08-29 LAB
A/G RATIO: 1.3 (ref 1.1–2.2)
ALBUMIN SERPL-MCNC: 3.8 G/DL (ref 3.4–5)
ALP BLD-CCNC: 104 U/L (ref 40–129)
ALT SERPL-CCNC: 15 U/L (ref 10–40)
ANION GAP SERPL CALCULATED.3IONS-SCNC: 9 MMOL/L (ref 3–16)
AST SERPL-CCNC: 22 U/L (ref 15–37)
BASOPHILS ABSOLUTE: 0 K/UL (ref 0–0.2)
BASOPHILS RELATIVE PERCENT: 0.7 %
BILIRUB SERPL-MCNC: 0.3 MG/DL (ref 0–1)
BUN BLDV-MCNC: 26 MG/DL (ref 7–20)
CALCIUM SERPL-MCNC: 9.1 MG/DL (ref 8.3–10.6)
CHLORIDE BLD-SCNC: 95 MMOL/L (ref 99–110)
CO2: 27 MMOL/L (ref 21–32)
CREAT SERPL-MCNC: 1.1 MG/DL (ref 0.6–1.2)
EOSINOPHILS ABSOLUTE: 0.2 K/UL (ref 0–0.6)
EOSINOPHILS RELATIVE PERCENT: 4.1 %
GFR AFRICAN AMERICAN: 58
GFR NON-AFRICAN AMERICAN: 48
GLOBULIN: 3 G/DL
GLUCOSE BLD-MCNC: 100 MG/DL (ref 70–99)
HCT VFR BLD CALC: 34.3 % (ref 36–48)
HEMOGLOBIN: 11.6 G/DL (ref 12–16)
LYMPHOCYTES ABSOLUTE: 1 K/UL (ref 1–5.1)
LYMPHOCYTES RELATIVE PERCENT: 19.6 %
MCH RBC QN AUTO: 32.7 PG (ref 26–34)
MCHC RBC AUTO-ENTMCNC: 33.7 G/DL (ref 31–36)
MCV RBC AUTO: 97.1 FL (ref 80–100)
MONOCYTES ABSOLUTE: 0.7 K/UL (ref 0–1.3)
MONOCYTES RELATIVE PERCENT: 13.7 %
NEUTROPHILS ABSOLUTE: 3.3 K/UL (ref 1.7–7.7)
NEUTROPHILS RELATIVE PERCENT: 61.9 %
ORGANISM: ABNORMAL
PDW BLD-RTO: 12.9 % (ref 12.4–15.4)
PLATELET # BLD: 242 K/UL (ref 135–450)
PMV BLD AUTO: 6.4 FL (ref 5–10.5)
POTASSIUM SERPL-SCNC: 3.8 MMOL/L (ref 3.5–5.1)
RBC # BLD: 3.53 M/UL (ref 4–5.2)
SODIUM BLD-SCNC: 131 MMOL/L (ref 136–145)
TOTAL PROTEIN: 6.8 G/DL (ref 6.4–8.2)
URINE CULTURE, ROUTINE: ABNORMAL
WBC # BLD: 5.3 K/UL (ref 4–11)

## 2019-08-29 PROCEDURE — 6360000002 HC RX W HCPCS: Performed by: STUDENT IN AN ORGANIZED HEALTH CARE EDUCATION/TRAINING PROGRAM

## 2019-08-29 PROCEDURE — 85025 COMPLETE CBC W/AUTO DIFF WBC: CPT

## 2019-08-29 PROCEDURE — 99238 HOSP IP/OBS DSCHRG MGMT 30/<: CPT | Performed by: INTERNAL MEDICINE

## 2019-08-29 PROCEDURE — 6370000000 HC RX 637 (ALT 250 FOR IP): Performed by: STUDENT IN AN ORGANIZED HEALTH CARE EDUCATION/TRAINING PROGRAM

## 2019-08-29 PROCEDURE — 80053 COMPREHEN METABOLIC PANEL: CPT

## 2019-08-29 PROCEDURE — 36415 COLL VENOUS BLD VENIPUNCTURE: CPT

## 2019-08-29 PROCEDURE — 2580000003 HC RX 258: Performed by: STUDENT IN AN ORGANIZED HEALTH CARE EDUCATION/TRAINING PROGRAM

## 2019-08-29 PROCEDURE — 6370000000 HC RX 637 (ALT 250 FOR IP): Performed by: INTERNAL MEDICINE

## 2019-08-29 RX ORDER — CIPROFLOXACIN 500 MG/1
500 TABLET, FILM COATED ORAL EVERY 12 HOURS SCHEDULED
Qty: 5 TABLET | Refills: 0 | Status: CANCELLED | OUTPATIENT
Start: 2019-08-29 | End: 2019-09-01

## 2019-08-29 RX ORDER — METHOCARBAMOL 500 MG/1
500 TABLET, FILM COATED ORAL EVERY 6 HOURS PRN
Qty: 16 TABLET | Refills: 0 | Status: CANCELLED | OUTPATIENT
Start: 2019-08-29 | End: 2019-09-02

## 2019-08-29 RX ORDER — CIPROFLOXACIN 500 MG/1
500 TABLET, FILM COATED ORAL EVERY 12 HOURS SCHEDULED
Status: DISCONTINUED | OUTPATIENT
Start: 2019-08-29 | End: 2019-08-29 | Stop reason: HOSPADM

## 2019-08-29 RX ORDER — METHOCARBAMOL 500 MG/1
500 TABLET, FILM COATED ORAL EVERY 6 HOURS PRN
Qty: 16 TABLET | Refills: 0 | Status: SHIPPED | OUTPATIENT
Start: 2019-08-29 | End: 2019-09-02

## 2019-08-29 RX ORDER — CIPROFLOXACIN 500 MG/1
500 TABLET, FILM COATED ORAL EVERY 12 HOURS SCHEDULED
Qty: 5 TABLET | Refills: 0 | Status: SHIPPED | OUTPATIENT
Start: 2019-08-29 | End: 2019-09-01

## 2019-08-29 RX ADMIN — CLOPIDOGREL BISULFATE 75 MG: 75 TABLET, FILM COATED ORAL at 14:28

## 2019-08-29 RX ADMIN — ROSUVASTATIN CALCIUM 20 MG: 20 TABLET, COATED ORAL at 08:07

## 2019-08-29 RX ADMIN — ENOXAPARIN SODIUM 40 MG: 40 INJECTION SUBCUTANEOUS at 08:06

## 2019-08-29 RX ADMIN — FUROSEMIDE 20 MG: 10 INJECTION, SOLUTION INTRAMUSCULAR; INTRAVENOUS at 08:16

## 2019-08-29 RX ADMIN — PANTOPRAZOLE SODIUM 20 MG: 20 TABLET, DELAYED RELEASE ORAL at 06:20

## 2019-08-29 RX ADMIN — BUPROPION HYDROCHLORIDE 300 MG: 150 TABLET, FILM COATED, EXTENDED RELEASE ORAL at 08:07

## 2019-08-29 RX ADMIN — CIPROFLOXACIN 500 MG: 500 TABLET, FILM COATED ORAL at 08:07

## 2019-08-29 RX ADMIN — CITALOPRAM HYDROBROMIDE 20 MG: 20 TABLET ORAL at 08:07

## 2019-08-29 RX ADMIN — HYDROXYCHLOROQUINE SULFATE 200 MG: 200 TABLET, FILM COATED ORAL at 14:28

## 2019-08-29 RX ADMIN — Medication 10 ML: at 08:07

## 2019-08-29 ASSESSMENT — PAIN DESCRIPTION - PAIN TYPE: TYPE: ACUTE PAIN

## 2019-08-29 ASSESSMENT — PAIN DESCRIPTION - ONSET: ONSET: ON-GOING

## 2019-08-29 ASSESSMENT — PAIN DESCRIPTION - LOCATION: LOCATION: BACK

## 2019-08-29 ASSESSMENT — PAIN DESCRIPTION - ORIENTATION: ORIENTATION: MID

## 2019-08-29 ASSESSMENT — PAIN DESCRIPTION - FREQUENCY: FREQUENCY: CONTINUOUS

## 2019-08-29 ASSESSMENT — PAIN SCALES - GENERAL: PAINLEVEL_OUTOF10: 3

## 2019-08-29 ASSESSMENT — PAIN DESCRIPTION - PROGRESSION: CLINICAL_PROGRESSION: NOT CHANGED

## 2019-08-29 NOTE — CARE COORDINATION
[address] Transitions Interview     2019    Patient: Jossy Jordan Patient : 1945   MRN: [id number]   Reason for Admission:  Intractable pain   RARS: Readmission Risk Score: 16         Spoke with: Jossy Jordan and Jorden Goins, caregiver       Readmission Risk  Patient Active Problem List   Diagnosis    Pure hypercholesterolemia    Cervicalgia    Depression, major, recurrent, in remission ([address]    Lumbar spinal stenosis    Systemic lupus erythematosus ([address]    Osteoporosis    Closed fracture of part of neck of femur ([address]    Peptic ulcer    Urge incontinence    Hearing loss    Vitamin D deficiency    mixed Incontinence    Essential hypertension, benign    Intertrochanteric fracture of left hip (HCC)    CHF (congestive heart failure) ([address]    [facility]) [facility]    Allergic rhinitis    Ataxia involving legs    Coronary artery disease involving native coronary artery of native heart without angina pectoris    Acute left-sided weakness    Right-sided cerebrovascular accident (CVA) ([address]    Bunion of great toe of right foot    Depressive disorder    Interstitial lung disease ([address]    Intractable pain    Closed fracture of ninth thoracic vertebra with delayed healing    Pain in thoracic spine    Intractable back pain    Recurrent falls       Inpatient Assessment  Care Transitions Summary    Care Transitions Inpatient Review  Medication Review  Do you have all of your prescriptions and are they filled?:  Yes   Barriers to Medication Adherence:  None  Are you able to afford your medications?:  Yes  How often do you have difficulty taking your medications?:  I always take them as prescribed.   Housing Review  Who do you live with?:  Other Caregiver  Are you an active caregiver in your home?:  No  Social Support  Durable Medical Equipment  Patient DME:  Shower chair, Walker, Other  Other Patient DME:  walk-in shower, [facility], grab bar / toilet & shower, toilet raiser w/arms, rollator   Functional Review  Ability handle personal hygiene needs (bathing/dressing/grooming):  Needs Assistance  Ability to manage medications:  Needs Assistance  Ability to prepare food:  Dependent  Ability to maintain home (clean home, laundry):  Needs Assistance  Ability to drive and/or has transportation:  Dependent  Ability to do shopping:  Dependent  Ability to manage finances: Independent  Hearing and Vision  Visual Impairment:  Visual impairment (Glasses/contacts)  Hearing Impairment:  Wears hearing aids, Hard of hearing (Comment: bilatral hearing aids )  Care Transitions Interventions     Other Services:  Completed (Comment: referral made to community toma educator )        Summary  CTC spoke to the Pt and her caregiver, Leroy Sandoval. They report that they live in a two story home with one KAVEH but Pt lives on the main level of the home. Pt reports having DME (see list) and uses a rollator at all times to ambulate. Pt also reports multiple falls in the home d/t right sided weakness and losing her balance. Pt needs assistance with transportation, cooking, housekeeping, med setup, and bathing which is all provided by Leroy Sandoval. Pt and Leroy Chester reports that Pt will d/c to [facility] before returning home. Pt has a history of HF and CTC explained and provided the Pt with a copy of the HF Zone Tool Sheet. CTC provided Pt education on when and how to obtain daily weight, importance of daily weight, tracking information to share with physicians, and when to call physicians regarding weight changes. Pt confirms she has a scale at home. CTC offered to have education mailed to the Pt on HF management and Pt declined. Caregiver did requested education on Alzheimer and sent an e-mail to community health educator at the home office to mail information to [facility] as requested. Pt and caregiver deny any additional questions and concerns and CTC will continue to follow while IP.     Follow Up  Future Appointments   Date Time Provider Giuliana Clementine   9/10/2019 10:00 AM Diego Mcdonnell MD [facility]  There are no preventive care reminders to display for this patient.     Tequila Saez RN

## 2019-08-29 NOTE — PROGRESS NOTES
Lupus  - Continue home hydroxychloroquine      Code Status: Full Code  DIET: LOW SODIUM 2 GM;   PPX: lovenox  DISPO: [facility]    Jordan Ricci MD, PGY-1  08/29/19  11:14 AM    This patient has been staffed and discussed with Patiedie Jurado MD.

## 2019-08-29 NOTE — DISCHARGE SUMMARY
obtained and were remarkable for chronic vertebral body compression fractures involving the T9 and T11 vertebrae as well as disc osteophytes in the T10-11 vertebrae. Neurosurgery was consulted at the time and felt that the patient was not a candidate for any future surgical intervention. The patient's caregiver stated that he has been giving her tramadol at home without any meaningful pain relief. He decided to bring the patient to the emergency department again this evening because he was concerned that he is no longer able to take care of her and was interested in skilled nursing and/or rehabilitation placement. Floor course:  Pt was admitted for management of intractable back pain. NSGY again deemed the pt to be a non-surgical candidate. Pain regimen was continued, back brace obtained for patient. SNF placement obtained by social work, will continue therapy at skilled facility. PT had pleural effusions bilaterally at admission, was diuresed appropriately. Eventually cleared for discharge to skilled facility. The morning of discharge, patient was seen at bedside. Endorsed no new complaints, says that her back is uncomfortable, but pain is manageable. Denies any fevers, chills, chest pain, palpitations, leg swelling, cough, shortness of breath, difficulty breathing, wheezing, abdominal pain, nausea, vomiting, diarrhea. Tolerating a cardiac diet, urinating and stooling appropriately. Disposition:  Skilled Facility    Physical Exam Performed:     /68   Pulse 58   Temp 98.3 °F (36.8 °C) (Oral)   Resp 17   Ht 5' (1.524 m)   Wt 164 lb 0.4 oz (74.4 kg)   SpO2 92%   Breastfeeding?  No   BMI 32.03 kg/m²       Physical Exam:  Const: Well nourished, well developed, not distressed, not diaphoretic, appears stated age  Eyes: PERRL, EOMI, no conjunctival injection, no discharge  HENT: Normocephalic, atraumatic, oropharynx not erythematous, no postnasal drip  Neck: Supple, no JVD, no thyromegaly, vital signs and oxygen saturation and notify the healthcare provider managing the HF if fluid volume overload is confirmed     To contact [facility], call [phone]                 Diet:   No salt added and 64 oz fluid restriction  No potassium based salt substitutes     Labs:              BMP,BNP              Frequency:  Weekly x 4              Fax results to: [facility] Clinic: [phone]     Med List attached:              Hold Coreg/Metoprolol if HR less than 45 or patient symptomatic              Hold ACE/ARB if SBP less than 85 or patient symptomatic              Do not hold Spironolactone (aldactone) for hypotension/bradycardia              Avoid NSAIDS or decongestants              Call MD/NP for questions: CHF Clinic: [phone]     Heart Failure Screening Tool: A NEW LEAF  A: Acute agitation or anxiety  N: Nighttime shortness of breath or increase in nighttime urination  E: Edema in lower extremities  W: Weight gain  L: Lightheadedness  E: Extrema shortness of breath lying down  A: Abdominal symptoms (nausea, pain, decreased appetitie, distention:  F: Fatigue    Activity: activity as tolerated    Diet: cardiac diet    Discharge Medications:     Discharge Medication List as of 8/29/2019  1:22 PM           Details   ciprofloxacin (CIPRO) 500 MG tablet Take 1 tablet by mouth every 12 hours for 5 doses, Disp-5 tablet, R-0Print              Details   methocarbamol (ROBAXIN) 500 MG tablet Take 1 tablet by mouth every 6 hours as needed (back pain) WARNING:  May cause drowsiness. May impair ability to operate vehicles or machinery.   Do not use in combination with alcohol., Disp-16 tablet, R-0Print              Details   furosemide (LASIX) 20 MG tablet TAKE 1 TABLET BY MOUTH DAILY AS NEEDED FOR SWELLING, Disp-30 tablet, R-0Normal      Cholecalciferol (VITAMIN D3) 24431 units CAPS TAKE 1 CAPSULE EVERY OTHER WEEK, Disp-4 capsule, R-0Normal      losartan (COZAAR) 25 MG tablet TAKE 1 TABLET BY MOUTH

## 2019-08-29 NOTE — CARE COORDINATION
Case Management Assessment            Discharge Note                    Date / Time of Note: 8/29/2019 1:33 PM                  Discharge Note Completed by: Svetlana Perez    Patient Name: Noa Iniguez   YOB: 1945  Diagnosis: Intractable pain [R52]  Intractable pain [R52]   Date / Time: 8/25/2019 11:31 PM    Current PCP: Lissy Bass MD  Clinic patient: No    Hospitalization in the last 30 days: No    Advance Directives:  Code Status: Full Code  [place] DNR form completed and on chart: Not Indicated    Financial:  Payor: MEDICARE / Plan: MEDICARE PART A AND B / Product Type: *No Product type* /      Pharmacy:    [facility] [address] [place] [address]  [phone]  Phone: [phone] Fax: [phone]      Assistance purchasing medications?: Potential Assistance Purchasing Medications: Yes  Assistance provided by Case Management: None at this time    Does patient want to participate in local refill/ meds to beds program?: Yes    Meds To Beds General Rules:  1. Can ONLY be done Monday- Friday between 8:30am-5pm  2. Prescription(s) must be in pharmacy by 3pm to be filled same day  3. Copy of patient's insurance/ prescription drug card and patient face sheet must be sent along with the prescription(s)  4. Cost of Rx cannot be added to hospital bill. If financial assistance is needed, please contact unit  or ;  or  CANNOT provide pharmacy voucher for patients co-pays  5.  Patients can then  the prescription on their way out of the hospital at discharge, or pharmacy can deliver to the bedside if staff is available. (payment due at time of pick-up or delivery - cash, check, or card accepted)     Able to afford home medications/ co-pay costs: Yes    ADLS:  Current PT AM-PAC Score: 16 /24  Current OT AM-PAC Score: 14 /24      DISCHARGE Disposition:

## 2019-08-29 NOTE — PLAN OF CARE
Problem: Falls - Risk of:  Goal: Will remain free from falls  Description  Will remain free from falls  8/29/2019 1312 by To Levy RN  Outcome: Ongoing  Note:   Patient high fall risk and is up with assist x2. Patient alert and oriented x4, non skid socks on, bed in lowest position and locked, side rails up x2, call light and belongings within reach, bed alarm on for safety, fall sign posted. Will continue to monitor. 8/29/2019 0629 by Nilesh Euceda RN  Outcome: Met This Shift     Problem: Pain:  Goal: Control of acute pain  Description  Control of acute pain  Note:   Patient currently denying pain. Will continue to monitor.

## 2019-08-29 NOTE — DISCHARGE INSTR - COC
Electronically signed by KERON Leija on 8/29/19 at 12:39 PM    PHYSICIAN SECTION    Prognosis: Good    Condition at Discharge: Stable    Rehab Potential (if transferring to Rehab): Fair    Recommended Labs or Other Treatments After Discharge:     A weight gain of 3 pounds in one day OR 5 pound weight gain in one week  should alert licensed staff to perform an advanced assessment of volume status using   A NEW LEAF. Take vital signs and oxygen saturation and notify the healthcare provider managing the HF if fluid volume overload is confirmed    [facility], call [phone]        Diet:   No salt added and 64 oz fluid restriction  No potassium based salt substitutes      Labs:   BMP,BNP   Frequency:  Weekly x 4              Fax results to: CHF Clinic: [phone]      Med List attached:   Hold Coreg/Metoprolol if HR less than 45 or patient symptomatic   Hold ACE/ARB if SBP less than 85 or patient symptomatic   Do not hold Spironolactone (aldactone) for hypotension/bradycardia   Avoid NSAIDS or decongestants   Call MD/NP for questions: CHF Clinic: [phone]    Heart Failure Screening Tool: A NEW LEAF  A: Acute agitation or anxiety  N: Nighttime shortness of breath or increase in nighttime urination  E: Edema in lower extremities  W: Weight gain  L: Lightheadedness  E: Extrema shortness of breath lying down  A: Abdominal symptoms (nausea, pain, decreased appetitie, distention:  F: Fatigue      Physician Certification: I certify the above information and transfer of Jimbo Lange  is necessary for the continuing treatment of the diagnosis listed and that she requires [facility] for greater 30 days.      Update Admission H&P: No change in H&P    PHYSICIAN SIGNATURE:  Electronically signed by Ava Smith MD on 8/29/19 at 1:18 PM on behalf of Tello Salmon MD

## 2019-08-30 ENCOUNTER — APPOINTMENT (OUTPATIENT)
Dept: PHYSICAL THERAPY | Age: 74
End: 2019-08-30
Payer: MEDICARE

## 2019-09-05 ENCOUNTER — TELEPHONE (OUTPATIENT)
Dept: INTERNAL MEDICINE CLINIC | Age: 74
End: 2019-09-05

## 2019-09-05 NOTE — TELEPHONE ENCOUNTER
Pt care giver came into office stated the following:  Pt is on a fluid restriction  Pt care giver stated that told pt to drink all water she can get. Pt Caregiver stated that wants to know who did the Fluid restriction.   Please call

## 2019-09-06 ENCOUNTER — CARE COORDINATION (OUTPATIENT)
Dept: CARE COORDINATION | Age: 74
End: 2019-09-06

## 2019-09-10 ENCOUNTER — OFFICE VISIT (OUTPATIENT)
Dept: INTERNAL MEDICINE CLINIC | Age: 74
End: 2019-09-10
Payer: MEDICARE

## 2019-09-10 VITALS
TEMPERATURE: 97.9 F | BODY MASS INDEX: 31.05 KG/M2 | HEART RATE: 65 BPM | DIASTOLIC BLOOD PRESSURE: 74 MMHG | WEIGHT: 159 LBS | SYSTOLIC BLOOD PRESSURE: 128 MMHG | OXYGEN SATURATION: 95 %

## 2019-09-10 DIAGNOSIS — I10 ESSENTIAL HYPERTENSION, BENIGN: ICD-10-CM

## 2019-09-10 DIAGNOSIS — F33.40 DEPRESSION, MAJOR, RECURRENT, IN REMISSION (HCC): Primary | ICD-10-CM

## 2019-09-10 DIAGNOSIS — M32.9 SYSTEMIC LUPUS ERYTHEMATOSUS, UNSPECIFIED SLE TYPE, UNSPECIFIED ORGAN INVOLVEMENT STATUS (HCC): ICD-10-CM

## 2019-09-10 DIAGNOSIS — M48.061 SPINAL STENOSIS OF LUMBAR REGION, UNSPECIFIED WHETHER NEUROGENIC CLAUDICATION PRESENT: ICD-10-CM

## 2019-09-10 DIAGNOSIS — R06.09 EXERTIONAL DYSPNEA: ICD-10-CM

## 2019-09-10 DIAGNOSIS — R53.81 PHYSICAL DECONDITIONING: ICD-10-CM

## 2019-09-10 PROCEDURE — G8417 CALC BMI ABV UP PARAM F/U: HCPCS | Performed by: INTERNAL MEDICINE

## 2019-09-10 PROCEDURE — 1090F PRES/ABSN URINE INCON ASSESS: CPT | Performed by: INTERNAL MEDICINE

## 2019-09-10 PROCEDURE — G8598 ASA/ANTIPLAT THER USED: HCPCS | Performed by: INTERNAL MEDICINE

## 2019-09-10 PROCEDURE — 1123F ACP DISCUSS/DSCN MKR DOCD: CPT | Performed by: INTERNAL MEDICINE

## 2019-09-10 PROCEDURE — 99214 OFFICE O/P EST MOD 30 MIN: CPT | Performed by: INTERNAL MEDICINE

## 2019-09-10 PROCEDURE — 1111F DSCHRG MED/CURRENT MED MERGE: CPT | Performed by: INTERNAL MEDICINE

## 2019-09-10 PROCEDURE — 1036F TOBACCO NON-USER: CPT | Performed by: INTERNAL MEDICINE

## 2019-09-10 PROCEDURE — G8399 PT W/DXA RESULTS DOCUMENT: HCPCS | Performed by: INTERNAL MEDICINE

## 2019-09-10 PROCEDURE — 4040F PNEUMOC VAC/ADMIN/RCVD: CPT | Performed by: INTERNAL MEDICINE

## 2019-09-10 PROCEDURE — G8427 DOCREV CUR MEDS BY ELIG CLIN: HCPCS | Performed by: INTERNAL MEDICINE

## 2019-09-10 RX ORDER — CARVEDILOL 25 MG/1
TABLET ORAL
Qty: 60 TABLET | Refills: 2 | Status: SHIPPED | OUTPATIENT
Start: 2019-09-10 | End: 2019-10-14

## 2019-09-10 RX ORDER — LOSARTAN POTASSIUM 25 MG/1
25 TABLET ORAL DAILY
Qty: 30 TABLET | Refills: 0 | Status: SHIPPED | OUTPATIENT
Start: 2019-09-10 | End: 2019-10-13 | Stop reason: SDUPTHER

## 2019-09-10 ASSESSMENT — ENCOUNTER SYMPTOMS
SHORTNESS OF BREATH: 0
CHEST TIGHTNESS: 0
WHEEZING: 0
ABDOMINAL PAIN: 0
COUGH: 0
VOMITING: 0
DIARRHEA: 0
NAUSEA: 0
BLOOD IN STOOL: 0

## 2019-09-10 NOTE — PROGRESS NOTES
nerve deficit or sensory deficit. She exhibits normal muscle tone. GCS eye subscore is 4. GCS verbal subscore is 5. GCS motor subscore is 6. No leg weakness   Skin: No rash noted. She is not diaphoretic. Psychiatric: She has a normal mood and affect. Her behavior is normal. Thought content normal.       Assessment/Plan:   Mk Nvaarrete was seen today for follow-up from hospital.    Diagnoses and all orders for this visit:    Depression, major, recurrent, in remission ([facility]  Pt fels depressed. She agrees to psych eval.   Already on SSRI  -     External Referral to Psychiatry    Essential hypertension, benign  Well controlled- no changes in medication today. Spinal stenosis of lumbar region, unspecified whether neurogenic claudication present  I am not impressed with any leg weakness. p refuses any procedure for her back pain--> keep PT. Systemic lupus erythematosus, unspecified SLE type, unspecified organ involvement status ([facility]  PT was followed by derm only--> referral to rheum  -     Jessica Alcazar MD, Rheumatology, [facility]    - Patient was encouraged to callthe office (and ask to see me) or be seen by other provider for any worsening or lack    of improvement in his symptoms.       - Pt was asked toschedule an appointment in 2 months, and to let me know of any scheduling difficulties. Additional patients instructions (if given): There are no Patient Instructions on file for this visit. Marcelo Lea M.D.   9/10/2019, 10:52 AM    Pt has exertional dyspnea. Last CXR ok but general deconditioning .    I will refer to CT chest and pulm eval. I will send a message to call pt and refer her

## 2019-09-12 ENCOUNTER — TELEPHONE (OUTPATIENT)
Dept: INTERNAL MEDICINE CLINIC | Age: 74
End: 2019-09-12

## 2019-09-12 ENCOUNTER — OFFICE VISIT (OUTPATIENT)
Dept: PSYCHIATRY | Age: 74
End: 2019-09-12
Payer: MEDICARE

## 2019-09-12 VITALS
HEART RATE: 55 BPM | DIASTOLIC BLOOD PRESSURE: 58 MMHG | BODY MASS INDEX: 30.78 KG/M2 | SYSTOLIC BLOOD PRESSURE: 100 MMHG | WEIGHT: 156.8 LBS | HEIGHT: 60 IN

## 2019-09-12 DIAGNOSIS — F41.1 GAD (GENERALIZED ANXIETY DISORDER): ICD-10-CM

## 2019-09-12 DIAGNOSIS — F33.1 MODERATE EPISODE OF RECURRENT MAJOR DEPRESSIVE DISORDER (HCC): Primary | ICD-10-CM

## 2019-09-12 DIAGNOSIS — F33.40 DEPRESSION, MAJOR, RECURRENT, IN REMISSION (HCC): ICD-10-CM

## 2019-09-12 DIAGNOSIS — R06.09 EXERTIONAL DYSPNEA: Primary | ICD-10-CM

## 2019-09-12 PROBLEM — F32.A DEPRESSIVE DISORDER: Status: RESOLVED | Noted: 2018-03-06 | Resolved: 2019-09-12

## 2019-09-12 PROCEDURE — 90792 PSYCH DIAG EVAL W/MED SRVCS: CPT | Performed by: PSYCHIATRY & NEUROLOGY

## 2019-09-12 RX ORDER — ARIPIPRAZOLE 5 MG/1
5 TABLET ORAL DAILY
Qty: 30 TABLET | Refills: 1 | Status: SHIPPED | OUTPATIENT
Start: 2019-09-12 | End: 2019-10-10

## 2019-09-12 RX ORDER — BUPROPION HYDROCHLORIDE 300 MG/1
TABLET ORAL
Qty: 30 TABLET | Refills: 5 | Status: SHIPPED | OUTPATIENT
Start: 2019-09-12 | End: 2020-02-11 | Stop reason: SDUPTHER

## 2019-09-13 ENCOUNTER — TELEPHONE (OUTPATIENT)
Dept: INTERNAL MEDICINE CLINIC | Age: 74
End: 2019-09-13

## 2019-09-13 DIAGNOSIS — S72.009S CLOSED FRACTURE OF NECK OF FEMUR, UNSPECIFIED LATERALITY, SEQUELA: Primary | ICD-10-CM

## 2019-09-14 ENCOUNTER — TELEPHONE (OUTPATIENT)
Dept: INTERNAL MEDICINE CLINIC | Age: 74
End: 2019-09-14

## 2019-09-14 DIAGNOSIS — R30.0 DYSURIA: Primary | ICD-10-CM

## 2019-09-16 ENCOUNTER — TELEPHONE (OUTPATIENT)
Dept: INTERNAL MEDICINE CLINIC | Age: 74
End: 2019-09-16

## 2019-09-16 RX ORDER — TRAMADOL HYDROCHLORIDE 50 MG/1
TABLET ORAL
Qty: 14 TABLET | Refills: 0 | OUTPATIENT
Start: 2019-09-16 | End: 2019-09-17

## 2019-09-17 ENCOUNTER — OFFICE VISIT (OUTPATIENT)
Dept: PSYCHOLOGY | Age: 74
End: 2019-09-17
Payer: MEDICARE

## 2019-09-17 DIAGNOSIS — F33.1 MODERATE EPISODE OF RECURRENT MAJOR DEPRESSIVE DISORDER (HCC): Primary | ICD-10-CM

## 2019-09-17 PROCEDURE — 90832 PSYTX W PT 30 MINUTES: CPT | Performed by: PSYCHOLOGIST

## 2019-09-17 ASSESSMENT — ANXIETY QUESTIONNAIRES
6. BECOMING EASILY ANNOYED OR IRRITABLE: 3-NEARLY EVERY DAY
7. FEELING AFRAID AS IF SOMETHING AWFUL MIGHT HAPPEN: 2-OVER HALF THE DAYS
GAD7 TOTAL SCORE: 14
5. BEING SO RESTLESS THAT IT IS HARD TO SIT STILL: 1-SEVERAL DAYS
3. WORRYING TOO MUCH ABOUT DIFFERENT THINGS: 2-OVER HALF THE DAYS
1. FEELING NERVOUS, ANXIOUS, OR ON EDGE: 2-OVER HALF THE DAYS
2. NOT BEING ABLE TO STOP OR CONTROL WORRYING: 2-OVER HALF THE DAYS
4. TROUBLE RELAXING: 2-OVER HALF THE DAYS

## 2019-09-17 ASSESSMENT — PATIENT HEALTH QUESTIONNAIRE - PHQ9
1. LITTLE INTEREST OR PLEASURE IN DOING THINGS: 1
SUM OF ALL RESPONSES TO PHQ QUESTIONS 1-9: 2
SUM OF ALL RESPONSES TO PHQ QUESTIONS 1-9: 2
SUM OF ALL RESPONSES TO PHQ9 QUESTIONS 1 & 2: 2
2. FEELING DOWN, DEPRESSED OR HOPELESS: 1

## 2019-09-17 NOTE — PROGRESS NOTES
Behavioral Health Consultation  [address], [address] [place] [address]  Psychologist  9/17/2019  12:18 PM      Time spent with Patient: 30 minutes  This is patient's twenty ninth [facility] appointment. Reason for Consult:  depression  Referring Provider: Nagi Steele MD  [id number] [address]. [id number], [address] [place] [address]      Feedback given to PCP. S:  During the last visit pt set goals to 1) continue to engage in pleasurable activities out of the house regularly 2) work on responding during conflict when not emotionally flooded 3) return for f/u in one month      Pt reports \"a lot of stuff\" has happened. Still bothered by having to use a walker. Continues to go to PT and OT. Saw dr. Sara Smith and was started on new medication. Has been on the medication for 3 days. Has been getting headaches every day and is tired. First 2 days was nauseas. Is considering a wheelchair and trying to accept it. Not reading as much  but when she does she falls asleep too easily. Stress people in her life - setting limits around people staying at her house so she can focus on getting better herself. Feels down about it all but nothing worse. Not engaging in as much out of the house due to all her therapies and feeling tired.  Hasn't been reading as much      O:  MSE:    Appearance    alert, cooperative  Sleep disturbance Yes  Fatigue Yes  Loss of pleasure Yes  Impulsive behavior No  Speech    normal volume, normal rate  Mood   \"depressed - ok\"  Affect    Congruent to thought content and mood  Thought Content    intact  Thought Process    linear and goal directed  Associations    logical connections  Insight    Good  Judgment    Intact  Orientation    oriented to person, place, time, and general circumstances  Memory    recent and remote memory intact  Attention/Concentration    intact  Suicide Assessment    Denies SI      History:    Medications:   Current Outpatient Medications   Medication Sig Dispense Refill    buPROPion insecurity:     Worry: Not on file     Inability: Not on file    Transportation needs:     Medical: Not on file     Non-medical: Not on file   Tobacco Use    Smoking status: Former Smoker     Packs/day: 0.50     Years: 30.00     Pack years: 15.00     Types: Cigarettes     Start date:      Last attempt to quit: 2014     Years since quittin.9    Smokeless tobacco: Never Used   Substance and Sexual Activity    Alcohol use: Yes     Alcohol/week: 0.0 standard drinks     Comment: 2 cocktails on weekends    Drug use: No    Sexual activity: Not Currently   Lifestyle    Physical activity:     Days per week: Not on file     Minutes per session: Not on file    Stress: Not on file   Relationships    Social connections:     Talks on phone: Not on file     Gets together: Not on file     Attends Judaism service: Not on file     Active member of club or organization: Not on file     Attends meetings of clubs or organizations: Not on file     Relationship status: Not on file    Intimate partner violence:     Fear of current or ex partner: Not on file     Emotionally abused: Not on file     Physically abused: Not on file     Forced sexual activity: Not on file   Other Topics Concern    Not on file   Social History Narrative    Not on file       TOBACCO:   reports that she quit smoking about 4 years ago. Her smoking use included cigarettes. She started smoking about 54 years ago. She has a 15.00 pack-year smoking history. She has never used smokeless tobacco.  ETOH:   reports that she drinks alcohol. Family History:   Family History   Problem Relation Age of Onset    High Blood Pressure Brother          A:  Ms. Lyndsey Jeffries mood continues to be depressed. Ms. Amita Mcleod continues to struggle with various medical problems that exacerbate her depression. She continues to be active and engaged and responds fairly to behavioral interventions.        PHQ Scores 2019 3/8/2019   PHQ2 Score 2 2 2 2 3 2 3   PHQ9 Score 2 10 8 5 11 2 11     Interpretation of Total Score Depression Severity: 1-4 = Minimal depression, 5-9 = Mild depression, 10-14 = Moderate depression, 15-19 = Moderately severe depression, 20-27 = Severe depression        BLANCA 7 SCORE 9/17/2019 7/18/2019 6/20/2019 6/6/2019 5/2/2019 4/2/2019 3/8/2019   BLANCA-7 Total Score 14 18 13 19 14 11 17     Interpretation of BLANCA-7 score: 5-9 = mild anxiety, 10-14 = moderate anxiety, 15+ = severe anxiety. Recommend referral to behavioral health for scores 10 or greater.           Diagnosis:    MDD, Recurrent, Moderate  Stress      Plan:  Pt interventions:  [place]-setting to identify pt's primary goals for [facility] visit / overall health and Supportive techniques  ACT interventions and reinforced pt's skill use, behavioral activation interventions,  treatment planning    Pt Behavioral Change Plan:  Pt set goals to 1) continue to set limits with others to focus on your own health 2) increase engagement in pleasurable activities out of the house regularly 3) return for f/u in 6 weeks, sooner if needed

## 2019-09-18 LAB
AMORPHOUS: ABNORMAL /HPF
BACTERIA: ABNORMAL /HPF
BILIRUBIN URINE: NEGATIVE
BLOOD, URINE: ABNORMAL
CLARITY: ABNORMAL
COLOR: YELLOW
CRYSTALS, UA: ABNORMAL /HPF
EPITHELIAL CELLS, UA: 4 /HPF (ref 0–5)
GLUCOSE URINE: NEGATIVE MG/DL
HYALINE CASTS: 3 /LPF (ref 0–8)
KETONES, URINE: NEGATIVE MG/DL
LEUKOCYTE ESTERASE, URINE: ABNORMAL
MICROSCOPIC EXAMINATION: YES
MUCUS: ABNORMAL /LPF
NITRITE, URINE: POSITIVE
PH UA: 6.5 (ref 5–8)
PROTEIN UA: 30 MG/DL
RBC UA: 2 /HPF (ref 0–4)
SPECIFIC GRAVITY UA: 1.02 (ref 1–1.03)
UROBILINOGEN, URINE: 0.2 E.U./DL
WBC UA: 600 /HPF (ref 0–5)

## 2019-09-19 ENCOUNTER — TELEPHONE (OUTPATIENT)
Dept: INTERNAL MEDICINE CLINIC | Age: 74
End: 2019-09-19

## 2019-09-19 ENCOUNTER — HOSPITAL ENCOUNTER (OUTPATIENT)
Dept: CT IMAGING | Age: 74
Discharge: HOME | End: 2019-09-19
Payer: MEDICARE

## 2019-09-19 DIAGNOSIS — R53.81 PHYSICAL DECONDITIONING: ICD-10-CM

## 2019-09-19 PROCEDURE — 71250 CT THORAX DX C-: CPT

## 2019-09-19 RX ORDER — CIPROFLOXACIN 500 MG/1
500 TABLET, FILM COATED ORAL 2 TIMES DAILY
Qty: 10 TABLET | Refills: 0 | Status: SHIPPED | OUTPATIENT
Start: 2019-09-19 | End: 2019-10-01 | Stop reason: SDUPTHER

## 2019-09-19 NOTE — TELEPHONE ENCOUNTER
Lalito Baron pt caregiver stated the following:  Pt has blistering around mouth,   on tongue on and off for a while.    Pharmacy on file verified  Please call

## 2019-09-20 ENCOUNTER — TELEPHONE (OUTPATIENT)
Dept: INTERNAL MEDICINE CLINIC | Age: 74
End: 2019-09-20

## 2019-09-20 ENCOUNTER — OFFICE VISIT (OUTPATIENT)
Dept: INTERNAL MEDICINE CLINIC | Age: 74
End: 2019-09-20
Payer: MEDICARE

## 2019-09-20 VITALS
DIASTOLIC BLOOD PRESSURE: 70 MMHG | WEIGHT: 157 LBS | BODY MASS INDEX: 30.66 KG/M2 | SYSTOLIC BLOOD PRESSURE: 128 MMHG | HEART RATE: 58 BPM | OXYGEN SATURATION: 98 %

## 2019-09-20 DIAGNOSIS — J84.9 ILD (INTERSTITIAL LUNG DISEASE) (HCC): ICD-10-CM

## 2019-09-20 DIAGNOSIS — K13.79 MOUTH SORE: Primary | ICD-10-CM

## 2019-09-20 DIAGNOSIS — N30.01 ACUTE CYSTITIS WITH HEMATURIA: ICD-10-CM

## 2019-09-20 PROCEDURE — 1123F ACP DISCUSS/DSCN MKR DOCD: CPT | Performed by: INTERNAL MEDICINE

## 2019-09-20 PROCEDURE — 1090F PRES/ABSN URINE INCON ASSESS: CPT | Performed by: INTERNAL MEDICINE

## 2019-09-20 PROCEDURE — G8427 DOCREV CUR MEDS BY ELIG CLIN: HCPCS | Performed by: INTERNAL MEDICINE

## 2019-09-20 PROCEDURE — 4040F PNEUMOC VAC/ADMIN/RCVD: CPT | Performed by: INTERNAL MEDICINE

## 2019-09-20 PROCEDURE — 1036F TOBACCO NON-USER: CPT | Performed by: INTERNAL MEDICINE

## 2019-09-20 PROCEDURE — 1111F DSCHRG MED/CURRENT MED MERGE: CPT | Performed by: INTERNAL MEDICINE

## 2019-09-20 PROCEDURE — G8399 PT W/DXA RESULTS DOCUMENT: HCPCS | Performed by: INTERNAL MEDICINE

## 2019-09-20 PROCEDURE — G8598 ASA/ANTIPLAT THER USED: HCPCS | Performed by: INTERNAL MEDICINE

## 2019-09-20 PROCEDURE — G8417 CALC BMI ABV UP PARAM F/U: HCPCS | Performed by: INTERNAL MEDICINE

## 2019-09-20 PROCEDURE — 99213 OFFICE O/P EST LOW 20 MIN: CPT | Performed by: INTERNAL MEDICINE

## 2019-09-20 RX ORDER — ACYCLOVIR 200 MG/1
400 CAPSULE ORAL 3 TIMES DAILY
Qty: 24 CAPSULE | Refills: 0 | Status: SHIPPED | OUTPATIENT
Start: 2019-09-20 | End: 2019-09-26 | Stop reason: SDUPTHER

## 2019-09-20 ASSESSMENT — ENCOUNTER SYMPTOMS
ABDOMINAL PAIN: 0
ROS SKIN COMMENTS: MOUTH SORES
NAUSEA: 0
DIARRHEA: 0
VOMITING: 0
SHORTNESS OF BREATH: 0
CHEST TIGHTNESS: 0

## 2019-09-20 NOTE — PROGRESS NOTES
buPROPion (WELLBUTRIN XL) 300 MG extended release tablet TAKE 1 TABLET BY MOUTH EVERY MORNING 9/12/19  Yes Tania Dee MD   ARIPiprazole (ABILIFY) 5 MG tablet Take 1 tablet by mouth daily  Patient taking differently: Take 2.5 mg by mouth daily  9/12/19  Yes Tania Dee MD   losartan (COZAAR) 25 MG tablet TAKE 1 TABLET BY MOUTH DAILY 9/10/19  Yes Kayla Ruby MD   carvedilol (COREG) 25 MG tablet TAKE ONE TABLET BY MOUTH TWICE DAILY WITH MEALS 9/10/19  Yes Kayla Ruby MD   furosemide (LASIX) 20 MG tablet TAKE 1 TABLET BY MOUTH DAILY AS NEEDED FOR SWELLING  Patient taking differently: Take 20 mg by mouth daily  8/8/19  Yes Kayla Ruby MD   Cholecalciferol (VITAMIN D3) 04468 units CAPS TAKE 1 CAPSULE EVERY OTHER WEEK 8/1/19  Yes Kayla Ruby MD   citalopram (CELEXA) 20 MG tablet TAKE 1 TABLET BY MOUTH DAILY 7/22/19  Yes Kayla Ruby MD   pantoprazole (PROTONIX) 20 MG tablet TAKE 1 TABLET BY MOUTH TWICE DAILY 7/9/19  Yes Kayla Ruby MD   rosuvastatin (CRESTOR) 20 MG tablet TAKE 1 TABLET BY MOUTH DAILY 4/3/19  Yes Kayla Ruby MD   clopidogrel (PLAVIX) 75 MG tablet TAKE 1 TABLET BY MOUTH DAILY 4/3/19  Yes Kayla Ruby MD   Mirabegron ER (MYRBETRIQ) 25 MG TB24 TAKE 2 TABLETS BY MOUTH DAILY  Patient taking differently: TAKE 1 TABLET BY MOUTH DAILY 4/3/19  Yes Kayla Ruby MD   methylcellulose (CITRUCEL) oral powder Take 2 g by mouth every other day    Yes Historical Provider, MD   Handicap Placard [facility] by Does not apply route Valid for 5 years from 11/7/18 11/7/18  Yes Kayla Ruby MD   Nutritional Supplements (ENSURE ACTIVE HIGH PROTEIN PO) Take 1 Can by mouth daily as needed   Yes Historical Provider, MD   acetaminophen 650 MG TABS Take 650 mg by mouth every 6 hours as needed for Pain or Fever (Fever >100.5 F (38 C)).  12/9/13  Yes Ethel Manuel MD   hydroxychloroquine (PLAQUENIL) 200 MG tablet Take 200 mg by AM

## 2019-09-23 DIAGNOSIS — I50.22 CHRONIC SYSTOLIC CONGESTIVE HEART FAILURE (HCC): ICD-10-CM

## 2019-09-23 RX ORDER — FUROSEMIDE 20 MG/1
TABLET ORAL
Qty: 30 TABLET | Refills: 0 | Status: SHIPPED | OUTPATIENT
Start: 2019-09-23 | End: 2019-10-14 | Stop reason: DRUGHIGH

## 2019-09-26 ENCOUNTER — TELEPHONE (OUTPATIENT)
Dept: INTERNAL MEDICINE CLINIC | Age: 74
End: 2019-09-26

## 2019-09-26 DIAGNOSIS — K13.79 MOUTH SORE: ICD-10-CM

## 2019-09-26 RX ORDER — ACYCLOVIR 200 MG/1
400 CAPSULE ORAL 3 TIMES DAILY
Qty: 12 CAPSULE | Refills: 0 | Status: SHIPPED | OUTPATIENT
Start: 2019-09-26 | End: 2019-09-29

## 2019-09-27 ENCOUNTER — TELEPHONE (OUTPATIENT)
Dept: INTERNAL MEDICINE CLINIC | Age: 74
End: 2019-09-27

## 2019-09-27 NOTE — TELEPHONE ENCOUNTER
Patient has put on 4 pounds in the last 2 days. Caregiver doesn't think it's from eating.  OT said patient is dehydrated but she is drinking a large amount of fluids

## 2019-09-27 NOTE — TELEPHONE ENCOUNTER
Patient is feeling fine and speaking fine. Normal limits vitals. Crackling sounds when checking lower lobes.

## 2019-09-30 ENCOUNTER — TELEPHONE (OUTPATIENT)
Dept: INTERNAL MEDICINE CLINIC | Age: 74
End: 2019-09-30

## 2019-09-30 NOTE — TELEPHONE ENCOUNTER
Pt state the following:  Pt care giver stated that pt drop 12 lbs one day. Pt care giver stated that pt put on 2 more lbs. Today. Pt care giver  needs to on advised on whether pt should take another lasix. Pt care giver is schneider that  is out of office.   Please advised

## 2019-09-30 NOTE — TELEPHONE ENCOUNTER
On 9/27/19 pt weighed 162 lbs, she weighed 150 lbs on 9/28/19. Today she is at 152 lbs. BP is good and pt feeling well. Sent to on call MD  to see if anything needs to be done today or if it can wait for  tomorrow.

## 2019-10-01 ENCOUNTER — TELEPHONE (OUTPATIENT)
Dept: INTERNAL MEDICINE CLINIC | Age: 74
End: 2019-10-01

## 2019-10-01 ENCOUNTER — OFFICE VISIT (OUTPATIENT)
Dept: PULMONOLOGY | Age: 74
End: 2019-10-01
Payer: MEDICARE

## 2019-10-01 VITALS
HEART RATE: 57 BPM | OXYGEN SATURATION: 96 % | HEIGHT: 60 IN | WEIGHT: 158 LBS | SYSTOLIC BLOOD PRESSURE: 88 MMHG | DIASTOLIC BLOOD PRESSURE: 62 MMHG | BODY MASS INDEX: 31.02 KG/M2

## 2019-10-01 DIAGNOSIS — N39.0 URINARY TRACT INFECTION WITHOUT HEMATURIA, SITE UNSPECIFIED: Primary | ICD-10-CM

## 2019-10-01 DIAGNOSIS — N39.0 RECURRENT UTI: ICD-10-CM

## 2019-10-01 DIAGNOSIS — J84.9 INTERSTITIAL LUNG DISEASE (HCC): Primary | ICD-10-CM

## 2019-10-01 PROCEDURE — G8484 FLU IMMUNIZE NO ADMIN: HCPCS | Performed by: INTERNAL MEDICINE

## 2019-10-01 PROCEDURE — 1123F ACP DISCUSS/DSCN MKR DOCD: CPT | Performed by: INTERNAL MEDICINE

## 2019-10-01 PROCEDURE — 99214 OFFICE O/P EST MOD 30 MIN: CPT | Performed by: INTERNAL MEDICINE

## 2019-10-01 PROCEDURE — G8399 PT W/DXA RESULTS DOCUMENT: HCPCS | Performed by: INTERNAL MEDICINE

## 2019-10-01 PROCEDURE — 4040F PNEUMOC VAC/ADMIN/RCVD: CPT | Performed by: INTERNAL MEDICINE

## 2019-10-01 PROCEDURE — G8427 DOCREV CUR MEDS BY ELIG CLIN: HCPCS | Performed by: INTERNAL MEDICINE

## 2019-10-01 PROCEDURE — G8417 CALC BMI ABV UP PARAM F/U: HCPCS | Performed by: INTERNAL MEDICINE

## 2019-10-01 PROCEDURE — 1036F TOBACCO NON-USER: CPT | Performed by: INTERNAL MEDICINE

## 2019-10-01 PROCEDURE — 1090F PRES/ABSN URINE INCON ASSESS: CPT | Performed by: INTERNAL MEDICINE

## 2019-10-01 PROCEDURE — G8598 ASA/ANTIPLAT THER USED: HCPCS | Performed by: INTERNAL MEDICINE

## 2019-10-01 RX ORDER — CIPROFLOXACIN 500 MG/1
500 TABLET, FILM COATED ORAL 2 TIMES DAILY
Qty: 10 TABLET | Refills: 0 | Status: SHIPPED | OUTPATIENT
Start: 2019-10-01 | End: 2019-10-06

## 2019-10-02 ENCOUNTER — OFFICE VISIT (OUTPATIENT)
Dept: RHEUMATOLOGY | Age: 74
End: 2019-10-02
Payer: MEDICARE

## 2019-10-02 VITALS
DIASTOLIC BLOOD PRESSURE: 68 MMHG | WEIGHT: 155 LBS | BODY MASS INDEX: 30.43 KG/M2 | HEIGHT: 60 IN | SYSTOLIC BLOOD PRESSURE: 124 MMHG

## 2019-10-02 DIAGNOSIS — Z13.89 SCREENING FOR RHEUMATIC DISORDER: ICD-10-CM

## 2019-10-02 DIAGNOSIS — L93.2 CUTANEOUS LUPUS ERYTHEMATOSUS: Primary | ICD-10-CM

## 2019-10-02 DIAGNOSIS — M35.00 SICCA SYNDROME, UNSPECIFIED: ICD-10-CM

## 2019-10-02 DIAGNOSIS — L93.2 CUTANEOUS LUPUS ERYTHEMATOSUS: ICD-10-CM

## 2019-10-02 LAB
C3 COMPLEMENT: 148.3 MG/DL (ref 90–180)
C4 COMPLEMENT: 34.5 MG/DL (ref 10–40)
HBV SURFACE AB TITR SER: <3.5 MIU/ML
HEPATITIS B CORE IGM ANTIBODY: NORMAL
HEPATITIS B SURFACE ANTIGEN INTERPRETATION: NORMAL
HEPATITIS C ANTIBODY INTERPRETATION: NORMAL
PRO-BNP: 510 PG/ML (ref 0–449)
RHEUMATOID FACTOR: <10 IU/ML
TOTAL CK: 87 U/L (ref 26–192)

## 2019-10-02 PROCEDURE — G8417 CALC BMI ABV UP PARAM F/U: HCPCS | Performed by: INTERNAL MEDICINE

## 2019-10-02 PROCEDURE — 4040F PNEUMOC VAC/ADMIN/RCVD: CPT | Performed by: INTERNAL MEDICINE

## 2019-10-02 PROCEDURE — G8598 ASA/ANTIPLAT THER USED: HCPCS | Performed by: INTERNAL MEDICINE

## 2019-10-02 PROCEDURE — 1123F ACP DISCUSS/DSCN MKR DOCD: CPT | Performed by: INTERNAL MEDICINE

## 2019-10-02 PROCEDURE — 99205 OFFICE O/P NEW HI 60 MIN: CPT | Performed by: INTERNAL MEDICINE

## 2019-10-02 PROCEDURE — G8427 DOCREV CUR MEDS BY ELIG CLIN: HCPCS | Performed by: INTERNAL MEDICINE

## 2019-10-02 PROCEDURE — 1036F TOBACCO NON-USER: CPT | Performed by: INTERNAL MEDICINE

## 2019-10-02 PROCEDURE — G8399 PT W/DXA RESULTS DOCUMENT: HCPCS | Performed by: INTERNAL MEDICINE

## 2019-10-02 PROCEDURE — G8484 FLU IMMUNIZE NO ADMIN: HCPCS | Performed by: INTERNAL MEDICINE

## 2019-10-02 PROCEDURE — 1090F PRES/ABSN URINE INCON ASSESS: CPT | Performed by: INTERNAL MEDICINE

## 2019-10-02 RX ORDER — CLOBETASOL PROPIONATE 0.46 MG/ML
SOLUTION TOPICAL 2 TIMES DAILY
COMMUNITY
End: 2021-05-25

## 2019-10-03 ENCOUNTER — TELEPHONE (OUTPATIENT)
Dept: INTERNAL MEDICINE CLINIC | Age: 74
End: 2019-10-03

## 2019-10-03 DIAGNOSIS — M35.00 SICCA SYNDROME, UNSPECIFIED: ICD-10-CM

## 2019-10-03 DIAGNOSIS — L93.2 CUTANEOUS LUPUS ERYTHEMATOSUS: ICD-10-CM

## 2019-10-03 LAB
ANA INTERPRETATION: ABNORMAL
ANA PATTERN: ABNORMAL
ANA TITER: ABNORMAL
ANTI-CENTROMERE B IGG: <0.2 AI (ref 0–0.9)
ANTI-CHROMATIN IGG: 0.2 AI (ref 0–0.9)
ANTI-DSDNA IGG: 7 IU/ML (ref 0–9)
ANTI-JO1 IGG: <0.2 AI (ref 0–0.9)
ANTI-NUCLEAR ANTIBODY (ANA): POSITIVE
ANTI-RIBOSOMAL P IGG: <0.2 AI (ref 0–0.9)
ANTI-RNP IGG: 0.4 AI (ref 0–0.9)
ANTI-SCL70 IGG: <0.2 AI (ref 0–0.9)
ANTI-SMITH IGG: <0.2 AI (ref 0–0.9)
ANTI-SMRNP IGG: 0.4 AI (ref 0–0.9)
ANTI-SS-A IGG: >8 AI (ref 0–0.9)
ANTI-SS-B IGG: <0.2 AI (ref 0–0.9)
BACTERIA: ABNORMAL /HPF
BILIRUBIN URINE: NEGATIVE
BLOOD, URINE: NEGATIVE
CLARITY: ABNORMAL
COLOR: YELLOW
CREATININE URINE: 81.6 MG/DL (ref 28–259)
CYCLIC CITRULLINATED PEPTIDE ANTIBODY IGG: 1.6 U/ML (ref 0–2.9)
EPITHELIAL CELLS, UA: 2 /HPF (ref 0–5)
GLUCOSE URINE: NEGATIVE MG/DL
HYALINE CASTS: 5 /LPF (ref 0–8)
KETONES, URINE: NEGATIVE MG/DL
LEUKOCYTE ESTERASE, URINE: ABNORMAL
MICROSCOPIC EXAMINATION: YES
NITRITE, URINE: NEGATIVE
PATHOLOGIST: ABNORMAL
PH UA: 6.5 (ref 5–8)
PROTEIN PROTEIN: 17 MG/DL
PROTEIN UA: NEGATIVE MG/DL
PROTEIN/CREAT RATIO: 0.2 MG/DL
RBC UA: 1 /HPF (ref 0–4)
SPECIFIC GRAVITY UA: 1.01 (ref 1–1.03)
URINE TYPE: ABNORMAL
UROBILINOGEN, URINE: 0.2 E.U./DL
WBC UA: 38 /HPF (ref 0–5)

## 2019-10-04 LAB
ALDOLASE: 2.7 U/L (ref 1.5–8.1)
ANTICARDIOLIPIN IGG ANTIBODY: 3 GPL (ref 0–14)
BETA-2 GLYCOPROTEIN 1 IGG ANTIBODY: 1 SGU (ref 0–20)
BETA-2 GLYCOPROTEIN 1 IGM ANTIBODY: 2 SMU (ref 0–20)
CARDIOLIPIN AB IGM: 0 MPL (ref 0–12)
DRVVT CONFIRMATION TEST: NORMAL RATIO
DRVVT SCREEN: 38 SEC (ref 33–44)
DRVVT,DIL: NORMAL SEC (ref 33–44)
HEXAGONAL PHOSPHOLIPID NEUTRALIZAT TEST: NORMAL
LUPUS ANTICOAG INTERP: NORMAL
PLT NEUTA: NORMAL
PT D: 12.4 SEC (ref 12–15.5)
PTT D: 40 SEC (ref 32–48)
PTT-D CORR REFLEX: NORMAL SEC (ref 32–48)
PTT-HEPARIN NEUTRALIZED: NORMAL SEC (ref 32–48)
REPTILASE TIME: NORMAL SEC
THROMBIN TIME: NORMAL SEC (ref 14.7–19.5)

## 2019-10-07 ENCOUNTER — TELEPHONE (OUTPATIENT)
Dept: INTERNAL MEDICINE CLINIC | Age: 74
End: 2019-10-07

## 2019-10-10 ENCOUNTER — OFFICE VISIT (OUTPATIENT)
Dept: PSYCHIATRY | Age: 74
End: 2019-10-10
Payer: MEDICARE

## 2019-10-10 ENCOUNTER — TELEPHONE (OUTPATIENT)
Dept: INTERNAL MEDICINE CLINIC | Age: 74
End: 2019-10-10

## 2019-10-10 VITALS
HEIGHT: 60 IN | DIASTOLIC BLOOD PRESSURE: 90 MMHG | HEART RATE: 56 BPM | SYSTOLIC BLOOD PRESSURE: 122 MMHG | BODY MASS INDEX: 30.7 KG/M2 | WEIGHT: 156.4 LBS

## 2019-10-10 DIAGNOSIS — F33.1 MODERATE EPISODE OF RECURRENT MAJOR DEPRESSIVE DISORDER (HCC): ICD-10-CM

## 2019-10-10 DIAGNOSIS — F33.1 MODERATE EPISODE OF RECURRENT MAJOR DEPRESSIVE DISORDER (HCC): Primary | ICD-10-CM

## 2019-10-10 DIAGNOSIS — F41.1 GAD (GENERALIZED ANXIETY DISORDER): ICD-10-CM

## 2019-10-10 DIAGNOSIS — Z79.899 OTHER LONG TERM (CURRENT) DRUG THERAPY: ICD-10-CM

## 2019-10-10 DIAGNOSIS — F33.40 DEPRESSION, MAJOR, RECURRENT, IN REMISSION (HCC): ICD-10-CM

## 2019-10-10 LAB
ANION GAP SERPL CALCULATED.3IONS-SCNC: 14 MMOL/L (ref 3–16)
BUN BLDV-MCNC: 15 MG/DL (ref 7–20)
CALCIUM SERPL-MCNC: 9.7 MG/DL (ref 8.3–10.6)
CHLORIDE BLD-SCNC: 100 MMOL/L (ref 99–110)
CHOLESTEROL, TOTAL: 126 MG/DL (ref 0–199)
CO2: 25 MMOL/L (ref 21–32)
CREAT SERPL-MCNC: 0.9 MG/DL (ref 0.6–1.2)
GFR AFRICAN AMERICAN: >60
GFR NON-AFRICAN AMERICAN: >60
GLUCOSE BLD-MCNC: 96 MG/DL (ref 70–99)
HDLC SERPL-MCNC: 66 MG/DL (ref 40–60)
LDL CHOLESTEROL CALCULATED: 46 MG/DL
POTASSIUM SERPL-SCNC: 4 MMOL/L (ref 3.5–5.1)
SODIUM BLD-SCNC: 139 MMOL/L (ref 136–145)
TRIGL SERPL-MCNC: 70 MG/DL (ref 0–150)
VLDLC SERPL CALC-MCNC: 14 MG/DL

## 2019-10-10 PROCEDURE — G8484 FLU IMMUNIZE NO ADMIN: HCPCS | Performed by: PSYCHIATRY & NEUROLOGY

## 2019-10-10 PROCEDURE — G8417 CALC BMI ABV UP PARAM F/U: HCPCS | Performed by: PSYCHIATRY & NEUROLOGY

## 2019-10-10 PROCEDURE — 1036F TOBACCO NON-USER: CPT | Performed by: PSYCHIATRY & NEUROLOGY

## 2019-10-10 PROCEDURE — G8399 PT W/DXA RESULTS DOCUMENT: HCPCS | Performed by: PSYCHIATRY & NEUROLOGY

## 2019-10-10 PROCEDURE — 4040F PNEUMOC VAC/ADMIN/RCVD: CPT | Performed by: PSYCHIATRY & NEUROLOGY

## 2019-10-10 PROCEDURE — 99214 OFFICE O/P EST MOD 30 MIN: CPT | Performed by: PSYCHIATRY & NEUROLOGY

## 2019-10-10 PROCEDURE — 1123F ACP DISCUSS/DSCN MKR DOCD: CPT | Performed by: PSYCHIATRY & NEUROLOGY

## 2019-10-10 PROCEDURE — G8598 ASA/ANTIPLAT THER USED: HCPCS | Performed by: PSYCHIATRY & NEUROLOGY

## 2019-10-10 PROCEDURE — 1090F PRES/ABSN URINE INCON ASSESS: CPT | Performed by: PSYCHIATRY & NEUROLOGY

## 2019-10-10 PROCEDURE — G8427 DOCREV CUR MEDS BY ELIG CLIN: HCPCS | Performed by: PSYCHIATRY & NEUROLOGY

## 2019-10-10 RX ORDER — ARIPIPRAZOLE 2 MG/1
2 TABLET ORAL DAILY
Qty: 30 TABLET | Refills: 1 | Status: SHIPPED | OUTPATIENT
Start: 2019-10-10 | End: 2020-02-11

## 2019-10-10 RX ORDER — CITALOPRAM 20 MG/1
20 TABLET ORAL DAILY
Qty: 90 TABLET | Refills: 1 | Status: ON HOLD | OUTPATIENT
Start: 2019-10-10 | End: 2019-12-02 | Stop reason: HOSPADM

## 2019-10-11 ENCOUNTER — TELEPHONE (OUTPATIENT)
Dept: INTERNAL MEDICINE CLINIC | Age: 74
End: 2019-10-11

## 2019-10-11 ENCOUNTER — HOSPITAL ENCOUNTER (OUTPATIENT)
Dept: PULMONOLOGY | Age: 74
Discharge: HOME | End: 2019-10-11
Payer: MEDICARE

## 2019-10-11 DIAGNOSIS — J84.9 INTERSTITIAL LUNG DISEASE (HCC): ICD-10-CM

## 2019-10-11 LAB
ESTIMATED AVERAGE GLUCOSE: 119.8 MG/DL
HBA1C MFR BLD: 5.8 %

## 2019-10-11 PROCEDURE — 94664 DEMO&/EVAL PT USE INHALER: CPT

## 2019-10-11 PROCEDURE — 94010 BREATHING CAPACITY TEST: CPT

## 2019-10-11 PROCEDURE — 94760 N-INVAS EAR/PLS OXIMETRY 1: CPT

## 2019-10-11 PROCEDURE — 94726 PLETHYSMOGRAPHY LUNG VOLUMES: CPT

## 2019-10-11 PROCEDURE — 94375 RESPIRATORY FLOW VOLUME LOOP: CPT

## 2019-10-11 PROCEDURE — 94729 DIFFUSING CAPACITY: CPT

## 2019-10-13 DIAGNOSIS — I50.22 CHRONIC SYSTOLIC CONGESTIVE HEART FAILURE (HCC): ICD-10-CM

## 2019-10-13 DIAGNOSIS — I10 ESSENTIAL HYPERTENSION, BENIGN: ICD-10-CM

## 2019-10-13 DIAGNOSIS — E78.00 PURE HYPERCHOLESTEROLEMIA: ICD-10-CM

## 2019-10-14 ENCOUNTER — OFFICE VISIT (OUTPATIENT)
Dept: INTERNAL MEDICINE CLINIC | Age: 74
End: 2019-10-14
Payer: MEDICARE

## 2019-10-14 VITALS
HEART RATE: 52 BPM | SYSTOLIC BLOOD PRESSURE: 126 MMHG | WEIGHT: 156 LBS | OXYGEN SATURATION: 96 % | HEIGHT: 60 IN | DIASTOLIC BLOOD PRESSURE: 82 MMHG | BODY MASS INDEX: 30.63 KG/M2

## 2019-10-14 DIAGNOSIS — R30.0 DYSURIA: ICD-10-CM

## 2019-10-14 DIAGNOSIS — M32.9 SYSTEMIC LUPUS ERYTHEMATOSUS, UNSPECIFIED SLE TYPE, UNSPECIFIED ORGAN INVOLVEMENT STATUS (HCC): ICD-10-CM

## 2019-10-14 DIAGNOSIS — Z23 NEED FOR INFLUENZA VACCINATION: ICD-10-CM

## 2019-10-14 DIAGNOSIS — J84.9 ILD (INTERSTITIAL LUNG DISEASE) (HCC): ICD-10-CM

## 2019-10-14 DIAGNOSIS — I10 ESSENTIAL HYPERTENSION, BENIGN: Primary | ICD-10-CM

## 2019-10-14 LAB
BACTERIA: ABNORMAL /HPF
BILIRUBIN URINE: NEGATIVE
BLOOD, URINE: NEGATIVE
CASTS: ABNORMAL /LPF
CLARITY: ABNORMAL
COLOR: YELLOW
COMMENT UA: ABNORMAL
EPITHELIAL CELLS, UA: 10 /HPF (ref 0–5)
GLUCOSE URINE: NEGATIVE MG/DL
KETONES, URINE: NEGATIVE MG/DL
LEUKOCYTE ESTERASE, URINE: ABNORMAL
MICROSCOPIC EXAMINATION: YES
NITRITE, URINE: NEGATIVE
PH UA: 5.5 (ref 5–8)
PROTEIN UA: ABNORMAL MG/DL
RBC UA: 4 /HPF (ref 0–4)
SPECIFIC GRAVITY UA: 1.03 (ref 1–1.03)
URINE TYPE: ABNORMAL
UROBILINOGEN, URINE: 0.2 E.U./DL
WBC UA: 88 /HPF (ref 0–5)

## 2019-10-14 PROCEDURE — 4040F PNEUMOC VAC/ADMIN/RCVD: CPT | Performed by: INTERNAL MEDICINE

## 2019-10-14 PROCEDURE — 99214 OFFICE O/P EST MOD 30 MIN: CPT | Performed by: INTERNAL MEDICINE

## 2019-10-14 PROCEDURE — G8399 PT W/DXA RESULTS DOCUMENT: HCPCS | Performed by: INTERNAL MEDICINE

## 2019-10-14 PROCEDURE — 1036F TOBACCO NON-USER: CPT | Performed by: INTERNAL MEDICINE

## 2019-10-14 PROCEDURE — G8484 FLU IMMUNIZE NO ADMIN: HCPCS | Performed by: INTERNAL MEDICINE

## 2019-10-14 PROCEDURE — 1090F PRES/ABSN URINE INCON ASSESS: CPT | Performed by: INTERNAL MEDICINE

## 2019-10-14 PROCEDURE — G8427 DOCREV CUR MEDS BY ELIG CLIN: HCPCS | Performed by: INTERNAL MEDICINE

## 2019-10-14 PROCEDURE — 1123F ACP DISCUSS/DSCN MKR DOCD: CPT | Performed by: INTERNAL MEDICINE

## 2019-10-14 PROCEDURE — G8598 ASA/ANTIPLAT THER USED: HCPCS | Performed by: INTERNAL MEDICINE

## 2019-10-14 PROCEDURE — G8417 CALC BMI ABV UP PARAM F/U: HCPCS | Performed by: INTERNAL MEDICINE

## 2019-10-14 RX ORDER — FUROSEMIDE 20 MG/1
20 TABLET ORAL DAILY
Qty: 60 TABLET | Refills: 3 | Status: SHIPPED | OUTPATIENT
Start: 2019-10-14 | End: 2019-11-13 | Stop reason: DRUGHIGH

## 2019-10-14 RX ORDER — CHOLECALCIFEROL (VITAMIN D3) 1250 MCG
CAPSULE ORAL
Qty: 4 CAPSULE | Refills: 0 | Status: SHIPPED | OUTPATIENT
Start: 2019-10-14 | End: 2019-12-23

## 2019-10-14 RX ORDER — CLOPIDOGREL BISULFATE 75 MG/1
75 TABLET ORAL DAILY
Qty: 30 TABLET | Refills: 5 | Status: SHIPPED | OUTPATIENT
Start: 2019-10-14 | End: 2019-11-19 | Stop reason: SDUPTHER

## 2019-10-14 RX ORDER — ROSUVASTATIN CALCIUM 20 MG/1
20 TABLET, COATED ORAL DAILY
Qty: 30 TABLET | Refills: 5 | Status: SHIPPED | OUTPATIENT
Start: 2019-10-14 | End: 2020-05-05

## 2019-10-14 RX ORDER — CARVEDILOL 25 MG/1
12.5 TABLET ORAL 2 TIMES DAILY
Qty: 30 TABLET | Refills: 2 | Status: SHIPPED | OUTPATIENT
Start: 2019-10-14 | End: 2019-10-31 | Stop reason: DRUGHIGH

## 2019-10-14 RX ORDER — LOSARTAN POTASSIUM 25 MG/1
25 TABLET ORAL DAILY
Qty: 30 TABLET | Refills: 5 | Status: ON HOLD | OUTPATIENT
Start: 2019-10-14 | End: 2019-12-02 | Stop reason: HOSPADM

## 2019-10-14 RX ORDER — FUROSEMIDE 20 MG/1
20 TABLET ORAL DAILY
Qty: 30 TABLET | Refills: 5 | Status: SHIPPED | OUTPATIENT
Start: 2019-10-14 | End: 2019-11-13 | Stop reason: DRUGHIGH

## 2019-10-14 ASSESSMENT — ENCOUNTER SYMPTOMS
ABDOMINAL PAIN: 0
CHEST TIGHTNESS: 0
VOMITING: 0
NAUSEA: 0
SHORTNESS OF BREATH: 0

## 2019-10-16 ENCOUNTER — TELEPHONE (OUTPATIENT)
Dept: INTERNAL MEDICINE CLINIC | Age: 74
End: 2019-10-16

## 2019-10-17 ENCOUNTER — TELEPHONE (OUTPATIENT)
Dept: INTERNAL MEDICINE CLINIC | Age: 74
End: 2019-10-17

## 2019-10-17 LAB
MISCELLANEOUS LAB TEST ORDER: NORMAL
ORGANISM: ABNORMAL
ORGANISM: ABNORMAL
URINE CULTURE, ROUTINE: ABNORMAL
URINE CULTURE, ROUTINE: ABNORMAL

## 2019-10-17 RX ORDER — SULFAMETHOXAZOLE AND TRIMETHOPRIM 400; 80 MG/1; MG/1
1 TABLET ORAL 2 TIMES DAILY
Qty: 10 TABLET | Refills: 0 | Status: SHIPPED | OUTPATIENT
Start: 2019-10-17 | End: 2019-10-22

## 2019-10-21 ENCOUNTER — TELEPHONE (OUTPATIENT)
Dept: INTERNAL MEDICINE CLINIC | Age: 74
End: 2019-10-21

## 2019-10-21 DIAGNOSIS — R30.0 DYSURIA: Primary | ICD-10-CM

## 2019-10-21 DIAGNOSIS — I50.22 CHRONIC SYSTOLIC CONGESTIVE HEART FAILURE (HCC): ICD-10-CM

## 2019-10-22 RX ORDER — FUROSEMIDE 20 MG/1
TABLET ORAL
Qty: 30 TABLET | Refills: 2 | Status: SHIPPED | OUTPATIENT
Start: 2019-10-22 | End: 2019-11-13 | Stop reason: DRUGHIGH

## 2019-10-22 RX ORDER — PANTOPRAZOLE SODIUM 20 MG/1
TABLET, DELAYED RELEASE ORAL
Qty: 60 TABLET | Refills: 2 | Status: SHIPPED | OUTPATIENT
Start: 2019-10-22 | End: 2020-02-27

## 2019-10-23 ENCOUNTER — TELEPHONE (OUTPATIENT)
Dept: INTERNAL MEDICINE CLINIC | Age: 74
End: 2019-10-23

## 2019-10-24 ENCOUNTER — OFFICE VISIT (OUTPATIENT)
Dept: RHEUMATOLOGY | Age: 74
End: 2019-10-24
Payer: MEDICARE

## 2019-10-24 ENCOUNTER — OFFICE VISIT (OUTPATIENT)
Dept: FAMILY MEDICINE CLINIC | Age: 74
End: 2019-10-24
Payer: MEDICARE

## 2019-10-24 VITALS
HEIGHT: 62 IN | WEIGHT: 158 LBS | BODY MASS INDEX: 29.08 KG/M2 | DIASTOLIC BLOOD PRESSURE: 68 MMHG | SYSTOLIC BLOOD PRESSURE: 116 MMHG

## 2019-10-24 VITALS
HEIGHT: 62 IN | WEIGHT: 156 LBS | BODY MASS INDEX: 28.71 KG/M2 | SYSTOLIC BLOOD PRESSURE: 130 MMHG | DIASTOLIC BLOOD PRESSURE: 80 MMHG | OXYGEN SATURATION: 96 % | HEART RATE: 57 BPM

## 2019-10-24 DIAGNOSIS — J34.89 RHINORRHEA: ICD-10-CM

## 2019-10-24 DIAGNOSIS — J84.9 ILD (INTERSTITIAL LUNG DISEASE) (HCC): ICD-10-CM

## 2019-10-24 DIAGNOSIS — Z79.899 HIGH RISK MEDICATION USE: ICD-10-CM

## 2019-10-24 DIAGNOSIS — R25.2 MUSCLE CRAMPING: ICD-10-CM

## 2019-10-24 DIAGNOSIS — M85.89 OSTEOPENIA OF MULTIPLE SITES: ICD-10-CM

## 2019-10-24 DIAGNOSIS — R11.0 NAUSEA: Primary | ICD-10-CM

## 2019-10-24 DIAGNOSIS — L93.2 CUTANEOUS LUPUS ERYTHEMATOSUS: Primary | ICD-10-CM

## 2019-10-24 PROCEDURE — G8598 ASA/ANTIPLAT THER USED: HCPCS | Performed by: NURSE PRACTITIONER

## 2019-10-24 PROCEDURE — G8427 DOCREV CUR MEDS BY ELIG CLIN: HCPCS | Performed by: NURSE PRACTITIONER

## 2019-10-24 PROCEDURE — 1123F ACP DISCUSS/DSCN MKR DOCD: CPT | Performed by: NURSE PRACTITIONER

## 2019-10-24 PROCEDURE — G8399 PT W/DXA RESULTS DOCUMENT: HCPCS | Performed by: INTERNAL MEDICINE

## 2019-10-24 PROCEDURE — 90653 IIV ADJUVANT VACCINE IM: CPT | Performed by: NURSE PRACTITIONER

## 2019-10-24 PROCEDURE — G8399 PT W/DXA RESULTS DOCUMENT: HCPCS | Performed by: NURSE PRACTITIONER

## 2019-10-24 PROCEDURE — 4040F PNEUMOC VAC/ADMIN/RCVD: CPT | Performed by: INTERNAL MEDICINE

## 2019-10-24 PROCEDURE — 1036F TOBACCO NON-USER: CPT | Performed by: NURSE PRACTITIONER

## 2019-10-24 PROCEDURE — G8482 FLU IMMUNIZE ORDER/ADMIN: HCPCS | Performed by: NURSE PRACTITIONER

## 2019-10-24 PROCEDURE — 1036F TOBACCO NON-USER: CPT | Performed by: INTERNAL MEDICINE

## 2019-10-24 PROCEDURE — G8482 FLU IMMUNIZE ORDER/ADMIN: HCPCS | Performed by: INTERNAL MEDICINE

## 2019-10-24 PROCEDURE — G8598 ASA/ANTIPLAT THER USED: HCPCS | Performed by: INTERNAL MEDICINE

## 2019-10-24 PROCEDURE — 1123F ACP DISCUSS/DSCN MKR DOCD: CPT | Performed by: INTERNAL MEDICINE

## 2019-10-24 PROCEDURE — G8427 DOCREV CUR MEDS BY ELIG CLIN: HCPCS | Performed by: INTERNAL MEDICINE

## 2019-10-24 PROCEDURE — G8417 CALC BMI ABV UP PARAM F/U: HCPCS | Performed by: NURSE PRACTITIONER

## 2019-10-24 PROCEDURE — 99214 OFFICE O/P EST MOD 30 MIN: CPT | Performed by: INTERNAL MEDICINE

## 2019-10-24 PROCEDURE — G0008 ADMIN INFLUENZA VIRUS VAC: HCPCS | Performed by: NURSE PRACTITIONER

## 2019-10-24 PROCEDURE — G8417 CALC BMI ABV UP PARAM F/U: HCPCS | Performed by: INTERNAL MEDICINE

## 2019-10-24 PROCEDURE — 1090F PRES/ABSN URINE INCON ASSESS: CPT | Performed by: NURSE PRACTITIONER

## 2019-10-24 PROCEDURE — 1090F PRES/ABSN URINE INCON ASSESS: CPT | Performed by: INTERNAL MEDICINE

## 2019-10-24 PROCEDURE — 4040F PNEUMOC VAC/ADMIN/RCVD: CPT | Performed by: NURSE PRACTITIONER

## 2019-10-24 PROCEDURE — 99213 OFFICE O/P EST LOW 20 MIN: CPT | Performed by: NURSE PRACTITIONER

## 2019-10-24 RX ORDER — PREDNISONE 10 MG/1
TABLET ORAL
Qty: 60 TABLET | Refills: 0 | Status: SHIPPED | OUTPATIENT
Start: 2019-10-24 | End: 2019-11-23 | Stop reason: SDUPTHER

## 2019-10-24 RX ORDER — AZATHIOPRINE 50 MG/1
TABLET ORAL
Qty: 30 TABLET | Refills: 0 | Status: SHIPPED | OUTPATIENT
Start: 2019-10-24 | End: 2019-11-18 | Stop reason: SDUPTHER

## 2019-10-24 ASSESSMENT — ENCOUNTER SYMPTOMS
GASTROINTESTINAL NEGATIVE: 1
EYES NEGATIVE: 1
RHINORRHEA: 1

## 2019-10-25 ENCOUNTER — TELEPHONE (OUTPATIENT)
Dept: INTERNAL MEDICINE CLINIC | Age: 74
End: 2019-10-25

## 2019-10-25 LAB — POTASSIUM SERPL-SCNC: 4.6 MMOL/L (ref 3.5–5.1)

## 2019-10-26 LAB — URINE CULTURE, ROUTINE: NORMAL

## 2019-10-28 ENCOUNTER — TELEPHONE (OUTPATIENT)
Dept: INTERNAL MEDICINE CLINIC | Age: 74
End: 2019-10-28

## 2019-10-29 ENCOUNTER — OFFICE VISIT (OUTPATIENT)
Dept: PSYCHOLOGY | Age: 74
End: 2019-10-29
Payer: MEDICARE

## 2019-10-29 DIAGNOSIS — F33.1 MODERATE EPISODE OF RECURRENT MAJOR DEPRESSIVE DISORDER (HCC): Primary | ICD-10-CM

## 2019-10-29 PROCEDURE — 90832 PSYTX W PT 30 MINUTES: CPT | Performed by: PSYCHOLOGIST

## 2019-10-29 ASSESSMENT — PATIENT HEALTH QUESTIONNAIRE - PHQ9
SUM OF ALL RESPONSES TO PHQ QUESTIONS 1-9: 10
7. TROUBLE CONCENTRATING ON THINGS, SUCH AS READING THE NEWSPAPER OR WATCHING TELEVISION: 1
SUM OF ALL RESPONSES TO PHQ QUESTIONS 1-9: 10
9. THOUGHTS THAT YOU WOULD BE BETTER OFF DEAD, OR OF HURTING YOURSELF: 0
4. FEELING TIRED OR HAVING LITTLE ENERGY: 2
1. LITTLE INTEREST OR PLEASURE IN DOING THINGS: 1
8. MOVING OR SPEAKING SO SLOWLY THAT OTHER PEOPLE COULD HAVE NOTICED. OR THE OPPOSITE, BEING SO FIGETY OR RESTLESS THAT YOU HAVE BEEN MOVING AROUND A LOT MORE THAN USUAL: 3
2. FEELING DOWN, DEPRESSED OR HOPELESS: 1
6. FEELING BAD ABOUT YOURSELF - OR THAT YOU ARE A FAILURE OR HAVE LET YOURSELF OR YOUR FAMILY DOWN: 0
SUM OF ALL RESPONSES TO PHQ9 QUESTIONS 1 & 2: 2
5. POOR APPETITE OR OVEREATING: 0
3. TROUBLE FALLING OR STAYING ASLEEP: 2

## 2019-10-29 ASSESSMENT — ANXIETY QUESTIONNAIRES
4. TROUBLE RELAXING: 2-OVER HALF THE DAYS
6. BECOMING EASILY ANNOYED OR IRRITABLE: 3-NEARLY EVERY DAY
2. NOT BEING ABLE TO STOP OR CONTROL WORRYING: 2-OVER HALF THE DAYS
7. FEELING AFRAID AS IF SOMETHING AWFUL MIGHT HAPPEN: 1-SEVERAL DAYS
3. WORRYING TOO MUCH ABOUT DIFFERENT THINGS: 2-OVER HALF THE DAYS
GAD7 TOTAL SCORE: 13
5. BEING SO RESTLESS THAT IT IS HARD TO SIT STILL: 1-SEVERAL DAYS
1. FEELING NERVOUS, ANXIOUS, OR ON EDGE: 2-OVER HALF THE DAYS

## 2019-10-31 ENCOUNTER — OFFICE VISIT (OUTPATIENT)
Dept: PULMONOLOGY | Age: 74
End: 2019-10-31
Payer: MEDICARE

## 2019-10-31 ENCOUNTER — TELEPHONE (OUTPATIENT)
Dept: INTERNAL MEDICINE CLINIC | Age: 74
End: 2019-10-31

## 2019-10-31 VITALS
BODY MASS INDEX: 30 KG/M2 | SYSTOLIC BLOOD PRESSURE: 110 MMHG | HEART RATE: 63 BPM | WEIGHT: 163 LBS | HEIGHT: 62 IN | DIASTOLIC BLOOD PRESSURE: 62 MMHG | OXYGEN SATURATION: 96 %

## 2019-10-31 DIAGNOSIS — J84.9 INTERSTITIAL LUNG DISEASE (HCC): Primary | ICD-10-CM

## 2019-10-31 DIAGNOSIS — M32.9 SYSTEMIC LUPUS ERYTHEMATOSUS, UNSPECIFIED SLE TYPE, UNSPECIFIED ORGAN INVOLVEMENT STATUS (HCC): ICD-10-CM

## 2019-10-31 DIAGNOSIS — I10 ESSENTIAL HYPERTENSION, BENIGN: ICD-10-CM

## 2019-10-31 PROCEDURE — G8399 PT W/DXA RESULTS DOCUMENT: HCPCS | Performed by: INTERNAL MEDICINE

## 2019-10-31 PROCEDURE — G8482 FLU IMMUNIZE ORDER/ADMIN: HCPCS | Performed by: INTERNAL MEDICINE

## 2019-10-31 PROCEDURE — G8427 DOCREV CUR MEDS BY ELIG CLIN: HCPCS | Performed by: INTERNAL MEDICINE

## 2019-10-31 PROCEDURE — 1090F PRES/ABSN URINE INCON ASSESS: CPT | Performed by: INTERNAL MEDICINE

## 2019-10-31 PROCEDURE — 1123F ACP DISCUSS/DSCN MKR DOCD: CPT | Performed by: INTERNAL MEDICINE

## 2019-10-31 PROCEDURE — 1036F TOBACCO NON-USER: CPT | Performed by: INTERNAL MEDICINE

## 2019-10-31 PROCEDURE — G8598 ASA/ANTIPLAT THER USED: HCPCS | Performed by: INTERNAL MEDICINE

## 2019-10-31 PROCEDURE — 99214 OFFICE O/P EST MOD 30 MIN: CPT | Performed by: INTERNAL MEDICINE

## 2019-10-31 PROCEDURE — 4040F PNEUMOC VAC/ADMIN/RCVD: CPT | Performed by: INTERNAL MEDICINE

## 2019-10-31 PROCEDURE — G8417 CALC BMI ABV UP PARAM F/U: HCPCS | Performed by: INTERNAL MEDICINE

## 2019-10-31 RX ORDER — CARVEDILOL 25 MG/1
25 TABLET ORAL 2 TIMES DAILY
Qty: 60 TABLET | Refills: 2 | Status: SHIPPED | OUTPATIENT
Start: 2019-10-31 | End: 2019-11-20

## 2019-11-01 LAB — MISCELLANEOUS LAB TEST ORDER: NORMAL

## 2019-11-08 ENCOUNTER — TELEPHONE (OUTPATIENT)
Dept: INTERNAL MEDICINE CLINIC | Age: 74
End: 2019-11-08

## 2019-11-13 ENCOUNTER — TELEPHONE (OUTPATIENT)
Dept: INTERNAL MEDICINE CLINIC | Age: 74
End: 2019-11-13

## 2019-11-13 DIAGNOSIS — I10 ESSENTIAL HYPERTENSION, BENIGN: Primary | ICD-10-CM

## 2019-11-13 LAB
ANION GAP SERPL CALCULATED.3IONS-SCNC: 14 MMOL/L (ref 3–16)
BUN BLDV-MCNC: 22 MG/DL (ref 7–20)
CALCIUM SERPL-MCNC: 9.7 MG/DL (ref 8.3–10.6)
CHLORIDE BLD-SCNC: 93 MMOL/L (ref 99–110)
CO2: 25 MMOL/L (ref 21–32)
CREAT SERPL-MCNC: 1 MG/DL (ref 0.6–1.2)
GFR AFRICAN AMERICAN: >60
GFR NON-AFRICAN AMERICAN: 54
GLUCOSE BLD-MCNC: 79 MG/DL (ref 70–99)
POTASSIUM SERPL-SCNC: 4 MMOL/L (ref 3.5–5.1)
PRO-BNP: 803 PG/ML (ref 0–449)
SODIUM BLD-SCNC: 132 MMOL/L (ref 136–145)

## 2019-11-13 RX ORDER — FUROSEMIDE 20 MG/1
20 TABLET ORAL 2 TIMES DAILY
Qty: 60 TABLET | Refills: 3 | Status: SHIPPED | OUTPATIENT
Start: 2019-11-13 | End: 2019-11-20

## 2019-11-13 RX ORDER — FUROSEMIDE 20 MG/1
20 TABLET ORAL 2 TIMES DAILY
Qty: 60 TABLET | Refills: 3 | Status: SHIPPED
Start: 2019-11-13 | End: 2019-11-13 | Stop reason: SDUPTHER

## 2019-11-14 ENCOUNTER — HOSPITAL ENCOUNTER (EMERGENCY)
Age: 74
Discharge: HOME | End: 2019-11-14
Attending: EMERGENCY MEDICINE
Payer: MEDICARE

## 2019-11-14 ENCOUNTER — TELEPHONE (OUTPATIENT)
Dept: INTERNAL MEDICINE CLINIC | Age: 74
End: 2019-11-14

## 2019-11-14 ENCOUNTER — APPOINTMENT (OUTPATIENT)
Dept: CT IMAGING | Age: 74
End: 2019-11-14
Payer: MEDICARE

## 2019-11-14 ENCOUNTER — APPOINTMENT (OUTPATIENT)
Dept: GENERAL RADIOLOGY | Age: 74
End: 2019-11-14
Payer: MEDICARE

## 2019-11-14 VITALS
OXYGEN SATURATION: 93 % | TEMPERATURE: 99.6 F | HEIGHT: 60 IN | DIASTOLIC BLOOD PRESSURE: 63 MMHG | WEIGHT: 157 LBS | RESPIRATION RATE: 16 BRPM | BODY MASS INDEX: 30.82 KG/M2 | HEART RATE: 66 BPM | SYSTOLIC BLOOD PRESSURE: 118 MMHG

## 2019-11-14 DIAGNOSIS — N30.00 ACUTE CYSTITIS WITHOUT HEMATURIA: Primary | ICD-10-CM

## 2019-11-14 DIAGNOSIS — R51.9 ACUTE NONINTRACTABLE HEADACHE, UNSPECIFIED HEADACHE TYPE: ICD-10-CM

## 2019-11-14 LAB
ANION GAP SERPL CALCULATED.3IONS-SCNC: 10 MMOL/L (ref 3–16)
BACTERIA: ABNORMAL /HPF
BASOPHILS ABSOLUTE: 0 K/UL (ref 0–0.2)
BASOPHILS RELATIVE PERCENT: 0.4 %
BILIRUBIN URINE: NEGATIVE
BLOOD, URINE: NEGATIVE
BUN BLDV-MCNC: 24 MG/DL (ref 7–20)
CALCIUM SERPL-MCNC: 8.9 MG/DL (ref 8.3–10.6)
CHLORIDE BLD-SCNC: 97 MMOL/L (ref 99–110)
CLARITY: CLEAR
CO2: 26 MMOL/L (ref 21–32)
COLOR: YELLOW
CREAT SERPL-MCNC: 0.9 MG/DL (ref 0.6–1.2)
EOSINOPHILS ABSOLUTE: 0.1 K/UL (ref 0–0.6)
EOSINOPHILS RELATIVE PERCENT: 1.1 %
EPITHELIAL CELLS, UA: ABNORMAL /HPF
GFR AFRICAN AMERICAN: >60
GFR NON-AFRICAN AMERICAN: >60
GLUCOSE BLD-MCNC: 113 MG/DL (ref 70–99)
GLUCOSE URINE: NEGATIVE MG/DL
HCT VFR BLD CALC: 35.1 % (ref 36–48)
HEMOGLOBIN: 11.9 G/DL (ref 12–16)
KETONES, URINE: NEGATIVE MG/DL
LEUKOCYTE ESTERASE, URINE: ABNORMAL
LYMPHOCYTES ABSOLUTE: 0.1 K/UL (ref 1–5.1)
LYMPHOCYTES RELATIVE PERCENT: 1.5 %
MCH RBC QN AUTO: 32.5 PG (ref 26–34)
MCHC RBC AUTO-ENTMCNC: 33.9 G/DL (ref 31–36)
MCV RBC AUTO: 95.9 FL (ref 80–100)
MICROSCOPIC EXAMINATION: YES
MONOCYTES ABSOLUTE: 0.2 K/UL (ref 0–1.3)
MONOCYTES RELATIVE PERCENT: 2.5 %
NEUTROPHILS ABSOLUTE: 8.5 K/UL (ref 1.7–7.7)
NEUTROPHILS RELATIVE PERCENT: 94.5 %
NITRITE, URINE: POSITIVE
PDW BLD-RTO: 14.6 % (ref 12.4–15.4)
PH UA: 8.5 (ref 5–8)
PLATELET # BLD: 190 K/UL (ref 135–450)
PMV BLD AUTO: 6.2 FL (ref 5–10.5)
POTASSIUM REFLEX MAGNESIUM: 4.3 MMOL/L (ref 3.5–5.1)
PROTEIN UA: ABNORMAL MG/DL
RAPID INFLUENZA  B AGN: NEGATIVE
RAPID INFLUENZA A AGN: NEGATIVE
RBC # BLD: 3.66 M/UL (ref 4–5.2)
RBC UA: ABNORMAL /HPF (ref 0–2)
SODIUM BLD-SCNC: 133 MMOL/L (ref 136–145)
SPECIFIC GRAVITY UA: 1.02 (ref 1–1.03)
URINE TYPE: ABNORMAL
UROBILINOGEN, URINE: 1 E.U./DL
WBC # BLD: 9 K/UL (ref 4–11)
WBC UA: ABNORMAL /HPF (ref 0–5)

## 2019-11-14 PROCEDURE — 81001 URINALYSIS AUTO W/SCOPE: CPT

## 2019-11-14 PROCEDURE — 6370000000 HC RX 637 (ALT 250 FOR IP): Performed by: EMERGENCY MEDICINE

## 2019-11-14 PROCEDURE — 96361 HYDRATE IV INFUSION ADD-ON: CPT

## 2019-11-14 PROCEDURE — 87086 URINE CULTURE/COLONY COUNT: CPT

## 2019-11-14 PROCEDURE — 87804 INFLUENZA ASSAY W/OPTIC: CPT

## 2019-11-14 PROCEDURE — 85025 COMPLETE CBC W/AUTO DIFF WBC: CPT

## 2019-11-14 PROCEDURE — 96374 THER/PROPH/DIAG INJ IV PUSH: CPT

## 2019-11-14 PROCEDURE — 71046 X-RAY EXAM CHEST 2 VIEWS: CPT

## 2019-11-14 PROCEDURE — 70450 CT HEAD/BRAIN W/O DYE: CPT

## 2019-11-14 PROCEDURE — 2580000003 HC RX 258: Performed by: STUDENT IN AN ORGANIZED HEALTH CARE EDUCATION/TRAINING PROGRAM

## 2019-11-14 PROCEDURE — 80048 BASIC METABOLIC PNL TOTAL CA: CPT

## 2019-11-14 PROCEDURE — 6360000002 HC RX W HCPCS: Performed by: STUDENT IN AN ORGANIZED HEALTH CARE EDUCATION/TRAINING PROGRAM

## 2019-11-14 PROCEDURE — 87186 SC STD MICRODIL/AGAR DIL: CPT

## 2019-11-14 PROCEDURE — 87077 CULTURE AEROBIC IDENTIFY: CPT

## 2019-11-14 PROCEDURE — 99284 EMERGENCY DEPT VISIT MOD MDM: CPT

## 2019-11-14 RX ORDER — KETOROLAC TROMETHAMINE 30 MG/ML
15 INJECTION, SOLUTION INTRAMUSCULAR; INTRAVENOUS ONCE
Status: COMPLETED | OUTPATIENT
Start: 2019-11-14 | End: 2019-11-14

## 2019-11-14 RX ORDER — 0.9 % SODIUM CHLORIDE 0.9 %
500 INTRAVENOUS SOLUTION INTRAVENOUS ONCE
Status: COMPLETED | OUTPATIENT
Start: 2019-11-14 | End: 2019-11-14

## 2019-11-14 RX ORDER — CEPHALEXIN 500 MG/1
500 CAPSULE ORAL 3 TIMES DAILY
Qty: 21 CAPSULE | Refills: 0 | Status: SHIPPED | OUTPATIENT
Start: 2019-11-14 | End: 2019-11-21

## 2019-11-14 RX ORDER — CEPHALEXIN 500 MG/1
500 CAPSULE ORAL ONCE
Status: COMPLETED | OUTPATIENT
Start: 2019-11-14 | End: 2019-11-14

## 2019-11-14 RX ADMIN — SODIUM CHLORIDE 500 ML: 9 INJECTION, SOLUTION INTRAVENOUS at 20:35

## 2019-11-14 RX ADMIN — KETOROLAC TROMETHAMINE 15 MG: 30 INJECTION, SOLUTION INTRAMUSCULAR at 21:10

## 2019-11-14 RX ADMIN — CEPHALEXIN 500 MG: 500 CAPSULE ORAL at 23:19

## 2019-11-14 ASSESSMENT — ENCOUNTER SYMPTOMS
BACK PAIN: 0
SHORTNESS OF BREATH: 0
NAUSEA: 0
COUGH: 1
CONSTIPATION: 0
ABDOMINAL PAIN: 0

## 2019-11-14 ASSESSMENT — PAIN DESCRIPTION - PAIN TYPE
TYPE: ACUTE PAIN
TYPE: ACUTE PAIN

## 2019-11-14 ASSESSMENT — PAIN SCALES - GENERAL
PAINLEVEL_OUTOF10: 5
PAINLEVEL_OUTOF10: 4
PAINLEVEL_OUTOF10: 1

## 2019-11-14 ASSESSMENT — PAIN DESCRIPTION - LOCATION
LOCATION: HEAD
LOCATION: HEAD

## 2019-11-15 ENCOUNTER — TELEPHONE (OUTPATIENT)
Dept: INTERNAL MEDICINE CLINIC | Age: 74
End: 2019-11-15

## 2019-11-16 LAB
ORGANISM: ABNORMAL
URINE CULTURE, ROUTINE: ABNORMAL

## 2019-11-18 DIAGNOSIS — J84.9 ILD (INTERSTITIAL LUNG DISEASE) (HCC): ICD-10-CM

## 2019-11-18 RX ORDER — AZATHIOPRINE 50 MG/1
TABLET ORAL
Qty: 30 TABLET | Refills: 0 | Status: SHIPPED | OUTPATIENT
Start: 2019-11-18 | End: 2019-11-26

## 2019-11-19 RX ORDER — CLOPIDOGREL BISULFATE 75 MG/1
TABLET ORAL
Qty: 30 TABLET | Refills: 2 | Status: SHIPPED | OUTPATIENT
Start: 2019-11-19 | End: 2020-05-05

## 2019-11-20 ENCOUNTER — OFFICE VISIT (OUTPATIENT)
Dept: RHEUMATOLOGY | Age: 74
End: 2019-11-20
Payer: MEDICARE

## 2019-11-20 ENCOUNTER — OFFICE VISIT (OUTPATIENT)
Dept: INTERNAL MEDICINE CLINIC | Age: 74
End: 2019-11-20
Payer: MEDICARE

## 2019-11-20 VITALS
SYSTOLIC BLOOD PRESSURE: 100 MMHG | DIASTOLIC BLOOD PRESSURE: 68 MMHG | BODY MASS INDEX: 30.82 KG/M2 | WEIGHT: 156.97 LBS | HEIGHT: 60 IN

## 2019-11-20 VITALS
HEIGHT: 60 IN | SYSTOLIC BLOOD PRESSURE: 112 MMHG | HEART RATE: 55 BPM | BODY MASS INDEX: 30.82 KG/M2 | DIASTOLIC BLOOD PRESSURE: 70 MMHG | WEIGHT: 157 LBS | OXYGEN SATURATION: 96 %

## 2019-11-20 DIAGNOSIS — M35.02 SJOGREN'S SYNDROME WITH LUNG INVOLVEMENT (HCC): ICD-10-CM

## 2019-11-20 DIAGNOSIS — Z79.899 HIGH RISK MEDICATION USE: ICD-10-CM

## 2019-11-20 DIAGNOSIS — J84.9 ILD (INTERSTITIAL LUNG DISEASE) (HCC): Primary | ICD-10-CM

## 2019-11-20 DIAGNOSIS — I10 ESSENTIAL HYPERTENSION, BENIGN: ICD-10-CM

## 2019-11-20 DIAGNOSIS — L93.2 CUTANEOUS LUPUS ERYTHEMATOSUS: ICD-10-CM

## 2019-11-20 LAB
ALBUMIN SERPL-MCNC: 3.8 G/DL (ref 3.4–5)
ALP BLD-CCNC: 90 U/L (ref 40–129)
ALT SERPL-CCNC: 28 U/L (ref 10–40)
AST SERPL-CCNC: 26 U/L (ref 15–37)
BILIRUB SERPL-MCNC: 0.3 MG/DL (ref 0–1)
BILIRUBIN DIRECT: <0.2 MG/DL (ref 0–0.3)
BILIRUBIN, INDIRECT: NORMAL MG/DL (ref 0–1)
TOTAL PROTEIN: 6.6 G/DL (ref 6.4–8.2)

## 2019-11-20 PROCEDURE — G8598 ASA/ANTIPLAT THER USED: HCPCS | Performed by: INTERNAL MEDICINE

## 2019-11-20 PROCEDURE — 1123F ACP DISCUSS/DSCN MKR DOCD: CPT | Performed by: INTERNAL MEDICINE

## 2019-11-20 PROCEDURE — 1036F TOBACCO NON-USER: CPT | Performed by: INTERNAL MEDICINE

## 2019-11-20 PROCEDURE — G8399 PT W/DXA RESULTS DOCUMENT: HCPCS | Performed by: INTERNAL MEDICINE

## 2019-11-20 PROCEDURE — 1090F PRES/ABSN URINE INCON ASSESS: CPT | Performed by: INTERNAL MEDICINE

## 2019-11-20 PROCEDURE — G8417 CALC BMI ABV UP PARAM F/U: HCPCS | Performed by: INTERNAL MEDICINE

## 2019-11-20 PROCEDURE — 99213 OFFICE O/P EST LOW 20 MIN: CPT | Performed by: INTERNAL MEDICINE

## 2019-11-20 PROCEDURE — G8482 FLU IMMUNIZE ORDER/ADMIN: HCPCS | Performed by: INTERNAL MEDICINE

## 2019-11-20 PROCEDURE — 99214 OFFICE O/P EST MOD 30 MIN: CPT | Performed by: INTERNAL MEDICINE

## 2019-11-20 PROCEDURE — G8427 DOCREV CUR MEDS BY ELIG CLIN: HCPCS | Performed by: INTERNAL MEDICINE

## 2019-11-20 PROCEDURE — 4040F PNEUMOC VAC/ADMIN/RCVD: CPT | Performed by: INTERNAL MEDICINE

## 2019-11-20 RX ORDER — CARVEDILOL 12.5 MG/1
12.5 TABLET ORAL 2 TIMES DAILY
Qty: 180 TABLET | Refills: 2 | Status: ON HOLD | OUTPATIENT
Start: 2019-11-20 | End: 2019-12-02 | Stop reason: HOSPADM

## 2019-11-20 RX ORDER — AZATHIOPRINE 50 MG/1
TABLET ORAL
Qty: 180 TABLET | Refills: 0 | Status: SHIPPED | OUTPATIENT
Start: 2019-11-20 | End: 2020-03-24

## 2019-11-20 RX ORDER — FUROSEMIDE 20 MG/1
20 TABLET ORAL DAILY
Qty: 30 TABLET | Refills: 3 | Status: ON HOLD
Start: 2019-11-20 | End: 2019-12-02 | Stop reason: HOSPADM

## 2019-11-20 RX ORDER — FUROSEMIDE 20 MG/1
20 TABLET ORAL 2 TIMES DAILY
Qty: 30 TABLET | Refills: 3
Start: 2019-11-20 | End: 2019-11-20

## 2019-11-20 ASSESSMENT — ENCOUNTER SYMPTOMS
SHORTNESS OF BREATH: 0
VOMITING: 0
ABDOMINAL PAIN: 0
NAUSEA: 0
CHEST TIGHTNESS: 0
SORE THROAT: 0

## 2019-11-23 DIAGNOSIS — J84.9 ILD (INTERSTITIAL LUNG DISEASE) (HCC): ICD-10-CM

## 2019-11-25 ENCOUNTER — TELEPHONE (OUTPATIENT)
Dept: INTERNAL MEDICINE CLINIC | Age: 74
End: 2019-11-25

## 2019-11-25 RX ORDER — PREDNISONE 10 MG/1
TABLET ORAL
Qty: 60 TABLET | Refills: 0 | Status: SHIPPED | OUTPATIENT
Start: 2019-11-25 | End: 2019-12-19

## 2019-11-26 ENCOUNTER — APPOINTMENT (OUTPATIENT)
Dept: CT IMAGING | Age: 74
DRG: 644 | End: 2019-11-26
Payer: MEDICARE

## 2019-11-26 ENCOUNTER — APPOINTMENT (OUTPATIENT)
Dept: GENERAL RADIOLOGY | Age: 74
DRG: 644 | End: 2019-11-26
Payer: MEDICARE

## 2019-11-26 ENCOUNTER — HOSPITAL ENCOUNTER (INPATIENT)
Age: 74
LOS: 7 days | Discharge: NURSING, REHAB OR LONG-TERM CARE | DRG: 644 | End: 2019-12-03
Attending: EMERGENCY MEDICINE | Admitting: INTERNAL MEDICINE
Payer: MEDICARE

## 2019-11-26 DIAGNOSIS — E87.1 HYPONATREMIA: Primary | ICD-10-CM

## 2019-11-26 DIAGNOSIS — R41.0 CONFUSION: ICD-10-CM

## 2019-11-26 DIAGNOSIS — N30.00 ACUTE CYSTITIS WITHOUT HEMATURIA: ICD-10-CM

## 2019-11-26 PROBLEM — N39.0 URINARY TRACT INFECTION: Status: ACTIVE | Noted: 2019-11-26

## 2019-11-26 LAB
ALBUMIN SERPL-MCNC: 3.5 G/DL (ref 3.4–5)
ALP BLD-CCNC: 79 U/L (ref 40–129)
ALT SERPL-CCNC: 15 U/L (ref 10–40)
AMORPHOUS: ABNORMAL /HPF
ANION GAP SERPL CALCULATED.3IONS-SCNC: 12 MMOL/L (ref 3–16)
AST SERPL-CCNC: 16 U/L (ref 15–37)
BACTERIA: ABNORMAL /HPF
BASE EXCESS VENOUS: 1.2 MMOL/L (ref -2–3)
BASOPHILS ABSOLUTE: 0 K/UL (ref 0–0.2)
BASOPHILS RELATIVE PERCENT: 0.2 %
BILIRUB SERPL-MCNC: 0.6 MG/DL (ref 0–1)
BILIRUBIN DIRECT: <0.2 MG/DL (ref 0–0.3)
BILIRUBIN URINE: NEGATIVE
BILIRUBIN, INDIRECT: NORMAL MG/DL (ref 0–1)
BLOOD, URINE: NEGATIVE
BUN BLDV-MCNC: 20 MG/DL (ref 7–20)
CALCIUM SERPL-MCNC: 9.2 MG/DL (ref 8.3–10.6)
CARBOXYHEMOGLOBIN: 2.6 % (ref 0–1.5)
CHLORIDE BLD-SCNC: 93 MMOL/L (ref 99–110)
CLARITY: CLEAR
CO2: 22 MMOL/L (ref 21–32)
COLOR: YELLOW
CREAT SERPL-MCNC: 0.8 MG/DL (ref 0.6–1.2)
EKG ATRIAL RATE: 64 BPM
EKG DIAGNOSIS: NORMAL
EKG P AXIS: 35 DEGREES
EKG P-R INTERVAL: 184 MS
EKG Q-T INTERVAL: 388 MS
EKG QRS DURATION: 86 MS
EKG QTC CALCULATION (BAZETT): 400 MS
EKG R AXIS: -45 DEGREES
EKG T AXIS: 119 DEGREES
EKG VENTRICULAR RATE: 64 BPM
EOSINOPHILS ABSOLUTE: 0.1 K/UL (ref 0–0.6)
EOSINOPHILS RELATIVE PERCENT: 1 %
GFR AFRICAN AMERICAN: >60
GFR NON-AFRICAN AMERICAN: >60
GLUCOSE BLD-MCNC: 149 MG/DL (ref 70–99)
GLUCOSE BLD-MCNC: 73 MG/DL (ref 70–99)
GLUCOSE BLD-MCNC: 78 MG/DL (ref 70–99)
GLUCOSE URINE: NEGATIVE MG/DL
HCO3 VENOUS: 24.5 MMOL/L (ref 24–28)
HCT VFR BLD CALC: 34.1 % (ref 36–48)
HEMOGLOBIN, VEN, REDUCED: 17.7 %
HEMOGLOBIN: 11.5 G/DL (ref 12–16)
INR BLD: 0.99 (ref 0.86–1.14)
KETONES, URINE: NEGATIVE MG/DL
LEUKOCYTE ESTERASE, URINE: ABNORMAL
LIPASE: 152 U/L (ref 13–60)
LYMPHOCYTES ABSOLUTE: 1.4 K/UL (ref 1–5.1)
LYMPHOCYTES RELATIVE PERCENT: 15.3 %
MCH RBC QN AUTO: 32.3 PG (ref 26–34)
MCHC RBC AUTO-ENTMCNC: 33.7 G/DL (ref 31–36)
MCV RBC AUTO: 95.7 FL (ref 80–100)
METHEMOGLOBIN VENOUS: 0.1 % (ref 0–1.5)
MICROSCOPIC EXAMINATION: YES
MONOCYTES ABSOLUTE: 0.6 K/UL (ref 0–1.3)
MONOCYTES RELATIVE PERCENT: 6.7 %
NEUTROPHILS ABSOLUTE: 6.9 K/UL (ref 1.7–7.7)
NEUTROPHILS RELATIVE PERCENT: 76.8 %
NITRITE, URINE: POSITIVE
O2 SAT, VEN: 82 %
OSMOLALITY: 271 MOSM/KG (ref 278–305)
PCO2, VEN: 35.4 MMHG (ref 41–51)
PDW BLD-RTO: 14.6 % (ref 12.4–15.4)
PERFORMED ON: ABNORMAL
PERFORMED ON: NORMAL
PH UA: 7.5 (ref 5–8)
PH VENOUS: 7.45 (ref 7.35–7.45)
PLATELET # BLD: 295 K/UL (ref 135–450)
PMV BLD AUTO: 6.2 FL (ref 5–10.5)
PO2, VEN: 44.3 MMHG (ref 25–40)
POTASSIUM REFLEX MAGNESIUM: 4.1 MMOL/L (ref 3.5–5.1)
PRO-BNP: 1843 PG/ML (ref 0–449)
PROTEIN UA: NEGATIVE MG/DL
PROTHROMBIN TIME: 11.5 SEC (ref 10–13.2)
RAPID INFLUENZA  B AGN: NEGATIVE
RAPID INFLUENZA A AGN: NEGATIVE
RBC # BLD: 3.56 M/UL (ref 4–5.2)
RBC UA: ABNORMAL /HPF (ref 0–2)
REASON FOR REJECTION: NORMAL
REJECTED TEST: NORMAL
SODIUM BLD-SCNC: 127 MMOL/L (ref 136–145)
SODIUM BLD-SCNC: 129 MMOL/L (ref 136–145)
SPECIFIC GRAVITY UA: 1.01 (ref 1–1.03)
TCO2 CALC VENOUS: 26 MMOL/L
TOTAL PROTEIN: 6.8 G/DL (ref 6.4–8.2)
TROPONIN: <0.01 NG/ML
TSH REFLEX: 1.75 UIU/ML (ref 0.27–4.2)
URIC ACID, SERUM: 3 MG/DL (ref 2.6–6)
URINE TYPE: ABNORMAL
UROBILINOGEN, URINE: 0.2 E.U./DL
VITAMIN B-12: 567 PG/ML (ref 211–911)
VITAMIN D 25-HYDROXY: 86.7 NG/ML
WBC # BLD: 9 K/UL (ref 4–11)
WBC UA: ABNORMAL /HPF (ref 0–5)

## 2019-11-26 PROCEDURE — 70450 CT HEAD/BRAIN W/O DYE: CPT

## 2019-11-26 PROCEDURE — 2580000003 HC RX 258: Performed by: STUDENT IN AN ORGANIZED HEALTH CARE EDUCATION/TRAINING PROGRAM

## 2019-11-26 PROCEDURE — 87186 SC STD MICRODIL/AGAR DIL: CPT

## 2019-11-26 PROCEDURE — 85025 COMPLETE CBC W/AUTO DIFF WBC: CPT

## 2019-11-26 PROCEDURE — 96361 HYDRATE IV INFUSION ADD-ON: CPT

## 2019-11-26 PROCEDURE — 85610 PROTHROMBIN TIME: CPT

## 2019-11-26 PROCEDURE — 87040 BLOOD CULTURE FOR BACTERIA: CPT

## 2019-11-26 PROCEDURE — 80048 BASIC METABOLIC PNL TOTAL CA: CPT

## 2019-11-26 PROCEDURE — 82803 BLOOD GASES ANY COMBINATION: CPT

## 2019-11-26 PROCEDURE — 84484 ASSAY OF TROPONIN QUANT: CPT

## 2019-11-26 PROCEDURE — 2060000000 HC ICU INTERMEDIATE R&B

## 2019-11-26 PROCEDURE — 80076 HEPATIC FUNCTION PANEL: CPT

## 2019-11-26 PROCEDURE — 6360000004 HC RX CONTRAST MEDICATION: Performed by: EMERGENCY MEDICINE

## 2019-11-26 PROCEDURE — 84443 ASSAY THYROID STIM HORMONE: CPT

## 2019-11-26 PROCEDURE — 83880 ASSAY OF NATRIURETIC PEPTIDE: CPT

## 2019-11-26 PROCEDURE — 82306 VITAMIN D 25 HYDROXY: CPT

## 2019-11-26 PROCEDURE — 87077 CULTURE AEROBIC IDENTIFY: CPT

## 2019-11-26 PROCEDURE — 81001 URINALYSIS AUTO W/SCOPE: CPT

## 2019-11-26 PROCEDURE — 84550 ASSAY OF BLOOD/URIC ACID: CPT

## 2019-11-26 PROCEDURE — 83690 ASSAY OF LIPASE: CPT

## 2019-11-26 PROCEDURE — 70496 CT ANGIOGRAPHY HEAD: CPT

## 2019-11-26 PROCEDURE — 96365 THER/PROPH/DIAG IV INF INIT: CPT

## 2019-11-26 PROCEDURE — 99285 EMERGENCY DEPT VISIT HI MDM: CPT

## 2019-11-26 PROCEDURE — 93005 ELECTROCARDIOGRAM TRACING: CPT | Performed by: EMERGENCY MEDICINE

## 2019-11-26 PROCEDURE — 83930 ASSAY OF BLOOD OSMOLALITY: CPT

## 2019-11-26 PROCEDURE — 74176 CT ABD & PELVIS W/O CONTRAST: CPT

## 2019-11-26 PROCEDURE — 6370000000 HC RX 637 (ALT 250 FOR IP): Performed by: STUDENT IN AN ORGANIZED HEALTH CARE EDUCATION/TRAINING PROGRAM

## 2019-11-26 PROCEDURE — 6360000002 HC RX W HCPCS: Performed by: EMERGENCY MEDICINE

## 2019-11-26 PROCEDURE — 82607 VITAMIN B-12: CPT

## 2019-11-26 PROCEDURE — 2580000003 HC RX 258: Performed by: EMERGENCY MEDICINE

## 2019-11-26 PROCEDURE — 96375 TX/PRO/DX INJ NEW DRUG ADDON: CPT

## 2019-11-26 PROCEDURE — 31720 CLEARANCE OF AIRWAYS: CPT

## 2019-11-26 PROCEDURE — 71046 X-RAY EXAM CHEST 2 VIEWS: CPT

## 2019-11-26 PROCEDURE — 87086 URINE CULTURE/COLONY COUNT: CPT

## 2019-11-26 PROCEDURE — 2580000003 HC RX 258

## 2019-11-26 PROCEDURE — 36415 COLL VENOUS BLD VENIPUNCTURE: CPT

## 2019-11-26 PROCEDURE — 84295 ASSAY OF SERUM SODIUM: CPT

## 2019-11-26 PROCEDURE — 87804 INFLUENZA ASSAY W/OPTIC: CPT

## 2019-11-26 PROCEDURE — 94761 N-INVAS EAR/PLS OXIMETRY MLT: CPT

## 2019-11-26 PROCEDURE — 6360000002 HC RX W HCPCS: Performed by: STUDENT IN AN ORGANIZED HEALTH CARE EDUCATION/TRAINING PROGRAM

## 2019-11-26 RX ORDER — DEXTROSE MONOHYDRATE 25 G/50ML
25 INJECTION, SOLUTION INTRAVENOUS ONCE
Status: COMPLETED | OUTPATIENT
Start: 2019-11-26 | End: 2019-11-26

## 2019-11-26 RX ORDER — DEXTROSE MONOHYDRATE 25 G/50ML
INJECTION, SOLUTION INTRAVENOUS
Status: COMPLETED
Start: 2019-11-26 | End: 2019-11-26

## 2019-11-26 RX ORDER — HYDROXYCHLOROQUINE SULFATE 200 MG/1
200 TABLET, FILM COATED ORAL DAILY
Status: DISCONTINUED | OUTPATIENT
Start: 2019-11-26 | End: 2019-12-03 | Stop reason: HOSPADM

## 2019-11-26 RX ORDER — BUPROPION HYDROCHLORIDE 150 MG/1
300 TABLET ORAL DAILY
Status: DISCONTINUED | OUTPATIENT
Start: 2019-11-26 | End: 2019-12-03 | Stop reason: HOSPADM

## 2019-11-26 RX ORDER — DEXTROSE AND SODIUM CHLORIDE 5; .9 G/100ML; G/100ML
1000 INJECTION, SOLUTION INTRAVENOUS CONTINUOUS
Status: DISCONTINUED | OUTPATIENT
Start: 2019-11-26 | End: 2019-11-26

## 2019-11-26 RX ORDER — AZATHIOPRINE 50 MG/1
100 TABLET ORAL DAILY
Status: DISCONTINUED | OUTPATIENT
Start: 2019-11-26 | End: 2019-12-03 | Stop reason: HOSPADM

## 2019-11-26 RX ORDER — PREDNISONE 10 MG/1
20 TABLET ORAL DAILY
Status: DISCONTINUED | OUTPATIENT
Start: 2019-11-26 | End: 2019-11-27

## 2019-11-26 RX ORDER — NICOTINE POLACRILEX 4 MG
15 LOZENGE BUCCAL PRN
Status: DISCONTINUED | OUTPATIENT
Start: 2019-11-26 | End: 2019-12-03 | Stop reason: HOSPADM

## 2019-11-26 RX ORDER — CITALOPRAM 10 MG/1
10 TABLET ORAL DAILY
Status: DISCONTINUED | OUTPATIENT
Start: 2019-11-26 | End: 2019-12-03 | Stop reason: HOSPADM

## 2019-11-26 RX ORDER — ONDANSETRON 2 MG/ML
4 INJECTION INTRAMUSCULAR; INTRAVENOUS EVERY 6 HOURS PRN
Status: DISCONTINUED | OUTPATIENT
Start: 2019-11-26 | End: 2019-12-03 | Stop reason: HOSPADM

## 2019-11-26 RX ORDER — SODIUM CHLORIDE 0.9 % (FLUSH) 0.9 %
10 SYRINGE (ML) INJECTION EVERY 12 HOURS SCHEDULED
Status: DISCONTINUED | OUTPATIENT
Start: 2019-11-26 | End: 2019-12-03 | Stop reason: HOSPADM

## 2019-11-26 RX ORDER — 0.9 % SODIUM CHLORIDE 0.9 %
500 INTRAVENOUS SOLUTION INTRAVENOUS ONCE
Status: COMPLETED | OUTPATIENT
Start: 2019-11-26 | End: 2019-11-26

## 2019-11-26 RX ORDER — ARIPIPRAZOLE 2 MG/1
2 TABLET ORAL DAILY
Status: DISCONTINUED | OUTPATIENT
Start: 2019-11-26 | End: 2019-12-03 | Stop reason: HOSPADM

## 2019-11-26 RX ORDER — CARVEDILOL 12.5 MG/1
12.5 TABLET ORAL 2 TIMES DAILY WITH MEALS
Status: DISCONTINUED | OUTPATIENT
Start: 2019-11-26 | End: 2019-12-03 | Stop reason: HOSPADM

## 2019-11-26 RX ORDER — PANTOPRAZOLE SODIUM 20 MG/1
20 TABLET, DELAYED RELEASE ORAL 2 TIMES DAILY
Status: DISCONTINUED | OUTPATIENT
Start: 2019-11-26 | End: 2019-11-29

## 2019-11-26 RX ORDER — ACETAMINOPHEN 325 MG/1
650 TABLET ORAL EVERY 4 HOURS PRN
Status: DISCONTINUED | OUTPATIENT
Start: 2019-11-26 | End: 2019-12-03 | Stop reason: HOSPADM

## 2019-11-26 RX ORDER — CLOPIDOGREL BISULFATE 75 MG/1
75 TABLET ORAL DAILY
Status: DISCONTINUED | OUTPATIENT
Start: 2019-11-26 | End: 2019-12-03 | Stop reason: HOSPADM

## 2019-11-26 RX ORDER — DEXTROSE MONOHYDRATE 50 MG/ML
100 INJECTION, SOLUTION INTRAVENOUS PRN
Status: DISCONTINUED | OUTPATIENT
Start: 2019-11-26 | End: 2019-12-03 | Stop reason: HOSPADM

## 2019-11-26 RX ORDER — 0.9 % SODIUM CHLORIDE 0.9 %
1000 INTRAVENOUS SOLUTION INTRAVENOUS ONCE
Status: COMPLETED | OUTPATIENT
Start: 2019-11-26 | End: 2019-11-26

## 2019-11-26 RX ORDER — LOSARTAN POTASSIUM 25 MG/1
25 TABLET ORAL DAILY
Status: DISCONTINUED | OUTPATIENT
Start: 2019-11-26 | End: 2019-12-03 | Stop reason: HOSPADM

## 2019-11-26 RX ORDER — SODIUM CHLORIDE 0.9 % (FLUSH) 0.9 %
10 SYRINGE (ML) INJECTION PRN
Status: DISCONTINUED | OUTPATIENT
Start: 2019-11-26 | End: 2019-12-03 | Stop reason: HOSPADM

## 2019-11-26 RX ORDER — DEXTROSE MONOHYDRATE 25 G/50ML
12.5 INJECTION, SOLUTION INTRAVENOUS PRN
Status: DISCONTINUED | OUTPATIENT
Start: 2019-11-26 | End: 2019-12-03 | Stop reason: HOSPADM

## 2019-11-26 RX ADMIN — LOSARTAN POTASSIUM 25 MG: 25 TABLET ORAL at 20:37

## 2019-11-26 RX ADMIN — PREDNISONE 20 MG: 10 TABLET ORAL at 18:01

## 2019-11-26 RX ADMIN — CLOPIDOGREL BISULFATE 75 MG: 75 TABLET, FILM COATED ORAL at 18:01

## 2019-11-26 RX ADMIN — CITALOPRAM HYDROBROMIDE 10 MG: 10 TABLET ORAL at 18:01

## 2019-11-26 RX ADMIN — PANTOPRAZOLE SODIUM 20 MG: 20 TABLET, DELAYED RELEASE ORAL at 20:40

## 2019-11-26 RX ADMIN — DEXTROSE AND SODIUM CHLORIDE 1000 ML: 5; 900 INJECTION, SOLUTION INTRAVENOUS at 10:44

## 2019-11-26 RX ADMIN — ACETAMINOPHEN 650 MG: 325 TABLET ORAL at 18:09

## 2019-11-26 RX ADMIN — ENOXAPARIN SODIUM 40 MG: 40 INJECTION SUBCUTANEOUS at 18:01

## 2019-11-26 RX ADMIN — Medication 10 ML: at 20:40

## 2019-11-26 RX ADMIN — DEXTROSE MONOHYDRATE 25 G: 25 INJECTION, SOLUTION INTRAVENOUS at 08:37

## 2019-11-26 RX ADMIN — IOPAMIDOL 80 ML: 755 INJECTION, SOLUTION INTRAVENOUS at 10:18

## 2019-11-26 RX ADMIN — DEXTROSE 50 % IN WATER (D50W) INTRAVENOUS SYRINGE 25 G: at 08:37

## 2019-11-26 RX ADMIN — AZATHIOPRINE 100 MG: 50 TABLET ORAL at 18:03

## 2019-11-26 RX ADMIN — SODIUM CHLORIDE 1000 ML: 0.9 INJECTION, SOLUTION INTRAVENOUS at 09:06

## 2019-11-26 RX ADMIN — CARVEDILOL 12.5 MG: 12.5 TABLET, FILM COATED ORAL at 18:01

## 2019-11-26 RX ADMIN — SODIUM CHLORIDE 500 ML: 9 INJECTION, SOLUTION INTRAVENOUS at 21:25

## 2019-11-26 RX ADMIN — CEFTRIAXONE 1 G: 1 INJECTION, POWDER, FOR SOLUTION INTRAMUSCULAR; INTRAVENOUS at 10:43

## 2019-11-26 RX ADMIN — HYDROXYCHLOROQUINE SULFATE 200 MG: 200 TABLET, FILM COATED ORAL at 18:01

## 2019-11-26 RX ADMIN — ARIPIPRAZOLE 2 MG: 2 TABLET ORAL at 18:04

## 2019-11-26 ASSESSMENT — PAIN SCALES - GENERAL
PAINLEVEL_OUTOF10: 4
PAINLEVEL_OUTOF10: 0
PAINLEVEL_OUTOF10: 1
PAINLEVEL_OUTOF10: 0
PAINLEVEL_OUTOF10: 0

## 2019-11-26 ASSESSMENT — ENCOUNTER SYMPTOMS
ABDOMINAL PAIN: 0
NAUSEA: 1
VOMITING: 1
CONSTIPATION: 0
WHEEZING: 0
SHORTNESS OF BREATH: 1
ALLERGIC/IMMUNOLOGIC NEGATIVE: 1
DIARRHEA: 0
SHORTNESS OF BREATH: 0
EYES NEGATIVE: 1

## 2019-11-27 ENCOUNTER — APPOINTMENT (OUTPATIENT)
Dept: GENERAL RADIOLOGY | Age: 74
DRG: 644 | End: 2019-11-27
Payer: MEDICARE

## 2019-11-27 PROBLEM — E27.1 ADDISON'S DISEASE (HCC): Status: ACTIVE | Noted: 2019-11-27

## 2019-11-27 PROBLEM — E87.1 HYPONATREMIA: Status: ACTIVE | Noted: 2019-11-27

## 2019-11-27 LAB
AMPHETAMINE SCREEN, URINE: NORMAL
ANION GAP SERPL CALCULATED.3IONS-SCNC: 11 MMOL/L (ref 3–16)
BARBITURATE SCREEN URINE: NORMAL
BASOPHILS ABSOLUTE: 0 K/UL (ref 0–0.2)
BASOPHILS RELATIVE PERCENT: 0.4 %
BENZODIAZEPINE SCREEN, URINE: NORMAL
BUN BLDV-MCNC: 16 MG/DL (ref 7–20)
CALCIUM SERPL-MCNC: 8.6 MG/DL (ref 8.3–10.6)
CANNABINOID SCREEN URINE: NORMAL
CHLORIDE BLD-SCNC: 99 MMOL/L (ref 99–110)
CO2: 21 MMOL/L (ref 21–32)
COCAINE METABOLITE SCREEN URINE: NORMAL
CORTISOL TOTAL: 1.6 UG/DL
CREAT SERPL-MCNC: 0.7 MG/DL (ref 0.6–1.2)
EKG ATRIAL RATE: 62 BPM
EKG DIAGNOSIS: NORMAL
EKG P AXIS: 49 DEGREES
EKG P-R INTERVAL: 186 MS
EKG Q-T INTERVAL: 416 MS
EKG QRS DURATION: 90 MS
EKG QTC CALCULATION (BAZETT): 422 MS
EKG R AXIS: -56 DEGREES
EKG T AXIS: 138 DEGREES
EKG VENTRICULAR RATE: 62 BPM
EOSINOPHILS ABSOLUTE: 0.1 K/UL (ref 0–0.6)
EOSINOPHILS RELATIVE PERCENT: 0.8 %
GFR AFRICAN AMERICAN: >60
GFR NON-AFRICAN AMERICAN: >60
GLUCOSE BLD-MCNC: 80 MG/DL (ref 70–99)
HCT VFR BLD CALC: 30.7 % (ref 36–48)
HEMOGLOBIN: 10.2 G/DL (ref 12–16)
LIPASE: 77 U/L (ref 13–60)
LYMPHOCYTES ABSOLUTE: 1 K/UL (ref 1–5.1)
LYMPHOCYTES RELATIVE PERCENT: 14.1 %
Lab: NORMAL
MAGNESIUM: 1.9 MG/DL (ref 1.8–2.4)
MCH RBC QN AUTO: 32 PG (ref 26–34)
MCHC RBC AUTO-ENTMCNC: 33.3 G/DL (ref 31–36)
MCV RBC AUTO: 96.3 FL (ref 80–100)
METHADONE SCREEN, URINE: NORMAL
MONOCYTES ABSOLUTE: 0.6 K/UL (ref 0–1.3)
MONOCYTES RELATIVE PERCENT: 8.1 %
NEUTROPHILS ABSOLUTE: 5.4 K/UL (ref 1.7–7.7)
NEUTROPHILS RELATIVE PERCENT: 76.6 %
OPIATE SCREEN URINE: NORMAL
OSMOLALITY URINE: 369 MOSM/KG (ref 390–1070)
OXYCODONE URINE: NORMAL
PDW BLD-RTO: 14.2 % (ref 12.4–15.4)
PH UA: 6
PHENCYCLIDINE SCREEN URINE: NORMAL
PLATELET # BLD: 272 K/UL (ref 135–450)
PMV BLD AUTO: 6.2 FL (ref 5–10.5)
POTASSIUM SERPL-SCNC: 4 MMOL/L (ref 3.5–5.1)
PROPOXYPHENE SCREEN: NORMAL
RBC # BLD: 3.18 M/UL (ref 4–5.2)
SODIUM BLD-SCNC: 125 MMOL/L (ref 136–145)
SODIUM BLD-SCNC: 128 MMOL/L (ref 136–145)
SODIUM BLD-SCNC: 131 MMOL/L (ref 136–145)
SODIUM BLD-SCNC: 131 MMOL/L (ref 136–145)
SODIUM URINE: 58 MMOL/L
WBC # BLD: 7 K/UL (ref 4–11)

## 2019-11-27 PROCEDURE — 2580000003 HC RX 258: Performed by: INTERNAL MEDICINE

## 2019-11-27 PROCEDURE — 97530 THERAPEUTIC ACTIVITIES: CPT

## 2019-11-27 PROCEDURE — 6370000000 HC RX 637 (ALT 250 FOR IP): Performed by: INTERNAL MEDICINE

## 2019-11-27 PROCEDURE — 83935 ASSAY OF URINE OSMOLALITY: CPT

## 2019-11-27 PROCEDURE — 99222 1ST HOSP IP/OBS MODERATE 55: CPT | Performed by: INTERNAL MEDICINE

## 2019-11-27 PROCEDURE — 6360000002 HC RX W HCPCS: Performed by: INTERNAL MEDICINE

## 2019-11-27 PROCEDURE — 94664 DEMO&/EVAL PT USE INHALER: CPT

## 2019-11-27 PROCEDURE — 6370000000 HC RX 637 (ALT 250 FOR IP): Performed by: STUDENT IN AN ORGANIZED HEALTH CARE EDUCATION/TRAINING PROGRAM

## 2019-11-27 PROCEDURE — 93005 ELECTROCARDIOGRAM TRACING: CPT | Performed by: STUDENT IN AN ORGANIZED HEALTH CARE EDUCATION/TRAINING PROGRAM

## 2019-11-27 PROCEDURE — 97162 PT EVAL MOD COMPLEX 30 MIN: CPT

## 2019-11-27 PROCEDURE — 84295 ASSAY OF SERUM SODIUM: CPT

## 2019-11-27 PROCEDURE — 2580000003 HC RX 258: Performed by: STUDENT IN AN ORGANIZED HEALTH CARE EDUCATION/TRAINING PROGRAM

## 2019-11-27 PROCEDURE — 92610 EVALUATE SWALLOWING FUNCTION: CPT

## 2019-11-27 PROCEDURE — 83690 ASSAY OF LIPASE: CPT

## 2019-11-27 PROCEDURE — 80048 BASIC METABOLIC PNL TOTAL CA: CPT

## 2019-11-27 PROCEDURE — 84300 ASSAY OF URINE SODIUM: CPT

## 2019-11-27 PROCEDURE — 84540 ASSAY OF URINE/UREA-N: CPT

## 2019-11-27 PROCEDURE — 2060000000 HC ICU INTERMEDIATE R&B

## 2019-11-27 PROCEDURE — 94150 VITAL CAPACITY TEST: CPT

## 2019-11-27 PROCEDURE — 6360000002 HC RX W HCPCS: Performed by: STUDENT IN AN ORGANIZED HEALTH CARE EDUCATION/TRAINING PROGRAM

## 2019-11-27 PROCEDURE — 97166 OT EVAL MOD COMPLEX 45 MIN: CPT

## 2019-11-27 PROCEDURE — G0009 ADMIN PNEUMOCOCCAL VACCINE: HCPCS | Performed by: INTERNAL MEDICINE

## 2019-11-27 PROCEDURE — 85025 COMPLETE CBC W/AUTO DIFF WBC: CPT

## 2019-11-27 PROCEDURE — 80307 DRUG TEST PRSMV CHEM ANLYZR: CPT

## 2019-11-27 PROCEDURE — 83735 ASSAY OF MAGNESIUM: CPT

## 2019-11-27 PROCEDURE — 82533 TOTAL CORTISOL: CPT

## 2019-11-27 PROCEDURE — 97535 SELF CARE MNGMENT TRAINING: CPT

## 2019-11-27 PROCEDURE — 97116 GAIT TRAINING THERAPY: CPT

## 2019-11-27 PROCEDURE — 93010 ELECTROCARDIOGRAM REPORT: CPT | Performed by: INTERNAL MEDICINE

## 2019-11-27 PROCEDURE — 36415 COLL VENOUS BLD VENIPUNCTURE: CPT

## 2019-11-27 PROCEDURE — 74230 X-RAY XM SWLNG FUNCJ C+: CPT

## 2019-11-27 PROCEDURE — 90732 PPSV23 VACC 2 YRS+ SUBQ/IM: CPT | Performed by: INTERNAL MEDICINE

## 2019-11-27 RX ORDER — POLYETHYLENE GLYCOL 3350 17 G/17G
17 POWDER, FOR SOLUTION ORAL ONCE
Status: COMPLETED | OUTPATIENT
Start: 2019-11-27 | End: 2019-11-27

## 2019-11-27 RX ORDER — PREDNISONE 10 MG/1
20 TABLET ORAL DAILY
Status: DISCONTINUED | OUTPATIENT
Start: 2019-11-27 | End: 2019-12-03 | Stop reason: HOSPADM

## 2019-11-27 RX ORDER — DOCUSATE SODIUM 100 MG/1
100 CAPSULE, LIQUID FILLED ORAL DAILY
Status: DISCONTINUED | OUTPATIENT
Start: 2019-11-27 | End: 2019-11-29

## 2019-11-27 RX ORDER — SODIUM CHLORIDE 9 MG/ML
INJECTION, SOLUTION INTRAVENOUS CONTINUOUS
Status: DISCONTINUED | OUTPATIENT
Start: 2019-11-27 | End: 2019-11-27

## 2019-11-27 RX ADMIN — CITALOPRAM HYDROBROMIDE 10 MG: 10 TABLET ORAL at 10:00

## 2019-11-27 RX ADMIN — Medication 10 ML: at 10:03

## 2019-11-27 RX ADMIN — CEFTRIAXONE 1 G: 1 INJECTION, POWDER, FOR SOLUTION INTRAMUSCULAR; INTRAVENOUS at 15:45

## 2019-11-27 RX ADMIN — PNEUMOCOCCAL VACCINE POLYVALENT 0.5 ML
25; 25; 25; 25; 25; 25; 25; 25; 25; 25; 25; 25; 25; 25; 25; 25; 25; 25; 25; 25; 25; 25; 25 INJECTION, SOLUTION INTRAMUSCULAR; SUBCUTANEOUS at 12:15

## 2019-11-27 RX ADMIN — PREDNISONE 20 MG: 10 TABLET ORAL at 10:00

## 2019-11-27 RX ADMIN — ENOXAPARIN SODIUM 40 MG: 40 INJECTION SUBCUTANEOUS at 09:59

## 2019-11-27 RX ADMIN — BUPROPION HYDROCHLORIDE 300 MG: 150 TABLET, FILM COATED, EXTENDED RELEASE ORAL at 09:59

## 2019-11-27 RX ADMIN — DOCUSATE SODIUM 100 MG: 100 CAPSULE, LIQUID FILLED ORAL at 12:14

## 2019-11-27 RX ADMIN — POLYETHYLENE GLYCOL 3350 17 G: 17 POWDER, FOR SOLUTION ORAL at 12:14

## 2019-11-27 RX ADMIN — CLOPIDOGREL BISULFATE 75 MG: 75 TABLET, FILM COATED ORAL at 10:00

## 2019-11-27 RX ADMIN — PANTOPRAZOLE SODIUM 20 MG: 20 TABLET, DELAYED RELEASE ORAL at 22:50

## 2019-11-27 RX ADMIN — SODIUM CHLORIDE: 9 INJECTION, SOLUTION INTRAVENOUS at 09:58

## 2019-11-27 RX ADMIN — AZATHIOPRINE 100 MG: 50 TABLET ORAL at 12:15

## 2019-11-27 RX ADMIN — Medication 10 ML: at 22:50

## 2019-11-27 RX ADMIN — HYDROXYCHLOROQUINE SULFATE 200 MG: 200 TABLET, FILM COATED ORAL at 10:00

## 2019-11-27 RX ADMIN — PANTOPRAZOLE SODIUM 20 MG: 20 TABLET, DELAYED RELEASE ORAL at 10:00

## 2019-11-27 RX ADMIN — ARIPIPRAZOLE 2 MG: 2 TABLET ORAL at 12:14

## 2019-11-27 ASSESSMENT — PAIN SCALES - GENERAL
PAINLEVEL_OUTOF10: 0

## 2019-11-28 LAB
ANION GAP SERPL CALCULATED.3IONS-SCNC: 13 MMOL/L (ref 3–16)
BASOPHILS ABSOLUTE: 0 K/UL (ref 0–0.2)
BASOPHILS RELATIVE PERCENT: 0.4 %
BUN BLDV-MCNC: 16 MG/DL (ref 7–20)
CALCIUM SERPL-MCNC: 8.7 MG/DL (ref 8.3–10.6)
CHLORIDE BLD-SCNC: 97 MMOL/L (ref 99–110)
CO2: 19 MMOL/L (ref 21–32)
CREAT SERPL-MCNC: 0.7 MG/DL (ref 0.6–1.2)
EOSINOPHILS ABSOLUTE: 0.2 K/UL (ref 0–0.6)
EOSINOPHILS RELATIVE PERCENT: 2.2 %
GFR AFRICAN AMERICAN: >60
GFR NON-AFRICAN AMERICAN: >60
GLUCOSE BLD-MCNC: 89 MG/DL (ref 70–99)
HCT VFR BLD CALC: 31.4 % (ref 36–48)
HEMOGLOBIN: 10.6 G/DL (ref 12–16)
LYMPHOCYTES ABSOLUTE: 1.2 K/UL (ref 1–5.1)
LYMPHOCYTES RELATIVE PERCENT: 13.4 %
MAGNESIUM: 1.8 MG/DL (ref 1.8–2.4)
MCH RBC QN AUTO: 32.6 PG (ref 26–34)
MCHC RBC AUTO-ENTMCNC: 33.9 G/DL (ref 31–36)
MCV RBC AUTO: 96.1 FL (ref 80–100)
MONOCYTES ABSOLUTE: 0.6 K/UL (ref 0–1.3)
MONOCYTES RELATIVE PERCENT: 7.2 %
NEUTROPHILS ABSOLUTE: 6.8 K/UL (ref 1.7–7.7)
NEUTROPHILS RELATIVE PERCENT: 76.8 %
ORGANISM: ABNORMAL
PDW BLD-RTO: 14.2 % (ref 12.4–15.4)
PLATELET # BLD: 281 K/UL (ref 135–450)
PMV BLD AUTO: 5.7 FL (ref 5–10.5)
POTASSIUM SERPL-SCNC: 4 MMOL/L (ref 3.5–5.1)
RBC # BLD: 3.26 M/UL (ref 4–5.2)
SODIUM BLD-SCNC: 126 MMOL/L (ref 136–145)
SODIUM BLD-SCNC: 129 MMOL/L (ref 136–145)
UREA NITROGEN, UR: 448.1 MG/DL (ref 800–1666)
URIC ACID, SERUM: 2.5 MG/DL (ref 2.6–6)
URINE CULTURE, ROUTINE: ABNORMAL
WBC # BLD: 8.9 K/UL (ref 4–11)

## 2019-11-28 PROCEDURE — 36415 COLL VENOUS BLD VENIPUNCTURE: CPT

## 2019-11-28 PROCEDURE — 6360000002 HC RX W HCPCS: Performed by: STUDENT IN AN ORGANIZED HEALTH CARE EDUCATION/TRAINING PROGRAM

## 2019-11-28 PROCEDURE — 6370000000 HC RX 637 (ALT 250 FOR IP): Performed by: INTERNAL MEDICINE

## 2019-11-28 PROCEDURE — 83735 ASSAY OF MAGNESIUM: CPT

## 2019-11-28 PROCEDURE — 2580000003 HC RX 258: Performed by: INTERNAL MEDICINE

## 2019-11-28 PROCEDURE — 6360000002 HC RX W HCPCS: Performed by: INTERNAL MEDICINE

## 2019-11-28 PROCEDURE — 84295 ASSAY OF SERUM SODIUM: CPT

## 2019-11-28 PROCEDURE — 85025 COMPLETE CBC W/AUTO DIFF WBC: CPT

## 2019-11-28 PROCEDURE — 80048 BASIC METABOLIC PNL TOTAL CA: CPT

## 2019-11-28 PROCEDURE — 84300 ASSAY OF URINE SODIUM: CPT

## 2019-11-28 PROCEDURE — 2580000003 HC RX 258: Performed by: STUDENT IN AN ORGANIZED HEALTH CARE EDUCATION/TRAINING PROGRAM

## 2019-11-28 PROCEDURE — 2060000000 HC ICU INTERMEDIATE R&B

## 2019-11-28 PROCEDURE — 99232 SBSQ HOSP IP/OBS MODERATE 35: CPT | Performed by: INTERNAL MEDICINE

## 2019-11-28 PROCEDURE — 6370000000 HC RX 637 (ALT 250 FOR IP): Performed by: STUDENT IN AN ORGANIZED HEALTH CARE EDUCATION/TRAINING PROGRAM

## 2019-11-28 PROCEDURE — 84550 ASSAY OF BLOOD/URIC ACID: CPT

## 2019-11-28 RX ADMIN — Medication 10 ML: at 09:35

## 2019-11-28 RX ADMIN — PANTOPRAZOLE SODIUM 20 MG: 20 TABLET, DELAYED RELEASE ORAL at 22:05

## 2019-11-28 RX ADMIN — Medication 10 ML: at 22:08

## 2019-11-28 RX ADMIN — ACETAMINOPHEN 650 MG: 325 TABLET ORAL at 10:43

## 2019-11-28 RX ADMIN — DOCUSATE SODIUM 100 MG: 100 CAPSULE, LIQUID FILLED ORAL at 09:31

## 2019-11-28 RX ADMIN — BUPROPION HYDROCHLORIDE 300 MG: 150 TABLET, FILM COATED, EXTENDED RELEASE ORAL at 09:31

## 2019-11-28 RX ADMIN — HYDROXYCHLOROQUINE SULFATE 200 MG: 200 TABLET, FILM COATED ORAL at 09:31

## 2019-11-28 RX ADMIN — PREDNISONE 20 MG: 10 TABLET ORAL at 09:30

## 2019-11-28 RX ADMIN — ARIPIPRAZOLE 2 MG: 2 TABLET ORAL at 09:32

## 2019-11-28 RX ADMIN — CEFTRIAXONE 1 G: 1 INJECTION, POWDER, FOR SOLUTION INTRAMUSCULAR; INTRAVENOUS at 15:26

## 2019-11-28 RX ADMIN — ENOXAPARIN SODIUM 40 MG: 40 INJECTION SUBCUTANEOUS at 09:32

## 2019-11-28 RX ADMIN — AZATHIOPRINE 100 MG: 50 TABLET ORAL at 09:33

## 2019-11-28 RX ADMIN — PANTOPRAZOLE SODIUM 20 MG: 20 TABLET, DELAYED RELEASE ORAL at 09:31

## 2019-11-28 RX ADMIN — CITALOPRAM HYDROBROMIDE 10 MG: 10 TABLET ORAL at 09:31

## 2019-11-28 RX ADMIN — ACETAMINOPHEN 650 MG: 325 TABLET ORAL at 04:29

## 2019-11-28 RX ADMIN — MAGNESIUM CITRATE 296 ML: 1.75 LIQUID ORAL at 09:37

## 2019-11-28 RX ADMIN — CLOPIDOGREL BISULFATE 75 MG: 75 TABLET, FILM COATED ORAL at 09:32

## 2019-11-28 ASSESSMENT — PAIN DESCRIPTION - LOCATION
LOCATION: HEAD
LOCATION: HEAD

## 2019-11-28 ASSESSMENT — PAIN DESCRIPTION - PAIN TYPE
TYPE: ACUTE PAIN
TYPE: ACUTE PAIN

## 2019-11-28 ASSESSMENT — PAIN DESCRIPTION - DESCRIPTORS
DESCRIPTORS: HEADACHE
DESCRIPTORS: ACHING;HEADACHE

## 2019-11-28 ASSESSMENT — PAIN SCALES - GENERAL
PAINLEVEL_OUTOF10: 5
PAINLEVEL_OUTOF10: 0
PAINLEVEL_OUTOF10: 0
PAINLEVEL_OUTOF10: 3
PAINLEVEL_OUTOF10: 0

## 2019-11-28 ASSESSMENT — PAIN DESCRIPTION - ORIENTATION: ORIENTATION: ANTERIOR

## 2019-11-28 ASSESSMENT — PAIN - FUNCTIONAL ASSESSMENT: PAIN_FUNCTIONAL_ASSESSMENT: ACTIVITIES ARE NOT PREVENTED

## 2019-11-28 ASSESSMENT — PAIN DESCRIPTION - FREQUENCY: FREQUENCY: INTERMITTENT

## 2019-11-28 ASSESSMENT — PAIN DESCRIPTION - PROGRESSION: CLINICAL_PROGRESSION: GRADUALLY WORSENING

## 2019-11-28 ASSESSMENT — PAIN DESCRIPTION - ONSET: ONSET: PROGRESSIVE

## 2019-11-29 ENCOUNTER — APPOINTMENT (OUTPATIENT)
Dept: CT IMAGING | Age: 74
DRG: 644 | End: 2019-11-29
Payer: MEDICARE

## 2019-11-29 ENCOUNTER — APPOINTMENT (OUTPATIENT)
Dept: GENERAL RADIOLOGY | Age: 74
DRG: 644 | End: 2019-11-29
Payer: MEDICARE

## 2019-11-29 LAB
ANION GAP SERPL CALCULATED.3IONS-SCNC: 13 MMOL/L (ref 3–16)
BASE EXCESS ARTERIAL: -1.5 MMOL/L (ref -3–3)
BASOPHILS ABSOLUTE: 0 K/UL (ref 0–0.2)
BASOPHILS RELATIVE PERCENT: 0.6 %
BUN BLDV-MCNC: 16 MG/DL (ref 7–20)
CALCIUM SERPL-MCNC: 8.6 MG/DL (ref 8.3–10.6)
CARBOXYHEMOGLOBIN ARTERIAL: 2.7 % (ref 0–1.5)
CHLORIDE BLD-SCNC: 96 MMOL/L (ref 99–110)
CO2: 20 MMOL/L (ref 21–32)
CREAT SERPL-MCNC: 0.6 MG/DL (ref 0.6–1.2)
EOSINOPHILS ABSOLUTE: 0.2 K/UL (ref 0–0.6)
EOSINOPHILS RELATIVE PERCENT: 2.6 %
GFR AFRICAN AMERICAN: >60
GFR NON-AFRICAN AMERICAN: >60
GLUCOSE BLD-MCNC: 84 MG/DL (ref 70–99)
HCO3 ARTERIAL: 21 MMOL/L (ref 21–29)
HCT VFR BLD CALC: 30.5 % (ref 36–48)
HEMOGLOBIN, ART, EXTENDED: 12.2 G/DL
HEMOGLOBIN: 10.4 G/DL (ref 12–16)
LYMPHOCYTES ABSOLUTE: 0.8 K/UL (ref 1–5.1)
LYMPHOCYTES RELATIVE PERCENT: 9.8 %
MAGNESIUM: 1.9 MG/DL (ref 1.8–2.4)
MCH RBC QN AUTO: 32.7 PG (ref 26–34)
MCHC RBC AUTO-ENTMCNC: 34.2 G/DL (ref 31–36)
MCV RBC AUTO: 95.8 FL (ref 80–100)
METHEMOGLOBIN ARTERIAL: 0.2 % (ref 0–1.4)
MONOCYTES ABSOLUTE: 0.5 K/UL (ref 0–1.3)
MONOCYTES RELATIVE PERCENT: 6.9 %
NEUTROPHILS ABSOLUTE: 6.4 K/UL (ref 1.7–7.7)
NEUTROPHILS RELATIVE PERCENT: 80.1 %
O2 SAT, ARTERIAL: 97 % (ref 93–100)
PCO2 ARTERIAL: 28.9 MMHG (ref 35–45)
PDW BLD-RTO: 14.3 % (ref 12.4–15.4)
PH ARTERIAL: 7.47 (ref 7.35–7.45)
PLATELET # BLD: 276 K/UL (ref 135–450)
PMV BLD AUTO: 5.7 FL (ref 5–10.5)
PO2 ARTERIAL: 73.3 MMHG (ref 75–108)
POTASSIUM SERPL-SCNC: 4.1 MMOL/L (ref 3.5–5.1)
PRO-BNP: 1750 PG/ML (ref 0–449)
RBC # BLD: 3.18 M/UL (ref 4–5.2)
SODIUM BLD-SCNC: 128 MMOL/L (ref 136–145)
SODIUM BLD-SCNC: 129 MMOL/L (ref 136–145)
SODIUM BLD-SCNC: 129 MMOL/L (ref 136–145)
SODIUM BLD-SCNC: 131 MMOL/L (ref 136–145)
TCO2 ARTERIAL: 22 MMOL/L
WBC # BLD: 7.9 K/UL (ref 4–11)

## 2019-11-29 PROCEDURE — 2500000003 HC RX 250 WO HCPCS: Performed by: INTERNAL MEDICINE

## 2019-11-29 PROCEDURE — 82803 BLOOD GASES ANY COMBINATION: CPT

## 2019-11-29 PROCEDURE — 6370000000 HC RX 637 (ALT 250 FOR IP): Performed by: INTERNAL MEDICINE

## 2019-11-29 PROCEDURE — 36600 WITHDRAWAL OF ARTERIAL BLOOD: CPT

## 2019-11-29 PROCEDURE — 2580000003 HC RX 258: Performed by: STUDENT IN AN ORGANIZED HEALTH CARE EDUCATION/TRAINING PROGRAM

## 2019-11-29 PROCEDURE — 83735 ASSAY OF MAGNESIUM: CPT

## 2019-11-29 PROCEDURE — 6370000000 HC RX 637 (ALT 250 FOR IP): Performed by: STUDENT IN AN ORGANIZED HEALTH CARE EDUCATION/TRAINING PROGRAM

## 2019-11-29 PROCEDURE — 83880 ASSAY OF NATRIURETIC PEPTIDE: CPT

## 2019-11-29 PROCEDURE — 71045 X-RAY EXAM CHEST 1 VIEW: CPT

## 2019-11-29 PROCEDURE — 84295 ASSAY OF SERUM SODIUM: CPT

## 2019-11-29 PROCEDURE — 99232 SBSQ HOSP IP/OBS MODERATE 35: CPT | Performed by: INTERNAL MEDICINE

## 2019-11-29 PROCEDURE — 85025 COMPLETE CBC W/AUTO DIFF WBC: CPT

## 2019-11-29 PROCEDURE — 6360000002 HC RX W HCPCS: Performed by: STUDENT IN AN ORGANIZED HEALTH CARE EDUCATION/TRAINING PROGRAM

## 2019-11-29 PROCEDURE — 2580000003 HC RX 258: Performed by: INTERNAL MEDICINE

## 2019-11-29 PROCEDURE — 80048 BASIC METABOLIC PNL TOTAL CA: CPT

## 2019-11-29 PROCEDURE — 2060000000 HC ICU INTERMEDIATE R&B

## 2019-11-29 PROCEDURE — 36415 COLL VENOUS BLD VENIPUNCTURE: CPT

## 2019-11-29 PROCEDURE — 70450 CT HEAD/BRAIN W/O DYE: CPT

## 2019-11-29 RX ORDER — PANTOPRAZOLE SODIUM 20 MG/1
20 TABLET, DELAYED RELEASE ORAL DAILY
Status: DISCONTINUED | OUTPATIENT
Start: 2019-11-30 | End: 2019-12-03 | Stop reason: HOSPADM

## 2019-11-29 RX ORDER — FLUTICASONE PROPIONATE 50 MCG
2 SPRAY, SUSPENSION (ML) NASAL DAILY
Status: DISCONTINUED | OUTPATIENT
Start: 2019-11-29 | End: 2019-12-03 | Stop reason: HOSPADM

## 2019-11-29 RX ORDER — 0.9 % SODIUM CHLORIDE 0.9 %
500 INTRAVENOUS SOLUTION INTRAVENOUS ONCE
Status: DISCONTINUED | OUTPATIENT
Start: 2019-11-29 | End: 2019-11-29

## 2019-11-29 RX ORDER — DOCUSATE SODIUM 100 MG/1
100 CAPSULE, LIQUID FILLED ORAL 2 TIMES DAILY
Status: DISCONTINUED | OUTPATIENT
Start: 2019-11-29 | End: 2019-12-03 | Stop reason: HOSPADM

## 2019-11-29 RX ORDER — SODIUM CHLORIDE 9 MG/ML
INJECTION, SOLUTION INTRAVENOUS CONTINUOUS
Status: DISCONTINUED | OUTPATIENT
Start: 2019-11-29 | End: 2019-11-29

## 2019-11-29 RX ADMIN — ARIPIPRAZOLE 2 MG: 2 TABLET ORAL at 09:34

## 2019-11-29 RX ADMIN — ENOXAPARIN SODIUM 40 MG: 40 INJECTION SUBCUTANEOUS at 09:32

## 2019-11-29 RX ADMIN — CLOPIDOGREL BISULFATE 75 MG: 75 TABLET, FILM COATED ORAL at 09:32

## 2019-11-29 RX ADMIN — AZATHIOPRINE 100 MG: 50 TABLET ORAL at 09:33

## 2019-11-29 RX ADMIN — CITALOPRAM HYDROBROMIDE 10 MG: 10 TABLET ORAL at 09:32

## 2019-11-29 RX ADMIN — SODIUM BICARBONATE: 84 INJECTION, SOLUTION INTRAVENOUS at 16:27

## 2019-11-29 RX ADMIN — Medication 10 ML: at 09:33

## 2019-11-29 RX ADMIN — DOCUSATE SODIUM 100 MG: 100 CAPSULE, LIQUID FILLED ORAL at 09:32

## 2019-11-29 RX ADMIN — HYDROXYCHLOROQUINE SULFATE 200 MG: 200 TABLET, FILM COATED ORAL at 09:32

## 2019-11-29 RX ADMIN — BUPROPION HYDROCHLORIDE 300 MG: 150 TABLET, FILM COATED, EXTENDED RELEASE ORAL at 09:32

## 2019-11-29 RX ADMIN — BISACODYL 5 MG: 5 TABLET, COATED ORAL at 18:31

## 2019-11-29 RX ADMIN — FLUTICASONE PROPIONATE 2 SPRAY: 50 SPRAY, METERED NASAL at 10:40

## 2019-11-29 RX ADMIN — PREDNISONE 20 MG: 10 TABLET ORAL at 09:32

## 2019-11-29 RX ADMIN — PANTOPRAZOLE SODIUM 20 MG: 20 TABLET, DELAYED RELEASE ORAL at 09:32

## 2019-11-29 ASSESSMENT — PAIN SCALES - GENERAL
PAINLEVEL_OUTOF10: 0

## 2019-11-30 LAB
ANION GAP SERPL CALCULATED.3IONS-SCNC: 13 MMOL/L (ref 3–16)
BASOPHILS ABSOLUTE: 0 K/UL (ref 0–0.2)
BASOPHILS RELATIVE PERCENT: 0.6 %
BUN BLDV-MCNC: 20 MG/DL (ref 7–20)
CALCIUM SERPL-MCNC: 8.6 MG/DL (ref 8.3–10.6)
CHLORIDE BLD-SCNC: 98 MMOL/L (ref 99–110)
CO2: 19 MMOL/L (ref 21–32)
CREAT SERPL-MCNC: 0.6 MG/DL (ref 0.6–1.2)
EOSINOPHILS ABSOLUTE: 0.2 K/UL (ref 0–0.6)
EOSINOPHILS RELATIVE PERCENT: 3.7 %
GFR AFRICAN AMERICAN: >60
GFR NON-AFRICAN AMERICAN: >60
GLUCOSE BLD-MCNC: 62 MG/DL (ref 70–99)
GLUCOSE BLD-MCNC: 63 MG/DL (ref 70–99)
HCT VFR BLD CALC: 29.5 % (ref 36–48)
HEMOGLOBIN: 10 G/DL (ref 12–16)
LYMPHOCYTES ABSOLUTE: 1.2 K/UL (ref 1–5.1)
LYMPHOCYTES RELATIVE PERCENT: 19.1 %
MAGNESIUM: 1.9 MG/DL (ref 1.8–2.4)
MCH RBC QN AUTO: 32.6 PG (ref 26–34)
MCHC RBC AUTO-ENTMCNC: 33.9 G/DL (ref 31–36)
MCV RBC AUTO: 96 FL (ref 80–100)
MONOCYTES ABSOLUTE: 0.5 K/UL (ref 0–1.3)
MONOCYTES RELATIVE PERCENT: 8.5 %
NEUTROPHILS ABSOLUTE: 4.2 K/UL (ref 1.7–7.7)
NEUTROPHILS RELATIVE PERCENT: 68.1 %
PDW BLD-RTO: 14.6 % (ref 12.4–15.4)
PERFORMED ON: ABNORMAL
PLATELET # BLD: 288 K/UL (ref 135–450)
PMV BLD AUTO: 5.7 FL (ref 5–10.5)
POTASSIUM SERPL-SCNC: 3.7 MMOL/L (ref 3.5–5.1)
RBC # BLD: 3.07 M/UL (ref 4–5.2)
SODIUM BLD-SCNC: 129 MMOL/L (ref 136–145)
SODIUM BLD-SCNC: 129 MMOL/L (ref 136–145)
SODIUM BLD-SCNC: 130 MMOL/L (ref 136–145)
SODIUM BLD-SCNC: 134 MMOL/L (ref 136–145)
WBC # BLD: 6.2 K/UL (ref 4–11)

## 2019-11-30 PROCEDURE — 6370000000 HC RX 637 (ALT 250 FOR IP): Performed by: STUDENT IN AN ORGANIZED HEALTH CARE EDUCATION/TRAINING PROGRAM

## 2019-11-30 PROCEDURE — 99232 SBSQ HOSP IP/OBS MODERATE 35: CPT | Performed by: INTERNAL MEDICINE

## 2019-11-30 PROCEDURE — 84295 ASSAY OF SERUM SODIUM: CPT

## 2019-11-30 PROCEDURE — 6360000002 HC RX W HCPCS: Performed by: INTERNAL MEDICINE

## 2019-11-30 PROCEDURE — 6370000000 HC RX 637 (ALT 250 FOR IP): Performed by: INTERNAL MEDICINE

## 2019-11-30 PROCEDURE — 80048 BASIC METABOLIC PNL TOTAL CA: CPT

## 2019-11-30 PROCEDURE — 83735 ASSAY OF MAGNESIUM: CPT

## 2019-11-30 PROCEDURE — 6360000002 HC RX W HCPCS: Performed by: STUDENT IN AN ORGANIZED HEALTH CARE EDUCATION/TRAINING PROGRAM

## 2019-11-30 PROCEDURE — 2500000003 HC RX 250 WO HCPCS: Performed by: INTERNAL MEDICINE

## 2019-11-30 PROCEDURE — 2060000000 HC ICU INTERMEDIATE R&B

## 2019-11-30 PROCEDURE — 36415 COLL VENOUS BLD VENIPUNCTURE: CPT

## 2019-11-30 PROCEDURE — 85025 COMPLETE CBC W/AUTO DIFF WBC: CPT

## 2019-11-30 PROCEDURE — 2580000003 HC RX 258: Performed by: INTERNAL MEDICINE

## 2019-11-30 RX ORDER — LACTULOSE 10 G/15ML
20 SOLUTION ORAL 3 TIMES DAILY
Status: DISCONTINUED | OUTPATIENT
Start: 2019-11-30 | End: 2019-12-03 | Stop reason: HOSPADM

## 2019-11-30 RX ADMIN — BUPROPION HYDROCHLORIDE 300 MG: 150 TABLET, FILM COATED, EXTENDED RELEASE ORAL at 08:17

## 2019-11-30 RX ADMIN — DOCUSATE SODIUM 100 MG: 100 CAPSULE, LIQUID FILLED ORAL at 08:18

## 2019-11-30 RX ADMIN — SODIUM BICARBONATE: 84 INJECTION, SOLUTION INTRAVENOUS at 20:24

## 2019-11-30 RX ADMIN — LACTULOSE 20 G: 20 SOLUTION ORAL at 09:42

## 2019-11-30 RX ADMIN — HYDROXYCHLOROQUINE SULFATE 200 MG: 200 TABLET, FILM COATED ORAL at 08:18

## 2019-11-30 RX ADMIN — CITALOPRAM HYDROBROMIDE 10 MG: 10 TABLET ORAL at 08:17

## 2019-11-30 RX ADMIN — AZATHIOPRINE 100 MG: 50 TABLET ORAL at 08:16

## 2019-11-30 RX ADMIN — PREDNISONE 20 MG: 10 TABLET ORAL at 08:17

## 2019-11-30 RX ADMIN — ENOXAPARIN SODIUM 40 MG: 40 INJECTION SUBCUTANEOUS at 08:17

## 2019-11-30 RX ADMIN — PANTOPRAZOLE SODIUM 20 MG: 20 TABLET, DELAYED RELEASE ORAL at 08:18

## 2019-11-30 RX ADMIN — CLOPIDOGREL BISULFATE 75 MG: 75 TABLET, FILM COATED ORAL at 08:18

## 2019-11-30 RX ADMIN — FLUTICASONE PROPIONATE 2 SPRAY: 50 SPRAY, METERED NASAL at 08:17

## 2019-11-30 RX ADMIN — DOCUSATE SODIUM 100 MG: 100 CAPSULE, LIQUID FILLED ORAL at 20:23

## 2019-11-30 RX ADMIN — ARIPIPRAZOLE 2 MG: 2 TABLET ORAL at 08:17

## 2019-11-30 ASSESSMENT — PAIN SCALES - GENERAL
PAINLEVEL_OUTOF10: 0
PAINLEVEL_OUTOF10: 0

## 2019-12-01 LAB
ANION GAP SERPL CALCULATED.3IONS-SCNC: 9 MMOL/L (ref 3–16)
BASOPHILS ABSOLUTE: 0 K/UL (ref 0–0.2)
BASOPHILS RELATIVE PERCENT: 0.6 %
BLOOD CULTURE, ROUTINE: NORMAL
BUN BLDV-MCNC: 25 MG/DL (ref 7–20)
CALCIUM SERPL-MCNC: 8.9 MG/DL (ref 8.3–10.6)
CHLORIDE BLD-SCNC: 104 MMOL/L (ref 99–110)
CO2: 23 MMOL/L (ref 21–32)
CREAT SERPL-MCNC: 0.7 MG/DL (ref 0.6–1.2)
CULTURE, BLOOD 2: NORMAL
EOSINOPHILS ABSOLUTE: 0.3 K/UL (ref 0–0.6)
EOSINOPHILS RELATIVE PERCENT: 4.1 %
GFR AFRICAN AMERICAN: >60
GFR NON-AFRICAN AMERICAN: >60
GLUCOSE BLD-MCNC: 75 MG/DL (ref 70–99)
HCT VFR BLD CALC: 29.4 % (ref 36–48)
HEMOGLOBIN: 10.2 G/DL (ref 12–16)
LYMPHOCYTES ABSOLUTE: 1.5 K/UL (ref 1–5.1)
LYMPHOCYTES RELATIVE PERCENT: 23.3 %
MAGNESIUM: 1.9 MG/DL (ref 1.8–2.4)
MCH RBC QN AUTO: 33.5 PG (ref 26–34)
MCHC RBC AUTO-ENTMCNC: 34.6 G/DL (ref 31–36)
MCV RBC AUTO: 96.8 FL (ref 80–100)
MONOCYTES ABSOLUTE: 0.7 K/UL (ref 0–1.3)
MONOCYTES RELATIVE PERCENT: 10.9 %
NEUTROPHILS ABSOLUTE: 4 K/UL (ref 1.7–7.7)
NEUTROPHILS RELATIVE PERCENT: 61.1 %
PDW BLD-RTO: 14.4 % (ref 12.4–15.4)
PLATELET # BLD: 309 K/UL (ref 135–450)
PMV BLD AUTO: 5.6 FL (ref 5–10.5)
POTASSIUM SERPL-SCNC: 3.9 MMOL/L (ref 3.5–5.1)
RBC # BLD: 3.04 M/UL (ref 4–5.2)
SODIUM BLD-SCNC: 131 MMOL/L (ref 136–145)
SODIUM BLD-SCNC: 132 MMOL/L (ref 136–145)
SODIUM BLD-SCNC: 135 MMOL/L (ref 136–145)
SODIUM BLD-SCNC: 136 MMOL/L (ref 136–145)
WBC # BLD: 6.5 K/UL (ref 4–11)

## 2019-12-01 PROCEDURE — 80048 BASIC METABOLIC PNL TOTAL CA: CPT

## 2019-12-01 PROCEDURE — 2060000000 HC ICU INTERMEDIATE R&B

## 2019-12-01 PROCEDURE — 36415 COLL VENOUS BLD VENIPUNCTURE: CPT

## 2019-12-01 PROCEDURE — 99232 SBSQ HOSP IP/OBS MODERATE 35: CPT | Performed by: INTERNAL MEDICINE

## 2019-12-01 PROCEDURE — 85025 COMPLETE CBC W/AUTO DIFF WBC: CPT

## 2019-12-01 PROCEDURE — 6370000000 HC RX 637 (ALT 250 FOR IP): Performed by: INTERNAL MEDICINE

## 2019-12-01 PROCEDURE — 84295 ASSAY OF SERUM SODIUM: CPT

## 2019-12-01 PROCEDURE — 6370000000 HC RX 637 (ALT 250 FOR IP): Performed by: STUDENT IN AN ORGANIZED HEALTH CARE EDUCATION/TRAINING PROGRAM

## 2019-12-01 PROCEDURE — 83735 ASSAY OF MAGNESIUM: CPT

## 2019-12-01 PROCEDURE — 6360000002 HC RX W HCPCS: Performed by: STUDENT IN AN ORGANIZED HEALTH CARE EDUCATION/TRAINING PROGRAM

## 2019-12-01 RX ADMIN — ACETAMINOPHEN 650 MG: 325 TABLET ORAL at 23:50

## 2019-12-01 RX ADMIN — AZATHIOPRINE 100 MG: 50 TABLET ORAL at 08:07

## 2019-12-01 RX ADMIN — PREDNISONE 20 MG: 10 TABLET ORAL at 08:06

## 2019-12-01 RX ADMIN — ARIPIPRAZOLE 2 MG: 2 TABLET ORAL at 08:08

## 2019-12-01 RX ADMIN — DOCUSATE SODIUM 100 MG: 100 CAPSULE, LIQUID FILLED ORAL at 08:06

## 2019-12-01 RX ADMIN — CITALOPRAM HYDROBROMIDE 10 MG: 10 TABLET ORAL at 08:06

## 2019-12-01 RX ADMIN — HYDROXYCHLOROQUINE SULFATE 200 MG: 200 TABLET, FILM COATED ORAL at 08:05

## 2019-12-01 RX ADMIN — PANTOPRAZOLE SODIUM 20 MG: 20 TABLET, DELAYED RELEASE ORAL at 08:06

## 2019-12-01 RX ADMIN — BUPROPION HYDROCHLORIDE 300 MG: 150 TABLET, FILM COATED, EXTENDED RELEASE ORAL at 08:05

## 2019-12-01 RX ADMIN — ACETAMINOPHEN 650 MG: 325 TABLET ORAL at 08:06

## 2019-12-01 RX ADMIN — CLOPIDOGREL BISULFATE 75 MG: 75 TABLET, FILM COATED ORAL at 08:08

## 2019-12-01 ASSESSMENT — PAIN SCALES - GENERAL
PAINLEVEL_OUTOF10: 4
PAINLEVEL_OUTOF10: 0
PAINLEVEL_OUTOF10: 4

## 2019-12-02 ENCOUNTER — TELEPHONE (OUTPATIENT)
Dept: INTERNAL MEDICINE CLINIC | Age: 74
End: 2019-12-02

## 2019-12-02 LAB
ANION GAP SERPL CALCULATED.3IONS-SCNC: 11 MMOL/L (ref 3–16)
BASOPHILS ABSOLUTE: 0 K/UL (ref 0–0.2)
BASOPHILS RELATIVE PERCENT: 0.8 %
BUN BLDV-MCNC: 24 MG/DL (ref 7–20)
CALCIUM SERPL-MCNC: 8.8 MG/DL (ref 8.3–10.6)
CHLORIDE BLD-SCNC: 102 MMOL/L (ref 99–110)
CO2: 21 MMOL/L (ref 21–32)
CREAT SERPL-MCNC: 0.7 MG/DL (ref 0.6–1.2)
EOSINOPHILS ABSOLUTE: 0.3 K/UL (ref 0–0.6)
EOSINOPHILS RELATIVE PERCENT: 4.4 %
GFR AFRICAN AMERICAN: >60
GFR NON-AFRICAN AMERICAN: >60
GLUCOSE BLD-MCNC: 85 MG/DL (ref 70–99)
HCT VFR BLD CALC: 28.9 % (ref 36–48)
HEMOGLOBIN: 9.7 G/DL (ref 12–16)
LYMPHOCYTES ABSOLUTE: 1.5 K/UL (ref 1–5.1)
LYMPHOCYTES RELATIVE PERCENT: 24.7 %
MAGNESIUM: 1.9 MG/DL (ref 1.8–2.4)
MCH RBC QN AUTO: 32.8 PG (ref 26–34)
MCHC RBC AUTO-ENTMCNC: 33.7 G/DL (ref 31–36)
MCV RBC AUTO: 97.2 FL (ref 80–100)
MONOCYTES ABSOLUTE: 0.6 K/UL (ref 0–1.3)
MONOCYTES RELATIVE PERCENT: 10.1 %
NEUTROPHILS ABSOLUTE: 3.7 K/UL (ref 1.7–7.7)
NEUTROPHILS RELATIVE PERCENT: 60 %
PDW BLD-RTO: 15 % (ref 12.4–15.4)
PLATELET # BLD: 307 K/UL (ref 135–450)
PMV BLD AUTO: 5.7 FL (ref 5–10.5)
POTASSIUM SERPL-SCNC: 3.9 MMOL/L (ref 3.5–5.1)
PRO-BNP: 537 PG/ML (ref 0–449)
RBC # BLD: 2.97 M/UL (ref 4–5.2)
SODIUM BLD-SCNC: 132 MMOL/L (ref 136–145)
SODIUM BLD-SCNC: 133 MMOL/L (ref 136–145)
SODIUM BLD-SCNC: 134 MMOL/L (ref 136–145)
WBC # BLD: 6.1 K/UL (ref 4–11)

## 2019-12-02 PROCEDURE — 6370000000 HC RX 637 (ALT 250 FOR IP): Performed by: INTERNAL MEDICINE

## 2019-12-02 PROCEDURE — 6360000002 HC RX W HCPCS: Performed by: STUDENT IN AN ORGANIZED HEALTH CARE EDUCATION/TRAINING PROGRAM

## 2019-12-02 PROCEDURE — 2580000003 HC RX 258: Performed by: STUDENT IN AN ORGANIZED HEALTH CARE EDUCATION/TRAINING PROGRAM

## 2019-12-02 PROCEDURE — 3609017700 HC EGD DILATION GASTRIC/DUODENAL STRICTURE: Performed by: INTERNAL MEDICINE

## 2019-12-02 PROCEDURE — 2060000000 HC ICU INTERMEDIATE R&B

## 2019-12-02 PROCEDURE — 83880 ASSAY OF NATRIURETIC PEPTIDE: CPT

## 2019-12-02 PROCEDURE — 6360000002 HC RX W HCPCS: Performed by: INTERNAL MEDICINE

## 2019-12-02 PROCEDURE — 6370000000 HC RX 637 (ALT 250 FOR IP): Performed by: STUDENT IN AN ORGANIZED HEALTH CARE EDUCATION/TRAINING PROGRAM

## 2019-12-02 PROCEDURE — 84295 ASSAY OF SERUM SODIUM: CPT

## 2019-12-02 PROCEDURE — 99152 MOD SED SAME PHYS/QHP 5/>YRS: CPT | Performed by: INTERNAL MEDICINE

## 2019-12-02 PROCEDURE — 83735 ASSAY OF MAGNESIUM: CPT

## 2019-12-02 PROCEDURE — 97530 THERAPEUTIC ACTIVITIES: CPT

## 2019-12-02 PROCEDURE — 99231 SBSQ HOSP IP/OBS SF/LOW 25: CPT | Performed by: INTERNAL MEDICINE

## 2019-12-02 PROCEDURE — 0D758ZZ DILATION OF ESOPHAGUS, VIA NATURAL OR ARTIFICIAL OPENING ENDOSCOPIC: ICD-10-PCS | Performed by: INTERNAL MEDICINE

## 2019-12-02 PROCEDURE — 85025 COMPLETE CBC W/AUTO DIFF WBC: CPT

## 2019-12-02 PROCEDURE — 7100000011 HC PHASE II RECOVERY - ADDTL 15 MIN: Performed by: INTERNAL MEDICINE

## 2019-12-02 PROCEDURE — 80048 BASIC METABOLIC PNL TOTAL CA: CPT

## 2019-12-02 PROCEDURE — C1769 GUIDE WIRE: HCPCS | Performed by: INTERNAL MEDICINE

## 2019-12-02 PROCEDURE — 7100000010 HC PHASE II RECOVERY - FIRST 15 MIN: Performed by: INTERNAL MEDICINE

## 2019-12-02 PROCEDURE — 36415 COLL VENOUS BLD VENIPUNCTURE: CPT

## 2019-12-02 RX ORDER — CLOPIDOGREL BISULFATE 75 MG/1
75 TABLET ORAL DAILY
Status: CANCELLED | OUTPATIENT
Start: 2019-12-02

## 2019-12-02 RX ORDER — CITALOPRAM 10 MG/1
10 TABLET ORAL DAILY
Qty: 30 TABLET | Refills: 3 | Status: SHIPPED | OUTPATIENT
Start: 2019-12-03 | End: 2020-02-11

## 2019-12-02 RX ORDER — MIDAZOLAM HYDROCHLORIDE 1 MG/ML
INJECTION INTRAMUSCULAR; INTRAVENOUS PRN
Status: DISCONTINUED | OUTPATIENT
Start: 2019-12-02 | End: 2019-12-02 | Stop reason: ALTCHOICE

## 2019-12-02 RX ORDER — PSEUDOEPHEDRINE HCL 30 MG
100 TABLET ORAL 2 TIMES DAILY
Qty: 60 CAPSULE | Refills: 3 | Status: SHIPPED | OUTPATIENT
Start: 2019-12-02 | End: 2019-12-02

## 2019-12-02 RX ORDER — PSEUDOEPHEDRINE HCL 30 MG
100 TABLET ORAL 2 TIMES DAILY
Qty: 60 CAPSULE | Refills: 3 | Status: SHIPPED | OUTPATIENT
Start: 2019-12-02

## 2019-12-02 RX ADMIN — ARIPIPRAZOLE 2 MG: 2 TABLET ORAL at 08:35

## 2019-12-02 RX ADMIN — CLOPIDOGREL BISULFATE 75 MG: 75 TABLET, FILM COATED ORAL at 08:36

## 2019-12-02 RX ADMIN — LACTULOSE 20 G: 20 SOLUTION ORAL at 13:12

## 2019-12-02 RX ADMIN — LACTULOSE 20 G: 20 SOLUTION ORAL at 08:38

## 2019-12-02 RX ADMIN — PANTOPRAZOLE SODIUM 20 MG: 20 TABLET, DELAYED RELEASE ORAL at 08:34

## 2019-12-02 RX ADMIN — Medication 10 ML: at 20:35

## 2019-12-02 RX ADMIN — LACTULOSE 20 G: 20 SOLUTION ORAL at 20:34

## 2019-12-02 RX ADMIN — DOCUSATE SODIUM 100 MG: 100 CAPSULE, LIQUID FILLED ORAL at 20:34

## 2019-12-02 RX ADMIN — Medication 10 ML: at 08:36

## 2019-12-02 RX ADMIN — HYDROXYCHLOROQUINE SULFATE 200 MG: 200 TABLET, FILM COATED ORAL at 08:34

## 2019-12-02 RX ADMIN — CITALOPRAM HYDROBROMIDE 10 MG: 10 TABLET ORAL at 08:42

## 2019-12-02 RX ADMIN — DOCUSATE SODIUM 100 MG: 100 CAPSULE, LIQUID FILLED ORAL at 08:33

## 2019-12-02 RX ADMIN — AZATHIOPRINE 100 MG: 50 TABLET ORAL at 08:35

## 2019-12-02 RX ADMIN — PREDNISONE 20 MG: 10 TABLET ORAL at 08:34

## 2019-12-02 RX ADMIN — BUPROPION HYDROCHLORIDE 300 MG: 150 TABLET, FILM COATED, EXTENDED RELEASE ORAL at 08:33

## 2019-12-02 ASSESSMENT — PAIN - FUNCTIONAL ASSESSMENT
PAIN_FUNCTIONAL_ASSESSMENT: FACES
PAIN_FUNCTIONAL_ASSESSMENT: 0-10
PAIN_FUNCTIONAL_ASSESSMENT: 0-10
PAIN_FUNCTIONAL_ASSESSMENT: FACES
PAIN_FUNCTIONAL_ASSESSMENT: 0-10
PAIN_FUNCTIONAL_ASSESSMENT: 0-10
PAIN_FUNCTIONAL_ASSESSMENT: FACES

## 2019-12-02 ASSESSMENT — PAIN SCALES - GENERAL
PAINLEVEL_OUTOF10: 0

## 2019-12-02 ASSESSMENT — PAIN SCALES - WONG BAKER
WONGBAKER_NUMERICALRESPONSE: 0

## 2019-12-03 VITALS
HEIGHT: 60 IN | SYSTOLIC BLOOD PRESSURE: 134 MMHG | BODY MASS INDEX: 30.12 KG/M2 | OXYGEN SATURATION: 94 % | TEMPERATURE: 97 F | DIASTOLIC BLOOD PRESSURE: 72 MMHG | RESPIRATION RATE: 22 BRPM | HEART RATE: 66 BPM | WEIGHT: 153.44 LBS

## 2019-12-03 LAB
ANION GAP SERPL CALCULATED.3IONS-SCNC: 10 MMOL/L (ref 3–16)
BASOPHILS ABSOLUTE: 0 K/UL (ref 0–0.2)
BASOPHILS RELATIVE PERCENT: 0.6 %
BUN BLDV-MCNC: 18 MG/DL (ref 7–20)
CALCIUM SERPL-MCNC: 8.8 MG/DL (ref 8.3–10.6)
CHLORIDE BLD-SCNC: 101 MMOL/L (ref 99–110)
CO2: 22 MMOL/L (ref 21–32)
CREAT SERPL-MCNC: 0.6 MG/DL (ref 0.6–1.2)
EOSINOPHILS ABSOLUTE: 0.3 K/UL (ref 0–0.6)
EOSINOPHILS RELATIVE PERCENT: 4.5 %
GFR AFRICAN AMERICAN: >60
GFR NON-AFRICAN AMERICAN: >60
GLUCOSE BLD-MCNC: 69 MG/DL (ref 70–99)
HCT VFR BLD CALC: 29.3 % (ref 36–48)
HEMOGLOBIN: 9.9 G/DL (ref 12–16)
LYMPHOCYTES ABSOLUTE: 1.3 K/UL (ref 1–5.1)
LYMPHOCYTES RELATIVE PERCENT: 22 %
MAGNESIUM: 1.8 MG/DL (ref 1.8–2.4)
MCH RBC QN AUTO: 32.8 PG (ref 26–34)
MCHC RBC AUTO-ENTMCNC: 33.7 G/DL (ref 31–36)
MCV RBC AUTO: 97.2 FL (ref 80–100)
MONOCYTES ABSOLUTE: 0.6 K/UL (ref 0–1.3)
MONOCYTES RELATIVE PERCENT: 10 %
NEUTROPHILS ABSOLUTE: 3.6 K/UL (ref 1.7–7.7)
NEUTROPHILS RELATIVE PERCENT: 62.9 %
PDW BLD-RTO: 14.6 % (ref 12.4–15.4)
PLATELET # BLD: 300 K/UL (ref 135–450)
PMV BLD AUTO: 5.6 FL (ref 5–10.5)
POTASSIUM SERPL-SCNC: 3.9 MMOL/L (ref 3.5–5.1)
RBC # BLD: 3.02 M/UL (ref 4–5.2)
SODIUM BLD-SCNC: 133 MMOL/L (ref 136–145)
WBC # BLD: 5.7 K/UL (ref 4–11)

## 2019-12-03 PROCEDURE — 2580000003 HC RX 258: Performed by: STUDENT IN AN ORGANIZED HEALTH CARE EDUCATION/TRAINING PROGRAM

## 2019-12-03 PROCEDURE — 6370000000 HC RX 637 (ALT 250 FOR IP): Performed by: STUDENT IN AN ORGANIZED HEALTH CARE EDUCATION/TRAINING PROGRAM

## 2019-12-03 PROCEDURE — 85025 COMPLETE CBC W/AUTO DIFF WBC: CPT

## 2019-12-03 PROCEDURE — 6360000002 HC RX W HCPCS: Performed by: STUDENT IN AN ORGANIZED HEALTH CARE EDUCATION/TRAINING PROGRAM

## 2019-12-03 PROCEDURE — 6370000000 HC RX 637 (ALT 250 FOR IP): Performed by: INTERNAL MEDICINE

## 2019-12-03 PROCEDURE — 36415 COLL VENOUS BLD VENIPUNCTURE: CPT

## 2019-12-03 PROCEDURE — 80048 BASIC METABOLIC PNL TOTAL CA: CPT

## 2019-12-03 PROCEDURE — 99238 HOSP IP/OBS DSCHRG MGMT 30/<: CPT | Performed by: INTERNAL MEDICINE

## 2019-12-03 PROCEDURE — 83735 ASSAY OF MAGNESIUM: CPT

## 2019-12-03 RX ORDER — CARVEDILOL 12.5 MG/1
6.25 TABLET ORAL 2 TIMES DAILY WITH MEALS
Qty: 60 TABLET | Refills: 3 | Status: SHIPPED | OUTPATIENT
Start: 2019-12-03 | End: 2020-02-24 | Stop reason: DRUGHIGH

## 2019-12-03 RX ADMIN — PREDNISONE 20 MG: 10 TABLET ORAL at 09:28

## 2019-12-03 RX ADMIN — ARIPIPRAZOLE 2 MG: 2 TABLET ORAL at 09:28

## 2019-12-03 RX ADMIN — PANTOPRAZOLE SODIUM 20 MG: 20 TABLET, DELAYED RELEASE ORAL at 09:28

## 2019-12-03 RX ADMIN — Medication 10 ML: at 09:00

## 2019-12-03 RX ADMIN — CLOPIDOGREL BISULFATE 75 MG: 75 TABLET, FILM COATED ORAL at 09:29

## 2019-12-03 RX ADMIN — CITALOPRAM HYDROBROMIDE 10 MG: 10 TABLET ORAL at 09:29

## 2019-12-03 RX ADMIN — HYDROXYCHLOROQUINE SULFATE 200 MG: 200 TABLET, FILM COATED ORAL at 09:29

## 2019-12-03 RX ADMIN — AZATHIOPRINE 100 MG: 50 TABLET ORAL at 09:28

## 2019-12-03 RX ADMIN — BUPROPION HYDROCHLORIDE 300 MG: 150 TABLET, FILM COATED, EXTENDED RELEASE ORAL at 09:28

## 2019-12-03 RX ADMIN — FLUTICASONE PROPIONATE 2 SPRAY: 50 SPRAY, METERED NASAL at 09:27

## 2019-12-03 ASSESSMENT — PAIN SCALES - GENERAL
PAINLEVEL_OUTOF10: 0

## 2019-12-04 ENCOUNTER — CARE COORDINATION (OUTPATIENT)
Dept: CASE MANAGEMENT | Age: 74
End: 2019-12-04

## 2019-12-06 ENCOUNTER — TELEPHONE (OUTPATIENT)
Dept: INTERNAL MEDICINE CLINIC | Age: 74
End: 2019-12-06

## 2019-12-10 ENCOUNTER — TELEPHONE (OUTPATIENT)
Dept: INTERNAL MEDICINE CLINIC | Age: 74
End: 2019-12-10

## 2019-12-11 ENCOUNTER — TELEPHONE (OUTPATIENT)
Dept: INTERNAL MEDICINE CLINIC | Age: 74
End: 2019-12-11

## 2019-12-11 ENCOUNTER — TELEPHONE (OUTPATIENT)
Dept: RHEUMATOLOGY | Age: 74
End: 2019-12-11

## 2019-12-13 ENCOUNTER — TELEPHONE (OUTPATIENT)
Dept: INTERNAL MEDICINE CLINIC | Age: 74
End: 2019-12-13

## 2019-12-13 ENCOUNTER — OFFICE VISIT (OUTPATIENT)
Dept: CARDIOLOGY CLINIC | Age: 74
End: 2019-12-13
Payer: MEDICARE

## 2019-12-13 VITALS — SYSTOLIC BLOOD PRESSURE: 120 MMHG | DIASTOLIC BLOOD PRESSURE: 78 MMHG | HEART RATE: 72 BPM

## 2019-12-13 DIAGNOSIS — I50.33 ACUTE ON CHRONIC HEART FAILURE WITH PRESERVED EJECTION FRACTION (HCC): Primary | ICD-10-CM

## 2019-12-13 PROCEDURE — 1123F ACP DISCUSS/DSCN MKR DOCD: CPT | Performed by: NURSE PRACTITIONER

## 2019-12-13 PROCEDURE — G8417 CALC BMI ABV UP PARAM F/U: HCPCS | Performed by: NURSE PRACTITIONER

## 2019-12-13 PROCEDURE — 1090F PRES/ABSN URINE INCON ASSESS: CPT | Performed by: NURSE PRACTITIONER

## 2019-12-13 PROCEDURE — 99213 OFFICE O/P EST LOW 20 MIN: CPT | Performed by: NURSE PRACTITIONER

## 2019-12-13 PROCEDURE — 1036F TOBACCO NON-USER: CPT | Performed by: NURSE PRACTITIONER

## 2019-12-13 PROCEDURE — 1111F DSCHRG MED/CURRENT MED MERGE: CPT | Performed by: NURSE PRACTITIONER

## 2019-12-13 PROCEDURE — 4040F PNEUMOC VAC/ADMIN/RCVD: CPT | Performed by: NURSE PRACTITIONER

## 2019-12-13 PROCEDURE — G8427 DOCREV CUR MEDS BY ELIG CLIN: HCPCS | Performed by: NURSE PRACTITIONER

## 2019-12-13 PROCEDURE — G8399 PT W/DXA RESULTS DOCUMENT: HCPCS | Performed by: NURSE PRACTITIONER

## 2019-12-13 PROCEDURE — G8598 ASA/ANTIPLAT THER USED: HCPCS | Performed by: NURSE PRACTITIONER

## 2019-12-13 PROCEDURE — G8482 FLU IMMUNIZE ORDER/ADMIN: HCPCS | Performed by: NURSE PRACTITIONER

## 2019-12-13 ASSESSMENT — ENCOUNTER SYMPTOMS
RESPIRATORY NEGATIVE: 1
GASTROINTESTINAL NEGATIVE: 1

## 2019-12-17 ENCOUNTER — TELEPHONE (OUTPATIENT)
Dept: INTERNAL MEDICINE CLINIC | Age: 74
End: 2019-12-17

## 2019-12-19 ENCOUNTER — OFFICE VISIT (OUTPATIENT)
Dept: INTERNAL MEDICINE CLINIC | Age: 74
End: 2019-12-19
Payer: MEDICARE

## 2019-12-19 ENCOUNTER — CLINICAL DOCUMENTATION (OUTPATIENT)
Dept: PSYCHIATRY | Age: 74
End: 2019-12-19

## 2019-12-19 ENCOUNTER — OFFICE VISIT (OUTPATIENT)
Dept: PSYCHOLOGY | Age: 74
End: 2019-12-19
Payer: MEDICARE

## 2019-12-19 VITALS
BODY MASS INDEX: 28.9 KG/M2 | OXYGEN SATURATION: 95 % | SYSTOLIC BLOOD PRESSURE: 140 MMHG | HEART RATE: 71 BPM | WEIGHT: 148 LBS | DIASTOLIC BLOOD PRESSURE: 84 MMHG

## 2019-12-19 PROCEDURE — 4040F PNEUMOC VAC/ADMIN/RCVD: CPT | Performed by: INTERNAL MEDICINE

## 2019-12-19 PROCEDURE — 1111F DSCHRG MED/CURRENT MED MERGE: CPT | Performed by: INTERNAL MEDICINE

## 2019-12-19 PROCEDURE — 1123F ACP DISCUSS/DSCN MKR DOCD: CPT | Performed by: INTERNAL MEDICINE

## 2019-12-19 PROCEDURE — G8598 ASA/ANTIPLAT THER USED: HCPCS | Performed by: INTERNAL MEDICINE

## 2019-12-19 PROCEDURE — G8399 PT W/DXA RESULTS DOCUMENT: HCPCS | Performed by: INTERNAL MEDICINE

## 2019-12-19 PROCEDURE — 90832 PSYTX W PT 30 MINUTES: CPT | Performed by: PSYCHOLOGIST

## 2019-12-19 PROCEDURE — G8417 CALC BMI ABV UP PARAM F/U: HCPCS | Performed by: INTERNAL MEDICINE

## 2019-12-19 PROCEDURE — G8427 DOCREV CUR MEDS BY ELIG CLIN: HCPCS | Performed by: INTERNAL MEDICINE

## 2019-12-19 PROCEDURE — G8482 FLU IMMUNIZE ORDER/ADMIN: HCPCS | Performed by: INTERNAL MEDICINE

## 2019-12-19 PROCEDURE — 1090F PRES/ABSN URINE INCON ASSESS: CPT | Performed by: INTERNAL MEDICINE

## 2019-12-19 PROCEDURE — 99213 OFFICE O/P EST LOW 20 MIN: CPT | Performed by: INTERNAL MEDICINE

## 2019-12-19 PROCEDURE — 1036F TOBACCO NON-USER: CPT | Performed by: INTERNAL MEDICINE

## 2019-12-19 RX ORDER — PREDNISONE 1 MG/1
TABLET ORAL
Qty: 90 TABLET | Refills: 3 | Status: SHIPPED | OUTPATIENT
Start: 2019-12-19 | End: 2020-09-08

## 2019-12-19 ASSESSMENT — PATIENT HEALTH QUESTIONNAIRE - PHQ9
3. TROUBLE FALLING OR STAYING ASLEEP: 3
5. POOR APPETITE OR OVEREATING: 2
4. FEELING TIRED OR HAVING LITTLE ENERGY: 3
1. LITTLE INTEREST OR PLEASURE IN DOING THINGS: 2
10. IF YOU CHECKED OFF ANY PROBLEMS, HOW DIFFICULT HAVE THESE PROBLEMS MADE IT FOR YOU TO DO YOUR WORK, TAKE CARE OF THINGS AT HOME, OR GET ALONG WITH OTHER PEOPLE: 2
8. MOVING OR SPEAKING SO SLOWLY THAT OTHER PEOPLE COULD HAVE NOTICED. OR THE OPPOSITE, BEING SO FIGETY OR RESTLESS THAT YOU HAVE BEEN MOVING AROUND A LOT MORE THAN USUAL: 3
SUM OF ALL RESPONSES TO PHQ QUESTIONS 1-9: 19
SUM OF ALL RESPONSES TO PHQ9 QUESTIONS 1 & 2: 5
7. TROUBLE CONCENTRATING ON THINGS, SUCH AS READING THE NEWSPAPER OR WATCHING TELEVISION: 3
6. FEELING BAD ABOUT YOURSELF - OR THAT YOU ARE A FAILURE OR HAVE LET YOURSELF OR YOUR FAMILY DOWN: 0
9. THOUGHTS THAT YOU WOULD BE BETTER OFF DEAD, OR OF HURTING YOURSELF: 0
2. FEELING DOWN, DEPRESSED OR HOPELESS: 3
SUM OF ALL RESPONSES TO PHQ QUESTIONS 1-9: 19

## 2019-12-19 ASSESSMENT — ENCOUNTER SYMPTOMS
VOMITING: 0
NAUSEA: 0
CHEST TIGHTNESS: 0
SHORTNESS OF BREATH: 0
ABDOMINAL PAIN: 0

## 2019-12-19 ASSESSMENT — ANXIETY QUESTIONNAIRES
3. WORRYING TOO MUCH ABOUT DIFFERENT THINGS: 3-NEARLY EVERY DAY
6. BECOMING EASILY ANNOYED OR IRRITABLE: 2-OVER HALF THE DAYS
5. BEING SO RESTLESS THAT IT IS HARD TO SIT STILL: 0-NOT AT ALL
1. FEELING NERVOUS, ANXIOUS, OR ON EDGE: 2-OVER HALF THE DAYS
7. FEELING AFRAID AS IF SOMETHING AWFUL MIGHT HAPPEN: 0-NOT AT ALL
GAD7 TOTAL SCORE: 13
2. NOT BEING ABLE TO STOP OR CONTROL WORRYING: 3-NEARLY EVERY DAY
4. TROUBLE RELAXING: 3-NEARLY EVERY DAY

## 2019-12-19 NOTE — PROGRESS NOTES
Behavioral Health Consultation  [address] PsyD  Psychologist  12/19/2019  10:12 AM      Time spent with Patient: 30 minutes  This is patient's thirty first [facility] appointment. Reason for Consult:  depression  Referring Provider: Thalia Dawson MD        S:  During the last visit Pt set goals to 1) continue to set limits with others to focus on your own health 2) return for f/u in 7 weeks, sooner if needed    Pt was seen with caregiver today. Pt reports she has been hospitalized and is now in rehab facility. More depressed. Got one psych med cut in half while in the hospital. Pt's caregiver states it has been hard - wants her to be home for the holidays. Is worried about her and thinks she will be better at home with her dogs and in familiar environment. Has been scared about her change in memory but doesn't think she has dementia. Missed her psychiatry f/u in office today due to transportation issues. Is in stretcher.         O:  MSE:     Appearance: good hygiene   Attitude: cooperative and friendly  Consciousness: alert  Orientation: not clear  Memory: impaired  Attention/Concentration: lost train of thought while speaking  Psychomotor Activity: normal  Eye Contact: normal  Speech: normal rate and volume, well-articulated  Mood: \"OK\"  Affect: euthymic and congruent  Perception: within normal limits  Thought Content: within normal limits  Thought Process: logical, coherent and goal-directed  Insight: poor  Judgment: impaired  Ability to understand instructions: Yes  Ability to respond meaningfully: at times  Morbid Ideation: no   Suicide Assessment: no suicidal ideation, plan, or intent  Homicidal Ideation: no        History:    Medications:   Current Outpatient Medications   Medication Sig Dispense Refill    carvedilol (COREG) 12.5 MG tablet Take 0.5 tablets by mouth 2 times daily (with meals) 60 tablet 3    citalopram (CELEXA) 10 MG tablet Take 1 tablet by mouth daily 30 tablet 3    docusate (COLACE, DULCOLAX) 100 MG CAPS Take 100 mg by mouth 2 times daily 60 capsule 3    predniSONE (DELTASONE) 10 MG tablet TAKE 2 TABLETS BY MOUTH DAILY 60 tablet 0    azaTHIOprine (IMURAN) 50 MG tablet Take 2 tab po daily 180 tablet 0    clopidogrel (PLAVIX) 75 MG tablet TAKE 1 TABLET BY MOUTH DAILY 30 tablet 2    pantoprazole (PROTONIX) 20 MG tablet TAKE 1 TABLET BY MOUTH TWICE DAILY 60 tablet 2    rosuvastatin (CRESTOR) 20 MG tablet Take 1 tablet by mouth daily 30 tablet 5    Cholecalciferol (VITAMIN D3) 91027 units CAPS TAKE 1 CAPSULE EVERY OTHER WEEK 4 capsule 0    ARIPiprazole (ABILIFY) 2 MG tablet Take 1 tablet by mouth daily 30 tablet 1    clobetasol (TEMOVATE) 0.05 % external solution Apply topically 2 times daily Apply topically 2 times daily.  buPROPion (WELLBUTRIN XL) 300 MG extended release tablet TAKE 1 TABLET BY MOUTH EVERY MORNING 30 tablet 5    methylcellulose (CITRUCEL) oral powder Take 2 g by mouth every other day       Handicap Placard MISC by Does not apply route Valid for 5 years from 11/7/18 1 each 0    Nutritional Supplements (ENSURE ACTIVE HIGH PROTEIN PO) Take 1 Can by mouth daily as needed      acetaminophen 650 MG TABS Take 650 mg by mouth every 6 hours as needed for Pain or Fever (Fever >100.5 F (38 C)). 60 tablet 1    hydroxychloroquine (PLAQUENIL) 200 MG tablet Take 200 mg by mouth daily. No current facility-administered medications for this visit.         Social History:   Social History     Socioeconomic History    Marital status:      Spouse name: Not on file    Number of children: 1    Years of education: 15    Highest education level: Not on file   Occupational History    Occupation: Retired   Social Needs    Financial resource strain: Not on file    Food insecurity:     Worry: Not on file     Inability: Not on file   [employer] needs:     Medical: Not on file     Non-medical: Not on file   Tobacco Use    Smoking status: Former Smoker     Packs/day: 0.50     Years: 30.00     Pack years: 15.00     Types: Cigarettes     Start date: 63     Last attempt to quit: 2014     Years since quittin.2    Smokeless tobacco: Never Used   Substance and Sexual Activity    Alcohol use: Yes     Alcohol/week: 0.0 standard drinks     Comment: 2 cocktails on weekends    Drug use: No    Sexual activity: Not Currently   Lifestyle    Physical activity:     Days per week: Not on file     Minutes per session: Not on file    Stress: Not on file   Relationships    Social connections:     Talks on phone: Not on file     Gets together: Not on file     Attends Moravian service: Not on file     Active member of club or organization: Not on file     Attends meetings of clubs or organizations: Not on file     Relationship status: Not on file    Intimate partner violence:     Fear of current or ex partner: Not on file     Emotionally abused: Not on file     Physically abused: Not on file     Forced sexual activity: Not on file   Other Topics Concern    Not on file   Social History Narrative    Not on file       TOBACCO:   reports that she quit smoking about 5 years ago. Her smoking use included cigarettes. She started smoking about 55 years ago. She has a 15.00 pack-year smoking history. She has never used smokeless tobacco.  ETOH:   reports current alcohol use. Family History:   Family History   Problem Relation Age of Onset    High Blood Pressure Brother          A:  Ms. Gabriel Huerta has been experiencing worsening health problems and has been hospitalized and is currently in nursing care. She was seen in a stretcher today with her caregiver. Pt's mental status was altered and she was able to engage meaningfully at times but not consistently. Ms. Gabriel Huerta will be seeing PCP today for f/u. She was encouraged to f/u with psychiatry re: medications ASAP.      PHQ Scores 2019 10/29/2019 2019 2019 2019 2019 2019   PHQ2 Score 5 2 2 2

## 2019-12-19 NOTE — PROGRESS NOTES
Outpatient Note for established Patient - regular Visit    History Obtained From:  patient, electronic medical record  Is the patient new to me ? - No    HISTORY OF PRESENT ILLNESS:   The patient is a 66 y.o. female who is here today for :  Krystyna Augustin was seen today for follow-up from hospital.    Diagnoses and all orders for this visit:    ILD (interstitial lung disease) ([address]  -     predniSONE (DELTASONE) 5 MG tablet; Take 3 tablets by mouth daily for 14 days, THEN 2 tablets daily. TAKE 2 TABLETS BY MOUTH DAILY. She still still get N/V per care giver   She see her doctor at the rehab   She is still on Prednisone 20 mg   Last BM yesterday with suppository   She had blood work Na was 131  Pt denies CP/SOB/palpitations/abdominal pain/N/V. She had vaginal yeatr infection which was treated with oral fluconazole   She wants to get home but care giver is worried. Past Medical History:        Diagnosis Date    Anxiety     CAD (coronary artery disease)     CHF (congestive heart failure) ([facility])     Depression     DVT of lower extremity (deep venous thrombosis) ([facility])     Falls     Hypertension     Lupus ([facility])     TIA (transient ischemic attack)     x3    Urinary incontinence     UTI (urinary tract infection)        Social History:   TOBACCO:   reports that she quit smoking about 5 years ago. Her smoking use included cigarettes. She started smoking about 55 years ago. She has a 15.00 pack-year smoking history. She has never used smokeless tobacco.  ETOH:   reports current alcohol use. Current Medications:    Prior to Admission medications    Medication Sig Start Date End Date Taking? Authorizing Provider   predniSONE (DELTASONE) 5 MG tablet Take 3 tablets by mouth daily for 14 days, THEN 2 tablets daily. TAKE 2 TABLETS BY MOUTH DAILY.  12/19/19 1/1/21 Yes Jorge Roque MD   carvedilol (COREG) 12.5 MG tablet Take 0.5 tablets by mouth 2 times daily (with meals) 12/3/19  Yes Najma Casillas,    citalopram adenopathy. Left cervical: No superficial or deep cervical adenopathy. Skin:     Findings: No rash. Neurological:      Mental Status: She is alert and oriented to person, place, and time. GCS: GCS eye subscore is 4. GCS verbal subscore is 5. GCS motor subscore is 6. Motor: No abnormal muscle tone. Deep Tendon Reflexes: Reflexes are normal and symmetric. Psychiatric:         Behavior: Behavior normal.         Thought Content: Thought content normal.       Kalee - coordinator      Assessment/Plan:   Beatrice Kelly was seen today for follow-up from hospital.    Diagnoses and all orders for this visit:    ILD (interstitial lung disease) ([facility]  I will reduce her Prednisone dose. Discussed the need for  Water intake limitations. Also long discussion about placement. I think that she can take a few days off but overlll- I advised of considering longer stay at SNF     -     predniSONE (DELTASONE) 5 MG tablet; Take 3 tablets by mouth daily for 14 days, THEN 2 tablets daily. TAKE 2 TABLETS BY MOUTH DAILY. - Patient was encouraged to call the office (and ask to see me) or be seen by other provider for any worsening or lack of improvement of the symptoms within a few days / weeks. - Pt was asked toschedule an appointment in 1 months, and to let me know of any scheduling difficulties. Additional patients instructions (if given):   Patient Instructions   Please call the office (and ask to see me) or be seen by other provider for any worsening or lack of improvement of the symptoms within a few weeks. Christina Doe M.D.   12/19/2019, 12:16 PM     Post-Discharge Transitional Care Management Services or Hospital Follow Up      Faith Marks   YOB: 1945    Date of Office Visit:  12/19/2019  Date of Hospital Admission: 11/26/19  Date of Hospital Discharge: 12/3/19  Risk of hospital readmission (high >=14%.  Medium >=10%) :Readmission Risk Score: 27      Care management risk score Rising risk (score 2-5) and Complex Care (Scores >=6): 13     Non face to face  following discharge, date last encounter closed (first attempt may have been earlier): *No documented post hospital discharge outreach found in the last 14 days    Call initiated 2 business days of discharge: *No response recorded in the last 14 days    Patient Active Problem List   Diagnosis    Pure hypercholesterolemia    Cervicalgia    Moderate episode of recurrent major depressive disorder ([address]    Lumbar spinal stenosis    Systemic lupus erythematosus ([address]    Osteoporosis    Closed fracture of part of neck of femur ([address]    Peptic ulcer    Urge incontinence    Hearing loss    Vitamin D deficiency    mixed Incontinence    Essential hypertension, benign    Intertrochanteric fracture of left hip ([address]    DVT (deep venous thrombosis) ([facility]    Allergic rhinitis    Ataxia involving legs    Coronary artery disease involving native coronary artery of native heart without angina pectoris    Acute left-sided weakness    Right-sided cerebrovascular accident (CVA) ([address]    Bunion of great toe of right foot    ILD (interstitial lung disease) ([address]    Intractable pain    Closed fracture of ninth thoracic vertebra with delayed healing    Pain in thoracic spine    Intractable back pain    Recurrent falls    Acute on chronic heart failure with preserved ejection fraction (HCC)    Diastolic congestive heart failure (HCC)    BLANCA (generalized anxiety disorder)    Dysuria    Nausea    Rhinorrhea    Hyponatremia    Confusion    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 TAKE 2 TABLETS BY MOUTH DAILY. 90 tablet 3    carvedilol (COREG) 12.5 MG tablet Take 0.5 tablets by mouth 2 times daily (with meals) 60 tablet 3    citalopram (CELEXA) 10 MG tablet Take 1 tablet by mouth daily 30 tablet 3    docusate (COLACE, DULCOLAX) 100 MG CAPS Take 100 mg by mouth 2 times daily 60 capsule 3    azaTHIOprine (IMURAN) 50 MG tablet Take 2 tab po daily 180 tablet 0    clopidogrel (PLAVIX) 75 MG tablet TAKE 1 TABLET BY MOUTH DAILY 30 tablet 2    pantoprazole (PROTONIX) 20 MG tablet TAKE 1 TABLET BY MOUTH TWICE DAILY 60 tablet 2    rosuvastatin (CRESTOR) 20 MG tablet Take 1 tablet by mouth daily 30 tablet 5    ARIPiprazole (ABILIFY) 2 MG tablet Take 1 tablet by mouth daily 30 tablet 1    clobetasol (TEMOVATE) 0.05 % external solution Apply topically 2 times daily Apply topically 2 times daily.  buPROPion (WELLBUTRIN XL) 300 MG extended release tablet TAKE 1 TABLET BY MOUTH EVERY MORNING 30 tablet 5    hydroxychloroquine (PLAQUENIL) 200 MG tablet Take 200 mg by mouth daily. Medications patient taking as of now reconciled against medications ordered at time of hospital discharge: Yes    Chief Complaint   Patient presents with    Follow-Up from Hospital       History of Present illness - Follow up of Hospital diagnosis(es): hyponatremia    Inpatient course: Discharge summary reviewed- see chart. Interval history/Current status: still weak  See above     see above     Vitals:    12/19/19 1140   BP: (!) 140/84   Pulse: 71   SpO2: 95%   Weight: 148 lb (67.1 kg)     Body mass index is 28.9 kg/m².    Wt Readings from Last 3 Encounters:   12/19/19 148 lb (67.1 kg)   12/03/19 153 lb 7 oz (69.6 kg)   11/20/19 156 lb 15.5 oz (71.2 kg)     BP Readings from Last 3

## 2019-12-20 ENCOUNTER — TELEPHONE (OUTPATIENT)
Dept: INTERNAL MEDICINE CLINIC | Age: 74
End: 2019-12-20

## 2019-12-20 DIAGNOSIS — F33.40 DEPRESSION, MAJOR, RECURRENT, IN REMISSION (HCC): Primary | ICD-10-CM

## 2019-12-23 RX ORDER — CHOLECALCIFEROL (VITAMIN D3) 1250 MCG
CAPSULE ORAL
Qty: 4 CAPSULE | Refills: 2 | Status: SHIPPED | OUTPATIENT
Start: 2019-12-23 | End: 2020-06-16

## 2019-12-24 RX ORDER — CITALOPRAM 10 MG/1
10 TABLET ORAL DAILY
Qty: 30 TABLET | Refills: 5 | Status: SHIPPED | OUTPATIENT
Start: 2019-12-24 | End: 2020-02-11

## 2019-12-26 ENCOUNTER — TELEPHONE (OUTPATIENT)
Dept: INTERNAL MEDICINE CLINIC | Age: 74
End: 2019-12-26

## 2019-12-26 DIAGNOSIS — I10 ESSENTIAL HYPERTENSION, BENIGN: Primary | ICD-10-CM

## 2019-12-26 DIAGNOSIS — K59.00 CONSTIPATION, UNSPECIFIED CONSTIPATION TYPE: ICD-10-CM

## 2019-12-27 PROBLEM — N39.0 URINARY TRACT INFECTION: Status: RESOLVED | Noted: 2019-11-26 | Resolved: 2019-12-27

## 2019-12-28 ENCOUNTER — TELEPHONE (OUTPATIENT)
Dept: INTERNAL MEDICINE CLINIC | Age: 74
End: 2019-12-28

## 2019-12-30 ENCOUNTER — TELEPHONE (OUTPATIENT)
Dept: INTERNAL MEDICINE CLINIC | Age: 74
End: 2019-12-30

## 2020-01-02 ENCOUNTER — TELEPHONE (OUTPATIENT)
Dept: INTERNAL MEDICINE CLINIC | Age: 75
End: 2020-01-02

## 2020-01-02 ENCOUNTER — TELEPHONE (OUTPATIENT)
Dept: PSYCHIATRY | Age: 75
End: 2020-01-02

## 2020-01-02 NOTE — TELEPHONE ENCOUNTER
Claudette Biswas, patient's nurse called to report that he has taken her off ARIPiprazole (ABILIFY) 2 MG tablet, She has not taken for 2 days and seems fine, please advise.

## 2020-01-02 NOTE — TELEPHONE ENCOUNTER
Sodium was 135 on 12/30/19. Pt caregiver informed and states that Ulisses Roberts is doing really well. Anthony Ramos would like to know if he should give her Lasix if she gains weight. Also, should she decrease azathioprine to 1 a day? Anthony Ramos is aware that Dr. Barnt Nascimento is out of the office and this will be addressed when he returns.

## 2020-01-02 NOTE — TELEPHONE ENCOUNTER
Vanessa Low called for [facility] from [facility] 12[facility]. He most concern about sodium level. Blood draw from [facility].  Please advise

## 2020-01-02 NOTE — TELEPHONE ENCOUNTER
Pt caregiver informed NOT to change any meds on his own. He will call if he has any concerns for weight gain. He voiced understanding.

## 2020-01-03 ENCOUNTER — TELEPHONE (OUTPATIENT)
Dept: PRIMARY CARE CLINIC | Age: 75
End: 2020-01-03

## 2020-01-03 NOTE — TELEPHONE ENCOUNTER
Noted. I recommended they wait until we meet face to face to do this. Also it was the patient's friend/caregiver not a nurse.

## 2020-01-09 ENCOUNTER — OFFICE VISIT (OUTPATIENT)
Dept: PSYCHOLOGY | Age: 75
End: 2020-01-09
Payer: MEDICARE

## 2020-01-09 PROCEDURE — 90832 PSYTX W PT 30 MINUTES: CPT | Performed by: PSYCHOLOGIST

## 2020-01-09 ASSESSMENT — PATIENT HEALTH QUESTIONNAIRE - PHQ9
6. FEELING BAD ABOUT YOURSELF - OR THAT YOU ARE A FAILURE OR HAVE LET YOURSELF OR YOUR FAMILY DOWN: 0
SUM OF ALL RESPONSES TO PHQ QUESTIONS 1-9: 10
4. FEELING TIRED OR HAVING LITTLE ENERGY: 3
8. MOVING OR SPEAKING SO SLOWLY THAT OTHER PEOPLE COULD HAVE NOTICED. OR THE OPPOSITE, BEING SO FIGETY OR RESTLESS THAT YOU HAVE BEEN MOVING AROUND A LOT MORE THAN USUAL: 2
2. FEELING DOWN, DEPRESSED OR HOPELESS: 1
3. TROUBLE FALLING OR STAYING ASLEEP: 2
7. TROUBLE CONCENTRATING ON THINGS, SUCH AS READING THE NEWSPAPER OR WATCHING TELEVISION: 1
SUM OF ALL RESPONSES TO PHQ9 QUESTIONS 1 & 2: 2
SUM OF ALL RESPONSES TO PHQ QUESTIONS 1-9: 10
9. THOUGHTS THAT YOU WOULD BE BETTER OFF DEAD, OR OF HURTING YOURSELF: 0
5. POOR APPETITE OR OVEREATING: 0
1. LITTLE INTEREST OR PLEASURE IN DOING THINGS: 1

## 2020-01-09 ASSESSMENT — ANXIETY QUESTIONNAIRES
3. WORRYING TOO MUCH ABOUT DIFFERENT THINGS: 3-NEARLY EVERY DAY
7. FEELING AFRAID AS IF SOMETHING AWFUL MIGHT HAPPEN: 3-NEARLY EVERY DAY
6. BECOMING EASILY ANNOYED OR IRRITABLE: 1-SEVERAL DAYS
4. TROUBLE RELAXING: 2-OVER HALF THE DAYS
GAD7 TOTAL SCORE: 17
2. NOT BEING ABLE TO STOP OR CONTROL WORRYING: 3-NEARLY EVERY DAY
1. FEELING NERVOUS, ANXIOUS, OR ON EDGE: 3-NEARLY EVERY DAY
5. BEING SO RESTLESS THAT IT IS HARD TO SIT STILL: 2-OVER HALF THE DAYS

## 2020-01-13 ENCOUNTER — TELEPHONE (OUTPATIENT)
Dept: INTERNAL MEDICINE CLINIC | Age: 75
End: 2020-01-13

## 2020-01-13 RX ORDER — HYDROXYCHLOROQUINE SULFATE 200 MG/1
TABLET, FILM COATED ORAL
Qty: 100 TABLET | Refills: 3 | Status: SHIPPED | OUTPATIENT
Start: 2020-01-13 | End: 2020-03-30 | Stop reason: SDUPTHER

## 2020-01-13 NOTE — TELEPHONE ENCOUNTER
Luis Felipe Avila care giver just wanted to inform pt weight is up 158 from a 5 pound increase since Friday. Regular bowl movement      inform pt to use Lasix as needed, pt was given 1 dosage of 20 MG tab of the Lasix but didn't  help in regards to the water retention. Antoinette Young

## 2020-01-14 RX ORDER — CIPROFLOXACIN 500 MG/1
500 TABLET, FILM COATED ORAL 2 TIMES DAILY
Qty: 10 TABLET | Refills: 0 | Status: SHIPPED | OUTPATIENT
Start: 2020-01-14 | End: 2020-01-19

## 2020-01-15 ENCOUNTER — TELEPHONE (OUTPATIENT)
Dept: RHEUMATOLOGY | Age: 75
End: 2020-01-15

## 2020-01-15 ENCOUNTER — OFFICE VISIT (OUTPATIENT)
Dept: RHEUMATOLOGY | Age: 75
End: 2020-01-15
Payer: MEDICARE

## 2020-01-15 VITALS
WEIGHT: 155 LBS | SYSTOLIC BLOOD PRESSURE: 112 MMHG | HEIGHT: 60 IN | DIASTOLIC BLOOD PRESSURE: 78 MMHG | BODY MASS INDEX: 30.43 KG/M2

## 2020-01-15 PROCEDURE — 4040F PNEUMOC VAC/ADMIN/RCVD: CPT | Performed by: INTERNAL MEDICINE

## 2020-01-15 PROCEDURE — G8399 PT W/DXA RESULTS DOCUMENT: HCPCS | Performed by: INTERNAL MEDICINE

## 2020-01-15 PROCEDURE — G8482 FLU IMMUNIZE ORDER/ADMIN: HCPCS | Performed by: INTERNAL MEDICINE

## 2020-01-15 PROCEDURE — G8427 DOCREV CUR MEDS BY ELIG CLIN: HCPCS | Performed by: INTERNAL MEDICINE

## 2020-01-15 PROCEDURE — 1036F TOBACCO NON-USER: CPT | Performed by: INTERNAL MEDICINE

## 2020-01-15 PROCEDURE — G8417 CALC BMI ABV UP PARAM F/U: HCPCS | Performed by: INTERNAL MEDICINE

## 2020-01-15 PROCEDURE — 99214 OFFICE O/P EST MOD 30 MIN: CPT | Performed by: INTERNAL MEDICINE

## 2020-01-15 PROCEDURE — 1090F PRES/ABSN URINE INCON ASSESS: CPT | Performed by: INTERNAL MEDICINE

## 2020-01-15 PROCEDURE — 1123F ACP DISCUSS/DSCN MKR DOCD: CPT | Performed by: INTERNAL MEDICINE

## 2020-01-15 NOTE — TELEPHONE ENCOUNTER
Gris #1@ Michele's office  Labs and Dexa ordered. Need to check benefits after received. Medicare/AARP supplement.   No PA will be needed

## 2020-01-15 NOTE — PROGRESS NOTES
measures for dry eyes and dry mouth. RTC 8  weeks. Patient indicates understanding and agrees with the management plan. I reviewed patient's history, referral documents and electronic medical records. Copy of consult note is being routed electronically/faxed to referring physician. #######################################################################    Xnnjynpxhg-[id number] for cutaneous lupus and Sjogren's overlap, also has ILD. Other medical problems anxiety, depression, hypertension. Interval changes-  She was admitted to [facility]. in early December because of confusion from hyponatremia. At that time, prednisone reduced to 10 mg a day, states that she has been noticing worsening shortness of breath and cough since then. Continues to take Imuran and Plaquenil. No fever, chills, painful or swollen joints. Sicca symptoms are manageable. Her sodium levels have normalized, and mental function is good. No rashes, mucositis, pleurisy, GI or  symptoms. Tolerating medications well. CT chest and PFTs are mentioned below-DLCO is significantly reduced to 39%. All other review of systems are negative. Past Medical History:   Diagnosis Date    Anxiety     CAD (coronary artery disease)     CHF (congestive heart failure) (HCC)     Depression     DVT of lower extremity (deep venous thrombosis) (HCC)     Falls     Hypertension     Lupus (HCC)     TIA (transient ischemic attack)     x3    Urinary incontinence     UTI (urinary tract infection)      Past Surgical History:   Procedure Laterality Date    BACK SURGERY  9/24/2014     DR. Morrison     COLONOSCOPY  7/12/2010    WNL-repeat in 10 years    FEMUR SURGERY Right     HIP SURGERY Left 12/03/2013    left hip trochanteric femoral nailing    JOINT REPLACEMENT      LUMBAR LAMINECTOMY  8/3/2010    Dr. Nataly Bradford, L5-S1    UPPER GASTROINTESTINAL ENDOSCOPY  7/19/10    reflux with no Barretts/no H-pylori    UPPER GASTROINTESTINAL tablet Take 1 tablet by mouth 2 times daily for 5 days 10 tablet 0    hydroxychloroquine (PLAQUENIL) 200 MG tablet TAKE ONE TABLET BY MOUTH TWICE DAILY 100 tablet 3    citalopram (CELEXA) 10 MG tablet Take 1 tablet by mouth daily 30 tablet 5    Cholecalciferol (VITAMIN D3) 1.25 MG (09006 UT) CAPS TAKE 1 CAPSULE BY MOUTH EVERY OTHER WEEK 4 capsule 2    predniSONE (DELTASONE) 5 MG tablet Take 3 tablets by mouth daily for 14 days, THEN 2 tablets daily. TAKE 2 TABLETS BY MOUTH DAILY. 90 tablet 3    carvedilol (COREG) 12.5 MG tablet Take 0.5 tablets by mouth 2 times daily (with meals) 60 tablet 3    citalopram (CELEXA) 10 MG tablet Take 1 tablet by mouth daily 30 tablet 3    docusate (COLACE, DULCOLAX) 100 MG CAPS Take 100 mg by mouth 2 times daily 60 capsule 3    azaTHIOprine (IMURAN) 50 MG tablet Take 2 tab po daily 180 tablet 0    clopidogrel (PLAVIX) 75 MG tablet TAKE 1 TABLET BY MOUTH DAILY 30 tablet 2    pantoprazole (PROTONIX) 20 MG tablet TAKE 1 TABLET BY MOUTH TWICE DAILY 60 tablet 2    rosuvastatin (CRESTOR) 20 MG tablet Take 1 tablet by mouth daily 30 tablet 5    ARIPiprazole (ABILIFY) 2 MG tablet Take 1 tablet by mouth daily 30 tablet 1    clobetasol (TEMOVATE) 0.05 % external solution Apply topically 2 times daily Apply topically 2 times daily.  buPROPion (WELLBUTRIN XL) 300 MG extended release tablet TAKE 1 TABLET BY MOUTH EVERY MORNING 30 tablet 5    methylcellulose (CITRUCEL) oral powder Take 2 g by mouth every other day       Handicap Placard MISC by Does not apply route Valid for 5 years from 11/7/18 1 each 0    Nutritional Supplements (ENSURE ACTIVE HIGH PROTEIN PO) Take 1 Can by mouth daily as needed      acetaminophen 650 MG TABS Take 650 mg by mouth every 6 hours as needed for Pain or Fever (Fever >100.5 F (38 C)). 60 tablet 1     No current facility-administered medications for this visit.       Allergies   Allergen Reactions    Medrol [Methylprednisolone]      multiple side effects , able to tolerate prednisone without side effects     Oxycontin [Oxycodone Hcl]      Pruritis, vomiting     Vicodin [Hydrocodone-Acetaminophen] Nausea Only       PHYSICAL EXAM:    Vitals:    /78   Ht 5' (1.524 m)   Wt 155 lb (70.3 kg)   BMI 30.27 kg/m²   General appearance/ Psychiatric: well nourished, and well groomed, normal judgement, alert, appears stated age and cooperative. Is slow to respond. MKS: I do not appreciate any tender, swollen or inflamed joints in upper or lower extremities. She is using walker to ambulate because of physical deconditioning and shortness of breath. Skin: No rashes, no induration or skin thickening or nodules. No evidence ischemia or deformities noted in digits or nails. HEENT: Normal lacrimal glands, cornea, conjunctiva, lids. She does have dry oral mucosa with no saliva pool under her tongue. Saliva glands are not enlarged. Neck: No masses or asymmetry. No thyroid enlargement. Chest: Normal effort, bibasilar fine crackles are heard. Heart:  Normal s1/s2, no leg edema.       DATA:   Lab Results   Component Value Date    WBC 5.7 12/03/2019    HGB 9.9 (L) 12/03/2019    HCT 29.3 (L) 12/03/2019    MCV 97.2 12/03/2019     12/03/2019         Chemistry        Component Value Date/Time     (L) 12/03/2019 0437    K 3.9 12/03/2019 0437    K 4.1 11/26/2019 0821     12/03/2019 0437    CO2 22 12/03/2019 0437    BUN 18 12/03/2019 0437    CREATININE 0.6 12/03/2019 0437        Component Value Date/Time    CALCIUM 8.8 12/03/2019 0437    ALKPHOS 79 11/26/2019 0820    AST 16 11/26/2019 0820    ALT 15 11/26/2019 0820    BILITOT 0.6 11/26/2019 0820          Lab Results   Component Value Date    LABURIC 2.5 (L) 11/28/2019     Lab Results   Component Value Date    SEDRATE 32 (H) 03/15/2019     No results found for: CRP  Lab Results   Component Value Date    REBECCA POSITIVE (A) 10/02/2019    SEDRATE 32 (H) 03/15/2019     Lab Results   Component Value Date CKTOTAL 87 10/02/2019     No results found for: TSH  Lab Results   Component Value Date    VITD25 86.7 11/26/2019         Radiology Review:   CT chest 9/19/2019-  Impression       1.  Bilateral subpleural reticular opacities and traction bronchiectasis and developing subpleural honeycombing compatible with mild to moderate pulmonary fibrosis with progression from comparison chest CT. 2.  Clustered nodular opacities in the right upper lobe may be inflammatory/infectious but nonspecific. Following the Fleischner Society 2017 guidelines, if patient is low risk no routine follow-up is suggested unless suspicious morphology or upper lobe    location. If patient is high risk, then optional CT at 12 months is suggested.  qzxv   3.  Unchanged severe T9 and T11 vertebral compression fractures with retropulsion. PFT 10/11/2019      Pulse ox is 94 % on room air    Spirometry data:    FEV1/FVC: 78. Predicted ratio 75    FEV1 1.07 L, which is 65 % predicted    FVC is 1.38 L, which is 62 % predicted  Lung Volumes:    TLC (by Plethysmography) is 3.21 L, which is 73 % predicted    RV is 1.35 L which is 64 % predicted    Diffusion Capacity:    DLCO is 7.55 which is 39 % predicted  Impression:    1. There is no obstruction present    2. There is mild restriction with TLC of 73% predicted    4. There is severe reduction in diffusion capacity    Comment:   The presence of restriction and reduction of diffusion capacity   is a new in comparison with the PFT she had on 11/3/2017  PFT findings are suggestive of progression of ILD.  Clinical   correlation is recommended. A/P- See above.

## 2020-01-16 ENCOUNTER — TELEPHONE (OUTPATIENT)
Dept: INTERNAL MEDICINE CLINIC | Age: 75
End: 2020-01-16

## 2020-01-16 RX ORDER — ONDANSETRON 4 MG/1
4 TABLET, FILM COATED ORAL EVERY 12 HOURS PRN
Qty: 30 TABLET | Refills: 0 | Status: SHIPPED | OUTPATIENT
Start: 2020-01-16 | End: 2020-02-17

## 2020-01-21 ENCOUNTER — HOSPITAL ENCOUNTER (OUTPATIENT)
Dept: PULMONOLOGY | Age: 75
Discharge: HOME | End: 2020-01-21
Payer: MEDICARE

## 2020-01-21 VITALS — OXYGEN SATURATION: 97 %

## 2020-01-21 PROCEDURE — 94760 N-INVAS EAR/PLS OXIMETRY 1: CPT

## 2020-01-21 PROCEDURE — 94726 PLETHYSMOGRAPHY LUNG VOLUMES: CPT

## 2020-01-21 PROCEDURE — 94664 DEMO&/EVAL PT USE INHALER: CPT

## 2020-01-21 PROCEDURE — 94010 BREATHING CAPACITY TEST: CPT

## 2020-01-21 PROCEDURE — 94729 DIFFUSING CAPACITY: CPT

## 2020-01-23 ENCOUNTER — OFFICE VISIT (OUTPATIENT)
Dept: PULMONOLOGY | Age: 75
End: 2020-01-23
Payer: MEDICARE

## 2020-01-23 VITALS
OXYGEN SATURATION: 97 % | WEIGHT: 157 LBS | DIASTOLIC BLOOD PRESSURE: 76 MMHG | RESPIRATION RATE: 16 BRPM | HEART RATE: 84 BPM | HEIGHT: 60 IN | BODY MASS INDEX: 30.82 KG/M2 | SYSTOLIC BLOOD PRESSURE: 132 MMHG

## 2020-01-23 PROCEDURE — G8482 FLU IMMUNIZE ORDER/ADMIN: HCPCS | Performed by: INTERNAL MEDICINE

## 2020-01-23 PROCEDURE — G8399 PT W/DXA RESULTS DOCUMENT: HCPCS | Performed by: INTERNAL MEDICINE

## 2020-01-23 PROCEDURE — 4040F PNEUMOC VAC/ADMIN/RCVD: CPT | Performed by: INTERNAL MEDICINE

## 2020-01-23 PROCEDURE — 1090F PRES/ABSN URINE INCON ASSESS: CPT | Performed by: INTERNAL MEDICINE

## 2020-01-23 PROCEDURE — 1123F ACP DISCUSS/DSCN MKR DOCD: CPT | Performed by: INTERNAL MEDICINE

## 2020-01-23 PROCEDURE — G8427 DOCREV CUR MEDS BY ELIG CLIN: HCPCS | Performed by: INTERNAL MEDICINE

## 2020-01-23 PROCEDURE — 99214 OFFICE O/P EST MOD 30 MIN: CPT | Performed by: INTERNAL MEDICINE

## 2020-01-23 PROCEDURE — 1036F TOBACCO NON-USER: CPT | Performed by: INTERNAL MEDICINE

## 2020-01-23 PROCEDURE — G8417 CALC BMI ABV UP PARAM F/U: HCPCS | Performed by: INTERNAL MEDICINE

## 2020-01-23 NOTE — PROGRESS NOTES
Subjective:      Patient ID: Jin Montano is a 66 y.o. female. HPI Mrs. Yovani Aguilar returns for follow-up, for ILD and connective tissue disease. She recently had a follow-up PFT. She was hospitalized in December with hyponatremia, has recovered from that. She is maintaining normal sodium level, on fluid restriction. On the whole she is feeling significantly better. Her exercise tolerance has improved substantially over the past 6 months. She is using a walker primarily for ambulation, sometimes has a wheelchair when she is tired. She has a stationary bicycle to exercise her legs, which she is using daily. She is currently on Imuran, Plaquenil and tapering dose of prednisone. Her son feels that she seems a little more breathless as the prednisone dose has been decreased to 10 mg daily. No chest pain, tightness, wheezing, cough. Appetite is fair. She is not taking Ensure supplements because it makes her nauseated. She is eating breakfast and supper. Her weight is down a few pounds. She has some fluctuation in weight depending on developing edema. She is getting furosemide periodically as required for fluid retention, based on daily weight. Review of Systems    Objective:   Physical Exam  Vitals signs reviewed. Constitutional:       Appearance: She is well-developed. HENT:      Head: Normocephalic and atraumatic. Mouth/Throat:      Pharynx: No oropharyngeal exudate. Eyes:      General: No scleral icterus. Right eye: No discharge. Left eye: No discharge. Neck:      Thyroid: No thyromegaly. Trachea: No tracheal deviation. Cardiovascular:      Rate and Rhythm: Normal rate and regular rhythm. Pulses: Normal pulses. Heart sounds: Normal heart sounds. No murmur. Pulmonary:      Effort: Pulmonary effort is normal.      Breath sounds: No wheezing or rhonchi.       Comments: Mildly decreased breath sounds throughout, with rare basilar crackles, L>R  Musculoskeletal:      Right lower le+ Edema present. Left lower le+ Edema present. Lymphadenopathy:      Cervical: No cervical adenopathy. Skin:     General: Skin is warm and dry. Neurological:      Mental Status: She is alert and oriented to person, place, and time. Psychiatric:         Mood and Affect: Mood normal.         Behavior: Behavior normal.         Thought Content: Thought content normal.         Judgment: Judgment normal.     PFT on 2020 is reviewed, and compared to prior study from 2019: FVC is 1.82 L, improved from 1.38 L. FRC 2.17 l, improved from 1.80 L, and diffusion capacity 8.29, improved from 7.55    Assessment:      Interstitial lung disease, associated with connective tissue disease, lupus and sicca syndrome. She has been on immunosuppressive therapy for just over 3 months, and there is clear improvement in pulmonary function as well as clinical status. Echocardiogram in 2019 did not suggest presence of pulmonary hypertension or right heart dysfunction. Decrease in diffusion capacity relates then to interstitial disease and structural abnormalities, interstitial inflammation. Plan:      Continue treatment of CTD with immunosuppressive therapy. Monitor progress in lung function and exercise capacity.         Yulissa Fonseca MD

## 2020-01-27 ENCOUNTER — TELEPHONE (OUTPATIENT)
Dept: INTERNAL MEDICINE CLINIC | Age: 75
End: 2020-01-27

## 2020-01-28 NOTE — TELEPHONE ENCOUNTER
S/W Flora Fish, gave him ok to tell RN to order UA w/ Culture. He will have her call if she needs verbal from me. Order in chart.

## 2020-01-29 ENCOUNTER — TELEPHONE (OUTPATIENT)
Dept: INTERNAL MEDICINE CLINIC | Age: 75
End: 2020-01-29

## 2020-01-29 NOTE — TELEPHONE ENCOUNTER
Pt is c/o burning, frequent urination no foul smell. Pt  states a urine specimen was done yesterday.

## 2020-01-31 ENCOUNTER — TELEPHONE (OUTPATIENT)
Dept: INTERNAL MEDICINE CLINIC | Age: 75
End: 2020-01-31

## 2020-01-31 NOTE — TELEPHONE ENCOUNTER
I spoke with Emilie Opitz and requested she send the result to me per Dr Yonatan Cornejo request. I gave her [phone] to fax it.

## 2020-01-31 NOTE — TELEPHONE ENCOUNTER
Sara Muñoz from [facility] states that pt urine testing was done at Located within [facility]. Here is the acct # [de-identified] or MRN# [de-identified].     Sara Muñoz states the results are still processing

## 2020-01-31 NOTE — TELEPHONE ENCOUNTER
Bertha @ [facility] called stating she has positive urine culture results from 01/28/20 :     Staphylocottus Aureus      Aerococcus Sanguincola     Please call to advise

## 2020-02-03 ENCOUNTER — TELEPHONE (OUTPATIENT)
Dept: INTERNAL MEDICINE CLINIC | Age: 75
End: 2020-02-03

## 2020-02-03 RX ORDER — SULFAMETHOXAZOLE AND TRIMETHOPRIM 800; 160 MG/1; MG/1
1 TABLET ORAL 2 TIMES DAILY
Qty: 10 TABLET | Refills: 0 | Status: SHIPPED | OUTPATIENT
Start: 2020-02-03 | End: 2020-02-08

## 2020-02-03 NOTE — TELEPHONE ENCOUNTER
Spoke to aubree and he stated the bottom number is staying in [de-identified] and 90s. Leann Naqvi stated he will drop off some readings for a week for Dr. Anne Marie Lindsey can review.

## 2020-02-03 NOTE — TELEPHONE ENCOUNTER
Would like to know if pt should be on the antibiotics still? Stated he hasn't received a call back from Friday when he called last. Stated her bp he is also concerned about.  Requesting a call back today regarding this matter pls advise

## 2020-02-10 NOTE — PROGRESS NOTES
PSYCHIATRY PROGRESS NOTE    Dipak Wheeler  18    CC:   Chief Complaint   Patient presents with    Follow-up     Patient is here for a follow up. Depression, anxiety      HPI:   Dipak Wheeler is a 66 y.o. female with h/o depression who p/t clinic to establish care with this provider. PCP is Xavi Oviedo MD.     Interim: not seen since October. Late cancellation December. Caregiver called reporting he decided to stop her Abilify. Here with her friend/caregiver again today. Has been doing physical therapy and seeing improvement in her strength, this is improving her mood. Denies depressed mood. Energy/motivation. Appetite good. Sleeping well. Denies SI/HI, psychosis or endy. Denies anxiety. They feel she is cognitively better as well. No HA since stopping Abilify. Had hyponatremia in the hospital they decreased Celexa to 10mg. They want to stop Celexa. Stressors: financial, physical limitations    context: office visit  severity: mild  location: AMS / mood disturbance  associated symptoms: see above  modifiers: course of illness, stressors  duration: chronic    History obtained from patient and chart (confirmed by patient today).     Past Psychiatric History:    Prior hospitalizations: Denies   Prior diagnoses: Depression, anxiety   Prior medication trials/reactions to meds: Wellbutrin, Celexa (hyponatremia at 20mg), Cymbalta, Ativan, Abilify (HA)   Outpatient Treatment:    Suicide Attempts: Denies      Substance Use History:   Nicotine:   Social History     Tobacco Use   Smoking Status Former Smoker    Packs/day: 0.50    Years: 30.00    Pack years: 15.00    Types: Cigarettes    Start date:     Last attempt to quit: 2014    Years since quittin.3   Smokeless Tobacco Never Used      Alcohol: 1 drink 2x/week   Illicits: Denies   Caffeine: None   Rehabs/Complicated W/D: Denies, no DUIs     Past Medical/Surgical History:   Past Medical History:   Diagnosis Date    Anxiety     CAD (coronary artery disease)     CHF (congestive heart failure) (HCC)     Depression     DVT of lower extremity (deep venous thrombosis) (HCC)     Falls     Hypertension     Lupus (HCC)     TIA (transient ischemic attack)     x3    Urinary incontinence     UTI (urinary tract infection)      Past Surgical History:   Procedure Laterality Date    BACK SURGERY  9/24/2014     DR. Morrison     COLONOSCOPY  7/12/2010    WNL-repeat in 10 years    FEMUR SURGERY Right     HIP SURGERY Left 12/03/2013    left hip trochanteric femoral nailing   Mikey Gaston  8/3/2010    Dr. Stefany Villasenor, L5-S1    UPPER GASTROINTESTINAL ENDOSCOPY  7/19/10    reflux with no Barretts/no H-pylori    UPPER GASTROINTESTINAL ENDOSCOPY  01/2017    Dr. Vargas Gonsales - gastritis and duodinitis , neg H pylori     UPPER GASTROINTESTINAL ENDOSCOPY N/A 12/2/2019    EGD DILATION SAVORY 17mm/ 46 F performed by Bessy Madison MD at [facility] ENDOSCOPY         PCP: Nithin Fernandez MD      Social/Developmental History:    Marital:    Children: 1 son   Family:    Housing: With caregiver/ex BF   Occupation/Income: SSI   Education:              Gnosticist:    Legal hx: Denies   Abuse hx:   Violence hx:   Access to firearms: No    Family History:    Medical:  Family History   Problem Relation Age of Onset    High Blood Pressure Brother       Psychiatric: Denies   History of completed suicide: Denies    Allergies:    Allergies   Allergen Reactions    Medrol [Methylprednisolone]      multiple side effects , able to tolerate prednisone without side effects     Oxycontin [Oxycodone Hcl]      Pruritis, vomiting     Vicodin [Hydrocodone-Acetaminophen] Nausea Only         Current Medications:   Current Outpatient Medications   Medication Sig Dispense Refill    ondansetron (ZOFRAN) 4 MG tablet Take 1 tablet by mouth every 12 hours as needed for Nausea or Vomiting 30 tablet 0    hydroxychloroquine have lethal plan, does not have access to guns or weapons, patient is elida for safety, no prior suicide attempts, no family h/o suicide, no substance abuse, patient has social or family support, no active psychosis or cognitive dysfunction, already has outpatient services in place, compliant with recommended medications, and patient is future oriented. 2. Psychiatric  -D/C Celexa - pt wants to trial off, had hyponatremia  -Continue Wellbutrin XL 300mg qAM   -Labs: reviewed in Epic, up to date  -Continue therapy with Dr. Brandon Campos reviewed, c/w history  -R/b/se/a d/w pt who consents. 3. Medical  -Following with Sinai Hui MD    4. Substance   -See above    5. RTC - 3 months    Johana Fitzpatrick M.D.   Psychiatrist

## 2020-02-11 ENCOUNTER — OFFICE VISIT (OUTPATIENT)
Dept: PSYCHIATRY | Age: 75
End: 2020-02-11
Payer: MEDICARE

## 2020-02-11 ENCOUNTER — OFFICE VISIT (OUTPATIENT)
Dept: PSYCHOLOGY | Age: 75
End: 2020-02-11
Payer: MEDICARE

## 2020-02-11 VITALS
DIASTOLIC BLOOD PRESSURE: 66 MMHG | BODY MASS INDEX: 29.64 KG/M2 | HEART RATE: 73 BPM | SYSTOLIC BLOOD PRESSURE: 112 MMHG | WEIGHT: 151 LBS | HEIGHT: 60 IN

## 2020-02-11 DIAGNOSIS — I10 ESSENTIAL HYPERTENSION, BENIGN: ICD-10-CM

## 2020-02-11 LAB
ANION GAP SERPL CALCULATED.3IONS-SCNC: 11 MMOL/L (ref 3–16)
BUN BLDV-MCNC: 16 MG/DL (ref 7–20)
CALCIUM SERPL-MCNC: 9.9 MG/DL (ref 8.3–10.6)
CHLORIDE BLD-SCNC: 99 MMOL/L (ref 99–110)
CO2: 24 MMOL/L (ref 21–32)
CREAT SERPL-MCNC: 0.9 MG/DL (ref 0.6–1.2)
GFR AFRICAN AMERICAN: >60
GFR NON-AFRICAN AMERICAN: >60
GLUCOSE BLD-MCNC: 111 MG/DL (ref 70–99)
POTASSIUM SERPL-SCNC: 4.2 MMOL/L (ref 3.5–5.1)
SODIUM BLD-SCNC: 134 MMOL/L (ref 136–145)

## 2020-02-11 PROCEDURE — 1123F ACP DISCUSS/DSCN MKR DOCD: CPT | Performed by: PSYCHIATRY & NEUROLOGY

## 2020-02-11 PROCEDURE — 4040F PNEUMOC VAC/ADMIN/RCVD: CPT | Performed by: PSYCHIATRY & NEUROLOGY

## 2020-02-11 PROCEDURE — 99214 OFFICE O/P EST MOD 30 MIN: CPT | Performed by: PSYCHIATRY & NEUROLOGY

## 2020-02-11 PROCEDURE — G8399 PT W/DXA RESULTS DOCUMENT: HCPCS | Performed by: PSYCHIATRY & NEUROLOGY

## 2020-02-11 PROCEDURE — 1036F TOBACCO NON-USER: CPT | Performed by: PSYCHIATRY & NEUROLOGY

## 2020-02-11 PROCEDURE — 1090F PRES/ABSN URINE INCON ASSESS: CPT | Performed by: PSYCHIATRY & NEUROLOGY

## 2020-02-11 PROCEDURE — G8417 CALC BMI ABV UP PARAM F/U: HCPCS | Performed by: PSYCHIATRY & NEUROLOGY

## 2020-02-11 PROCEDURE — G8427 DOCREV CUR MEDS BY ELIG CLIN: HCPCS | Performed by: PSYCHIATRY & NEUROLOGY

## 2020-02-11 PROCEDURE — 90832 PSYTX W PT 30 MINUTES: CPT | Performed by: PSYCHOLOGIST

## 2020-02-11 PROCEDURE — G8482 FLU IMMUNIZE ORDER/ADMIN: HCPCS | Performed by: PSYCHIATRY & NEUROLOGY

## 2020-02-11 RX ORDER — BUPROPION HYDROCHLORIDE 300 MG/1
TABLET ORAL
Qty: 90 TABLET | Refills: 1 | Status: SHIPPED | OUTPATIENT
Start: 2020-02-11 | End: 2020-10-30 | Stop reason: SDUPTHER

## 2020-02-11 ASSESSMENT — ANXIETY QUESTIONNAIRES
1. FEELING NERVOUS, ANXIOUS, OR ON EDGE: 0-NOT AT ALL
6. BECOMING EASILY ANNOYED OR IRRITABLE: 1-SEVERAL DAYS
3. WORRYING TOO MUCH ABOUT DIFFERENT THINGS: 1-SEVERAL DAYS
4. TROUBLE RELAXING: 0-NOT AT ALL
2. NOT BEING ABLE TO STOP OR CONTROL WORRYING: 1-SEVERAL DAYS
7. FEELING AFRAID AS IF SOMETHING AWFUL MIGHT HAPPEN: 0-NOT AT ALL
GAD7 TOTAL SCORE: 3
5. BEING SO RESTLESS THAT IT IS HARD TO SIT STILL: 0-NOT AT ALL

## 2020-02-11 ASSESSMENT — PATIENT HEALTH QUESTIONNAIRE - PHQ9
SUM OF ALL RESPONSES TO PHQ9 QUESTIONS 1 & 2: 2
SUM OF ALL RESPONSES TO PHQ QUESTIONS 1-9: 2
1. LITTLE INTEREST OR PLEASURE IN DOING THINGS: 1
SUM OF ALL RESPONSES TO PHQ QUESTIONS 1-9: 2
2. FEELING DOWN, DEPRESSED OR HOPELESS: 1

## 2020-02-11 NOTE — PROGRESS NOTES
Social Needs    Financial resource strain: Not on file    Food insecurity:     Worry: Not on file     Inability: Not on file    Transportation needs:     Medical: Not on file     Non-medical: Not on file   Tobacco Use    Smoking status: Former Smoker     Packs/day: 0.50     Years: 30.00     Pack years: 15.00     Types: Cigarettes     Start date:      Last attempt to quit: 2014     Years since quittin.3    Smokeless tobacco: Never Used   Substance and Sexual Activity    Alcohol use: Yes     Alcohol/week: 0.0 standard drinks     Comment: 2 cocktails on weekends    Drug use: No    Sexual activity: Not Currently   Lifestyle    Physical activity:     Days per week: Not on file     Minutes per session: Not on file    Stress: Not on file   Relationships    Social connections:     Talks on phone: Not on file     Gets together: Not on file     Attends Sikh service: Not on file     Active member of club or organization: Not on file     Attends meetings of clubs or organizations: Not on file     Relationship status: Not on file    Intimate partner violence:     Fear of current or ex partner: Not on file     Emotionally abused: Not on file     Physically abused: Not on file     Forced sexual activity: Not on file   Other Topics Concern    Not on file   Social History Narrative    Not on file       TOBACCO:   reports that she quit smoking about 5 years ago. Her smoking use included cigarettes. She started smoking about 55 years ago. She has a 15.00 pack-year smoking history. She has never used smokeless tobacco.  ETOH:   reports current alcohol use. Family History:   Family History   Problem Relation Age of Onset    High Blood Pressure Brother          A:  Ms. Dong Brooke mood and cognitive functioning has improved since the last visit. She reports feeling clearer and more optimistic about her ongoing PT.  She continues to be friendly and engaged and responds positively to behavioral interventions. PHQ Scores 2/11/2020 1/9/2020 12/19/2019 10/29/2019 9/17/2019 7/18/2019 6/20/2019   PHQ2 Score 2 2 5 2 2 2 2   PHQ9 Score 2 10 19 10 2 10 8     Interpretation of Total Score Depression Severity: 1-4 = Minimal depression, 5-9 = Mild depression, 10-14 = Moderate depression, 15-19 = Moderately severe depression, 20-27 = Severe depression      BLANCA 7 SCORE 2/11/2020 1/9/2020 12/19/2019 10/29/2019 9/17/2019 7/18/2019 6/20/2019   BLANCA-7 Total Score 3 17 13 13 14 18 13     Interpretation of BLANCA-7 score: 5-9 = mild anxiety, 10-14 = moderate anxiety, 15+ = severe anxiety. Recommend referral to behavioral health for scores 10 or greater.       Diagnosis:    MDD, Recurrent, Moderate  Stress      Plan:  Pt interventions:  [place]-setting to identify pt's primary goals for [facility] visit / overall health and Supportive techniques, assisted pt with problem solving strategies,  ACT interventions and reinforced pt's skill use,  treatment planning    Pt Behavioral Change Plan:  Pt set goals to 1) check in with  again 2) monitor mood after stopping Celexa 3) return for f/u in 4 months, sooner if needed

## 2020-02-12 ENCOUNTER — OFFICE VISIT (OUTPATIENT)
Dept: RHEUMATOLOGY | Age: 75
End: 2020-02-12
Payer: MEDICARE

## 2020-02-12 VITALS
WEIGHT: 151.01 LBS | BODY MASS INDEX: 29.65 KG/M2 | SYSTOLIC BLOOD PRESSURE: 120 MMHG | HEIGHT: 60 IN | DIASTOLIC BLOOD PRESSURE: 76 MMHG

## 2020-02-12 PROCEDURE — G8399 PT W/DXA RESULTS DOCUMENT: HCPCS | Performed by: INTERNAL MEDICINE

## 2020-02-12 PROCEDURE — G8417 CALC BMI ABV UP PARAM F/U: HCPCS | Performed by: INTERNAL MEDICINE

## 2020-02-12 PROCEDURE — 1036F TOBACCO NON-USER: CPT | Performed by: INTERNAL MEDICINE

## 2020-02-12 PROCEDURE — G8482 FLU IMMUNIZE ORDER/ADMIN: HCPCS | Performed by: INTERNAL MEDICINE

## 2020-02-12 PROCEDURE — 1090F PRES/ABSN URINE INCON ASSESS: CPT | Performed by: INTERNAL MEDICINE

## 2020-02-12 PROCEDURE — G8427 DOCREV CUR MEDS BY ELIG CLIN: HCPCS | Performed by: INTERNAL MEDICINE

## 2020-02-12 PROCEDURE — 1123F ACP DISCUSS/DSCN MKR DOCD: CPT | Performed by: INTERNAL MEDICINE

## 2020-02-12 PROCEDURE — 99214 OFFICE O/P EST MOD 30 MIN: CPT | Performed by: INTERNAL MEDICINE

## 2020-02-12 PROCEDURE — 4040F PNEUMOC VAC/ADMIN/RCVD: CPT | Performed by: INTERNAL MEDICINE

## 2020-02-12 RX ORDER — PREDNISONE 2.5 MG
2.5 TABLET ORAL DAILY
Qty: 90 TABLET | Refills: 0 | Status: SHIPPED | OUTPATIENT
Start: 2020-02-12 | End: 2020-06-10 | Stop reason: CLARIF

## 2020-02-12 NOTE — PROGRESS NOTES
[address] [place] [address] Ln (w) [phone] (F)      Dear Dr. Hugo Avalos MD:  Please find Rheumatology assessment. Thank you for giving me the opportunity to be involved in Filemon Villafana care and I look forward following Jay Whalen along with you. If you have any questions or concerns please feel free to reach me. Note is transcribed using voice recognition software. Inadvertent computerized transcription errors may be present. Patient identification: Galileo Blair,: ,66 y.o. Sex: female     A/P  Jay Whalen was seen today for joint pain. Diagnoses and all orders for this visit:    High risk medication use  -     Hepatic Function Panel; Future  -     CBC Auto Differential; Future    ILD (interstitial lung disease) (HCC)    Connective tissue disease overlap syndrome (HCC)    Postmenopausal  -     DEXA BONE DENSITY 2 SITES; Future    Other orders  -     predniSONE (DELTASONE) 2.5 MG tablet; Take 1 tablet by mouth daily Take 1 tab daily for 4 weeks. Cutaneous lupus + Sjogren's overlap with ILD -positive RBEECCA, SSA, sicca symptoms and intermittent photosensitive rash. Myositis panel, RF, CCP, anti-Tawnya negative. She remains asymptomatic. No complaints or concerns today. Limited physical activity from chronic back pain and arthritis. No cough or shortness of breath. PFT showed improvement in DLCO from 39 to 44%. Lung function have improved. Plan-  Check LFT and CBC today. Stay on current dose of Plaquenil 400 mg a day, Imuran 100 mg a day, reduce prednisone 7.5 mg a day. If she remains asymptomatic, can cut back on prednisone 5 mg a day in a month.       Prolonged Barretts/no H-pylori    UPPER GASTROINTESTINAL ENDOSCOPY  2017    Dr. Bea Garcias - gastritis and duodinitis , neg H pylori     UPPER GASTROINTESTINAL ENDOSCOPY N/A 2019    EGD DILATION SAVORY 17mm/ 46 F performed by Nicholas Patrick MD at [address] History     Socioeconomic History    Marital status:      Spouse name: Not on file    Number of children: 1    Years of education: 15    Highest education level: Not on file   Occupational History    Occupation: Retired   Social Needs    Financial resource strain: Not on file    Food insecurity:     Worry: Not on file     Inability: Not on file   Site Intelligence needs:     Medical: Not on file     Non-medical: Not on file   Tobacco Use    Smoking status: Former Smoker     Packs/day: 0.50     Years: 30.00     Pack years: 15.00     Types: Cigarettes     Start date:      Last attempt to quit: 2014     Years since quittin.3    Smokeless tobacco: Never Used   Substance and Sexual Activity    Alcohol use:  Yes     Alcohol/week: 0.0 standard drinks     Comment: 2 cocktails on weekends    Drug use: No    Sexual activity: Not Currently   Lifestyle    Physical activity:     Days per week: Not on file     Minutes per session: Not on file    Stress: Not on file   Relationships    Social connections:     Talks on phone: Not on file     Gets together: Not on file     Attends Faith service: Not on file     Active member of club or organization: Not on file     Attends meetings of clubs or organizations: Not on file     Relationship status: Not on file    Intimate partner violence:     Fear of current or ex partner: Not on file     Emotionally abused: Not on file     Physically abused: Not on file     Forced sexual activity: Not on file   Other Topics Concern    Not on file   Social History Narrative    Not on file       No family history of autoimmune diseases    Current Outpatient Medications   Medication Sig Dispense Refill    predniSONE (DELTASONE) 2.5 MG tablet Take 1 tablet by mouth daily Take 1 tab daily for 4 weeks. 90 tablet 0    buPROPion (WELLBUTRIN XL) 300 MG extended release tablet TAKE 1 TABLET BY MOUTH EVERY MORNING 90 tablet 1    ondansetron (ZOFRAN) 4 MG tablet Take 1 tablet by mouth every 12 hours as needed for Nausea or Vomiting 30 tablet 0    hydroxychloroquine (PLAQUENIL) 200 MG tablet TAKE ONE TABLET BY MOUTH TWICE DAILY 100 tablet 3    Cholecalciferol (VITAMIN D3) 1.25 MG (48967 UT) CAPS TAKE 1 CAPSULE BY MOUTH EVERY OTHER WEEK 4 capsule 2    predniSONE (DELTASONE) 5 MG tablet Take 3 tablets by mouth daily for 14 days, THEN 2 tablets daily. TAKE 2 TABLETS BY MOUTH DAILY. 90 tablet 3    carvedilol (COREG) 12.5 MG tablet Take 0.5 tablets by mouth 2 times daily (with meals) 60 tablet 3    docusate (COLACE, DULCOLAX) 100 MG CAPS Take 100 mg by mouth 2 times daily 60 capsule 3    azaTHIOprine (IMURAN) 50 MG tablet Take 2 tab po daily 180 tablet 0    clopidogrel (PLAVIX) 75 MG tablet TAKE 1 TABLET BY MOUTH DAILY 30 tablet 2    pantoprazole (PROTONIX) 20 MG tablet TAKE 1 TABLET BY MOUTH TWICE DAILY 60 tablet 2    rosuvastatin (CRESTOR) 20 MG tablet Take 1 tablet by mouth daily 30 tablet 5    clobetasol (TEMOVATE) 0.05 % external solution Apply topically 2 times daily Apply topically 2 times daily.  methylcellulose (CITRUCEL) oral powder Take 2 g by mouth every other day       Handicap Placard MISC by Does not apply route Valid for 5 years from 11/7/18 1 each 0    Nutritional Supplements (ENSURE ACTIVE HIGH PROTEIN PO) Take 1 Can by mouth daily as needed      acetaminophen 650 MG TABS Take 650 mg by mouth every 6 hours as needed for Pain or Fever (Fever >100.5 F (38 C)). 60 tablet 1     No current facility-administered medications for this visit.       Allergies   Allergen Reactions    Medrol [Methylprednisolone]      multiple side effects , able to tolerate prednisone without side Review:     PFT 1/21/2020  Diffusion capacity is 8.29, which is 45% of predicted.     IMPRESSION:  1. Normal spirometry. 2.  Normal lung volumes. 3.  Moderate decrease in diffusion capacity. 4.  When compared to previous pulmonary function test dated 10/11/2019,  there is a significant improvement in spirometry and lung volumes. Diffusion capacity remains relatively unchanged.         CT chest 9/19/2019-  Impression       1.  Bilateral subpleural reticular opacities and traction bronchiectasis and developing subpleural honeycombing compatible with mild to moderate pulmonary fibrosis with progression from comparison chest CT. 2.  Clustered nodular opacities in the right upper lobe may be inflammatory/infectious but nonspecific. Following the Fleischner Society 2017 guidelines, if patient is low risk no routine follow-up is suggested unless suspicious morphology or upper lobe    location. If patient is high risk, then optional CT at 12 months is suggested.  qzxv   3.  Unchanged severe T9 and T11 vertebral compression fractures with retropulsion. PFT 10/11/2019      Pulse ox is 94 % on room air    Spirometry data:    FEV1/FVC: 78. Predicted ratio 75    FEV1 1.07 L, which is 65 % predicted    FVC is 1.38 L, which is 62 % predicted  Lung Volumes:    TLC (by Plethysmography) is 3.21 L, which is 73 % predicted    RV is 1.35 L which is 64 % predicted    Diffusion Capacity:    DLCO is 7.55 which is 39 % predicted  Impression:    1. There is no obstruction present    2. There is mild restriction with TLC of 73% predicted    4. There is severe reduction in diffusion capacity    Comment:   The presence of restriction and reduction of diffusion capacity   is a new in comparison with the PFT she had on 11/3/2017  PFT findings are suggestive of progression of ILD.  Clinical   correlation is recommended. A/P- See above.

## 2020-02-17 RX ORDER — ONDANSETRON 4 MG/1
4 TABLET, FILM COATED ORAL EVERY 12 HOURS PRN
Qty: 30 TABLET | Refills: 3 | Status: SHIPPED | OUTPATIENT
Start: 2020-02-17

## 2020-02-24 ENCOUNTER — HOSPITAL ENCOUNTER (OUTPATIENT)
Age: 75
Discharge: HOME | End: 2020-02-24
Payer: MEDICARE

## 2020-02-24 ENCOUNTER — TELEPHONE (OUTPATIENT)
Dept: INTERNAL MEDICINE CLINIC | Age: 75
End: 2020-02-24

## 2020-02-24 ENCOUNTER — HOSPITAL ENCOUNTER (OUTPATIENT)
Dept: GENERAL RADIOLOGY | Age: 75
Discharge: HOME | End: 2020-02-24
Payer: MEDICARE

## 2020-02-24 ENCOUNTER — OFFICE VISIT (OUTPATIENT)
Dept: INTERNAL MEDICINE CLINIC | Age: 75
End: 2020-02-24
Payer: MEDICARE

## 2020-02-24 VITALS
SYSTOLIC BLOOD PRESSURE: 120 MMHG | BODY MASS INDEX: 30.86 KG/M2 | HEART RATE: 77 BPM | OXYGEN SATURATION: 97 % | WEIGHT: 158 LBS | DIASTOLIC BLOOD PRESSURE: 80 MMHG

## 2020-02-24 DIAGNOSIS — R74.8 ELEVATED LIPASE: ICD-10-CM

## 2020-02-24 DIAGNOSIS — R06.02 SOB (SHORTNESS OF BREATH): ICD-10-CM

## 2020-02-24 DIAGNOSIS — E87.1 HYPONATREMIA: ICD-10-CM

## 2020-02-24 LAB
ALBUMIN SERPL-MCNC: 4.2 G/DL (ref 3.4–5)
ALP BLD-CCNC: 124 U/L (ref 40–129)
ALT SERPL-CCNC: 33 U/L (ref 10–40)
ANION GAP SERPL CALCULATED.3IONS-SCNC: 14 MMOL/L (ref 3–16)
AST SERPL-CCNC: 34 U/L (ref 15–37)
BILIRUB SERPL-MCNC: <0.2 MG/DL (ref 0–1)
BILIRUBIN DIRECT: <0.2 MG/DL (ref 0–0.3)
BILIRUBIN, INDIRECT: NORMAL MG/DL (ref 0–1)
BUN BLDV-MCNC: 20 MG/DL (ref 7–20)
CALCIUM SERPL-MCNC: 10 MG/DL (ref 8.3–10.6)
CHLORIDE BLD-SCNC: 102 MMOL/L (ref 99–110)
CO2: 27 MMOL/L (ref 21–32)
CREAT SERPL-MCNC: 1 MG/DL (ref 0.6–1.2)
GFR AFRICAN AMERICAN: >60
GFR NON-AFRICAN AMERICAN: 54
GLUCOSE BLD-MCNC: 90 MG/DL (ref 70–99)
LIPASE: 17 U/L (ref 13–60)
POTASSIUM SERPL-SCNC: 4.2 MMOL/L (ref 3.5–5.1)
SODIUM BLD-SCNC: 143 MMOL/L (ref 136–145)
TOTAL PROTEIN: 6.9 G/DL (ref 6.4–8.2)

## 2020-02-24 PROCEDURE — G8482 FLU IMMUNIZE ORDER/ADMIN: HCPCS | Performed by: INTERNAL MEDICINE

## 2020-02-24 PROCEDURE — G8399 PT W/DXA RESULTS DOCUMENT: HCPCS | Performed by: INTERNAL MEDICINE

## 2020-02-24 PROCEDURE — 99213 OFFICE O/P EST LOW 20 MIN: CPT | Performed by: INTERNAL MEDICINE

## 2020-02-24 PROCEDURE — 4040F PNEUMOC VAC/ADMIN/RCVD: CPT | Performed by: INTERNAL MEDICINE

## 2020-02-24 PROCEDURE — 1090F PRES/ABSN URINE INCON ASSESS: CPT | Performed by: INTERNAL MEDICINE

## 2020-02-24 PROCEDURE — G8427 DOCREV CUR MEDS BY ELIG CLIN: HCPCS | Performed by: INTERNAL MEDICINE

## 2020-02-24 PROCEDURE — 1036F TOBACCO NON-USER: CPT | Performed by: INTERNAL MEDICINE

## 2020-02-24 PROCEDURE — 1123F ACP DISCUSS/DSCN MKR DOCD: CPT | Performed by: INTERNAL MEDICINE

## 2020-02-24 PROCEDURE — G8417 CALC BMI ABV UP PARAM F/U: HCPCS | Performed by: INTERNAL MEDICINE

## 2020-02-24 PROCEDURE — 71046 X-RAY EXAM CHEST 2 VIEWS: CPT

## 2020-02-24 RX ORDER — FUROSEMIDE 20 MG/1
20 TABLET ORAL EVERY OTHER DAY
COMMUNITY
End: 2020-04-09 | Stop reason: ALTCHOICE

## 2020-02-24 RX ORDER — CARVEDILOL 6.25 MG/1
6.25 TABLET ORAL 2 TIMES DAILY
Qty: 180 TABLET | Refills: 2 | Status: SHIPPED | OUTPATIENT
Start: 2020-02-24 | End: 2020-11-16

## 2020-02-24 ASSESSMENT — ENCOUNTER SYMPTOMS
SHORTNESS OF BREATH: 1
VOMITING: 0
NAUSEA: 0
CHEST TIGHTNESS: 0
ABDOMINAL PAIN: 0
COUGH: 0

## 2020-02-24 NOTE — TELEPHONE ENCOUNTER
Dilia Tim called stating patient weight this morning is 157 lbs. He has been giving her lasix every other day. The nurse is coming in this morning about 9 am.   Dr. Mine Brown  dropped her steroids from 10 mg to 7.5 mg, he thinks this could be related to her running out of breath while to him. Please call to advise what to do? Do you need to see her.

## 2020-02-24 NOTE — PATIENT INSTRUCTIONS
Please call the office (and ask to see me) or be seen by other provider for any worsening or lack of improvement of the symptoms within a few days / weeks.

## 2020-02-24 NOTE — TELEPHONE ENCOUNTER
Bertha with [facility] called stating that patient lungs are very congested and she is short of breath just talking to her. Do you want to change her on the lasix or prednisone?       Please call to advise

## 2020-02-24 NOTE — TELEPHONE ENCOUNTER
S/W RN offered appt with Dr Sharon Lyons today at 3 pm. If Gayatri Prater cannot get her here today he will need to take her to ED. RN will call caregiver and ask him to call to confirm appt or if he will be taking her to ER.

## 2020-02-24 NOTE — TELEPHONE ENCOUNTER
Spoke with patient caregiver and stated he will bring patient in at 3pm today with Dr. Farooq Lam. Patient scheduled and Sindhu advised.

## 2020-02-24 NOTE — PROGRESS NOTES
Outpatient Note for established Patient - regular Visit    History Obtained From:  patient, electronic medical record  Is the patient new to me ? - No    HISTORY OF PRESENT ILLNESS:   The patient is a 66 y.o. female who is here today for :  Riki Donato was seen today for shortness of breath and weight gain. Diagnoses and all orders for this visit:    SOB (shortness of breath)  -     XR CHEST STANDARD (2 VW); Future  -     BASIC METABOLIC PANEL; Future  -     HEPATIC FUNCTION PANEL; Future    Hyponatremia  -     BASIC METABOLIC PANEL; Future    Elevated lipase  -     LIPASE; Future         Pt is c/o SOB started 2 nights ago. Pt currently not feeling SOB. SOB is intermittent. She is on Steroids by the rheumatologist for Sjorgen/ Lupus. No CP. She did gain weight in the past week. Infrequent cough. She was not tot getting her lasix by her care giver. She gained 7 lbs   She ,aintains low salt diet   Last Na 134 2/11/2020     Past Medical History:        Diagnosis Date    Anxiety     CAD (coronary artery disease)     CHF (congestive heart failure) (HCC)     Depression     DVT of lower extremity (deep venous thrombosis) (HCC)     Falls     Hypertension     Lupus (HCC)     TIA (transient ischemic attack)     x3    Urinary incontinence     UTI (urinary tract infection)        Social History:   TOBACCO:   reports that she quit smoking about 5 years ago. Her smoking use included cigarettes. She started smoking about 55 years ago. She has a 15.00 pack-year smoking history. She has never used smokeless tobacco.  ETOH:   reports current alcohol use. Current Medications:    Prior to Admission medications    Medication Sig Start Date End Date Taking?  Authorizing Provider   furosemide (LASIX) 20 MG tablet Take 20 mg by mouth every other day   Yes Historical Provider, MD   ondansetron (ZOFRAN) 4 MG tablet TAKE 1 TABLET BY MOUTH EVERY 12 HOURS AS NEEDED FOR NAUSEA OR VOMITING 2/17/20  Yes Thalia Dawson, MD   predniSONE (DELTASONE) 2.5 MG tablet Take 1 tablet by mouth daily Take 1 tab daily for 4 weeks. 2/12/20  Yes Malick Cabrera MD   buPROPion (WELLBUTRIN XL) 300 MG extended release tablet TAKE 1 TABLET BY MOUTH EVERY MORNING 2/11/20  Yes Juan José Forman MD   hydroxychloroquine (PLAQUENIL) 200 MG tablet TAKE ONE TABLET BY MOUTH TWICE DAILY 1/13/20  Yes Malick Cabrera MD   Cholecalciferol (VITAMIN D3) 1.25 MG (61606 UT) CAPS TAKE 1 CAPSULE BY MOUTH EVERY OTHER WEEK 12/23/19  Yes Richard Sims MD   predniSONE (DELTASONE) 5 MG tablet Take 3 tablets by mouth daily for 14 days, THEN 2 tablets daily. TAKE 2 TABLETS BY MOUTH DAILY. 12/19/19 1/1/21 Yes Richard Sims MD   carvedilol (COREG) 12.5 MG tablet Take 0.5 tablets by mouth 2 times daily (with meals) 12/3/19  Yes Najma Casillas DO   docusate (COLACE, DULCOLAX) 100 MG CAPS Take 100 mg by mouth 2 times daily 12/2/19  Yes Najma Casillas DO   azaTHIOprine (IMURAN) 50 MG tablet Take 2 tab po daily 11/20/19  Yes Malick Cabrera MD   clopidogrel (PLAVIX) 75 MG tablet TAKE 1 TABLET BY MOUTH DAILY 11/19/19  Yes Richard Sims MD   pantoprazole (PROTONIX) 20 MG tablet TAKE 1 TABLET BY MOUTH TWICE DAILY 10/22/19  Yes Richard Sims MD   rosuvastatin (CRESTOR) 20 MG tablet Take 1 tablet by mouth daily 10/14/19  Yes Richard Sims MD   clobetasol (TEMOVATE) 0.05 % external solution Apply topically 2 times daily Apply topically 2 times daily. Yes Historical Provider, MD   methylcellulose (CITRUCEL) oral powder Take 2 g by mouth every other day    Yes Historical Provider, MD   Handicap Placard [address] [facility] by Does not apply route Valid for 5 years from 11/7/18 11/7/18  Yes Richard Sims MD   Nutritional Supplements (ENSURE ACTIVE HIGH PROTEIN PO) Take 1 Can by mouth daily as needed   Yes Historical Provider, MD   acetaminophen 650 MG TABS Take 650 mg by mouth every 6 hours as needed for Pain or Fever (Fever >100.5 F (38 C)).  12/9/13  Yes submental or submandibular adenopathy. Cervical: No cervical adenopathy. Right cervical: No superficial or deep cervical adenopathy. Left cervical: No superficial or deep cervical adenopathy. Skin:     Findings: No rash. Neurological:      Mental Status: She is alert and oriented to person, place, and time. GCS: GCS eye subscore is 4. GCS verbal subscore is 5. GCS motor subscore is 6. Motor: No abnormal muscle tone. Deep Tendon Reflexes: Reflexes are normal and symmetric. Psychiatric:         Behavior: Behavior normal.         Thought Content: Thought content normal.        Assessment/Plan:   Jay Whalen was seen today for shortness of breath and weight gain. Diagnoses and all orders for this visit:    SOB (shortness of breath)  We will do chest x-ray. Patient's caregiver will give it was giving her Lasix less than instructed so I reiterated the need to give Lasix on a daily basis  Recheck labs also and patient will let me know if soon if she does not feel better  -     XR CHEST STANDARD (2 VW); Future  -     BASIC METABOLIC PANEL; Future  -     HEPATIC FUNCTION PANEL; Future    Hyponatremia  -     BASIC METABOLIC PANEL; Future    Elevated lipase  -     LIPASE; Future      - Patient was encouraged to call the office (and ask to see me) or be seen by other provider for any worsening or lack of improvement of the symptoms within a few days / weeks. - Pt was asked toschedule an appointment in 1 months, and to let me know of any scheduling difficulties. Additional patients instructions (if given):   Patient Instructions   Please call the office (and ask to see me) or be seen by other provider for any worsening or lack of improvement of the symptoms within a few days / weeks.             Hugo Avalos M.D.   2/24/2020, 3:22 PM

## 2020-02-25 ENCOUNTER — TELEPHONE (OUTPATIENT)
Dept: INTERNAL MEDICINE CLINIC | Age: 75
End: 2020-02-25

## 2020-02-26 ENCOUNTER — TELEPHONE (OUTPATIENT)
Dept: INTERNAL MEDICINE CLINIC | Age: 75
End: 2020-02-26

## 2020-02-26 NOTE — TELEPHONE ENCOUNTER
Notes recorded by Christina Doe MD on 2/25/2020 at 10:41 AM EST  Kidney function, sodium liver function and lipase normal.  Chest x-ray with no acute findings on the lungs. She does have compression fractures on her vertebra.     Plan-  1) keep Lasix daily and call me in a few days if symptoms are not better  2) refer patient to spine specialist for her vertebral fractures

## 2020-02-27 RX ORDER — PANTOPRAZOLE SODIUM 20 MG/1
TABLET, DELAYED RELEASE ORAL
Qty: 60 TABLET | Refills: 2 | Status: SHIPPED | OUTPATIENT
Start: 2020-02-27 | End: 2020-05-27

## 2020-02-28 ENCOUNTER — TELEPHONE (OUTPATIENT)
Dept: INTERNAL MEDICINE CLINIC | Age: 75
End: 2020-02-28

## 2020-02-28 NOTE — TELEPHONE ENCOUNTER
Pt went from 152 to 155 on wed night and come thrus. morning pt woke up weighting 155 and this was all from taking the furosemide (LASIX) 20 MG tablet.  This is a update, pt c/o starchy throat not sore pls advise        [address]

## 2020-03-02 ENCOUNTER — TELEPHONE (OUTPATIENT)
Dept: RHEUMATOLOGY | Age: 75
End: 2020-03-02

## 2020-03-03 ENCOUNTER — TELEPHONE (OUTPATIENT)
Dept: INTERNAL MEDICINE CLINIC | Age: 75
End: 2020-03-03

## 2020-03-03 NOTE — TELEPHONE ENCOUNTER
Red Elkins called to let you know that  Dr. Jonas Perez raised her prednisone to 10 mg  until she see her again in April. He states her blood pressure is all over the place but other than that she is doing good. Please call to advise.

## 2020-03-09 ENCOUNTER — TELEPHONE (OUTPATIENT)
Dept: INTERNAL MEDICINE CLINIC | Age: 75
End: 2020-03-09

## 2020-03-09 NOTE — TELEPHONE ENCOUNTER
Michael Lutz called and wanted to know if it is ok for him to stop the nurse until further notice, until this virus straightens it self out or do, or do you have any other suggestions? If this is ok with you, there is no need to call back.      Thanks

## 2020-03-10 NOTE — TELEPHONE ENCOUNTER
Trish Herron, patient's caregiver, deciding to still cancel having the nurse come out due to her having other sick patients that may have possible coronavirus. Stated he will cancel patient's appointment with Dr. Elizabeth Cornell for 03/11/20 because he does not want to bring patient into the office. He will reschedule for a later time.

## 2020-03-10 NOTE — TELEPHONE ENCOUNTER
LVM advising to keep the nurse coming out for visits, unless told otherwise. Advised to return call if concerns or questions arise.

## 2020-03-18 ENCOUNTER — TELEPHONE (OUTPATIENT)
Dept: INTERNAL MEDICINE CLINIC | Age: 75
End: 2020-03-18

## 2020-03-18 NOTE — TELEPHONE ENCOUNTER
Dr. Anne Marie Lindsey recommendation is pt can make own choices and please relay message to pt of recommendation

## 2020-03-18 NOTE — TELEPHONE ENCOUNTER
Spoke to [employer]. The day after she takes the lasix is when is changes, he stated he does not want to stop it and he is ok with every other day. Stated she is not having any other symptoms beside the bp going low. She is also taking OTC medications for headache due to his maple trees blooming and its allergies,     Today bp was 128/104 at 1pm and 15 min later 90/74 that was after making a lap. Did not take lasix today.

## 2020-03-24 ENCOUNTER — TELEPHONE (OUTPATIENT)
Dept: PSYCHOLOGY | Age: 75
End: 2020-03-24

## 2020-03-24 RX ORDER — AZATHIOPRINE 50 MG/1
TABLET ORAL
Qty: 180 TABLET | Refills: 0 | Status: SHIPPED | OUTPATIENT
Start: 2020-03-24 | End: 2020-06-23 | Stop reason: SDUPTHER

## 2020-03-24 NOTE — TELEPHONE ENCOUNTER
Javy Pt care giver stated the following  Pt best friend in Hospice  Pt is upset about not being there for friend. Gayatri Prater pt care giver is concerned that pt is becoming more and more depressed.   Requesting a call back

## 2020-03-25 ENCOUNTER — TELEPHONE (OUTPATIENT)
Dept: INTERNAL MEDICINE CLINIC | Age: 75
End: 2020-03-25

## 2020-03-25 NOTE — TELEPHONE ENCOUNTER
Talita with [facility] called stating they need a verbal to start skilled nursing services back up - per Red Elkins, the caregiver. Please call to advise if not answer please leave complete message .

## 2020-03-27 ENCOUNTER — TELEPHONE (OUTPATIENT)
Dept: PULMONOLOGY | Age: 75
End: 2020-03-27

## 2020-03-27 NOTE — TELEPHONE ENCOUNTER
Ginny Rai called to say that Caterina Preston has been having some SOB in the afternoons, especially when talking, Breathes are shallow. She has not taken any Lasix for 5 days and has had no weight gain. She does take antihistamine daily. She also takes prednisone, was on 40mg but has been taken down to 10mg daily. States she has no other symptoms and he has taken her temp 2x daily and it has been normal.  She had also been self quarantined. If you have questions Ginny Rai an be reached at [phone]  She uses [employer] [facility]	[place] on [address]. Please Advise.

## 2020-03-27 NOTE — TELEPHONE ENCOUNTER
These concerns deserve more time than a phone call and Rx order. Would suggest a virtual visit next week when we can get that set up.

## 2020-03-30 ENCOUNTER — TELEPHONE (OUTPATIENT)
Dept: INTERNAL MEDICINE CLINIC | Age: 75
End: 2020-03-30

## 2020-03-30 RX ORDER — HYDROXYCHLOROQUINE SULFATE 200 MG/1
TABLET, FILM COATED ORAL
Qty: 60 TABLET | Refills: 2 | Status: SHIPPED | OUTPATIENT
Start: 2020-03-30 | End: 2020-07-23

## 2020-03-30 NOTE — TELEPHONE ENCOUNTER
Tried to call barrett back and was told they would call me back.     But spoke to nicolasa see previous message and gave the verbal

## 2020-03-31 ENCOUNTER — TELEPHONE (OUTPATIENT)
Dept: INTERNAL MEDICINE CLINIC | Age: 75
End: 2020-03-31

## 2020-03-31 RX ORDER — CITALOPRAM 10 MG/1
10 TABLET ORAL DAILY
Qty: 30 TABLET | Refills: 5 | Status: SHIPPED | OUTPATIENT
Start: 2020-03-31 | End: 2020-04-17 | Stop reason: ALTCHOICE

## 2020-03-31 NOTE — TELEPHONE ENCOUNTER
I spoke to patients Caregiver which requested that Sofie Hazel get back on the Celexa 10mg. Her mood and depression is acting up again, especially with all that's going on. Please advise.
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
## 2020-03-31 NOTE — TELEPHONE ENCOUNTER
Care giver stated he is going to start pt on a medication (he couldn spell the med) because pt needs it. pls give him a call he stated so he can explain pls advise

## 2020-04-01 NOTE — TELEPHONE ENCOUNTER
I have no new recommendations for medical therapy. Continue current dose of prednisone. It is not clear there is a new pulmonary problem, the issue is continuing to deal with her chronic problems.

## 2020-04-07 ENCOUNTER — TELEPHONE (OUTPATIENT)
Dept: INTERNAL MEDICINE CLINIC | Age: 75
End: 2020-04-07

## 2020-04-07 NOTE — TELEPHONE ENCOUNTER
Leidy Arceo called wanting to know if patient is able to take antihistamine, since she started some new medication? Please call to advise.

## 2020-04-08 ENCOUNTER — TELEPHONE (OUTPATIENT)
Dept: INTERNAL MEDICINE CLINIC | Age: 75
End: 2020-04-08

## 2020-04-08 NOTE — TELEPHONE ENCOUNTER
Non-Urgent Medical Question           Weird day with blood pressure steady 92/64 p59 not a lot of movement  Average last 7days 166/74 68

## 2020-04-09 ENCOUNTER — VIRTUAL VISIT (OUTPATIENT)
Dept: INTERNAL MEDICINE CLINIC | Age: 75
End: 2020-04-09
Payer: MEDICARE

## 2020-04-09 VITALS
SYSTOLIC BLOOD PRESSURE: 80 MMHG | DIASTOLIC BLOOD PRESSURE: 61 MMHG | HEIGHT: 60 IN | WEIGHT: 156 LBS | BODY MASS INDEX: 30.63 KG/M2

## 2020-04-09 PROCEDURE — 99212 OFFICE O/P EST SF 10 MIN: CPT | Performed by: INTERNAL MEDICINE

## 2020-04-09 RX ORDER — AMOXICILLIN AND CLAVULANATE POTASSIUM 875; 125 MG/1; MG/1
1 TABLET, FILM COATED ORAL 2 TIMES DAILY
Qty: 10 TABLET | Refills: 0 | Status: SHIPPED | OUTPATIENT
Start: 2020-04-09 | End: 2020-04-14

## 2020-04-09 ASSESSMENT — ENCOUNTER SYMPTOMS
VOMITING: 0
CHEST TIGHTNESS: 0
ABDOMINAL PAIN: 0
NAUSEA: 0
SHORTNESS OF BREATH: 0

## 2020-04-10 ENCOUNTER — TELEPHONE (OUTPATIENT)
Dept: INTERNAL MEDICINE CLINIC | Age: 75
End: 2020-04-10

## 2020-04-13 ENCOUNTER — TELEPHONE (OUTPATIENT)
Dept: INTERNAL MEDICINE CLINIC | Age: 75
End: 2020-04-13

## 2020-04-15 ENCOUNTER — TELEPHONE (OUTPATIENT)
Dept: INTERNAL MEDICINE CLINIC | Age: 75
End: 2020-04-15

## 2020-04-15 NOTE — TELEPHONE ENCOUNTER
fyi- Pt has gone 2 days without a migraine. .. antibiotics are working. Also looking for lab results. . requesting a call back regarding this matter.  pls advise

## 2020-04-17 ENCOUNTER — FOLLOWUP TELEPHONE ENCOUNTER (OUTPATIENT)
Dept: NEPHROLOGY | Age: 75
End: 2020-04-17

## 2020-04-17 ENCOUNTER — TELEPHONE (OUTPATIENT)
Dept: INTERNAL MEDICINE CLINIC | Age: 75
End: 2020-04-17

## 2020-04-20 DIAGNOSIS — E22.2 SIADH (SYNDROME OF INAPPROPRIATE ADH PRODUCTION) (HCC): ICD-10-CM

## 2020-04-20 DIAGNOSIS — Z79.899 HIGH RISK MEDICATION USE: ICD-10-CM

## 2020-04-20 LAB
A/G RATIO: 1.2 (ref 1.1–2.2)
ALBUMIN SERPL-MCNC: 3.3 G/DL (ref 3.4–5)
ALP BLD-CCNC: 91 U/L (ref 40–129)
ALT SERPL-CCNC: 41 U/L (ref 10–40)
ANION GAP SERPL CALCULATED.3IONS-SCNC: 18 MMOL/L (ref 3–16)
AST SERPL-CCNC: 37 U/L (ref 15–37)
BASOPHILS ABSOLUTE: 0 K/UL (ref 0–0.2)
BASOPHILS RELATIVE PERCENT: 0.6 %
BILIRUB SERPL-MCNC: 0.3 MG/DL (ref 0–1)
BILIRUBIN DIRECT: <0.2 MG/DL (ref 0–0.3)
BILIRUBIN, INDIRECT: ABNORMAL MG/DL (ref 0–1)
BUN BLDV-MCNC: 20 MG/DL (ref 7–20)
CALCIUM SERPL-MCNC: 9.7 MG/DL (ref 8.3–10.6)
CHLORIDE BLD-SCNC: 95 MMOL/L (ref 99–110)
CO2: 25 MMOL/L (ref 21–32)
CREAT SERPL-MCNC: 0.9 MG/DL (ref 0.6–1.2)
EOSINOPHILS ABSOLUTE: 0 K/UL (ref 0–0.6)
EOSINOPHILS RELATIVE PERCENT: 0 %
GFR AFRICAN AMERICAN: >60
GFR NON-AFRICAN AMERICAN: >60
GLOBULIN: 2.7 G/DL
GLUCOSE BLD-MCNC: 90 MG/DL (ref 70–99)
HCT VFR BLD CALC: 36 % (ref 36–48)
HEMOGLOBIN: 12 G/DL (ref 12–16)
LYMPHOCYTES ABSOLUTE: 1 K/UL (ref 1–5.1)
LYMPHOCYTES RELATIVE PERCENT: 13.7 %
MCH RBC QN AUTO: 32.6 PG (ref 26–34)
MCHC RBC AUTO-ENTMCNC: 33.4 G/DL (ref 31–36)
MCV RBC AUTO: 97.5 FL (ref 80–100)
MONOCYTES ABSOLUTE: 0.7 K/UL (ref 0–1.3)
MONOCYTES RELATIVE PERCENT: 9.6 %
NEUTROPHILS ABSOLUTE: 5.4 K/UL (ref 1.7–7.7)
NEUTROPHILS RELATIVE PERCENT: 76.1 %
PDW BLD-RTO: 14 % (ref 12.4–15.4)
PLATELET # BLD: 414 K/UL (ref 135–450)
PMV BLD AUTO: 6.2 FL (ref 5–10.5)
POTASSIUM SERPL-SCNC: 4.3 MMOL/L (ref 3.5–5.1)
RBC # BLD: 3.7 M/UL (ref 4–5.2)
SODIUM BLD-SCNC: 138 MMOL/L (ref 136–145)
TOTAL PROTEIN: 6 G/DL (ref 6.4–8.2)
WBC # BLD: 7.1 K/UL (ref 4–11)

## 2020-04-21 ENCOUNTER — PATIENT MESSAGE (OUTPATIENT)
Dept: INTERNAL MEDICINE CLINIC | Age: 75
End: 2020-04-21

## 2020-04-22 ENCOUNTER — TELEMEDICINE (OUTPATIENT)
Dept: RHEUMATOLOGY | Age: 75
End: 2020-04-22
Payer: MEDICARE

## 2020-04-22 ENCOUNTER — TELEPHONE (OUTPATIENT)
Dept: INTERNAL MEDICINE CLINIC | Age: 75
End: 2020-04-22

## 2020-04-22 PROCEDURE — 1090F PRES/ABSN URINE INCON ASSESS: CPT | Performed by: INTERNAL MEDICINE

## 2020-04-22 PROCEDURE — 99214 OFFICE O/P EST MOD 30 MIN: CPT | Performed by: INTERNAL MEDICINE

## 2020-04-22 PROCEDURE — G8399 PT W/DXA RESULTS DOCUMENT: HCPCS | Performed by: INTERNAL MEDICINE

## 2020-04-22 PROCEDURE — G8428 CUR MEDS NOT DOCUMENT: HCPCS | Performed by: INTERNAL MEDICINE

## 2020-04-22 PROCEDURE — 4040F PNEUMOC VAC/ADMIN/RCVD: CPT | Performed by: INTERNAL MEDICINE

## 2020-04-22 PROCEDURE — 1123F ACP DISCUSS/DSCN MKR DOCD: CPT | Performed by: INTERNAL MEDICINE

## 2020-04-22 NOTE — PROGRESS NOTES
2020    TELEHEALTH EVALUATION -- Audio/Visual (During BSQBC-94 public health emergency)    HPI:    Charlie Naranjo (:  1945) has requested an audio/video evaluation for the following concern(s):  Follow-up for Sjogren's syndrome, cutaneous lupus and ILD. She also has osteopenia on long-term prednisone use. States that her shortness of breath got worse, contacted Dr. Daphne Esparza who increased prednisone to 10 mg a day, continues to take Imuran and Plaquenil. Breathing stable on current regimen. She is still pretty short of breath, has very limited exercise tolerance, gets shortness of breath even with walking and talking, at times has trouble in completing a sentence. She is able to tolerate medications well. Denies any GI side effects, rashes or intercurrent infections. No active manifestations of lupus otherwise. All other review of systems are negative. Review of Systems    Prior to Visit Medications    Medication Sig Taking? Authorizing Provider   hydroxychloroquine (PLAQUENIL) 200 MG tablet TAKE ONE TABLET BY MOUTH TWICE DAILY  Bhumi Bernal MD   azaTHIOprine (IMURAN) 50 MG tablet TAKE 2 TABLETS BY MOUTH DAILY  Bhumi Bernal MD   pantoprazole (PROTONIX) 20 MG tablet TAKE 1 TABLET BY MOUTH TWICE DAILY  Alvina Faye MD   carvedilol (COREG) 6.25 MG tablet Take 1 tablet by mouth 2 times daily  Alvina Faye MD   ondansetron (ZOFRAN) 4 MG tablet TAKE 1 TABLET BY MOUTH EVERY 12 HOURS AS NEEDED FOR NAUSEA OR VOMITING  Deion Johns MD   predniSONE (DELTASONE) 2.5 MG tablet Take 1 tablet by mouth daily Take 1 tab daily for 4 weeks.   Bhumi Bernal MD   buPROPion (WELLBUTRIN XL) 300 MG extended release tablet TAKE 1 TABLET BY MOUTH EVERY MORNING  Ney Rangel MD   Cholecalciferol (VITAMIN D3) 1.25 MG (65300 UT) CAPS TAKE 1 CAPSULE BY MOUTH EVERY OTHER WEEK  Robbi Johns MD   predniSONE (DELTASONE) 5 MG tablet Take 3 tablets by mouth daily for 14 days, THEN visit.  Eyes:  EOM    []  Normal  [] Abnormal-  Sclera  []  Normal  [] Abnormal -         Discharge []  None visible  [] Abnormal -    HENT:   [] Normocephalic, atraumatic. [] Abnormal   [] Mouth/Throat: Mucous membranes are moist.     External Ears [] Normal  [] Abnormal-     Neck: [] No visualized mass     Pulmonary/Chest: [] Respiratory effort normal.  [] No visualized signs of difficulty breathing or respiratory distress        [] Abnormal-      Musculoskeletal:   [x] Normal gait with no signs of ataxia         [x] Normal range of motion of neck        [] Abnormal-       Neurological:        [] No Facial Asymmetry (Cranial nerve 7 motor function) (limited exam to video visit)          [] No gaze palsy        [] Abnormal-         Skin:        [x] No significant exanthematous lesions or discoloration noted on facial skin         [] Abnormal-            Psychiatric:       [] Normal Affect [] No Hallucinations        [] Abnormal-     Other pertinent observable physical exam findings-     ASSESSMENT/PLAN:  David Hansen was seen today for follow-up. Diagnoses and all orders for this visit:    Connective tissue disease overlap syndrome ([facility] [address]    ILD (interstitial lung disease) ([facility] [address]    High risk medication use    Osteopenia of multiple sites      Cutaneous lupus + Sjogren's overlap with ILD -positive REBECCA, SSA, sicca symptoms and intermittent photosensitive rash. Myositis panel, RF, CCP, anti-Tawnya negative. No skin manifestations at this time. ILD remains stable, continues to have exertional shortness of breath even with mild exertion. She is taking 10 mg of prednisone, unable to taper because of worsening shortness of breath, azathioprine 100 mg a day and hydroxychloroquine 400 mg a day.   Labs reviewed, has borderline elevated liver function test.  PFT January 2020   showed improvement in DLCO from 39 to 44%.       Plan-  Above medical conditions are stable, continue current regimen including 10 mg of prednisone at this

## 2020-04-22 NOTE — TELEPHONE ENCOUNTER
Pt aide states when pt woke up this morning and he took her bp     141/73     Half hour later    118/100    Half hour later      105/69

## 2020-05-01 ENCOUNTER — TELEPHONE (OUTPATIENT)
Dept: INTERNAL MEDICINE CLINIC | Age: 75
End: 2020-05-01

## 2020-05-05 ENCOUNTER — TELEPHONE (OUTPATIENT)
Dept: INTERNAL MEDICINE CLINIC | Age: 75
End: 2020-05-05

## 2020-05-05 RX ORDER — ROSUVASTATIN CALCIUM 20 MG/1
20 TABLET, COATED ORAL DAILY
Qty: 30 TABLET | Refills: 5 | Status: SHIPPED | OUTPATIENT
Start: 2020-05-05 | End: 2020-11-02

## 2020-05-05 RX ORDER — CLOPIDOGREL BISULFATE 75 MG/1
TABLET ORAL
Qty: 30 TABLET | Refills: 2 | Status: SHIPPED | OUTPATIENT
Start: 2020-05-05 | End: 2020-08-07

## 2020-05-06 ENCOUNTER — TELEPHONE (OUTPATIENT)
Dept: INTERNAL MEDICINE CLINIC | Age: 75
End: 2020-05-06

## 2020-05-06 DIAGNOSIS — I10 ESSENTIAL HYPERTENSION, BENIGN: ICD-10-CM

## 2020-05-06 LAB
ANION GAP SERPL CALCULATED.3IONS-SCNC: 15 MMOL/L (ref 3–16)
BUN BLDV-MCNC: 22 MG/DL (ref 7–20)
CALCIUM SERPL-MCNC: 10 MG/DL (ref 8.3–10.6)
CHLORIDE BLD-SCNC: 102 MMOL/L (ref 99–110)
CO2: 21 MMOL/L (ref 21–32)
CREAT SERPL-MCNC: 0.8 MG/DL (ref 0.6–1.2)
GFR AFRICAN AMERICAN: >60
GFR NON-AFRICAN AMERICAN: >60
GLUCOSE BLD-MCNC: 109 MG/DL (ref 70–99)
POTASSIUM SERPL-SCNC: 4.3 MMOL/L (ref 3.5–5.1)
SODIUM BLD-SCNC: 138 MMOL/L (ref 136–145)

## 2020-05-06 NOTE — TELEPHONE ENCOUNTER
Home care is asking if the patient can be seen by a NP for her pre-op for cataract sx. They have one on site which would be easier but they want 's ok.  Please advise

## 2020-05-07 ENCOUNTER — OFFICE VISIT (OUTPATIENT)
Dept: INTERNAL MEDICINE CLINIC | Age: 75
End: 2020-05-07
Payer: MEDICARE

## 2020-05-07 ENCOUNTER — TELEPHONE (OUTPATIENT)
Dept: INTERNAL MEDICINE CLINIC | Age: 75
End: 2020-05-07

## 2020-05-07 VITALS
SYSTOLIC BLOOD PRESSURE: 126 MMHG | BODY MASS INDEX: 30.47 KG/M2 | DIASTOLIC BLOOD PRESSURE: 82 MMHG | HEIGHT: 60 IN | TEMPERATURE: 97.6 F

## 2020-05-07 PROCEDURE — 4040F PNEUMOC VAC/ADMIN/RCVD: CPT | Performed by: INTERNAL MEDICINE

## 2020-05-07 PROCEDURE — G8399 PT W/DXA RESULTS DOCUMENT: HCPCS | Performed by: INTERNAL MEDICINE

## 2020-05-07 PROCEDURE — 1090F PRES/ABSN URINE INCON ASSESS: CPT | Performed by: INTERNAL MEDICINE

## 2020-05-07 PROCEDURE — 1036F TOBACCO NON-USER: CPT | Performed by: INTERNAL MEDICINE

## 2020-05-07 PROCEDURE — 99213 OFFICE O/P EST LOW 20 MIN: CPT | Performed by: INTERNAL MEDICINE

## 2020-05-07 PROCEDURE — G8427 DOCREV CUR MEDS BY ELIG CLIN: HCPCS | Performed by: INTERNAL MEDICINE

## 2020-05-07 PROCEDURE — G8417 CALC BMI ABV UP PARAM F/U: HCPCS | Performed by: INTERNAL MEDICINE

## 2020-05-07 PROCEDURE — 1123F ACP DISCUSS/DSCN MKR DOCD: CPT | Performed by: INTERNAL MEDICINE

## 2020-05-07 ASSESSMENT — ENCOUNTER SYMPTOMS
DIARRHEA: 1
CHEST TIGHTNESS: 0
ABDOMINAL PAIN: 0
VOMITING: 1
SHORTNESS OF BREATH: 0
NAUSEA: 1

## 2020-05-07 NOTE — PATIENT INSTRUCTIONS
Please call the office (and ask to see me) or be seen by other provider for any worsening or lack of improvement of the symptoms within a few days. Patient Education        Diarrhea: Care Instructions  Your Care Instructions    Diarrhea is loose, watery stools (bowel movements). The exact cause is often hard to find. Sometimes diarrhea is your body's way of getting rid of what caused an upset stomach. Viruses, food poisoning, and many medicines can cause diarrhea. Some people get diarrhea in response to emotional stress, anxiety, or certain foods. Almost everyone has diarrhea now and then. It usually isn't serious, and your stools will return to normal soon. The important thing to do is replace the fluids you have lost, so you can prevent dehydration. The doctor has checked you carefully, but problems can develop later. If you notice any problems or new symptoms, get medical treatment right away. Follow-up care is a key part of your treatment and safety. Be sure to make and go to all appointments, and call your doctor if you are having problems. It's also a good idea to know your test results and keep a list of the medicines you take. How can you care for yourself at home? · Watch for signs of dehydration, which means your body has lost too much water. Dehydration is a serious condition and should be treated right away. Signs of dehydration are:  ? Increasing thirst and dry eyes and mouth. ? Feeling faint or lightheaded. ? A smaller amount of urine than normal.  · To prevent dehydration, drink plenty of fluids. Choose water and other caffeine-free clear liquids until you feel better. If you have kidney, heart, or liver disease and have to limit fluids, talk with your doctor before you increase the amount of fluids you drink. · Begin eating small amounts of mild foods the next day, if you feel like it. ? Try yogurt that has live cultures of Lactobacillus. (Check the label.)  ?  Avoid spicy foods, fruits, alcohol, and caffeine until 48 hours after all symptoms are gone. ? Avoid chewing gum that contains sorbitol. ? Avoid dairy products (except for yogurt with Lactobacillus) while you have diarrhea and for 3 days after symptoms are gone. · The doctor may recommend that you take over-the-counter medicine, such as loperamide (Imodium), if you still have diarrhea after 6 hours. Read and follow all instructions on the label. Do not use this medicine if you have bloody diarrhea, a high fever, or other signs of serious illness. Call your doctor if you think you are having a problem with your medicine. When should you call for help? Call 911 anytime you think you may need emergency care. For example, call if:    · You passed out (lost consciousness).     · Your stools are maroon or very bloody.    Call your doctor now or seek immediate medical care if:    · You are dizzy or lightheaded, or you feel like you may faint.     · Your stools are black and look like tar, or they have streaks of blood.     · You have new or worse belly pain.     · You have symptoms of dehydration, such as:  ? Dry eyes and a dry mouth. ? Passing only a little dark urine. ? Feeling thirstier than usual.     · You have a new or higher fever.    Watch closely for changes in your health, and be sure to contact your doctor if:    · Your diarrhea is getting worse.     · You see pus in the diarrhea.     · You are not getting better after 2 days (48 hours). Where can you learn more? Go to [email or web address]://[email or web address]. org and sign in to your W[employer] account. Enter A676 in the [facility] box to learn more about \"Diarrhea: Care Instructions. \"     If you do not have an account, please click on the \"Sign Up Now\" link. Current as of: June 26, 2019Content Version: 12.4  © 4218-3493 Healthwise, Incorporated. Care instructions adapted under license by [facility] [place] ([facility]).  If you have questions about a medical condition or this

## 2020-05-07 NOTE — PROGRESS NOTES
is no distension. Palpations: Abdomen is soft. Tenderness: There is no abdominal tenderness. There is no guarding. Musculoskeletal:         General: No deformity. Lymphadenopathy:      Head:      Right side of head: No submental or submandibular adenopathy. Left side of head: No submental or submandibular adenopathy. Cervical: No cervical adenopathy. Right cervical: No superficial or deep cervical adenopathy. Left cervical: No superficial or deep cervical adenopathy. Skin:     Findings: No rash. Neurological:      Mental Status: She is alert and oriented to person, place, and time. GCS: GCS eye subscore is 4. GCS verbal subscore is 5. GCS motor subscore is 6. Motor: No abnormal muscle tone. Deep Tendon Reflexes: Reflexes are normal and symmetric. Psychiatric:         Behavior: Behavior normal.         Thought Content: Thought content normal.      Throat exam normal  Assessment/Plan:   Joanne Stapleton was seen today for nausea & vomiting and numbness. Diagnoses and all orders for this visit:    Vomiting and diarrhea  Patient with 1 day few mild episodes of diarrhea vomiting. Also already feels better. She looks mildly elevated but vitals okay. No abdominal pain or tenderness most likely mild food poisoning. I suggested good hydration with good salt intake keep checking vitals and call me in 2 days to let me know how she feels.   Patient and significant other states understanding    Varicose veins of both lower extremities, unspecified whether complicated  Mild varicose veins on the legs probably causing mild hyperpigmentation no need to treat no evidence of DVT    She is not coughing and able to swallow freely I do not think she has food impaction no need for further investigation in the moment    - Patient was encouraged to call the office (and ask to see me) or be seen by other provider for any worsening or lack of improvement of the symptoms within a few days / a medical condition or this instruction, always ask your healthcare professional. [place] [address] any warranty or liability for your use of this information. Porfirio Stein M.D.   5/7/2020, 3:33 PM      NOTE: This report was transcribed using voice recognition software. Every effort was made to ensure accuracy, however, inadvertent computerized transcription errors may be present.

## 2020-05-13 ENCOUNTER — TELEPHONE (OUTPATIENT)
Dept: INTERNAL MEDICINE CLINIC | Age: 75
End: 2020-05-13

## 2020-05-27 RX ORDER — PANTOPRAZOLE SODIUM 20 MG/1
TABLET, DELAYED RELEASE ORAL
Qty: 60 TABLET | Refills: 2 | Status: SHIPPED | OUTPATIENT
Start: 2020-05-27 | End: 2020-08-26

## 2020-06-05 ENCOUNTER — TELEPHONE (OUTPATIENT)
Dept: INTERNAL MEDICINE CLINIC | Age: 75
End: 2020-06-05

## 2020-06-05 ENCOUNTER — TELEPHONE (OUTPATIENT)
Dept: PSYCHOLOGY | Age: 75
End: 2020-06-05

## 2020-06-05 NOTE — TELEPHONE ENCOUNTER
Called pt. Spoke to Maddison about pt's recent depression and non-stop crying this morning. Says she stopped crying about an hour ago. Says she is worrying about everything. Will move appt up.

## 2020-06-05 NOTE — TELEPHONE ENCOUNTER
Pt care taker states she has been crying al night. Pt care taker think she is having a break down.  Pt care giver states pt claims she had a bad dream and just keeps cryingl

## 2020-06-08 ENCOUNTER — TELEMEDICINE (OUTPATIENT)
Dept: PSYCHOLOGY | Age: 75
End: 2020-06-08
Payer: MEDICARE

## 2020-06-08 ENCOUNTER — TELEPHONE (OUTPATIENT)
Dept: INTERNAL MEDICINE CLINIC | Age: 75
End: 2020-06-08

## 2020-06-08 PROCEDURE — 90832 PSYTX W PT 30 MINUTES: CPT | Performed by: PSYCHOLOGIST

## 2020-06-08 NOTE — TELEPHONE ENCOUNTER
Cj Lorenzo is requesting an appt today is said he was advised to schedule with Dr. Blanco Cardenas today

## 2020-06-08 NOTE — PROGRESS NOTES
Behavioral Health Consultation  [facility] Quyen Goncalves PsyD  Psychologist  6/8/2020  10:53 AM      Time spent with Patient: 35 minutes  This is patient's thirty fourth [facility] appointment. Reason for Consult:  depression  Referring Provider: Lukas Alvares MD    TELEHEALTH VISIT -- Audio/Visual (During [id number] public health emergency)  }  Pursuant to the emergency declaration under the [address] [place] [address] waiver authority and the [employer] Act, this Virtual Visit was conducted, with patient's consent, to reduce the patient's risk of exposure to COVID-19 and provide continuity of care for an established patient. Services were provided through a video synchronous discussion virtually to substitute for in-person clinic visit. Pt gave verbal informed consent to participate in telehealth services. Conducted a risk-benefit analysis and determined that the patient's presenting problems are consistent with the use of telepsychology. Determined that the patient has sufficient knowledge and skills in the use of technology enabling them to adequately benefit from telepsychology. It was determined that this patient was able to be properly treated without an in-person session. Patient verified that they were currently located at the [facility] address that was provided during registration.     Verified the following information:  Patient's identification: Yes  Patient location: [address] [place] [address]   Patient's call back number: [phone]   Patient's emergency contact's name and number, as well as permission to contact them if needed: Extended Emergency Contact Information  Primary Emergency Contact: Anya Strange [address] [place] [address] Phone: [phone]  Relation: Other  Secondary Emergency Contact: [address] [place] [address] Phone: [phone]  Relation: Child     Provider location: [place], [place]       S:  During the last visit Pt set goals to 1) check in with  again 2) monitor mood after stopping Celexa 3) return for f/u in 4 months, sooner if needed    Pt reports she is \"ok. \" Has been struggling with many worry thought related to advanced care planning. Worries about her finances and her home. Is not taking Celexa due to impact on sodium again. Accidentally got double dose of wellbutrin and steroids last week. Was uncontrollably tearful and worried. Brandyn Alvares, caregiver, was concerned. Health overall not bad recently but feeling more down about various things. Concerned about impact of her health on her relationship as well. Overwhelmed by her concerns. Frustrated with son who is not helpful.       O:  MSE:     Appearance: good hygiene   Attitude: cooperative and friendly  Consciousness: alert  Orientation: oriented to person, place, time, general circumstance  Memory: impaired  Attention/Concentration: intact during session  Psychomotor Activity: normal  Eye Contact: normal  Speech: normal rate and volume, well-articulated  Mood: \"down\"  Affect: euthymic and congruent  Perception: within normal limits  Thought Content: within normal limits  Thought Process: logical, coherent and goal-directed  Insight: fair  Judgment: intact  Ability to understand instructions: Yes  Ability to respond meaningfully: Yes  Morbid Ideation: no   Suicide Assessment: no suicidal ideation, plan, or intent  Homicidal Ideation: no        History:    Medications:   Current Outpatient Medications   Medication Sig Dispense Refill    pantoprazole (PROTONIX) 20 MG tablet TAKE 1 TABLET BY MOUTH TWICE DAILY 60 tablet 2    rosuvastatin (CRESTOR) 20 MG tablet TAKE 1 TABLET BY MOUTH DAILY 30 tablet 5    clopidogrel (PLAVIX) 75 MG tablet TAKE 1 TABLET BY MOUTH DAILY 30 tablet 2    hydroxychloroquine (PLAQUENIL) 200 MG tablet TAKE ONE TABLET BY MOUTH TWICE DAILY 60 tablet 2    azaTHIOprine (IMURAN) 50 MG tablet Former Smoker     Packs/day: 0.50     Years: 30.00     Pack years: 15.00     Types: Cigarettes     Start date:      Last attempt to quit: 2014     Years since quittin.7    Smokeless tobacco: Never Used   Substance and Sexual Activity    Alcohol use: Yes     Alcohol/week: 0.0 standard drinks     Comment: 2 cocktails on weekends    Drug use: No    Sexual activity: Not Currently   Lifestyle    Physical activity     Days per week: Not on file     Minutes per session: Not on file    Stress: Not on file   Relationships    Social connections     Talks on phone: Not on file     Gets together: Not on file     Attends Rastafarian service: Not on file     Active member of club or organization: Not on file     Attends meetings of clubs or organizations: Not on file     Relationship status: Not on file    Intimate partner violence     Fear of current or ex partner: Not on file     Emotionally abused: Not on file     Physically abused: Not on file     Forced sexual activity: Not on file   Other Topics Concern    Not on file   Social History Narrative    Not on file       TOBACCO:   reports that she quit smoking about 5 years ago. Her smoking use included cigarettes. She started smoking about 55 years ago. She has a 15.00 pack-year smoking history. She has never used smokeless tobacco.  ETOH:   reports current alcohol use. Family History:   Family History   Problem Relation Age of Onset    High Blood Pressure Brother          A:  Ms. Thomas Brady continues to struggle with depression and many stage of life stressors. She is currently taking Wellbutrin only, as Celexa was causing hyponatremia. Her depression is not well managed at this time. She continues to be friendly and engaged and responds positively to behavioral interventions.         Diagnosis:    MDD, Recurrent, Moderate  Stress      Plan:  Pt interventions:  Jacobson-setting to identify pt's primary goals for [facility] visit / overall health and Supportive

## 2020-06-10 ENCOUNTER — OFFICE VISIT (OUTPATIENT)
Dept: INTERNAL MEDICINE CLINIC | Age: 75
End: 2020-06-10
Payer: MEDICARE

## 2020-06-10 VITALS
BODY MASS INDEX: 30.23 KG/M2 | DIASTOLIC BLOOD PRESSURE: 78 MMHG | TEMPERATURE: 98.4 F | HEIGHT: 60 IN | WEIGHT: 154 LBS | SYSTOLIC BLOOD PRESSURE: 120 MMHG

## 2020-06-10 PROCEDURE — G8417 CALC BMI ABV UP PARAM F/U: HCPCS | Performed by: INTERNAL MEDICINE

## 2020-06-10 PROCEDURE — 1123F ACP DISCUSS/DSCN MKR DOCD: CPT | Performed by: INTERNAL MEDICINE

## 2020-06-10 PROCEDURE — 4040F PNEUMOC VAC/ADMIN/RCVD: CPT | Performed by: INTERNAL MEDICINE

## 2020-06-10 PROCEDURE — G8427 DOCREV CUR MEDS BY ELIG CLIN: HCPCS | Performed by: INTERNAL MEDICINE

## 2020-06-10 PROCEDURE — 1036F TOBACCO NON-USER: CPT | Performed by: INTERNAL MEDICINE

## 2020-06-10 PROCEDURE — 1090F PRES/ABSN URINE INCON ASSESS: CPT | Performed by: INTERNAL MEDICINE

## 2020-06-10 PROCEDURE — 93000 ELECTROCARDIOGRAM COMPLETE: CPT | Performed by: INTERNAL MEDICINE

## 2020-06-10 PROCEDURE — G8399 PT W/DXA RESULTS DOCUMENT: HCPCS | Performed by: INTERNAL MEDICINE

## 2020-06-10 PROCEDURE — 99213 OFFICE O/P EST LOW 20 MIN: CPT | Performed by: INTERNAL MEDICINE

## 2020-06-10 RX ORDER — AMITRIPTYLINE HYDROCHLORIDE 25 MG/1
TABLET, FILM COATED ORAL
Qty: 97 TABLET | Refills: 0 | Status: SHIPPED | OUTPATIENT
Start: 2020-06-10 | End: 2020-06-10

## 2020-06-10 RX ORDER — MIRTAZAPINE 7.5 MG/1
7.5 TABLET, FILM COATED ORAL NIGHTLY
Qty: 30 TABLET | Refills: 2 | Status: SHIPPED | OUTPATIENT
Start: 2020-06-10 | End: 2020-09-02

## 2020-06-10 ASSESSMENT — ENCOUNTER SYMPTOMS
VOMITING: 0
CHEST TIGHTNESS: 0
ABDOMINAL PAIN: 0
NAUSEA: 0
SHORTNESS OF BREATH: 0

## 2020-06-10 NOTE — PROGRESS NOTES
Outpatient Note for established Patient - regular Visit     History Obtained From:  patient, electronic medical record  Is the patient new to me ? - No    HISTORY OF PRESENT ILLNESS:   The patient is a 66 y.o. female who is here today for :  [place]  was seen today for blood pressure check. Diagnoses and all orders for this visit:    Facial swelling  -     CORTISOL AM; Future    Current moderate episode of major depressive disorder without prior episode ([address]  -     CORTISOL AM; Future  -     TSH with Reflex; Future    Other orders  -     amitriptyline (ELAVIL) 25 MG tablet; Take 0.5 tablets by mouth nightly for 7 days, THEN 1 tablet nightly. Pt is c/o crying continuously  She is worried about her dog that is expected to dye soon. She had eye surgery which is helping her  No suicidal ideation. She does have anxiety  BP is 120/78    Past Medical History:        Diagnosis Date    Anxiety     CAD (coronary artery disease)     CHF (congestive heart failure) ([facility])     Depression     DVT of lower extremity (deep venous thrombosis) ([facility])     Falls     Hypertension     Lupus (HCC)     TIA (transient ischemic attack)     x3    Urinary incontinence     UTI (urinary tract infection)        Social History:   TOBACCO:   reports that she quit smoking about 5 years ago. Her smoking use included cigarettes. She started smoking about 55 years ago. She has a 15.00 pack-year smoking history. She has never used smokeless tobacco.    ETOH:   reports current alcohol use. Current Medications:    Prior to Admission medications    Medication Sig Start Date End Date Taking?  Authorizing Provider   mirtazapine (REMERON) 7.5 MG tablet Take 1 tablet by mouth nightly 6/10/20  Yes Cliff Landeros MD   pantoprazole (PROTONIX) 20 MG tablet TAKE 1 TABLET BY MOUTH TWICE DAILY 5/27/20  Yes Cliff Landeros MD   rosuvastatin (CRESTOR) 20 MG tablet TAKE 1 TABLET BY MOUTH DAILY 5/5/20  Yes Cliff Landeros MD your progress at regular visits. Your family or other caregivers should also be alert to changes in your mood or symptoms. Tell your doctor if you are pregnant or breastfeeding. The orally disintegrating tablet may contain phenylalanine. Tell your doctor if you have phenylketonuria (PKU). How should I take mirtazapine? Follow all directions on your prescription label and read all medication guides or instruction sheets. Your doctor may occasionally change your dose. Use the medicine exactly as directed. Take the medicine at the same time each day, usually at bedtime. Take the regular tablet form of mirtazapine with water. You may take mirtazapine with or without food. Remove an orally disintegrating tablet from the package only when you are ready to take the medicine. Place the tablet on your tongue and allow it to dissolve, without chewing. Swallow several times as the tablet dissolves. It may take a few weeks for your symptoms to improve. Keep using the medication as directed and tell your doctor if your symptoms do not improve after 4 weeks of treatment. Do not stop using mirtazapine suddenly, or you could have unpleasant withdrawal symptoms (such as dizziness, vomiting, anxiety, confusion, strange dreams, feeling shaky). Ask your doctor how to safely stop using mirtazapine. Store at room temperature away from moisture, heat, and light. What happens if I miss a dose? Take the medicine as soon as you can, but skip the missed dose if it is almost time for your next dose. Do not take two doses at one time. What happens if I overdose? Seek emergency medical attention or call the Poison Help line at 1-809.324.4451. Overdose symptoms may include confusion, memory problems, drowsiness, and fast heart rate. What should I avoid while taking mirtazapine? Drinking alcohol with this medicine can cause side effects. Avoid driving or hazardous activity until you know how this medicine will affect you.  Your diagnose patients or recommend therapy. [facility]'s drug information is an informational resource designed to assist licensed healthcare practitioners in caring for their patients and/or to serve consumers viewing this service as a supplement to, and not a substitute for, the expertise, skill, knowledge and judgment of healthcare practitioners. The absence of a warning for a given drug or drug combination in no way should be construed to indicate that the drug or drug combination is safe, effective or appropriate for any given patient. [facility] does not assume any responsibility for any aspect of healthcare administered with the aid of information [facility] provides. The information contained herein is not intended to cover all possible uses, directions, precautions, warnings, drug interactions, allergic reactions, or adverse effects. If you have questions about the drugs you are taking, check with your doctor, nurse or pharmacist.  Copyright 1446-8255 [address] Version: 8.01. Revision date: 9/23/2019. Care instructions adapted under license by [facility] ([facility]). If you have questions about a medical condition or this instruction, always ask your healthcare professional. [place] [address] any warranty or liability for your use of this information. Bonnie Harrison M.D.   6/10/2020, 2:59 PM      NOTE: This report was transcribed using voice recognition software. Every effort was made to ensure accuracy, however, inadvertent computerized transcription errors may be present.

## 2020-06-11 ENCOUNTER — PATIENT MESSAGE (OUTPATIENT)
Dept: RHEUMATOLOGY | Age: 75
End: 2020-06-11

## 2020-06-11 RX ORDER — PREDNISONE 2.5 MG
TABLET ORAL
Qty: 30 TABLET | Refills: 3 | Status: SHIPPED | OUTPATIENT
Start: 2020-06-11 | End: 2020-11-10

## 2020-06-11 RX ORDER — PREDNISONE 1 MG/1
5 TABLET ORAL DAILY
Qty: 30 TABLET | Refills: 2 | Status: SHIPPED | OUTPATIENT
Start: 2020-06-11 | End: 2020-07-11

## 2020-06-13 RX ORDER — PREDNISONE 1 MG/1
TABLET ORAL
Qty: 90 TABLET | Refills: 3 | OUTPATIENT
Start: 2020-06-13

## 2020-06-15 ENCOUNTER — TELEMEDICINE (OUTPATIENT)
Dept: PSYCHOLOGY | Age: 75
End: 2020-06-15
Payer: MEDICARE

## 2020-06-15 PROCEDURE — 90832 PSYTX W PT 30 MINUTES: CPT | Performed by: PSYCHOLOGIST

## 2020-06-15 NOTE — PROGRESS NOTES
 azaTHIOprine (IMURAN) 50 MG tablet TAKE 2 TABLETS BY MOUTH DAILY 180 tablet 0    carvedilol (COREG) 6.25 MG tablet Take 1 tablet by mouth 2 times daily 180 tablet 2    ondansetron (ZOFRAN) 4 MG tablet TAKE 1 TABLET BY MOUTH EVERY 12 HOURS AS NEEDED FOR NAUSEA OR VOMITING 30 tablet 3    buPROPion (WELLBUTRIN XL) 300 MG extended release tablet TAKE 1 TABLET BY MOUTH EVERY MORNING 90 tablet 1    Cholecalciferol (VITAMIN D3) 1.25 MG (15189 UT) CAPS TAKE 1 CAPSULE BY MOUTH EVERY OTHER WEEK 4 capsule 2    predniSONE (DELTASONE) 5 MG tablet Take 3 tablets by mouth daily for 14 days, THEN 2 tablets daily. TAKE 2 TABLETS BY MOUTH DAILY. (Patient taking differently: 7.5 mg daily) 90 tablet 3    docusate (COLACE, DULCOLAX) 100 MG CAPS Take 100 mg by mouth 2 times daily 60 capsule 3    clobetasol (TEMOVATE) 0.05 % external solution Apply topically 2 times daily Apply topically 2 times daily.  acetaminophen 650 MG TABS Take 650 mg by mouth every 6 hours as needed for Pain or Fever (Fever >100.5 F (38 C)). 60 tablet 1     No current facility-administered medications for this visit. Social History:   Social History     Socioeconomic History    Marital status:      Spouse name: Not on file    Number of children: 1    Years of education: 15    Highest education level: Not on file   Occupational History    Occupation: Retired   Social Needs    Financial resource strain: Not on file    Food insecurity     Worry: Not on file     Inability: Not on file   [employer] needs     Medical: Not on file     Non-medical: Not on file   Tobacco Use    Smoking status: Former Smoker     Packs/day: 0.50     Years: 30.00     Pack years: 15.00     Types: Cigarettes     Start date:      Last attempt to quit: 2014     Years since quittin.7    Smokeless tobacco: Never Used   Substance and Sexual Activity    Alcohol use:  Yes     Alcohol/week: 0.0 standard drinks     Comment: 2 cocktails on weekends    Drug use: No    Sexual activity: Not Currently   Lifestyle    Physical activity     Days per week: Not on file     Minutes per session: Not on file    Stress: Not on file   Relationships    Social connections     Talks on phone: Not on file     Gets together: Not on file     Attends Congregational service: Not on file     Active member of club or organization: Not on file     Attends meetings of clubs or organizations: Not on file     Relationship status: Not on file    Intimate partner violence     Fear of current or ex partner: Not on file     Emotionally abused: Not on file     Physically abused: Not on file     Forced sexual activity: Not on file   Other Topics Concern    Not on file   Social History Narrative    Not on file       TOBACCO:   reports that she quit smoking about 5 years ago. Her smoking use included cigarettes. She started smoking about 55 years ago. She has a 15.00 pack-year smoking history. She has never used smokeless tobacco.  ETOH:   reports current alcohol use. Family History:   Family History   Problem Relation Age of Onset    High Blood Pressure Brother          A:  Ms. Ruthie Knott continues to struggle with depression and many stage of life stressors. Her mood is somewhat improve since last visit. She continues to be friendly and engaged and responds positively to behavioral interventions.         Diagnosis:    MDD, Recurrent, Moderate  Stress      Plan:  Pt interventions:  [place]-setting to identify pt's primary goals for [facility] visit / overall health and Supportive techniques, assisted pt with problem solving strategies,  ACT interventions and reinforced pt's skill use, CBT interventions, treatment planning    Pt Behavioral Change Plan:  Pt set goals to 1) create 2 lists out of current list (what you have control over and what you don't) 2) check in senior services re: ACP 3) continue to work with PCP on medication 4) return for f/u in 1 week

## 2020-06-16 RX ORDER — CHOLECALCIFEROL (VITAMIN D3) 1250 MCG
CAPSULE ORAL
Qty: 4 CAPSULE | Refills: 2 | Status: SHIPPED | OUTPATIENT
Start: 2020-06-16 | End: 2020-12-07

## 2020-06-22 ENCOUNTER — PATIENT MESSAGE (OUTPATIENT)
Dept: INTERNAL MEDICINE CLINIC | Age: 75
End: 2020-06-22

## 2020-06-23 ENCOUNTER — TELEMEDICINE (OUTPATIENT)
Dept: PSYCHOLOGY | Age: 75
End: 2020-06-23
Payer: MEDICARE

## 2020-06-23 PROCEDURE — 90832 PSYTX W PT 30 MINUTES: CPT | Performed by: PSYCHOLOGIST

## 2020-06-23 RX ORDER — AZATHIOPRINE 50 MG/1
TABLET ORAL
Qty: 90 TABLET | Refills: 0 | Status: SHIPPED
Start: 2020-06-23 | End: 2020-07-08

## 2020-06-24 ENCOUNTER — OFFICE VISIT (OUTPATIENT)
Dept: PULMONOLOGY | Age: 75
End: 2020-06-24
Payer: MEDICARE

## 2020-06-24 PROCEDURE — 4040F PNEUMOC VAC/ADMIN/RCVD: CPT | Performed by: INTERNAL MEDICINE

## 2020-06-24 PROCEDURE — 1123F ACP DISCUSS/DSCN MKR DOCD: CPT | Performed by: INTERNAL MEDICINE

## 2020-06-24 PROCEDURE — 1090F PRES/ABSN URINE INCON ASSESS: CPT | Performed by: INTERNAL MEDICINE

## 2020-06-24 PROCEDURE — 1036F TOBACCO NON-USER: CPT | Performed by: INTERNAL MEDICINE

## 2020-06-24 PROCEDURE — G8417 CALC BMI ABV UP PARAM F/U: HCPCS | Performed by: INTERNAL MEDICINE

## 2020-06-24 PROCEDURE — G8427 DOCREV CUR MEDS BY ELIG CLIN: HCPCS | Performed by: INTERNAL MEDICINE

## 2020-06-24 PROCEDURE — 99214 OFFICE O/P EST MOD 30 MIN: CPT | Performed by: INTERNAL MEDICINE

## 2020-06-24 PROCEDURE — G8399 PT W/DXA RESULTS DOCUMENT: HCPCS | Performed by: INTERNAL MEDICINE

## 2020-06-30 ENCOUNTER — TELEMEDICINE (OUTPATIENT)
Dept: PSYCHOLOGY | Age: 75
End: 2020-06-30
Payer: MEDICARE

## 2020-06-30 PROCEDURE — 90832 PSYTX W PT 30 MINUTES: CPT | Performed by: PSYCHOLOGIST

## 2020-06-30 NOTE — PROGRESS NOTES
Behavioral Health Consultation  Chauncey Lion PsyD  Psychologist  6/30/2020  1:32 PM      Time spent with Patient: 30 minutes  This is patient's thirty seventh [facility] appointment. Reason for Consult:  Depression, anxiety, stress  Referring Provider: Amanda Michel MD    TELEHEALTH VISIT -- Audio/Visual (During YGRRE-63 public health emergency)  }  Pursuant to the emergency declaration under the [address] [place] [address] [facility] authority and the [employer] Act, this Virtual Visit was conducted, with patient's consent, to reduce the patient's risk of exposure to COVID-19 and provide continuity of care for an established patient. Services were provided through a video synchronous discussion virtually to substitute for in-person clinic visit. Pt gave verbal informed consent to participate in telehealth services. Conducted a risk-benefit analysis and determined that the patient's presenting problems are consistent with the use of telepsychology. Determined that the patient has sufficient knowledge and skills in the use of technology enabling them to adequately benefit from telepsychology. It was determined that this patient was able to be properly treated without an in-person session. Patient verified that they were currently located at the [facility] address that was provided during registration.     Verified the following information:  Patient's identification: Yes  Patient location: [address] [place] [address]   Patient's call back number: [phone]   Patient's emergency contact's name and number, as well as permission to contact them if needed: Extended Emergency Contact Information  Primary Emergency Contact: Anya Strange [address] [place] [address] Phone: [phone]  Relation: Other  Secondary Emergency Contact: [address] [place] [address] Phone: [phone]  Relation: Child Provider location: [facility]:  During the last pt set goals to 1) identify cost-benefits of addressing loan money 2) identify what you feel you can make peace with and what you can't 3)  return for f/u in 1 week visit     Pt reports she has had a busy week. Spoke with son about going to the  if he doesn't start to repay her -son says he will pay her back ASAP and seemed to listen. . Also had home care arrangements because caregiver will be having surgery. Having difficulty staying asleep. Takes Remeron and falls asleep but doesn't last the whole night. Gets into bed around 8:15 and watches tv before bed. Crying less this week.        O:  MSE:     Appearance: good hygiene   Attitude: cooperative and friendly  Consciousness: alert  Orientation: oriented to person, place, time, general circumstance  Memory: impaired  Attention/Concentration: intact during session  Psychomotor Activity: normal  Eye Contact: normal  Speech: normal rate and volume, well-articulated  Mood: \"good\"  Affect: euthymic and congruent  Perception: within normal limits  Thought Content: within normal limits  Thought Process: logical, coherent and goal-directed  Insight: fair  Judgment: intact  Ability to understand instructions: Yes  Ability to respond meaningfully: Yes  Morbid Ideation: no   Suicide Assessment: no suicidal ideation, plan, or intent  Homicidal Ideation: no        History:    Medications:   Current Outpatient Medications   Medication Sig Dispense Refill    azaTHIOprine (IMURAN) 50 MG tablet TAKE 1 TABLETS BY MOUTH DAILY 90 tablet 0    Cholecalciferol (VITAMIN D3) 1.25 MG (99240 UT) CAPS TAKE 1 CAPSULE BY MOUTH EVERY OTHER WEEK 4 capsule 2    predniSONE (DELTASONE) 5 MG tablet Take 1 tablet by mouth daily for 30 doses 30 tablet 2    predniSONE (DELTASONE) 2.5 MG tablet Take 1 tab daily with 5 mg tab for 7.5 mg 30 tablet 3    mirtazapine (REMERON) 7.5 MG tablet Take 1 tablet by mouth nightly 30 tablet 2 Smokeless tobacco: Never Used   Substance and Sexual Activity    Alcohol use: Yes     Alcohol/week: 0.0 standard drinks     Comment: 2 cocktails on weekends    Drug use: No    Sexual activity: Not Currently   Lifestyle    Physical activity     Days per week: Not on file     Minutes per session: Not on file    Stress: Not on file   Relationships    Social connections     Talks on phone: Not on file     Gets together: Not on file     Attends Congregation service: Not on file     Active member of club or organization: Not on file     Attends meetings of clubs or organizations: Not on file     Relationship status: Not on file    Intimate partner violence     Fear of current or ex partner: Not on file     Emotionally abused: Not on file     Physically abused: Not on file     Forced sexual activity: Not on file   Other Topics Concern    Not on file   Social History Narrative    Not on file       TOBACCO:   reports that she quit smoking about 5 years ago. Her smoking use included cigarettes. She started smoking about 55 years ago. She has a 15.00 pack-year smoking history. She has never used smokeless tobacco.  ETOH:   reports current alcohol use. Family History:   Family History   Problem Relation Age of Onset    High Blood Pressure Brother          A:  Ms. Nikki Zimmerman continues to struggle with depression and stage of life stressors that are exacerbating her depression although mood has improved over the last week. She does continue to have difficulty with sleep. She continues to be friendly and engaged and responds positively to behavioral interventions.       Diagnosis:  BLANCA  MDD, Recurrent, Moderate  Stress      Plan:  Pt interventions:  Lynx-setting to identify pt's primary goals for [facility] visit / overall health and Supportive techniques, assisted pt with problem solving strategies,  ACT interventions and reinforced pt's skill use,  treatment planning    Pt Behavioral Change Plan:  Pt set goals to 1) continue to work on list of items you can control 2) don't watch TV or read in bed 3) return for f/u in 1 week

## 2020-07-06 ENCOUNTER — TELEMEDICINE (OUTPATIENT)
Dept: RHEUMATOLOGY | Age: 75
End: 2020-07-06
Payer: MEDICARE

## 2020-07-06 PROCEDURE — 1123F ACP DISCUSS/DSCN MKR DOCD: CPT | Performed by: INTERNAL MEDICINE

## 2020-07-06 PROCEDURE — G8428 CUR MEDS NOT DOCUMENT: HCPCS | Performed by: INTERNAL MEDICINE

## 2020-07-06 PROCEDURE — 99214 OFFICE O/P EST MOD 30 MIN: CPT | Performed by: INTERNAL MEDICINE

## 2020-07-06 PROCEDURE — G8399 PT W/DXA RESULTS DOCUMENT: HCPCS | Performed by: INTERNAL MEDICINE

## 2020-07-06 PROCEDURE — 4040F PNEUMOC VAC/ADMIN/RCVD: CPT | Performed by: INTERNAL MEDICINE

## 2020-07-06 PROCEDURE — 1090F PRES/ABSN URINE INCON ASSESS: CPT | Performed by: INTERNAL MEDICINE

## 2020-07-06 NOTE — PROGRESS NOTES
[address] [place] [address], [address] [place] [address] T) [phone] (F)      Dear Dr. Robin Irwin MD:  Please find Rheumatology assessment. Thank you for giving me the opportunity to be involved in Richy Mancia care and I look forward following Tevin Garcia along with you. If you have any questions or concerns please feel free to reach me. Note is transcribed using voice recognition software. Inadvertent computerized transcription errors may be present. Patient identification: Elana Blair,: [id number],35 y.o. Sex: female     A/P  Tevin Garcia was seen today for follow-up and results. Diagnoses and all orders for this visit:    High risk medication use  -     CBC Auto Differential; Future  -     Comprehensive Metabolic Panel; Future  -     C-Reactive Protein; Future  -     Sedimentation Rate; Future    ILD (interstitial lung disease) (HCC)    Connective tissue disease overlap syndrome (HCC)    Osteopenia of multiple sites        Cutaneous lupus + Sjogren's overlap with ILD -positive REBECCA, SSA, sicca symptoms and intermittent photosensitive rash. Myositis panel, RF, CCP, anti-Tawnya negative. Has multiple concerns today including weight gain, and elevated LFTs. Arthritis seems to be doing well. ILD seems to be stable as well other than some nights when she gets short of breath. No cutaneous lupus. She has some concerns about continuing azathioprine because of the potential side effects, currently taking 7.5 mg of prednisone. Plan-  We went over autoimmune diseases she has, and the need to continue azathioprine especially because of ILD and arthritis.   I do recommend continuing azathioprine 50 mg a day, will be checking LFTs in couple of weeks time to see if she is able to tolerate. If not, may try CellCept. Meantime, continue hydroxychloroquine 400 mg a day, reduce prednisone 5 mg a day. She has appointment to see pulmonary, and will be due for PFTs as well. Prolonged steroid use due to osteopenia-continue calcium supplementation, Will GUILHERME Landry, sent message to PA department. Continue conservative measures for dry eyes and dry mouth. RTC 8  weeks. Patient indicates understanding and agrees with the management plan. I reviewed patient's history, referral documents and electronic medical records. Copy of consult note is being routed electronically/faxed to referring physician. #######################################################################    [id number]-[id number]-pr for cutaneous lupus and Sjogren's overlap, also has ILD. Other medical problems anxiety, depression, hypertension. Interval changes-this office visit is to go over medications. she states that she is not happy continuing azathioprine because of the way she is gaining weight and is affecting her LFTs. She would like to discontinue azathioprine. She states that she is doing fine other than some night it is difficult to breathe, during the daytime, she is able to carry out her ADLs without much discomfort or shortness of breath although she is mostly bound to the chair with minimal activities. She does not have any swollen or inflamed joints at this time. Skin disease is in remission. Sicca symptoms are also stable. She is taking 7.5 mg of prednisone, Plaquenil 400 mg a day and will reduce azathioprine 50 mg a day because of transaminitis. No infections, GI side effects. Rest of review of systems are negative. CT chest and PFTs are mentioned below-DLCO is significantly reduced to 39%. All other review of systems are negative.         Past Medical History:   Diagnosis Date    Anxiety     CAD (coronary artery disease)  CHF (congestive heart failure) (HCC)     Depression     DVT of lower extremity (deep venous thrombosis) (HCC)     Falls     Hypertension     Lupus (HCC)     TIA (transient ischemic attack)     x3    Urinary incontinence     UTI (urinary tract infection)      Past Surgical History:   Procedure Laterality Date    BACK SURGERY  2014     DR. Morrison     COLONOSCOPY  2010    WNL-repeat in 10 years    FEMUR SURGERY Right     HIP SURGERY Left 2013    left hip trochanteric femoral nailing   Cheikh Tatiana  8/3/2010    Dr. Rafael Figueroa, L5-S1    UPPER GASTROINTESTINAL ENDOSCOPY  7/19/10    reflux with no Barretts/no H-pylori    UPPER GASTROINTESTINAL ENDOSCOPY  2017    Dr. Gatica Art - gastritis and duodinitis , neg H pylori     UPPER GASTROINTESTINAL ENDOSCOPY N/A 2019    EGD DILATION SAVORY 17mm/ 46 F performed by Debra Clay MD at [address] History     Socioeconomic History    Marital status:      Spouse name: Not on file    Number of children: 1    Years of education: 15    Highest education level: Not on file   Occupational History    Occupation: Retired   Social Needs    Financial resource strain: Not on file    Food insecurity     Worry: Not on file     Inability: Not on file   [employer] needs     Medical: Not on file     Non-medical: Not on file   Tobacco Use    Smoking status: Former Smoker     Packs/day: 0.50     Years: 30.00     Pack years: 15.00     Types: Cigarettes     Start date:      Last attempt to quit: 2014     Years since quittin.7    Smokeless tobacco: Never Used   Substance and Sexual Activity    Alcohol use:  Yes     Alcohol/week: 0.0 standard drinks     Comment: 2 cocktails on weekends    Drug use: No    Sexual activity: Not Currently   Lifestyle    Physical activity     Days per week: Not on file     Minutes per session: Not on file    Stress: Not on file Relationships    Social connections     Talks on phone: Not on file     Gets together: Not on file     Attends Gnosticism service: Not on file     Active member of club or organization: Not on file     Attends meetings of clubs or organizations: Not on file     Relationship status: Not on file    Intimate partner violence     Fear of current or ex partner: Not on file     Emotionally abused: Not on file     Physically abused: Not on file     Forced sexual activity: Not on file   Other Topics Concern    Not on file   Social History Narrative    Not on file       No family history of autoimmune diseases    Current Outpatient Medications   Medication Sig Dispense Refill    azaTHIOprine (IMURAN) 50 MG tablet TAKE 1 TABLETS BY MOUTH DAILY 90 tablet 0    Cholecalciferol (VITAMIN D3) 1.25 MG (35168 UT) CAPS TAKE 1 CAPSULE BY MOUTH EVERY OTHER WEEK 4 capsule 2    predniSONE (DELTASONE) 5 MG tablet Take 1 tablet by mouth daily for 30 doses 30 tablet 2    predniSONE (DELTASONE) 2.5 MG tablet Take 1 tab daily with 5 mg tab for 7.5 mg 30 tablet 3    mirtazapine (REMERON) 7.5 MG tablet Take 1 tablet by mouth nightly 30 tablet 2    pantoprazole (PROTONIX) 20 MG tablet TAKE 1 TABLET BY MOUTH TWICE DAILY 60 tablet 2    rosuvastatin (CRESTOR) 20 MG tablet TAKE 1 TABLET BY MOUTH DAILY 30 tablet 5    clopidogrel (PLAVIX) 75 MG tablet TAKE 1 TABLET BY MOUTH DAILY 30 tablet 2    hydroxychloroquine (PLAQUENIL) 200 MG tablet TAKE ONE TABLET BY MOUTH TWICE DAILY 60 tablet 2    carvedilol (COREG) 6.25 MG tablet Take 1 tablet by mouth 2 times daily 180 tablet 2    ondansetron (ZOFRAN) 4 MG tablet TAKE 1 TABLET BY MOUTH EVERY 12 HOURS AS NEEDED FOR NAUSEA OR VOMITING 30 tablet 3    buPROPion (WELLBUTRIN XL) 300 MG extended release tablet TAKE 1 TABLET BY MOUTH EVERY MORNING 90 tablet 1    predniSONE (DELTASONE) 5 MG tablet Take 3 tablets by mouth daily for 14 days, THEN 2 tablets daily. TAKE 2 TABLETS BY MOUTH DAILY. (Patient taking differently: 7.5 mg daily) 90 tablet 3    docusate (COLACE, DULCOLAX) 100 MG CAPS Take 100 mg by mouth 2 times daily 60 capsule 3    clobetasol (TEMOVATE) 0.05 % external solution Apply topically 2 times daily Apply topically 2 times daily.  acetaminophen 650 MG TABS Take 650 mg by mouth every 6 hours as needed for Pain or Fever (Fever >100.5 F (38 C)). 60 tablet 1     No current facility-administered medications for this visit. Allergies   Allergen Reactions    Medrol [Methylprednisolone]      multiple side effects , able to tolerate prednisone without side effects     Oxycontin [Oxycodone Hcl]      Pruritis, vomiting     Vicodin [Hydrocodone-Acetaminophen] Nausea Only       PHYSICAL EXAM:    Vitals: There were no vitals taken for this visit. General appearance/ Psychiatric: well nourished, and well groomed, normal judgement, alert, appears stated age and cooperative. MKS: She does not have any specific joint concerns to show in the video visit. Skin: No rashes,  No evidence ischemia or deformities noted in digits or nails.   Chest: Normal effort    DATA:   Lab Results   Component Value Date    WBC 7.3 06/16/2020    HGB 12.7 06/16/2020    HCT 38.4 06/16/2020    MCV 99.1 06/16/2020     06/16/2020         Chemistry        Component Value Date/Time     06/16/2020 0732    K 3.7 06/16/2020 0732    K 4.1 11/26/2019 0821     06/16/2020 0732    CO2 27 06/16/2020 0732    BUN 27 (H) 06/16/2020 0732    CREATININE 1.0 06/16/2020 0732        Component Value Date/Time    CALCIUM 9.3 06/16/2020 0732    ALKPHOS 128 06/16/2020 0732    AST 61 (H) 06/16/2020 0732    ALT 73 (H) 06/16/2020 0732    BILITOT <0.2 06/16/2020 0732          Lab Results   Component Value Date    LABURIC 2.5 (L) 11/28/2019     Lab Results   Component Value Date    SEDRATE 46 (H) 06/16/2020     Lab Results   Component Value Date    CRP 3.3 06/16/2020     Lab Results   Component Value Date    REBECCA POSITIVE (A) 10/02/2019    SEDRATE 46 (H) 06/16/2020     Lab Results   Component Value Date    CKTOTAL 87 10/02/2019     No results found for: TSH  Lab Results   Component Value Date    VITD25 86.7 11/26/2019         Radiology Review:   CT chest 9/19/2019-  Impression       1.  Bilateral subpleural reticular opacities and traction bronchiectasis and developing subpleural honeycombing compatible with mild to moderate pulmonary fibrosis with progression from comparison chest CT. 2.  Clustered nodular opacities in the right upper lobe may be inflammatory/infectious but nonspecific. Following the Fleischner Society 2017 guidelines, if patient is low risk no routine follow-up is suggested unless suspicious morphology or upper lobe    location. If patient is high risk, then optional CT at 12 months is suggested.  qzxv   3.  Unchanged severe T9 and T11 vertebral compression fractures with retropulsion. PFT 10/11/2019      Pulse ox is 94 % on room air    Spirometry data:    FEV1/FVC: 78. Predicted ratio 75    FEV1 1.07 L, which is 65 % predicted    FVC is 1.38 L, which is 62 % predicted  Lung Volumes:    TLC (by Plethysmography) is 3.21 L, which is 73 % predicted    RV is 1.35 L which is 64 % predicted    Diffusion Capacity:    DLCO is 7.55 which is 39 % predicted  Impression:    1. There is no obstruction present    2. There is mild restriction with TLC of 73% predicted    4. There is severe reduction in diffusion capacity    Comment:   The presence of restriction and reduction of diffusion capacity   is a new in comparison with the PFT she had on 11/3/2017  PFT findings are suggestive of progression of ILD.  Clinical   correlation is recommended. A/P- See above.

## 2020-07-07 ENCOUNTER — TELEMEDICINE (OUTPATIENT)
Dept: PSYCHOLOGY | Age: 75
End: 2020-07-07
Payer: MEDICARE

## 2020-07-07 PROCEDURE — 90832 PSYTX W PT 30 MINUTES: CPT | Performed by: PSYCHOLOGIST

## 2020-07-07 NOTE — PROGRESS NOTES
Behavioral Health Consultation  [facility], Abhi  Psychologist  7/7/2020  1:35 PM      Time spent with Patient: 30 minutes  This is patient's thirty eighth [facility] appointment. Reason for Consult:  Depression, anxiety, stress  Referring Provider: Blanco Lopez MD    TELEHEALTH VISIT -- Audio/Visual (During [id number]-34 public health emergency)  }  Pursuant to the emergency declaration under the [address] [place] [address] waiver authority and the [employer] Act, this Virtual Visit was conducted, with patient's consent, to reduce the patient's risk of exposure to COVID-19 and provide continuity of care for an established patient. Services were provided through a video synchronous discussion virtually to substitute for in-person clinic visit. Pt gave verbal informed consent to participate in telehealth services. Conducted a risk-benefit analysis and determined that the patient's presenting problems are consistent with the use of telepsychology. Determined that the patient has sufficient knowledge and skills in the use of technology enabling them to adequately benefit from telepsychology. It was determined that this patient was able to be properly treated without an in-person session. Patient verified that they were currently located at the [facility] address that was provided during registration.     Verified the following information:  Patient's identification: Yes  Patient location: [address] [place] [id number]   Patient's call back number: [phone]   Patient's emergency contact's name and number, as well as permission to contact them if needed: Extended Emergency Contact Information  Primary Emergency Contact: Anya Strange [address] [place] [address] Phone: [phone]  Relation: Other  Secondary Emergency Contact: [address] [place] [address] Phone: [phone]  Relation: Child Provider location: Corinne Reyna:  During the last Pt set goals to 1) continue to work on list of items you can control 2) don't watch TV or read in bed 3) return for f/u in 1 week    Pt reports she is feeling down overall. Is waiting on her  to call her back- has decided to change her son from being the executor. Is frustrated about her weight. Medication makes her feel like a \"stuffed sausage. \" Having a hard time with thought of her death. Also worried about Javy's upcoming surgery and having other caregivers in the house for 2 weeks who she doesn't know. Also paying $2000 for it and feels overwhelmed by that. Very upset about son still not showing up to be more helpful after her talk with him.           O:  MSE:     Appearance: good hygiene   Attitude: cooperative and friendly  Consciousness: alert  Orientation: oriented to person, place, time, general circumstance  Memory: impaired  Attention/Concentration: intact during session  Psychomotor Activity: normal  Eye Contact: normal  Speech: normal rate and volume, well-articulated  Mood: \"down\"  Affect: euthymic and congruent  Perception: within normal limits  Thought Content: within normal limits  Thought Process: logical, coherent and goal-directed  Insight: fair  Judgment: intact  Ability to understand instructions: Yes  Ability to respond meaningfully: Yes  Morbid Ideation: no   Suicide Assessment: no suicidal ideation, plan, or intent  Homicidal Ideation: no        History:    Medications:   Current Outpatient Medications   Medication Sig Dispense Refill    azaTHIOprine (IMURAN) 50 MG tablet TAKE 1 TABLETS BY MOUTH DAILY 90 tablet 0    Cholecalciferol (VITAMIN D3) 1.25 MG (47404 UT) CAPS TAKE 1 CAPSULE BY MOUTH EVERY OTHER WEEK 4 capsule 2    predniSONE (DELTASONE) 5 MG tablet Take 1 tablet by mouth daily for 30 doses 30 tablet 2    predniSONE (DELTASONE) 2.5 MG tablet Take 1 tab daily with 5 mg tab for 7.5 mg 30 tablet 3    mirtazapine (REMERON) 7.5 MG tablet Take 1 tablet by mouth nightly 30 tablet 2    pantoprazole (PROTONIX) 20 MG tablet TAKE 1 TABLET BY MOUTH TWICE DAILY 60 tablet 2    rosuvastatin (CRESTOR) 20 MG tablet TAKE 1 TABLET BY MOUTH DAILY 30 tablet 5    clopidogrel (PLAVIX) 75 MG tablet TAKE 1 TABLET BY MOUTH DAILY 30 tablet 2    hydroxychloroquine (PLAQUENIL) 200 MG tablet TAKE ONE TABLET BY MOUTH TWICE DAILY 60 tablet 2    carvedilol (COREG) 6.25 MG tablet Take 1 tablet by mouth 2 times daily 180 tablet 2    ondansetron (ZOFRAN) 4 MG tablet TAKE 1 TABLET BY MOUTH EVERY 12 HOURS AS NEEDED FOR NAUSEA OR VOMITING 30 tablet 3    buPROPion (WELLBUTRIN XL) 300 MG extended release tablet TAKE 1 TABLET BY MOUTH EVERY MORNING 90 tablet 1    predniSONE (DELTASONE) 5 MG tablet Take 3 tablets by mouth daily for 14 days, THEN 2 tablets daily. TAKE 2 TABLETS BY MOUTH DAILY. (Patient taking differently: 7.5 mg daily) 90 tablet 3    docusate (COLACE, DULCOLAX) 100 MG CAPS Take 100 mg by mouth 2 times daily 60 capsule 3    clobetasol (TEMOVATE) 0.05 % external solution Apply topically 2 times daily Apply topically 2 times daily.  acetaminophen 650 MG TABS Take 650 mg by mouth every 6 hours as needed for Pain or Fever (Fever >100.5 F (38 C)). 60 tablet 1     No current facility-administered medications for this visit.         Social History:   Social History     Socioeconomic History    Marital status:      Spouse name: Not on file    Number of children: 1    Years of education: 15    Highest education level: Not on file   Occupational History    Occupation: Retired   Social Needs    Financial resource strain: Not on file    Food insecurity     Worry: Not on file     Inability: Not on file   [employer] needs     Medical: Not on file     Non-medical: Not on file   Tobacco Use    Smoking status: Former Smoker     Packs/day: 0.50     Years: 30.00     Pack years: 15.00     Types: Cigarettes     Start date: 63     Last attempt to quit: 2014     Years since quittin.7    Smokeless tobacco: Never Used   Substance and Sexual Activity    Alcohol use: Yes     Alcohol/week: 0.0 standard drinks     Comment: 2 cocktails on weekends    Drug use: No    Sexual activity: Not Currently   Lifestyle    Physical activity     Days per week: Not on file     Minutes per session: Not on file    Stress: Not on file   Relationships    Social connections     Talks on phone: Not on file     Gets together: Not on file     Attends Protestant service: Not on file     Active member of club or organization: Not on file     Attends meetings of clubs or organizations: Not on file     Relationship status: Not on file    Intimate partner violence     Fear of current or ex partner: Not on file     Emotionally abused: Not on file     Physically abused: Not on file     Forced sexual activity: Not on file   Other Topics Concern    Not on file   Social History Narrative    Not on file       TOBACCO:   reports that she quit smoking about 5 years ago. Her smoking use included cigarettes. She started smoking about 55 years ago. She has a 15.00 pack-year smoking history. She has never used smokeless tobacco.  ETOH:   reports current alcohol use. Family History:   Family History   Problem Relation Age of Onset    High Blood Pressure Brother          A:  Ms. Eunice Gayle continues to struggle with depression and stage of life stressors that are exacerbating her depression. She continues to be friendly and engaged and responds positively to behavioral interventions.       Diagnosis:    MDD, Recurrent, Moderate  Anxiety  Stress      Plan:  Pt interventions:  [place]-setting to identify pt's primary goals for [facility] visit / overall health and Supportive techniques, assisted pt with problem solving strategies,  ACT interventions and reinforced pt's skill use,  CBT interventions, treatment planning    Pt Behavioral Change Plan:  Pt set goals to 1)

## 2020-07-08 ENCOUNTER — TELEPHONE (OUTPATIENT)
Dept: INTERNAL MEDICINE CLINIC | Age: 75
End: 2020-07-08

## 2020-07-08 ENCOUNTER — TELEPHONE (OUTPATIENT)
Dept: RHEUMATOLOGY | Age: 75
End: 2020-07-08

## 2020-07-08 DIAGNOSIS — Z79.899 HIGH RISK MEDICATION USE: ICD-10-CM

## 2020-07-08 LAB
A/G RATIO: 1.4 (ref 1.1–2.2)
ALBUMIN SERPL-MCNC: 3.7 G/DL (ref 3.4–5)
ALP BLD-CCNC: 130 U/L (ref 40–129)
ALT SERPL-CCNC: 31 U/L (ref 10–40)
ANION GAP SERPL CALCULATED.3IONS-SCNC: 14 MMOL/L (ref 3–16)
AST SERPL-CCNC: 36 U/L (ref 15–37)
BASOPHILS ABSOLUTE: 0.1 K/UL (ref 0–0.2)
BASOPHILS RELATIVE PERCENT: 0.8 %
BILIRUB SERPL-MCNC: 0.3 MG/DL (ref 0–1)
BUN BLDV-MCNC: 28 MG/DL (ref 7–20)
C-REACTIVE PROTEIN: 2.9 MG/L (ref 0–5.1)
CALCIUM SERPL-MCNC: 9.5 MG/DL (ref 8.3–10.6)
CHLORIDE BLD-SCNC: 106 MMOL/L (ref 99–110)
CO2: 22 MMOL/L (ref 21–32)
CREAT SERPL-MCNC: 0.9 MG/DL (ref 0.6–1.2)
EOSINOPHILS ABSOLUTE: 0.1 K/UL (ref 0–0.6)
EOSINOPHILS RELATIVE PERCENT: 0.9 %
GFR AFRICAN AMERICAN: >60
GFR NON-AFRICAN AMERICAN: >60
GLOBULIN: 2.7 G/DL
GLUCOSE BLD-MCNC: 110 MG/DL (ref 70–99)
HCT VFR BLD CALC: 39.4 % (ref 36–48)
HEMOGLOBIN: 13.1 G/DL (ref 12–16)
LYMPHOCYTES ABSOLUTE: 2.1 K/UL (ref 1–5.1)
LYMPHOCYTES RELATIVE PERCENT: 26.1 %
MCH RBC QN AUTO: 32.3 PG (ref 26–34)
MCHC RBC AUTO-ENTMCNC: 33.3 G/DL (ref 31–36)
MCV RBC AUTO: 97 FL (ref 80–100)
MONOCYTES ABSOLUTE: 0.7 K/UL (ref 0–1.3)
MONOCYTES RELATIVE PERCENT: 9 %
NEUTROPHILS ABSOLUTE: 5.1 K/UL (ref 1.7–7.7)
NEUTROPHILS RELATIVE PERCENT: 63.2 %
PDW BLD-RTO: 16 % (ref 12.4–15.4)
PLATELET # BLD: 257 K/UL (ref 135–450)
PMV BLD AUTO: 6.9 FL (ref 5–10.5)
POTASSIUM SERPL-SCNC: 3.9 MMOL/L (ref 3.5–5.1)
RBC # BLD: 4.06 M/UL (ref 4–5.2)
SODIUM BLD-SCNC: 142 MMOL/L (ref 136–145)
TOTAL PROTEIN: 6.4 G/DL (ref 6.4–8.2)
WBC # BLD: 8.1 K/UL (ref 4–11)

## 2020-07-08 NOTE — TELEPHONE ENCOUNTER
Pt care giver states he weigh pt on Friday and she weigh 159.6 and he gave her lasix on Friday. Pt care giver states he weigh her on Saturday and she weigh 157 and care giver weigh her today and it was 161.4. Pt care giver states he will be bringing pt in for labs in around 10 to 10:30 if you would like to speak to him. Care giver just wanted to give dr update and see what else can do to help get the water off.

## 2020-07-08 NOTE — TELEPHONE ENCOUNTER
Reclast #1   Dx: Osteopenia   Dexa: 4/12/2018   New one ordered but not completed yet. Labs: bun was elevated 6/16.  New orders place today

## 2020-07-08 NOTE — TELEPHONE ENCOUNTER
Patient of Dr. Corrinne Resides. Dr. Crain Record referral to dietician. Caregiver Blaine Arizmendi called to schedule an appt with dietician but wanted Galileo Jeff to review records ahead of time before he would schedule.

## 2020-07-09 ENCOUNTER — TELEPHONE (OUTPATIENT)
Dept: PULMONOLOGY | Age: 75
End: 2020-07-09

## 2020-07-09 LAB — SEDIMENTATION RATE, ERYTHROCYTE: 28 MM/HR (ref 0–30)

## 2020-07-09 RX ORDER — AZATHIOPRINE 50 MG/1
TABLET ORAL
Qty: 90 TABLET | Refills: 0 | Status: SHIPPED | OUTPATIENT
Start: 2020-07-09 | End: 2021-01-04

## 2020-07-13 ENCOUNTER — TELEPHONE (OUTPATIENT)
Dept: RHEUMATOLOGY | Age: 75
End: 2020-07-13

## 2020-07-13 ENCOUNTER — OFFICE VISIT (OUTPATIENT)
Dept: INTERNAL MEDICINE CLINIC | Age: 75
End: 2020-07-13
Payer: MEDICARE

## 2020-07-13 VITALS
SYSTOLIC BLOOD PRESSURE: 124 MMHG | OXYGEN SATURATION: 92 % | BODY MASS INDEX: 30.08 KG/M2 | HEART RATE: 78 BPM | TEMPERATURE: 96.5 F | DIASTOLIC BLOOD PRESSURE: 80 MMHG | HEIGHT: 60 IN

## 2020-07-13 PROCEDURE — 1123F ACP DISCUSS/DSCN MKR DOCD: CPT | Performed by: INTERNAL MEDICINE

## 2020-07-13 PROCEDURE — 1090F PRES/ABSN URINE INCON ASSESS: CPT | Performed by: INTERNAL MEDICINE

## 2020-07-13 PROCEDURE — 4040F PNEUMOC VAC/ADMIN/RCVD: CPT | Performed by: INTERNAL MEDICINE

## 2020-07-13 PROCEDURE — 1036F TOBACCO NON-USER: CPT | Performed by: INTERNAL MEDICINE

## 2020-07-13 PROCEDURE — G8399 PT W/DXA RESULTS DOCUMENT: HCPCS | Performed by: INTERNAL MEDICINE

## 2020-07-13 PROCEDURE — G8417 CALC BMI ABV UP PARAM F/U: HCPCS | Performed by: INTERNAL MEDICINE

## 2020-07-13 PROCEDURE — 99213 OFFICE O/P EST LOW 20 MIN: CPT | Performed by: INTERNAL MEDICINE

## 2020-07-13 PROCEDURE — G8427 DOCREV CUR MEDS BY ELIG CLIN: HCPCS | Performed by: INTERNAL MEDICINE

## 2020-07-13 RX ORDER — FUROSEMIDE 40 MG/1
40 TABLET ORAL
COMMUNITY
End: 2020-08-21 | Stop reason: SDUPTHER

## 2020-07-13 ASSESSMENT — ENCOUNTER SYMPTOMS
SHORTNESS OF BREATH: 0
VOMITING: 0
CHEST TIGHTNESS: 0
NAUSEA: 0
ABDOMINAL PAIN: 0

## 2020-07-13 NOTE — TELEPHONE ENCOUNTER
Please call patient and advise she needs to have her dexa scan done to proceed with Reclast infusion.

## 2020-07-13 NOTE — PROGRESS NOTES
MG (18392 UT) CAPS TAKE 1 CAPSULE BY MOUTH EVERY OTHER WEEK 6/16/20  Yes Aranza Mosley MD   mirtazapine (REMERON) 7.5 MG tablet Take 1 tablet by mouth nightly 6/10/20  Yes Aranza Mosley MD   pantoprazole (PROTONIX) 20 MG tablet TAKE 1 TABLET BY MOUTH TWICE DAILY 5/27/20  Yes rAanza Mosley MD   rosuvastatin (CRESTOR) 20 MG tablet TAKE 1 TABLET BY MOUTH DAILY 5/5/20  Yes Aranza Mosley MD   clopidogrel (PLAVIX) 75 MG tablet TAKE 1 TABLET BY MOUTH DAILY 5/5/20  Yes Aranza Mosley MD   hydroxychloroquine (PLAQUENIL) 200 MG tablet TAKE ONE TABLET BY MOUTH TWICE DAILY 3/30/20  Yes Alexis Haines MD   carvedilol (COREG) 6.25 MG tablet Take 1 tablet by mouth 2 times daily 2/24/20  Yes Aranza Mosley MD   ondansetron (ZOFRAN) 4 MG tablet TAKE 1 TABLET BY MOUTH EVERY 12 HOURS AS NEEDED FOR NAUSEA OR VOMITING 2/17/20  Yes Aranza Mosley MD   buPROPion (WELLBUTRIN XL) 300 MG extended release tablet TAKE 1 TABLET BY MOUTH EVERY MORNING 2/11/20  Yes Reynold Neil MD   predniSONE (DELTASONE) 5 MG tablet Take 3 tablets by mouth daily for 14 days, THEN 2 tablets daily. TAKE 2 TABLETS BY MOUTH DAILY. Patient taking differently: 5mg 12/19/19 1/1/21 Yes Aranza Mosley MD   docusate (COLACE, DULCOLAX) 100 MG CAPS Take 100 mg by mouth 2 times daily 12/2/19  Yes Najma Casillas DO   clobetasol (TEMOVATE) 0.05 % external solution Apply topically 2 times daily Apply topically 2 times daily. Yes Paz Cordoba MD   acetaminophen 650 MG TABS Take 650 mg by mouth every 6 hours as needed for Pain or Fever (Fever >100.5 F (38 C)). 12/9/13  Yes Michelle Castellano MD   predniSONE (DELTASONE) 2.5 MG tablet Take 1 tab daily with 5 mg tab for 7.5 mg  Patient not taking: Reported on 7/13/2020 6/11/20   Alexis Haines MD       REVIEW OF SYSTEMS:  Review of Systems   Constitutional: Negative for activity change, appetite change, chills, fever and unexpected weight change.    HENT: Negative for hearing loss. Eyes: Negative for visual disturbance. Respiratory: Negative for chest tightness and shortness of breath. Cardiovascular: Negative for chest pain. Gastrointestinal: Negative for abdominal pain, nausea and vomiting. Genitourinary: Negative for hematuria. Skin: Negative for rash. Allergic/Immunologic: Negative for immunocompromised state. Neurological: Negative for dizziness and headaches. Psychiatric/Behavioral: Negative for dysphoric mood and suicidal ideas. Physical Exam:      Vitals: /80   Pulse 78   Temp 96.5 °F (35.8 °C)   Ht 5' (1.524 m)   SpO2 92%   Breastfeeding No   BMI 30.08 kg/m²     Body mass index is 30.08 kg/m². Wt Readings from Last 3 Encounters:   06/10/20 154 lb (69.9 kg)   06/04/20 152 lb 9.6 oz (69.2 kg)   04/09/20 156 lb (70.8 kg)     Physical Exam  Constitutional:       General: She is not in acute distress. Appearance: She is well-developed. She is not diaphoretic. HENT:      Head: Normocephalic and atraumatic. Mouth/Throat:      Pharynx: No oropharyngeal exudate. Eyes:      General: No scleral icterus. Right eye: No discharge. Left eye: No discharge. Conjunctiva/sclera: Conjunctivae normal.   Neck:      Musculoskeletal: Normal range of motion and neck supple. Cardiovascular:      Rate and Rhythm: Normal rate and regular rhythm. Pulses: Normal pulses. Heart sounds: Normal heart sounds. No murmur. Pulmonary:      Effort: Pulmonary effort is normal. No respiratory distress. Breath sounds: Normal breath sounds. No wheezing or rales. Abdominal:      General: There is no distension. Palpations: Abdomen is soft. Tenderness: There is no abdominal tenderness. There is no guarding. Musculoskeletal:         General: No deformity. Right lower leg: No edema. Left lower leg: No edema.    Lymphadenopathy:      Head:      Right side of head: No submental or submandibular adenopathy. Left side of head: No submental or submandibular adenopathy. Cervical: No cervical adenopathy. Right cervical: No superficial or deep cervical adenopathy. Left cervical: No superficial or deep cervical adenopathy. Skin:     Findings: No rash. Neurological:      Mental Status: She is alert and oriented to person, place, and time. GCS: GCS eye subscore is 4. GCS verbal subscore is 5. GCS motor subscore is 6. Motor: No abnormal muscle tone. Deep Tendon Reflexes: Reflexes are normal and symmetric. Psychiatric:         Behavior: Behavior normal.         Thought Content: Thought content normal.        Assessment/Plan:   Roberto Beckman was seen today for medication check. Diagnoses and all orders for this visit:    Essential hypertension, benign  Well controlled- no changes in medication today. If weight gain persists--> consider reducing Coreg    Systemic lupus erythematosus, unspecified SLE type, unspecified organ involvement status ([facility] [address]  She is on Azathioprine  Also on Prednisone  F/u w/ rheum in 2-3 weeks. Weight gain  referral to dietitian.   -     Internal Referral to Dietitian    Her lung sounds better today  Pt is non distressed. Mood is good  Try improve diet before changing Mirtazapine. Her cough is sporadic and related to her ILD  Pt is worried about Covid- check     - Patient was encouraged to call the office (and ask to see me) or be seen by other provider for any worsening or lack of improvement of the symptoms within a few days / weeks. - Pt was asked toschedule an appointment in 2 months, and to let me know of any scheduling difficulties. Additional patients instructions (if given): There are no Patient Instructions on file for this visit. Minoo Shepherd M.D.   7/13/2020, 8:20 AM      NOTE: This report was transcribed using voice recognition software.  Every effort was made to ensure accuracy, however, inadvertent computerized transcription errors may be present.

## 2020-07-15 ENCOUNTER — TELEMEDICINE (OUTPATIENT)
Dept: PSYCHOLOGY | Age: 75
End: 2020-07-15
Payer: MEDICARE

## 2020-07-15 PROCEDURE — 98968 PH1 ASSMT&MGMT NQHP 21-30: CPT | Performed by: PSYCHOLOGIST

## 2020-07-15 NOTE — PROGRESS NOTES
Behavioral Health Consultation  [address] [place] [address] Ivanna, [address] [place] [address]  Psychologist  7/15/2020  11:50 AM      Time spent with Patient: 22 minutes  This is patient's thirty ninth [facility] appointment. Reason for Consult:  Depression, anxiety, stress  Referring Provider: Antonella Reaves MD    TELEHEALTH VISIT -- Audio Only- technical problems with pt's magalie (During WSSWY-77 public health emergency)  }  Pursuant to the emergency declaration under the [address] [place] [address] waiver authority and the [employer] Act, this Virtual Visit was conducted, with patient's consent, to reduce the patient's risk of exposure to COVID-19 and provide continuity of care for an established patient. Services were provided through a video synchronous discussion virtually to substitute for in-person clinic visit. Pt gave verbal informed consent to participate in telehealth services. Conducted a risk-benefit analysis and determined that the patient's presenting problems are consistent with the use of telepsychology. Determined that the patient has sufficient knowledge and skills in the use of technology enabling them to adequately benefit from telepsychology. It was determined that this patient was able to be properly treated without an in-person session. Patient verified that they were currently located at the [facility] address that was provided during registration.     Verified the following information:  Patient's identification: Yes  Patient location: [address] [place] [id number]   Patient's call back number: [phone]   Patient's emergency contact's name and number, as well as permission to contact them if needed: Extended Emergency Contact Information  Primary Emergency Contact: Anya Strange [address] [place] [address] Phone: [phone]  Relation: Other  Secondary Emergency Contact: [address] [place] [address] Phone: [phone]  Relation: Child     Provider location: [facility]:  During the last pt set goals to 1) continue to focus on tasks you can control 2) contact office to establish with dietician 3) return for f/u in 1 week    Pt reports she is \"ok. \" Caregivers surgery is on Friday- will have home health. Is nervous about it and not happy. Doesn't like having strangers in her home. Lost a tooth so had some pain. Has reached out to dietician but hasn't gotten a call for scheduling yet. Mood overall ok. Hasn't focused on some other chronic stressors due to more impending stress of not having her usual caregiver.          O:  MSE:     Appearance: Not assessed  Attitude: cooperative and friendly  Consciousness: alert  Orientation: oriented to person, place, time, general circumstance  Memory: recent and remote memory intact  Attention/Concentration: intact during session  Psychomotor Activity: Not assessed  Eye Contact: Not assessed  Speech: normal rate and volume, well-articulated  Mood: \"ok\"  Affect: euthymic and congruent  Perception: within normal limits  Thought Content: within normal limits  Thought Process: logical, coherent and goal-directed  Insight: fair  Judgment: intact  Ability to understand instructions: Yes  Ability to respond meaningfully: Yes  Morbid Ideation: no   Suicide Assessment: no suicidal ideation, plan, or intent  Homicidal Ideation: no        History:    Medications:   Current Outpatient Medications   Medication Sig Dispense Refill    furosemide (LASIX) 40 MG tablet Take 40 mg by mouth 2 times daily Patient caregiver has started giving patient again, due to severe swelling      azaTHIOprine (IMURAN) 50 MG tablet TAKE 1 TABLETS BY MOUTH DAILY 90 tablet 0    Cholecalciferol (VITAMIN D3) 1.25 MG (68688 UT) CAPS TAKE 1 CAPSULE BY MOUTH EVERY OTHER WEEK 4 capsule 2    predniSONE (DELTASONE) 2.5 MG tablet Take 1 tab daily with 5 mg tab for 7.5 mg (Patient not taking: Reported on 7/13/2020) 30 tablet 3    mirtazapine (REMERON) 7.5 MG tablet Take 1 tablet by mouth nightly 30 tablet 2    pantoprazole (PROTONIX) 20 MG tablet TAKE 1 TABLET BY MOUTH TWICE DAILY 60 tablet 2    rosuvastatin (CRESTOR) 20 MG tablet TAKE 1 TABLET BY MOUTH DAILY 30 tablet 5    clopidogrel (PLAVIX) 75 MG tablet TAKE 1 TABLET BY MOUTH DAILY 30 tablet 2    hydroxychloroquine (PLAQUENIL) 200 MG tablet TAKE ONE TABLET BY MOUTH TWICE DAILY 60 tablet 2    carvedilol (COREG) 6.25 MG tablet Take 1 tablet by mouth 2 times daily 180 tablet 2    ondansetron (ZOFRAN) 4 MG tablet TAKE 1 TABLET BY MOUTH EVERY 12 HOURS AS NEEDED FOR NAUSEA OR VOMITING 30 tablet 3    buPROPion (WELLBUTRIN XL) 300 MG extended release tablet TAKE 1 TABLET BY MOUTH EVERY MORNING 90 tablet 1    predniSONE (DELTASONE) 5 MG tablet Take 3 tablets by mouth daily for 14 days, THEN 2 tablets daily. TAKE 2 TABLETS BY MOUTH DAILY. (Patient taking differently: 5mg) 90 tablet 3    docusate (COLACE, DULCOLAX) 100 MG CAPS Take 100 mg by mouth 2 times daily 60 capsule 3    clobetasol (TEMOVATE) 0.05 % external solution Apply topically 2 times daily Apply topically 2 times daily.  acetaminophen 650 MG TABS Take 650 mg by mouth every 6 hours as needed for Pain or Fever (Fever >100.5 F (38 C)). 60 tablet 1     No current facility-administered medications for this visit.         Social History:   Social History     Socioeconomic History    Marital status:      Spouse name: Not on file    Number of children: 1    Years of education: 15    Highest education level: Not on file   Occupational History    Occupation: Retired   Social Needs    Financial resource strain: Not on file    Food insecurity     Worry: Not on file     Inability: Not on file   [employer] needs     Medical: Not on file     Non-medical: Not on file   Tobacco Use    Smoking status: Former Smoker     Packs/day: 0.50     Years: 30.00     Pack years: 15.00     Types: Cigarettes Start date: 63     Last attempt to quit: 2014     Years since quittin.8    Smokeless tobacco: Never Used   Substance and Sexual Activity    Alcohol use: Yes     Alcohol/week: 0.0 standard drinks     Comment: 2 cocktails on weekends    Drug use: No    Sexual activity: Not Currently   Lifestyle    Physical activity     Days per week: Not on file     Minutes per session: Not on file    Stress: Not on file   Relationships    Social connections     Talks on phone: Not on file     Gets together: Not on file     Attends Spiritism service: Not on file     Active member of club or organization: Not on file     Attends meetings of clubs or organizations: Not on file     Relationship status: Not on file    Intimate partner violence     Fear of current or ex partner: Not on file     Emotionally abused: Not on file     Physically abused: Not on file     Forced sexual activity: Not on file   Other Topics Concern    Not on file   Social History Narrative    Not on file       TOBACCO:   reports that she quit smoking about 5 years ago. Her smoking use included cigarettes. She started smoking about 55 years ago. She has a 15.00 pack-year smoking history. She has never used smokeless tobacco.  ETOH:   reports current alcohol use. Family History:   Family History   Problem Relation Age of Onset    High Blood Pressure Brother          A:  Ms. Ainsley Hampton continues to struggle with depression and stage of life stressors. She continues to be friendly and engaged and responds positively to behavioral interventions.       Diagnosis:    MDD, Recurrent, Moderate  Anxiety  Stress      Plan:  Pt interventions:  [place]-setting to identify pt's primary goals for [facility] visit / overall health and Supportive techniques, assisted pt with problem solving strategies,  ACT interventions and reinforced pt's skill use, treatment planning    Pt Behavioral Change Plan:  Pt set goals to 1)focus on immediate stressors 2) return for f/u in 1 week

## 2020-07-21 ENCOUNTER — TELEMEDICINE (OUTPATIENT)
Dept: PSYCHOLOGY | Age: 75
End: 2020-07-21
Payer: MEDICARE

## 2020-07-21 PROCEDURE — 98968 PH1 ASSMT&MGMT NQHP 21-30: CPT | Performed by: PSYCHOLOGIST

## 2020-07-21 NOTE — PROGRESS NOTES
Behavioral Health Consultation  Usman Edwards PsyD  Psychologist  7/21/2020  10:17 AM      Time spent with Patient: 30 minutes  This is patient's [facility] appointment. Reason for Consult:  Depression, anxiety, stress  Referring Provider: Robin Irwin MD    TELEHEALTH VISIT -- Audio Only- technical problems with pt's magalie (During [facility]-[id number] public health emergency)  }  Pursuant to the emergency declaration under the [address] [place] [address] waiver authority and the [employer] Act, this Virtual Visit was conducted, with patient's consent, to reduce the patient's risk of exposure to COVID-19 and provide continuity of care for an established patient. Services were provided through a video synchronous discussion virtually to substitute for in-person clinic visit. Pt gave verbal informed consent to participate in telehealth services. Conducted a risk-benefit analysis and determined that the patient's presenting problems are consistent with the use of telepsychology. Determined that the patient has sufficient knowledge and skills in the use of technology enabling them to adequately benefit from telepsychology. It was determined that this patient was able to be properly treated without an in-person session. Patient verified that they were currently located at the [facility] address that was provided during registration.     Verified the following information:  Patient's identification: Yes  Patient location: [address] [place] [address]   Patient's call back number: [phone]   Patient's emergency contact's name and number, as well as permission to contact them if needed: Extended Emergency Contact Information  Primary Emergency Contact: Anya Strange [address] [place] [address] Phone: [phone]  Relation: Other  Secondary Emergency Contact: [address] [place] [address] Phone: [phone]  Relation: Child     Provider location: [place], [place]       S:  During the last Pt set goals to 1)focus on immediate stressors 2) return for f/u in 1 week    Pt reports she is \"ok. \" SO/caregiver had surgery so had home care when he was in the hospital. Eldon Dooleyler better than she expected. He is home now and doing well which is relieving for her. Hasn't heard back from her  and is frustrated. Son has helped her out with dinners since SO has been in hospital- still hasn't paid her another payment. Continues to worry about her weight - feels lack of confidence due to weight gain. Feels uncomfortable in her clothes as they are tighter now. Wants to lose weight but thinks it is medication related. Thinking of getting hair trimmed to feel better about herself.          O:  MSE:     Appearance: good hygiene   Attitude: cooperative and friendly  Consciousness: alert  Orientation: oriented to person, place, time, general circumstance  Memory: recent and remote memory intact  Attention/Concentration: intact during session  Psychomotor Activity: normal  Eye Contact: normal  Speech: normal rate and volume, well-articulated  Mood: \"pretty good\"  Affect: euthymic and congruent  Perception: within normal limits  Thought Content: within normal limits  Thought Process: logical, coherent and goal-directed  Insight: fair  Judgment: intact  Ability to understand instructions: Yes  Ability to respond meaningfully: Yes  Morbid Ideation: no   Suicide Assessment: no suicidal ideation, plan, or intent  Homicidal Ideation: no        History:    Medications:   Current Outpatient Medications   Medication Sig Dispense Refill    furosemide (LASIX) 40 MG tablet Take 40 mg by mouth 2 times daily Patient caregiver has started giving patient again, due to severe swelling      azaTHIOprine (IMURAN) 50 MG tablet TAKE 1 TABLETS BY MOUTH DAILY 90 tablet 0    Cholecalciferol (VITAMIN D3) 1.25 MG (92479 UT) CAPS TAKE 1 CAPSULE BY MOUTH Non-medical: Not on file   Tobacco Use    Smoking status: Former Smoker     Packs/day: 0.50     Years: 30.00     Pack years: 15.00     Types: Cigarettes     Start date:      Last attempt to quit: 2014     Years since quittin.8    Smokeless tobacco: Never Used   Substance and Sexual Activity    Alcohol use: Yes     Alcohol/week: 0.0 standard drinks     Comment: 2 cocktails on weekends    Drug use: No    Sexual activity: Not Currently   Lifestyle    Physical activity     Days per week: Not on file     Minutes per session: Not on file    Stress: Not on file   Relationships    Social connections     Talks on phone: Not on file     Gets together: Not on file     Attends Yarsani service: Not on file     Active member of club or organization: Not on file     Attends meetings of clubs or organizations: Not on file     Relationship status: Not on file    Intimate partner violence     Fear of current or ex partner: Not on file     Emotionally abused: Not on file     Physically abused: Not on file     Forced sexual activity: Not on file   Other Topics Concern    Not on file   Social History Narrative    Not on file       TOBACCO:   reports that she quit smoking about 5 years ago. Her smoking use included cigarettes. She started smoking about 55 years ago. She has a 15.00 pack-year smoking history. She has never used smokeless tobacco.  ETOH:   reports current alcohol use. Family History:   Family History   Problem Relation Age of Onset    High Blood Pressure Brother          A:  Ms. Odilon Fay continues to struggle with depression and stage of life stressors although mood has been stable since last visit. She continues to gain weight and is interested in weight loss. She continues to be friendly and engaged and responds positively to behavioral interventions. She was assisted in scheduling visit with dietician.       Diagnosis:    MDD, Recurrent, Moderate  Anxiety  Stress      Plan:  Pt interventions:  [place]-setting to identify pt's primary goals for [facility] visit / overall health and Supportive techniques, assisted pt with problem solving strategies, CBT interventions,  ACT interventions and reinforced pt's skill use, treatment planning    Pt Behavioral Change Plan:  Pt set goals to 1) continue to focus on immediate stressors 2) see dietician on Thursday 3) return for f/u in 2 weeks

## 2020-07-22 NOTE — TELEPHONE ENCOUNTER
VOB for Reclast #1  Co Ins.: 20%   Covered : 80%  Until deductible has been met . Patient will have a $20.00 office visit co pay.   Deductible :$198.00 Met: 0  PA is not required

## 2020-07-22 NOTE — TELEPHONE ENCOUNTER
Medicare Primary  [employer] supplement secondary, pt is covered. Needs Dexa completed. Order is in [address] Can you please call pt and remind her to have this done. Give number to central scheduling please.

## 2020-07-23 ENCOUNTER — TELEMEDICINE (OUTPATIENT)
Dept: INTERNAL MEDICINE CLINIC | Age: 75
End: 2020-07-23

## 2020-07-23 RX ORDER — HYDROXYCHLOROQUINE SULFATE 200 MG/1
TABLET, FILM COATED ORAL
Qty: 60 TABLET | Refills: 2 | Status: SHIPPED | OUTPATIENT
Start: 2020-07-23 | End: 2020-10-19

## 2020-07-23 NOTE — PATIENT INSTRUCTIONS
BEHAVIOR GOALS    What to eat: Plan meals to follow Plate Guide, including vegetables, fruits, and whole grains daily.     How much to eat: Monitor hunger levels; eat just till satisfied, especially sherbet, pasta, coffee cake    Physical Activity: Add pedal cycling or walking in the house after meals

## 2020-07-23 NOTE — PROGRESS NOTES
incontinence    Hearing loss    Vitamin D deficiency    mixed Incontinence    Essential hypertension, benign    Intertrochanteric fracture of left hip (HCC)    DVT (deep venous thrombosis) ([facility])    Allergic rhinitis    Ataxia involving legs    Coronary artery disease involving native coronary artery of native heart without angina pectoris    Acute left-sided weakness    [facility]vascular accident [facility]) [facility]    Bunion of great toe of right foot    ILD (interstitial lung disease) ([facility]    Intractable pain    Closed fracture of ninth thoracic vertebra with delayed healing    Pain in thoracic spine    Intractable back pain    Recurrent falls    Acute on chronic heart failure with preserved ejection fraction (HCC)    Diastolic congestive heart failure (HCC)    BLANCA (generalized anxiety disorder)    Dysuria    Nausea    Rhinorrhea    Hyponatremia    Confusion    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[facility])    Acute encephalopathy    Constipation    Swelling of right hand       Current Outpatient Medications   Medication Sig Dispense Refill    furosemide (LASIX) 40 MG tablet Take 40 mg by mouth 2 times daily Patient caregiver has started giving patient again, due to severe swelling      azaTHIOprine (IMURAN) 50 MG tablet TAKE 1 TABLETS BY MOUTH DAILY 90 tablet 0    Cholecalciferol (VITAMIN D3) 1.25 MG (93279 UT) CAPS TAKE 1 CAPSULE BY MOUTH EVERY OTHER WEEK 4 capsule 2    predniSONE (DELTASONE) 2.5 MG tablet Take 1 tab daily with 5 mg tab for 7.5 mg (Patient not taking: Reported on 7/13/2020) 30 tablet 3    mirtazapine (REMERON) 7.5 MG tablet Take 1 tablet by mouth nightly 30 tablet 2    pantoprazole (PROTONIX) 20 MG tablet TAKE 1 TABLET BY MOUTH TWICE DAILY 60 tablet 2    rosuvastatin (CRESTOR) 20 MG tablet TAKE 1 TABLET BY MOUTH DAILY 30 tablet 5    clopidogrel (PLAVIX) 75 MG tablet TAKE 1 TABLET BY MOUTH DAILY 30 tablet 2    hydroxychloroquine (PLAQUENIL) 200 MG tablet TAKE ONE TABLET BY MOUTH TWICE DAILY 60 tablet 2    carvedilol (COREG) 6.25 MG tablet Take 1 tablet by mouth 2 times daily 180 tablet 2    ondansetron (ZOFRAN) 4 MG tablet TAKE 1 TABLET BY MOUTH EVERY 12 HOURS AS NEEDED FOR NAUSEA OR VOMITING 30 tablet 3    buPROPion (WELLBUTRIN XL) 300 MG extended release tablet TAKE 1 TABLET BY MOUTH EVERY MORNING 90 tablet 1    predniSONE (DELTASONE) 5 MG tablet Take 3 tablets by mouth daily for 14 days, THEN 2 tablets daily. TAKE 2 TABLETS BY MOUTH DAILY. (Patient taking differently: 5mg) 90 tablet 3    docusate (COLACE, DULCOLAX) 100 MG CAPS Take 100 mg by mouth 2 times daily 60 capsule 3    clobetasol (TEMOVATE) 0.05 % external solution Apply topically 2 times daily Apply topically 2 times daily.  acetaminophen 650 MG TABS Take 650 mg by mouth every 6 hours as needed for Pain or Fever (Fever >100.5 F (38 C)). 60 tablet 1     No current facility-administered medications for this visit.           NUTRITION ASSESSMENT    Biochemical Data, Medical Tests and Procedures:    Lab Results   Component Value Date    LABA1C 5.8 10/10/2019     Lab Results   Component Value Date    .8 10/10/2019       Lab Results   Component Value Date    CHOL 126 10/10/2019    CHOL 140 05/03/2018    CHOL 179 10/24/2017     Lab Results   Component Value Date    TRIG 70 10/10/2019    TRIG 112 05/03/2018    TRIG 97 10/24/2017     Lab Results   Component Value Date    HDL 66 (H) 10/10/2019    HDL 66 (H) 05/03/2018    HDL 80 (H) 10/24/2017     Lab Results   Component Value Date    LDLCALC 46 10/10/2019    LDLCALC 52 05/03/2018    LDLCALC 80 10/24/2017     Lab Results   Component Value Date    LABVLDL 14 10/10/2019    LABVLDL 22 05/03/2018    LABVLDL 19 10/24/2017     No results found for: CHOLHDLRATIO    Lab Results   Component Value Date    WBC 8.1 07/08/2020    HGB 13.1 07/08/2020    HCT 39.4 07/08/2020    MCV 97.0 07/08/2020     07/08/2020     Lab Results   Component Value Date    CREATININE 0.9 07/08/2020    BUN 28 (H) 07/08/2020     07/08/2020    K 3.9 07/08/2020     07/08/2020    CO2 22 07/08/2020       Anthropometric Measurements: Wt Readings from Last 3 Encounters:   06/10/20 154 lb (69.9 kg)   06/04/20 152 lb 9.6 oz (69.2 kg)   04/09/20 156 lb (70.8 kg)      BMI Readings from Last 3 Encounters:   07/13/20 30.08 kg/m²   06/10/20 30.08 kg/m²   06/04/20 29.80 kg/m²   165 2 days ago, gave large dose of lasix, lost 7 lbs  Patient's stated goal weight: not certain  7% Weight loss goal weight: 143    Physical Activity Assessment:  Physical activity: tricycle 30 minutes, walking (50 - 60 ft)    Sleep: 10 hours    Food and Nutrition History:   Usual beverages: water 20 - 30 oz daily Ensure or Protein shake daily, propel once weekly  Alcohol consumption: no    Frequency of meals away from home: rarely  Number of people in household: 2 (spouse does food preparation)  Food Availability Problems:   Within the past 12 months, have you worried that your food would run out before you got money to buy more? No  Within the past 12 months, has the food you bought not lasted till the end of the month and you didn't have money to get more?  No    Usual meals:   FOOD RECALL    First meal--Usual time: 8am  Today: Cheerios 3/4 cup, 2% milk, coffeecake small piece  Different Day: toast and protein shake     Snack  Ensure    Second meal--Usual time: 12:30  Recent: 1/2 egg sandwich and corn chips  Different Day: half sandwich: tuna OR peanut butter and honey     Snack  Almonds 1/4 cup    Third meal--Usual time: 5:30p  Recent: 3 oz pork chop fried in canola oil, 1/2 c creamed potatoes, 1/2 cupgreen beans  Different Day: attila roast or chicken or fish or meatloaf    Snack  Ice cream    Motivational Interviewing        Follow Up: as needed    Referring Provider: Himanshu Baez MD  Time spent with patient: 60 minutes

## 2020-07-28 ENCOUNTER — OFFICE VISIT (OUTPATIENT)
Dept: PRIMARY CARE CLINIC | Age: 75
End: 2020-07-28
Payer: MEDICARE

## 2020-07-28 PROCEDURE — G8428 CUR MEDS NOT DOCUMENT: HCPCS | Performed by: NURSE PRACTITIONER

## 2020-07-28 PROCEDURE — 99211 OFF/OP EST MAY X REQ PHY/QHP: CPT | Performed by: NURSE PRACTITIONER

## 2020-07-28 PROCEDURE — G8417 CALC BMI ABV UP PARAM F/U: HCPCS | Performed by: NURSE PRACTITIONER

## 2020-07-28 NOTE — PROGRESS NOTES
Tonya Matos received a viral test for COVID-19. They were educated on isolation and quarantine as appropriate. For any symptoms, they were directed to seek care from their PCP, given contact information to establish with a doctor, directed to an urgent care or the emergency room.

## 2020-08-03 ENCOUNTER — HOSPITAL ENCOUNTER (OUTPATIENT)
Dept: GENERAL RADIOLOGY | Age: 75
Discharge: HOME | End: 2020-08-03
Payer: MEDICARE

## 2020-08-03 ENCOUNTER — TELEPHONE (OUTPATIENT)
Dept: INTERNAL MEDICINE CLINIC | Age: 75
End: 2020-08-03

## 2020-08-03 ENCOUNTER — HOSPITAL ENCOUNTER (OUTPATIENT)
Dept: PULMONOLOGY | Age: 75
Discharge: HOME | End: 2020-08-03
Payer: MEDICARE

## 2020-08-03 VITALS — OXYGEN SATURATION: 97 %

## 2020-08-03 PROCEDURE — 94664 DEMO&/EVAL PT USE INHALER: CPT

## 2020-08-03 PROCEDURE — 94010 BREATHING CAPACITY TEST: CPT

## 2020-08-03 PROCEDURE — 77080 DXA BONE DENSITY AXIAL: CPT

## 2020-08-03 PROCEDURE — 94729 DIFFUSING CAPACITY: CPT

## 2020-08-03 PROCEDURE — 94760 N-INVAS EAR/PLS OXIMETRY 1: CPT

## 2020-08-03 PROCEDURE — 94726 PLETHYSMOGRAPHY LUNG VOLUMES: CPT

## 2020-08-03 RX ORDER — ALBUTEROL SULFATE 2.5 MG/3ML
2.5 SOLUTION RESPIRATORY (INHALATION) ONCE
Status: DISCONTINUED | OUTPATIENT
Start: 2020-08-03 | End: 2020-08-04 | Stop reason: HOSPADM

## 2020-08-04 ENCOUNTER — TELEMEDICINE (OUTPATIENT)
Dept: PSYCHOLOGY | Age: 75
End: 2020-08-04
Payer: MEDICARE

## 2020-08-04 PROCEDURE — 98968 PH1 ASSMT&MGMT NQHP 21-30: CPT | Performed by: PSYCHOLOGIST

## 2020-08-04 NOTE — PROCEDURES
[address]               [address] [place], [address]                               PULMONARY FUNCTION    PATIENT NAME: Maurice Soliman                      :        1945  MED REC NO:   [id number]                          ROOM:  ACCOUNT NO:   [de-identified]                           ADMIT DATE: 2020  PROVIDER:     Madelyn Lopez MD    DATE OF PROCEDURE:  2020    This is done in comparison to a study from 2020. FINDINGS:  Spirometry shows an FEV1 of 1.91, which is 94% of predicted  and FVC of 1.91. Forced expiratory volume in 1 second was 1.69, which  is 113% of predicted. Lung volumes were in the normal range, exception  of a decreased residual volume. Diffusion capacity was 63% of  predicted. There was no correction for hemoglobin as it was not  available. There was moderate decrease in diffusion. In comparison to  study from earlier this year, there was no substantial changes. CONCLUSION:  Normal baseline spirometry with moderate decrease in  diffusion, could be possibly secondary to anemia. This does not show  any progression of reported interstitial lung disease. But an isolated decrease in diffusion could be a sign of pulmonary vascular disease.         Maty Irwin MD    D: 2020 10:02:02       T: 2020 12:35:21     [id number]  Job#: [id number]     Doc#: [id number]    CC:

## 2020-08-05 LAB
REPORT: NORMAL
SARS-COV-2: NOT DETECTED
THIS TEST SENT TO: NORMAL

## 2020-08-07 RX ORDER — CLOPIDOGREL BISULFATE 75 MG/1
TABLET ORAL
Qty: 30 TABLET | Refills: 2 | Status: SHIPPED | OUTPATIENT
Start: 2020-08-07 | End: 2020-11-02

## 2020-08-11 ENCOUNTER — TELEMEDICINE (OUTPATIENT)
Dept: RHEUMATOLOGY | Age: 75
End: 2020-08-11
Payer: MEDICARE

## 2020-08-11 ENCOUNTER — TELEPHONE (OUTPATIENT)
Dept: RHEUMATOLOGY | Age: 75
End: 2020-08-11

## 2020-08-11 PROCEDURE — G8399 PT W/DXA RESULTS DOCUMENT: HCPCS | Performed by: INTERNAL MEDICINE

## 2020-08-11 PROCEDURE — 1090F PRES/ABSN URINE INCON ASSESS: CPT | Performed by: INTERNAL MEDICINE

## 2020-08-11 PROCEDURE — G8427 DOCREV CUR MEDS BY ELIG CLIN: HCPCS | Performed by: INTERNAL MEDICINE

## 2020-08-11 PROCEDURE — 1123F ACP DISCUSS/DSCN MKR DOCD: CPT | Performed by: INTERNAL MEDICINE

## 2020-08-11 PROCEDURE — 99214 OFFICE O/P EST MOD 30 MIN: CPT | Performed by: INTERNAL MEDICINE

## 2020-08-11 PROCEDURE — 4040F PNEUMOC VAC/ADMIN/RCVD: CPT | Performed by: INTERNAL MEDICINE

## 2020-08-11 NOTE — PROGRESS NOTES
Yunier Bejarano is a 66 y.o. female being evaluated by a Virtual Visit (video visit) encounter to address concerns as mentioned above. A caregiver was present when appropriate. Due to this being a TeleHealth encounter (During VHLDQ-29 public health emergency), evaluation of the following organ systems was limited: Vitals/Constitutional/EENT/Resp/CV/GI//MS/Neuro/Skin/Heme-Lymph-Imm. Pursuant to the emergency declaration under the [address] [place] [address] and the [employer] Act, this Virtual Visit was conducted with patient's (and/or legal guardian's) consent, to reduce the patient's risk of exposure to COVID-19 and provide necessary medical care. The patient (and/or legal guardian) has also been advised to contact this office for worsening conditions or problems, and seek emergency medical treatment and/or call 911 if deemed necessary. Patient identification was verified at the start of the visit: Yes    Total time spent for this encounter: Not billed by time    Services were provided through a video synchronous discussion virtually to substitute for in-person clinic visit. Patient and provider were located at their individual homes. --Brittany Drew MD on 2020 at 8:29 AM    An electronic signature was used to authenticate this note. [address] [place] [address] ) [phone] (F)      Dear Dr. Oskar Martínez MD:  Please find Rheumatology assessment. Thank you for giving me the opportunity to be involved in [facility] care and I look forward following Estiven Alaniz along with you.  If you have any questions or concerns please feel free to reach me. Note is transcribed using voice recognition software. Inadvertent computerized transcription errors may be present. Patient identification: Sheila Blair,: ,48 y.o. Sex: female     A/P  Diagnoses and all orders for this visit:    Connective tissue disease overlap syndrome (HCC)    High risk medication use    ILD (interstitial lung disease) ([facility] [address]    Osteopenia of multiple sites        Cutaneous lupus + Sjogren's overlap with ILD -positive REBECCA, SSA, sicca symptoms and intermittent photosensitive rash. Myositis panel, RF, CCP, anti-Tawnya negative. Today's visit-  Continues to gain weight-has been using Lasix intermittently. Does not have pitting edema in her legs. Intermittent shortness of breath with wheezing especially in the evening, otherwise remained stable. No musculoskeletal complaints. Sicca symptoms are stable. Labs from July-normal inflammatory markers and monitoring for Imuran. Limited physical activity from chronic back pain and arthritis. No cough or shortness of breath. PFT showed improvement in DLCO from 39 to 44%. Lung function have improved. DEXA scan-osteopenia with low score -2.4 at left radius, however is on long-term prednisone use. FRAX score necessitates use of antiresorptive therapy. DEXA scan 2020-            L Spine (L1-L3): BMD= 0.822 g/cm2, T-score= -1.7, Z-score= 0.7, %Change = -13.6%              Left Radius 1/3: BMD= 0.548 g/cm2, T-score= -2.4, Z-score= 0.4, %Change = -4.6%          Plan-  Continue current medications for ILD and Sjogren's disease- Plaquenil 400 mg a day, Imuran 50 mg a day,  prednisone 5 mg a day. Recheck labs in couple of months. We will plan for IV Reclast for bone health, continue calcium and vitamin D supplementation. She is aware of conservative measures for sicca symptoms.   She was advised to call nephrology for leg swelling although it appears that weight gain is unlikely from water retention. Patient indicates understanding and agrees with the management plan. I reviewed patient's history, referral documents and electronic medical records. Copy of consult note is being routed electronically/faxed to referring physician. #######################################################################    [id number] for cutaneous lupus and Sjogren's overlap, also has ILD. Other medical problems anxiety, depression, hypertension. Interval changes-  She tells me that she is doing fairly well other than gaining weight, has gained about 10 pounds in last 1 month, wheezing especially in the evening and sometimes mild GI discomfort. Sicca symptoms are stable. GI discomfort is much better on lower dose of Imuran. She denies any swollen or inflamed joints, muscle weakness, Raynaud's phenomenon, fever or chills or mucositis. Sicca symptoms are mild and manageable with conservative measures. Tolerating medications well. All other review of systems are negative. Past Medical History:   Diagnosis Date    Anxiety     CAD (coronary artery disease)     CHF (congestive heart failure) (HCC)     Depression     DVT of lower extremity (deep venous thrombosis) (HCC)     Falls     Hypertension     Lupus (HCC)     TIA (transient ischemic attack)     x3    Urinary incontinence     UTI (urinary tract infection)      Past Surgical History:   Procedure Laterality Date    BACK SURGERY  9/24/2014     DR. Morrison     COLONOSCOPY  7/12/2010    WNL-repeat in 10 years    FEMUR SURGERY Right     HIP SURGERY Left 12/03/2013    left hip trochanteric femoral nailing   Fifi Asa  8/3/2010    Dr. Lena Godoy, L5-S1    UPPER GASTROINTESTINAL ENDOSCOPY  7/19/10    reflux with no Barretts/no H-pylori    UPPER GASTROINTESTINAL ENDOSCOPY  01/2017    Dr. Sheila Pizano - gastritis and duodinitis , neg H pylori     UPPER GASTROINTESTINAL ENDOSCOPY N/A 2019    EGD DILATION SAVORY 17mm/ [place] F performed by Naa Chang MD at [address] History     Socioeconomic History    Marital status:      Spouse name: Not on file    Number of children: 1    Years of education: 15    Highest education level: Not on file   Occupational History    Occupation: Retired   Social Needs    Financial resource strain: Not on file    Food insecurity     Worry: Not on file     Inability: Not on file   [employer] needs     Medical: Not on file     Non-medical: Not on file   Tobacco Use    Smoking status: Former Smoker     Packs/day: 0.50     Years: 30.00     Pack years: 15.00     Types: Cigarettes     Start date:      Last attempt to quit: 2014     Years since quittin.8    Smokeless tobacco: Never Used   Substance and Sexual Activity    Alcohol use:  Yes     Alcohol/week: 0.0 standard drinks     Comment: 2 cocktails on weekends    Drug use: No    Sexual activity: Not Currently   Lifestyle    Physical activity     Days per week: Not on file     Minutes per session: Not on file    Stress: Not on file   Relationships    Social connections     Talks on phone: Not on file     Gets together: Not on file     Attends Congregation service: Not on file     Active member of club or organization: Not on file     Attends meetings of clubs or organizations: Not on file     Relationship status: Not on file    Intimate partner violence     Fear of current or ex partner: Not on file     Emotionally abused: Not on file     Physically abused: Not on file     Forced sexual activity: Not on file   Other Topics Concern    Not on file   Social History Narrative    Not on file       No family history of autoimmune diseases    Current Outpatient Medications   Medication Sig Dispense Refill    clopidogrel (PLAVIX) 75 MG tablet TAKE 1 TABLET BY MOUTH DAILY 30 tablet 2    hydroxychloroquine (PLAQUENIL) 200 MG tablet TAKE 1 TABLET BY MOUTH TWICE vitals taken for this visit. General appearance/ Psychiatric: well nourished, and well groomed, normal judgement, alert, appears stated age and cooperative. MKS: No findings to note in the video visit. Does not have any inflamed or swollen joints. No pitting edema in her legs   Skin: No rashes,  No evidence ischemia or deformities noted in digits or nails. External inspection of the eyes, nose, salivary glands looks normal.  Chest: Normal effort at rest/    DATA:   Lab Results   Component Value Date    WBC 8.1 07/08/2020    HGB 13.1 07/08/2020    HCT 39.4 07/08/2020    MCV 97.0 07/08/2020     07/08/2020         Chemistry        Component Value Date/Time     07/08/2020 1053    K 3.9 07/08/2020 1053    K 4.1 11/26/2019 0821     07/08/2020 1053    CO2 22 07/08/2020 1053    BUN 28 (H) 07/08/2020 1053    CREATININE 0.9 07/08/2020 1053        Component Value Date/Time    CALCIUM 9.5 07/08/2020 1053    ALKPHOS 130 (H) 07/08/2020 1053    AST 36 07/08/2020 1053    ALT 31 07/08/2020 1053    BILITOT 0.3 07/08/2020 1053          Lab Results   Component Value Date    LABURIC 2.5 (L) 11/28/2019     Lab Results   Component Value Date    SEDRATE 28 07/08/2020     Lab Results   Component Value Date    CRP 2.9 07/08/2020     Lab Results   Component Value Date    REBECCA POSITIVE (A) 10/02/2019    SEDRATE 28 07/08/2020     Lab Results   Component Value Date    CKTOTAL 87 10/02/2019     No results found for: TSH  Lab Results   Component Value Date    VITD25 86.7 11/26/2019         Radiology Review:     PFT 1/21/2020  Diffusion capacity is 8.29, which is 45% of predicted.     IMPRESSION:  1. Normal spirometry. 2.  Normal lung volumes. 3.  Moderate decrease in diffusion capacity. 4.  When compared to previous pulmonary function test dated 10/11/2019,  there is a significant improvement in spirometry and lung volumes.    Diffusion capacity remains relatively unchanged.         CT chest 9/19/2019-  Impression     1.  Bilateral subpleural reticular opacities and traction bronchiectasis and developing subpleural honeycombing compatible with mild to moderate pulmonary fibrosis with progression from comparison chest CT. 2.  Clustered nodular opacities in the right upper lobe may be inflammatory/infectious but nonspecific. Following the Fleischner Society 2017 guidelines, if patient is low risk no routine follow-up is suggested unless suspicious morphology or upper lobe    location. If patient is high risk, then optional CT at 12 months is suggested.  qzxv   3.  Unchanged severe T9 and T11 vertebral compression fractures with retropulsion. PFT 10/11/2019      Pulse ox is 94 % on room air    Spirometry data:    FEV1/FVC: 78. Predicted ratio 75    FEV1 1.07 L, which is 65 % predicted    FVC is 1.38 L, which is 62 % predicted  Lung Volumes:    TLC (by Plethysmography) is 3.21 L, which is 73 % predicted    RV is 1.35 L which is 64 % predicted    Diffusion Capacity:    DLCO is 7.55 which is 39 % predicted  Impression:    1. There is no obstruction present    2. There is mild restriction with TLC of 73% predicted    4. There is severe reduction in diffusion capacity    Comment:   The presence of restriction and reduction of diffusion capacity   is a new in comparison with the PFT she had on 11/3/2017  PFT findings are suggestive of progression of ILD.  Clinical   correlation is recommended. A/P- See above.

## 2020-08-12 ENCOUNTER — TELEPHONE (OUTPATIENT)
Dept: INTERNAL MEDICINE CLINIC | Age: 75
End: 2020-08-12

## 2020-08-13 ENCOUNTER — VIRTUAL VISIT (OUTPATIENT)
Dept: INTERNAL MEDICINE CLINIC | Age: 75
End: 2020-08-13

## 2020-08-13 ENCOUNTER — TELEPHONE (OUTPATIENT)
Dept: INTERNAL MEDICINE CLINIC | Age: 75
End: 2020-08-13

## 2020-08-13 ASSESSMENT — ENCOUNTER SYMPTOMS
ABDOMINAL PAIN: 0
VOMITING: 0
NAUSEA: 0
CHEST TIGHTNESS: 0
SHORTNESS OF BREATH: 0

## 2020-08-14 ENCOUNTER — OFFICE VISIT (OUTPATIENT)
Dept: INTERNAL MEDICINE CLINIC | Age: 75
End: 2020-08-14
Payer: MEDICARE

## 2020-08-14 VITALS
WEIGHT: 163.6 LBS | HEART RATE: 58 BPM | SYSTOLIC BLOOD PRESSURE: 112 MMHG | DIASTOLIC BLOOD PRESSURE: 74 MMHG | BODY MASS INDEX: 31.95 KG/M2 | TEMPERATURE: 97.5 F | OXYGEN SATURATION: 96 %

## 2020-08-14 PROCEDURE — 99213 OFFICE O/P EST LOW 20 MIN: CPT | Performed by: INTERNAL MEDICINE

## 2020-08-14 PROCEDURE — 4040F PNEUMOC VAC/ADMIN/RCVD: CPT | Performed by: INTERNAL MEDICINE

## 2020-08-14 PROCEDURE — 1123F ACP DISCUSS/DSCN MKR DOCD: CPT | Performed by: INTERNAL MEDICINE

## 2020-08-14 PROCEDURE — 1090F PRES/ABSN URINE INCON ASSESS: CPT | Performed by: INTERNAL MEDICINE

## 2020-08-14 PROCEDURE — 1036F TOBACCO NON-USER: CPT | Performed by: INTERNAL MEDICINE

## 2020-08-14 PROCEDURE — G8399 PT W/DXA RESULTS DOCUMENT: HCPCS | Performed by: INTERNAL MEDICINE

## 2020-08-14 PROCEDURE — G8427 DOCREV CUR MEDS BY ELIG CLIN: HCPCS | Performed by: INTERNAL MEDICINE

## 2020-08-14 PROCEDURE — G8417 CALC BMI ABV UP PARAM F/U: HCPCS | Performed by: INTERNAL MEDICINE

## 2020-08-14 ASSESSMENT — ENCOUNTER SYMPTOMS
CHEST TIGHTNESS: 0
ABDOMINAL PAIN: 0
SHORTNESS OF BREATH: 0
VOMITING: 0
NAUSEA: 0

## 2020-08-14 NOTE — PROGRESS NOTES
Outpatient Note for established Patient - regular Visit     History Obtained From:  patient, electronic medical record  Is the patient new to me ? - No    HISTORY OF PRESENT ILLNESS:   The patient is a 66 y.o. female who is here today for :  Mila Moran was seen today for weight gain. Diagnoses and all orders for this visit:    Weight gain    Systemic lupus erythematosus, unspecified SLE type, unspecified organ involvement status ([address]    Essential hypertension, benign      Pt feels that her mood is good  Her caregiver thinks that her mood is better with her meds  She started Mirtazepine in June 2/2020-158   4/2020- 156  6/2020- 152-->154  7/2020- 163   Pt denies CP/SOB/palpitations/abdominal pain/N/V. She does states that in the evening she sometimes get     Past Medical History:        Diagnosis Date    Anxiety     CAD (coronary artery disease)     CHF (congestive heart failure) (HCC)     Depression     DVT of lower extremity (deep venous thrombosis) (HCC)     Falls     Hypertension     Lupus ([facility] [address]     TIA (transient ischemic attack)     x3    Urinary incontinence     UTI (urinary tract infection)        Social History:   TOBACCO:   reports that she quit smoking about 5 years ago. Her smoking use included cigarettes. She started smoking about 55 years ago. She has a 15.00 pack-year smoking history. She has never used smokeless tobacco.    ETOH:   reports current alcohol use. Current Medications:    Prior to Admission medications    Medication Sig Start Date End Date Taking?  Authorizing Provider   clopidogrel (PLAVIX) 75 MG tablet TAKE 1 TABLET BY MOUTH DAILY 8/7/20  Yes Colby Sexton MD   hydroxychloroquine (PLAQUENIL) 200 MG tablet TAKE 1 TABLET BY MOUTH TWICE DAILY 7/23/20  Yes Kris Bell MD   furosemide (LASIX) 40 MG tablet Take 40 mg by mouth Patient caregiver has started giving patient again twice a week, due to severe swelling   Yes Historical Provider, MD   azaTHIOprine (IMURAN) 50 MG tablet TAKE 1 TABLETS BY MOUTH DAILY 7/9/20  Yes Rachana Villa MD   Cholecalciferol (VITAMIN D3) 1.25 MG (79713 UT) CAPS TAKE 1 CAPSULE BY MOUTH EVERY OTHER WEEK 6/16/20  Yes Rachana Villa MD   predniSONE (DELTASONE) 2.5 MG tablet Take 1 tab daily with 5 mg tab for 7.5 mg 6/11/20  Yes Justice Salinas MD   mirtazapine (REMERON) 7.5 MG tablet Take 1 tablet by mouth nightly 6/10/20  Yes Rachana Villa MD   pantoprazole (PROTONIX) 20 MG tablet TAKE 1 TABLET BY MOUTH TWICE DAILY 5/27/20  Yes Rachana Villa MD   rosuvastatin (CRESTOR) 20 MG tablet TAKE 1 TABLET BY MOUTH DAILY 5/5/20  Yes Rachana Villa MD   carvedilol (COREG) 6.25 MG tablet Take 1 tablet by mouth 2 times daily 2/24/20  Yes Rachana Villa MD   ondansetron (ZOFRAN) 4 MG tablet TAKE 1 TABLET BY MOUTH EVERY 12 HOURS AS NEEDED FOR NAUSEA OR VOMITING 2/17/20  Yes Rachana Villa MD   buPROPion (WELLBUTRIN XL) 300 MG extended release tablet TAKE 1 TABLET BY MOUTH EVERY MORNING 2/11/20  Yes Jaclyn Valdez MD   predniSONE (DELTASONE) 5 MG tablet Take 3 tablets by mouth daily for 14 days, THEN 2 tablets daily. TAKE 2 TABLETS BY MOUTH DAILY. Patient taking differently: 5mg 12/19/19 1/1/21 Yes Rachana Villa MD   docusate (COLACE, DULCOLAX) 100 MG CAPS Take 100 mg by mouth 2 times daily 12/2/19  Yes Najma Casillas DO   clobetasol (TEMOVATE) 0.05 % external solution Apply topically 2 times daily Apply topically 2 times daily. Yes Paz Cordoba MD   acetaminophen 650 MG TABS Take 650 mg by mouth every 6 hours as needed for Pain or Fever (Fever >100.5 F (38 C)). 12/9/13  Yes Ludy Roberts MD       REVIEW OF SYSTEMS:  Review of Systems   Constitutional: Negative for activity change, appetite change, chills, fever and unexpected weight change. HENT: Negative for hearing loss. Eyes: Negative for visual disturbance. Respiratory: Negative for chest tightness and shortness of breath. cervical: No superficial or deep cervical adenopathy. Left cervical: No superficial or deep cervical adenopathy. Skin:     Findings: No rash. Neurological:      Mental Status: She is alert and oriented to person, place, and time. GCS: GCS eye subscore is 4. GCS verbal subscore is 5. GCS motor subscore is 6. Motor: No abnormal muscle tone. Deep Tendon Reflexes: Reflexes are normal and symmetric. Psychiatric:         Behavior: Behavior normal.         Thought Content: Thought content normal.            Assessment/Plan:   Fausto Agee was seen today for weight gain. Diagnoses and all orders for this visit:    Weight gain  Patient gained weight without any evidence of fluid overload. Most likely secondary to . Weight gain is roughly 5 to 6 pounds. We discussed options of discontinue mirtazapine versus follow-up with that. Since she had difficulty with other antidepressants due to hyponatremia and since mirtazapine seems to work well we decided to wait another 4 weeks for the next follow-up appointment and then reevaluate. Systemic lupus erythematosus, unspecified SLE type, unspecified organ involvement status ([facility] [address]  No evidence of flareup  Essential hypertension, benign  Well controlled- no changes in medication today. Monitor blood pressure periodically    - Patient was encouraged to call the office (and ask to see me) or be seen by other provider for any worsening or lack of improvement of the symptoms within a few days / weeks. - Pt was asked toschedule an appointment in 1 months, and to let me know of any scheduling difficulties. Additional patients instructions (if given): There are no Patient Instructions on file for this visit. Perico Churchill M.D.   8/14/2020, 11:49 AM      NOTE: This report was transcribed using voice recognition software. Every effort was made to ensure accuracy, however, inadvertent computerized transcription errors may be present.

## 2020-08-17 ENCOUNTER — NURSE ONLY (OUTPATIENT)
Dept: RHEUMATOLOGY | Age: 75
End: 2020-08-17
Payer: MEDICARE

## 2020-08-17 VITALS
TEMPERATURE: 98.2 F | HEART RATE: 68 BPM | DIASTOLIC BLOOD PRESSURE: 74 MMHG | SYSTOLIC BLOOD PRESSURE: 136 MMHG | RESPIRATION RATE: 16 BRPM

## 2020-08-17 PROCEDURE — 96413 CHEMO IV INFUSION 1 HR: CPT | Performed by: INTERNAL MEDICINE

## 2020-08-17 RX ORDER — ZOLEDRONIC ACID 5 MG/100ML
5 INJECTION, SOLUTION INTRAVENOUS ONCE
Status: COMPLETED | OUTPATIENT
Start: 2020-08-17 | End: 2020-08-17

## 2020-08-17 RX ADMIN — ZOLEDRONIC ACID 5 MG: 5 INJECTION, SOLUTION INTRAVENOUS at 09:37

## 2020-08-17 NOTE — PROGRESS NOTES
Pt here for Reclast infusion #1, no f/u  visit with Dr. Inez Dowling, today. Infusion  tolerated well. Pt discharged ambulatory with her .      IV Gauge: 24  IV Site: left hand  # of Attempts: 1  IV Start: 0937 am  IV Stop: 0952 am

## 2020-08-21 ENCOUNTER — PATIENT MESSAGE (OUTPATIENT)
Dept: INTERNAL MEDICINE CLINIC | Age: 75
End: 2020-08-21

## 2020-08-21 RX ORDER — FUROSEMIDE 40 MG/1
TABLET ORAL
Qty: 15 TABLET | Refills: 1 | Status: SHIPPED | OUTPATIENT
Start: 2020-08-21 | End: 2020-08-22 | Stop reason: DRUGHIGH

## 2020-08-21 NOTE — TELEPHONE ENCOUNTER
From: Bella Shaikh  To: Tiffany Hsieh MD  Sent: 8/21/2020 12:38 PM EDT  Subject: Prescription Question    Thursday weight was 168 I gave her   The 40mg lasix she woke up at 165.augie mostly complains   About her lower legs being very tight. There is some ankle swelling   And a lot of discoloring Even after [id number] first time for that. I manly want to try the 20mg  Just to see if it helps. i am also going to try and restrict her fluids to  About 25oz. as.far as her lungs the nurse was here Tuesday and said   Her lungs sounded good. she did note some /we-zing/how do you spell that

## 2020-08-22 RX ORDER — FUROSEMIDE 20 MG/1
20 TABLET ORAL EVERY OTHER DAY
Qty: 45 TABLET | Refills: 0 | Status: SHIPPED | OUTPATIENT
Start: 2020-08-22 | End: 2020-10-09

## 2020-08-25 ENCOUNTER — TELEMEDICINE (OUTPATIENT)
Dept: PSYCHOLOGY | Age: 75
End: 2020-08-25
Payer: MEDICARE

## 2020-08-25 PROCEDURE — 90832 PSYTX W PT 30 MINUTES: CPT | Performed by: PSYCHOLOGIST

## 2020-08-25 NOTE — PROGRESS NOTES
Behavioral Health Consultation  [facility] Quyen Goncalves PsyD  Psychologist  8/25/2020  9:17 AM      Time spent with Patient: 30 minutes  This is patient's [de-identified] second [facility] appointment. Reason for Consult:  Depression, anxiety, stress  Referring Provider: Leland Fernandez MD    TELEHEALTH VISIT -- Audio/Video (During [facility]- public [facility] emergency)  }  Pursuant to the emergency declaration under the [address] [place] [address] waiver authority and the [employer] Act, this Virtual Visit was conducted, with patient's consent, to reduce the patient's risk of exposure to COVID-19 and provide continuity of care for an established patient. Services were provided through a video synchronous discussion virtually to substitute for in-person clinic visit. Pt gave verbal informed consent to participate in telehealth services. Conducted a risk-benefit analysis and determined that the patient's presenting problems are consistent with the use of telepsychology. Determined that the patient has sufficient knowledge and skills in the use of technology enabling them to adequately benefit from telepsychology. It was determined that this patient was able to be properly treated without an in-person session. Patient verified that they were currently located at the [facility] [address] address that was provided during registration.     Verified the following information:  Patient's identification: Yes  Patient location: [address] [place] [id number]   Patient's call back number: [phone]   Patient's emergency contact's name and number, as well as permission to contact them if needed: Extended Emergency Contact Information  Primary Emergency Contact: Anya Strange [address] [place] [address] Phone: [phone]  Relation: Other  Secondary Emergency Contact: [address] [place] [address] Phone: [phone]  Relation: Child Provider location: [facility]:  Pt reports she is doing well. Does have to get a tooth extraction. Still frustrated with her weight. Spoke to dietician and doesn't think its diet related. Frustrated about her weight. Got in touch with  and made changes she wanted to make. Hasn't told son yet and anticipates him being upset. Is worried about her death and gets upset. Spends a lot of time thinking about what she isn't able to do and how much her life has changed and isn't doing much that's enjoyable.       O:  MSE:     Appearance: good hygiene   Attitude: cooperative and friendly  Consciousness: alert  Orientation: oriented to person, place, time, general circumstance  Memory: recent and remote memory intact  Attention/Concentration: intact during session  Psychomotor Activity: normal  Eye Contact: normal  Speech: normal rate and volume, well-articulated  Mood: \"good\"  Affect: euthymic and congruent  Perception: within normal limits  Thought Content: within normal limits  Thought Process: logical, coherent and goal-directed  Insight: fair  Judgment: intact  Ability to understand instructions: Yes  Ability to respond meaningfully: Yes  Morbid Ideation: no   Suicide Assessment: no suicidal ideation, plan, or intent  Homicidal Ideation: no        History:    Medications:   Current Outpatient Medications   Medication Sig Dispense Refill    furosemide (LASIX) 20 MG tablet Take 1 tablet by mouth every other day 45 tablet 0    clopidogrel (PLAVIX) 75 MG tablet TAKE 1 TABLET BY MOUTH DAILY 30 tablet 2    hydroxychloroquine (PLAQUENIL) 200 MG tablet TAKE 1 TABLET BY MOUTH TWICE DAILY 60 tablet 2    azaTHIOprine (IMURAN) 50 MG tablet TAKE 1 TABLETS BY MOUTH DAILY 90 tablet 0    Cholecalciferol (VITAMIN D3) 1.25 MG (32363 UT) CAPS TAKE 1 CAPSULE BY MOUTH EVERY OTHER WEEK 4 capsule 2    predniSONE (DELTASONE) 2.5 MG tablet Take 1 tab daily with 5 mg tab for 7.5 mg 30 tablet 3    mirtazapine (REMERON) 7.5 MG tablet Take 1 tablet by mouth nightly 30 tablet 2    pantoprazole (PROTONIX) 20 MG tablet TAKE 1 TABLET BY MOUTH TWICE DAILY 60 tablet 2    rosuvastatin (CRESTOR) 20 MG tablet TAKE 1 TABLET BY MOUTH DAILY 30 tablet 5    carvedilol (COREG) 6.25 MG tablet Take 1 tablet by mouth 2 times daily 180 tablet 2    ondansetron (ZOFRAN) 4 MG tablet TAKE 1 TABLET BY MOUTH EVERY 12 HOURS AS NEEDED FOR NAUSEA OR VOMITING 30 tablet 3    buPROPion (WELLBUTRIN XL) 300 MG extended release tablet TAKE 1 TABLET BY MOUTH EVERY MORNING 90 tablet 1    predniSONE (DELTASONE) 5 MG tablet Take 3 tablets by mouth daily for 14 days, THEN 2 tablets daily. TAKE 2 TABLETS BY MOUTH DAILY. (Patient taking differently: 5mg) 90 tablet 3    docusate (COLACE, DULCOLAX) 100 MG CAPS Take 100 mg by mouth 2 times daily 60 capsule 3    clobetasol (TEMOVATE) 0.05 % external solution Apply topically 2 times daily Apply topically 2 times daily.  acetaminophen 650 MG TABS Take 650 mg by mouth every 6 hours as needed for Pain or Fever (Fever >100.5 F (38 C)). 60 tablet 1     No current facility-administered medications for this visit. Social History:   Social History     Socioeconomic History    Marital status:      Spouse name: Not on file    Number of children: 1    Years of education: 15    Highest education level: Not on file   Occupational History    Occupation: Retired   Social Needs    Financial resource strain: Not on file    Food insecurity     Worry: Not on file     Inability: Not on file   [employer] needs     Medical: Not on file     Non-medical: Not on file   Tobacco Use    Smoking status: Former Smoker     Packs/day: 0.50     Years: 30.00     Pack years: 15.00     Types: Cigarettes     Start date:      Last attempt to quit: 2014     Years since quittin.9    Smokeless tobacco: Never Used   Substance and Sexual Activity    Alcohol use:  Yes     Alcohol/week: 0.0 standard drinks Comment: 2 cocktails on weekends    Drug use: No    Sexual activity: Not Currently   Lifestyle    Physical activity     Days per week: Not on file     Minutes per session: Not on file    Stress: Not on file   Relationships    Social connections     Talks on phone: Not on file     Gets together: Not on file     Attends Orthodox service: Not on file     Active member of club or organization: Not on file     Attends meetings of clubs or organizations: Not on file     Relationship status: Not on file    Intimate partner violence     Fear of current or ex partner: Not on file     Emotionally abused: Not on file     Physically abused: Not on file     Forced sexual activity: Not on file   Other Topics Concern    Not on file   Social History Narrative    Not on file       TOBACCO:   reports that she quit smoking about 5 years ago. Her smoking use included cigarettes. She started smoking about 55 years ago. She has a 15.00 pack-year smoking history. She has never used smokeless tobacco.  ETOH:   reports current alcohol use. Family History:   Family History   Problem Relation Age of Onset    High Blood Pressure Brother          A:  Ms. Iris Jeffrey mood has improved since the last visit. She continues to be active and engaged and responds positively to behavioral interventions.     Diagnosis:    MDD, Recurrent, Moderate  Anxiety  Stress      Plan:  Pt interventions:  [place]-setting to identify pt's primary goals for [facility] visit / overall health and Supportive techniques,  ACT interventions and reinforced pt's skill use, treatment planning    Pt Behavioral Change Plan:  Pt set goals to 1) focus on activities that could bring fun into your life and brainstorm with Paige 2) return for f/u in 3 weeks

## 2020-08-26 RX ORDER — PANTOPRAZOLE SODIUM 20 MG/1
TABLET, DELAYED RELEASE ORAL
Qty: 60 TABLET | Refills: 2 | Status: SHIPPED | OUTPATIENT
Start: 2020-08-26 | End: 2020-11-29 | Stop reason: SDUPTHER

## 2020-08-28 RX ORDER — FUROSEMIDE 40 MG/1
TABLET ORAL
Qty: 45 TABLET | Refills: 1 | Status: SHIPPED | OUTPATIENT
Start: 2020-08-28 | End: 2020-10-09

## 2020-09-02 RX ORDER — MIRTAZAPINE 7.5 MG/1
TABLET, FILM COATED ORAL
Qty: 30 TABLET | Refills: 2 | Status: SHIPPED | OUTPATIENT
Start: 2020-09-02 | End: 2021-01-12

## 2020-09-04 DIAGNOSIS — E87.1 HYPONATREMIA: ICD-10-CM

## 2020-09-04 LAB
ALBUMIN SERPL-MCNC: 3.8 G/DL (ref 3.4–5)
ANION GAP SERPL CALCULATED.3IONS-SCNC: 12 MMOL/L (ref 3–16)
BUN BLDV-MCNC: 28 MG/DL (ref 7–20)
CALCIUM SERPL-MCNC: 9.3 MG/DL (ref 8.3–10.6)
CHLORIDE BLD-SCNC: 109 MMOL/L (ref 99–110)
CO2: 22 MMOL/L (ref 21–32)
CREAT SERPL-MCNC: 1 MG/DL (ref 0.6–1.2)
GFR AFRICAN AMERICAN: >60
GFR NON-AFRICAN AMERICAN: 54
GLUCOSE BLD-MCNC: 123 MG/DL (ref 70–99)
PHOSPHORUS: 3.1 MG/DL (ref 2.5–4.9)
POTASSIUM SERPL-SCNC: 3.7 MMOL/L (ref 3.5–5.1)
SODIUM BLD-SCNC: 143 MMOL/L (ref 136–145)

## 2020-09-08 RX ORDER — PREDNISONE 1 MG/1
TABLET ORAL
Qty: 30 TABLET | Refills: 2 | Status: SHIPPED | OUTPATIENT
Start: 2020-09-08 | End: 2020-12-07 | Stop reason: SDUPTHER

## 2020-09-11 ASSESSMENT — LIFESTYLE VARIABLES
AUDIT-C TOTAL SCORE: INCOMPLETE
HOW OFTEN DO YOU HAVE A DRINK CONTAINING ALCOHOL: NEVER
AUDIT TOTAL SCORE: INCOMPLETE
HOW OFTEN DO YOU HAVE A DRINK CONTAINING ALCOHOL: 0

## 2020-09-11 ASSESSMENT — PATIENT HEALTH QUESTIONNAIRE - PHQ9
1. LITTLE INTEREST OR PLEASURE IN DOING THINGS: 0
SUM OF ALL RESPONSES TO PHQ9 QUESTIONS 1 & 2: 1
SUM OF ALL RESPONSES TO PHQ QUESTIONS 1-9: 1
SUM OF ALL RESPONSES TO PHQ QUESTIONS 1-9: 1
2. FEELING DOWN, DEPRESSED OR HOPELESS: 1

## 2020-09-14 ENCOUNTER — OFFICE VISIT (OUTPATIENT)
Dept: INTERNAL MEDICINE CLINIC | Age: 75
End: 2020-09-14
Payer: MEDICARE

## 2020-09-14 VITALS
BODY MASS INDEX: 32.59 KG/M2 | HEIGHT: 60 IN | OXYGEN SATURATION: 97 % | DIASTOLIC BLOOD PRESSURE: 72 MMHG | SYSTOLIC BLOOD PRESSURE: 124 MMHG | TEMPERATURE: 96.9 F | HEART RATE: 66 BPM | WEIGHT: 166 LBS

## 2020-09-14 PROCEDURE — 90694 VACC AIIV4 NO PRSRV 0.5ML IM: CPT | Performed by: INTERNAL MEDICINE

## 2020-09-14 PROCEDURE — 1123F ACP DISCUSS/DSCN MKR DOCD: CPT | Performed by: INTERNAL MEDICINE

## 2020-09-14 PROCEDURE — 4040F PNEUMOC VAC/ADMIN/RCVD: CPT | Performed by: INTERNAL MEDICINE

## 2020-09-14 PROCEDURE — G0439 PPPS, SUBSEQ VISIT: HCPCS | Performed by: INTERNAL MEDICINE

## 2020-09-14 PROCEDURE — G0008 ADMIN INFLUENZA VIRUS VAC: HCPCS | Performed by: INTERNAL MEDICINE

## 2020-09-14 ASSESSMENT — ENCOUNTER SYMPTOMS
CHEST TIGHTNESS: 0
DIARRHEA: 0
CONSTIPATION: 0
BLOOD IN STOOL: 0
VOMITING: 0
ABDOMINAL PAIN: 0
SHORTNESS OF BREATH: 0
NAUSEA: 0

## 2020-09-14 NOTE — PROGRESS NOTES
Vaccine Information Sheet, \"Influenza - Inactivated\"  given to Ravinder Serna, or parent/legal guardian of  Ravinder Serna and verbalized understanding. Patient responses:    Have you ever had a reaction to a flu vaccine? No  Do you have any current illness? No  Have you ever had Guillian Nescopeck Syndrome? No  Do you have a serious allergy to any of the follow: Neomycin, Polymyxin, Thimerosal, eggs or egg products? No    Flu vaccine given per order. Please see immunization tab. Risks and benefits explained. Current VIS given.       Immunizations Administered     Name Date Dose Route    Influenza, Quadv, adjuvanted, 65 yrs +, IM, PF (Fluad) 9/14/2020 0.5 mL Intramuscular    Site: Deltoid- Right    Lot: [id number]    NDC: 81920-041-21

## 2020-09-14 NOTE — PATIENT INSTRUCTIONS
Please check when was the last colonoscopy and send me the results. We can try switch Mirtazapine to every other days. Please let me know. Personalized Preventive Plan for Eugene Lemus - 9/14/2020  Medicare offers a range of preventive health benefits. Some of the tests and screenings are paid in full while other may be subject to a deductible, co-insurance, and/or copay. Some of these benefits include a comprehensive review of your medical history including lifestyle, illnesses that may run in your family, and various assessments and screenings as appropriate. After reviewing your medical record and screening and assessments performed today your provider may have ordered immunizations, labs, imaging, and/or referrals for you. A list of these orders (if applicable) as well as your Preventive Care list are included within your After Visit Summary for your review. Other Preventive Recommendations:    · A preventive eye exam performed by an eye specialist is recommended every 1-2 years to screen for glaucoma; cataracts, macular degeneration, and other eye disorders. · A preventive dental visit is recommended every 6 months. · Try to get at least 150 minutes of exercise per week or 10,000 steps per day on a pedometer . · Order or download the FREE \"Exercise & Physical Activity: Your Everyday Guide\" from [employer]. Call 7-868.223.5885 or search [employer] online. · You need 6608-1848 mg of calcium and 0472-8337 IU of vitamin D per day. It is possible to meet your calcium requirement with diet alone, but a vitamin D supplement is usually necessary to meet this goal.  · When exposed to the sun, use a sunscreen that protects against both UVA and UVB radiation with an SPF of 30 or greater. Reapply every 2 to 3 hours or after sweating, drying off with a towel, or swimming. · Always wear a seat belt when traveling in a car.  Always wear a helmet when riding a

## 2020-09-14 NOTE — PROGRESS NOTES
Barretts/no H-pylori    UPPER GASTROINTESTINAL ENDOSCOPY  01/2017    Dr. Shawn Douglas - gastritis and duodinitis , neg H pylori     UPPER GASTROINTESTINAL ENDOSCOPY N/A 12/2/2019    EGD DILATION SAVORY 17mm/ 46 F performed by Beny Ness MD at [facility] ENDOSCOPY       Family History:       Problem Relation Age of Onset    High Blood Pressure Brother        Social History:   TOBACCO:   reports that she quit smoking about 5 years ago. Her smoking use included cigarettes. She started smoking about 55 years ago. She has a 15.00 pack-year smoking history. She has never used smokeless tobacco.  ETOH:   reports current alcohol use. Allergies:  Medrol [methylprednisolone]; Oxycontin [oxycodone hcl]; and Vicodin [hydrocodone-acetaminophen]    Current Medications:    Prior to Admission medications    Medication Sig Start Date End Date Taking?  Authorizing Provider   predniSONE (DELTASONE) 5 MG tablet TAKE 1 TABLET BY MOUTH DAILY FOR 30 DOSES 9/8/20  Yes Rosa Valerio MD   mirtazapine (REMERON) 7.5 MG tablet TAKE 1 TABLET BY MOUTH EVERY NIGHT 9/2/20  Yes Aisha Abernathy MD   furosemide (LASIX) 40 MG tablet TAKE 1 TABLET BY MOUTH EVERY OTHER DAY 8/28/20  Yes Aisha Abernathy MD   pantoprazole (PROTONIX) 20 MG tablet TAKE 1 TABLET BY MOUTH TWICE DAILY 8/26/20  Yes Aisha Abernathy MD   furosemide (LASIX) 20 MG tablet Take 1 tablet by mouth every other day 8/22/20 11/20/20 Yes Aisha Abernathy MD   clopidogrel (PLAVIX) 75 MG tablet TAKE 1 TABLET BY MOUTH DAILY 8/7/20  Yes Aisha Abernathy MD   hydroxychloroquine (PLAQUENIL) 200 MG tablet TAKE 1 TABLET BY MOUTH TWICE DAILY 7/23/20  Yes Rosa Valerio MD   azaTHIOprine (IMURAN) 50 MG tablet TAKE 1 TABLETS BY MOUTH DAILY 7/9/20  Yes Aisha Abernathy MD   Cholecalciferol (VITAMIN D3) 1.25 MG (93475 UT) CAPS TAKE 1 CAPSULE BY MOUTH EVERY OTHER WEEK 6/16/20  Yes Aisha Abernathy MD   predniSONE (DELTASONE) 2.5 MG tablet Take 1 tab daily with 5 mg tab for 7.5 mg 6/11/20  Yes Diana Romano MD   rosuvastatin (CRESTOR) 20 MG tablet TAKE 1 TABLET BY MOUTH DAILY 5/5/20  Yes Sterling Joyner MD   carvedilol (COREG) 6.25 MG tablet Take 1 tablet by mouth 2 times daily 2/24/20  Yes Sterling Joyner MD   ondansetron (ZOFRAN) 4 MG tablet TAKE 1 TABLET BY MOUTH EVERY 12 HOURS AS NEEDED FOR NAUSEA OR VOMITING 2/17/20  Yes Sterling Joyner MD   buPROPion (WELLBUTRIN XL) 300 MG extended release tablet TAKE 1 TABLET BY MOUTH EVERY MORNING 2/11/20  Yes Lili Logan MD   docusate (COLACE, DULCOLAX) 100 MG CAPS Take 100 mg by mouth 2 times daily 12/2/19  Yes Najma Casillas DO   clobetasol (TEMOVATE) 0.05 % external solution Apply topically 2 times daily Apply topically 2 times daily. Yes Historical Provider, MD   acetaminophen 650 MG TABS Take 650 mg by mouth every 6 hours as needed for Pain or Fever (Fever >100.5 F (38 C)). 12/9/13  Yes Nicole Camarillo MD       REVIEW OF SYSTEMS:  Review of Systems   Constitutional: Negative for activity change, appetite change, chills, fever and unexpected weight change. HENT: Negative for hearing loss. Eyes: Negative for visual disturbance. Respiratory: Negative for chest tightness and shortness of breath. Cardiovascular: Negative for chest pain. Gastrointestinal: Negative for abdominal pain, blood in stool, constipation, diarrhea, nausea and vomiting. Genitourinary: Negative for hematuria. Skin: Negative for rash. Allergic/Immunologic: Negative for immunocompromised state. Neurological: Negative for dizziness and headaches. Psychiatric/Behavioral: Negative for dysphoric mood and suicidal ideas. Screening:    Colon cancer screening: pt spouse will check     breast cancer screening: last mammogram she is due and interested     Cervical Cancer screening : last PAP smear no need     Need screening for lung cancer? No    Need screening for HIV ?  No    Annual wellness visit:  1) Patient reports > 2 fallin the past year ? No  2) Patient has safety precautious to prevent falls at home? Yes      safety precautions tips were given  3) patient reports anxiety/ depression? sheis doing well , better tan last time   4) patient report limiting vision difficulties? No5) patient report limiting hearingdifficulties? Some : she refuses wearing her hearing   6) patient report eating well and satisfactory diet? Yes - healthy diet tips          were given   7) patient reports physical activity? Yes recommendedwalking 30 min/          day for 5 day a week   8) patient has a living well? Yes living will forms and explanations were       Given  9) Patient lives at: house w/ her significant other           Patient was encouraged to see his regular eye specialist and to do a hearing screening in case of reduced hearing. Immunization:    Immunization History   Administered Date(s) Administered    DTaP vaccine 08/19/2019    Influenza, High Dose (Fluzone 65 yrs and older) 10/24/2017, 11/21/2018    Influenza, Quadv, IM, PF (6 mo and older Fluzone, Flulaval, Fluarix, and 3 yrs and older Afluria) 09/14/2016    Influenza, Triv, inactivated, subunit, adjuvanted, IM (Fluad 65 yrs and older) 10/24/2019    Pneumococcal Conjugate 13-valent ([id number]) 07/10/2015    Pneumococcal Polysaccharide ([id number]) 11/27/2019    Td, unspecified formulation 11/01/2008    Tdap (Boostrix, Adacel) 08/19/2019       Physical Exam:      Vitals: /72 (Site: Right Upper Arm)   Pulse 66   Temp 96.9 °F (36.1 °C)   Ht 5' (1.524 m)   Wt 166 lb (75.3 kg)   SpO2 97%   BMI 32.42 kg/m²     Body mass index is 32.42 kg/m². Wt Readings from Last 3 Encounters:   09/14/20 166 lb (75.3 kg)   08/14/20 163 lb 9.6 oz (74.2 kg)   08/06/20 162 lb (73.5 kg)     Physical Exam  Constitutional:       General: She is not in acute distress. Appearance: She is well-developed. She is not diaphoretic. HENT:      Head: Normocephalic and atraumatic. Right Ear: Tympanic membrane, ear canal and external ear normal.      Left Ear: Tympanic membrane, ear canal and external ear normal.      Mouth/Throat:      Pharynx: No oropharyngeal exudate. Eyes:      General: No scleral icterus. Right eye: No discharge. Left eye: No discharge. Conjunctiva/sclera: Conjunctivae normal.   Neck:      Musculoskeletal: Normal range of motion and neck supple. Cardiovascular:      Rate and Rhythm: Normal rate. Pulses: Normal pulses. Heart sounds: Normal heart sounds. No murmur. Pulmonary:      Effort: Pulmonary effort is normal. No respiratory distress. Breath sounds: Normal breath sounds. No wheezing or rales. Abdominal:      General: There is no distension. Palpations: Abdomen is soft. Tenderness: There is no abdominal tenderness. There is no guarding. Musculoskeletal:         General: No swelling or deformity. Right lower leg: No edema. Left lower leg: No edema. Lymphadenopathy:      Head:      Right side of head: No submental or submandibular adenopathy. Left side of head: No submental or submandibular adenopathy. Cervical: No cervical adenopathy. Right cervical: No superficial or deep cervical adenopathy. Left cervical: No superficial or deep cervical adenopathy. Skin:     Findings: No rash. Neurological:      Mental Status: She is alert and oriented to person, place, and time. GCS: GCS eye subscore is 4. GCS verbal subscore is 5. GCS motor subscore is 6. Motor: No abnormal muscle tone. Deep Tendon Reflexes: Reflexes are normal and symmetric. Psychiatric:         Behavior: Behavior normal.         Thought Content: Thought content normal.        Assessment/Plan:   Consuelo Rios was seen today for medicare awv. Diagnoses and all orders for this visit:    Medicare annual wellness visit, subsequent  Discussed weight gain she is doing very well on mirtazapine.   Likely secondary combination of mirtazapine and steroids. They will consider switching to mirtazapine every other day let me know about the mood    Essential hypertension, benign  Well controlled- no changes in medication today. Systemic lupus erythematosus, unspecified SLE type, unspecified organ involvement status ([facility] [address]  Seems to be controlled on low dose steroids and PLQUENOL     Pure hypercholesterolemia  Well controlled- no changes in medication today. Need for influenza vaccination  -     INFLUENZA, QUADV, ADJUVANTED, 72 YRS =, IM, PF, PREFILL SYR, 0.5ML (FLUAD)    Routine general medical examination at a health care facility    Screening for breast cancer  -     [facility] DIGITAL SCREENING AUGMENTED BILATERAL; Future    Screen for colon cancer  -     Fecal Blood Immunochemical Test; Future    Encounter for screening mammogram for malignant neoplasm of breast   -     ESAU DIGITAL SCREENING AUGMENTED BILATERAL; Future      - Patient was encouraged to call the office (and ask to see me) or be seen by other provider for any worsening or lack of improvement of the symptoms .    - Pt was asked toschedule an appointment in 2 months, and to let me know of any scheduling difficulties. Additional patients instructions (if given):   Patient Instructions   Please check when was the last colonoscopy and send me the results. Personalized Preventive Plan for [facility] - 9/14/2020  Medicare offers a range of preventive health benefits. Some of the tests and screenings are paid in full while other may be subject to a deductible, co-insurance, and/or copay. Some of these benefits include a comprehensive review of your medical history including lifestyle, illnesses that may run in your family, and various assessments and screenings as appropriate. After reviewing your medical record and screening and assessments performed today your provider may have ordered immunizations, labs, imaging, and/or referrals for you.   A list of these orders (if applicable) as well as your Preventive Care list are included within your After Visit Summary for your review. Other Preventive Recommendations:    · A preventive eye exam performed by an eye specialist is recommended every 1-2 years to screen for glaucoma; cataracts, macular degeneration, and other eye disorders. · A preventive dental visit is recommended every 6 months. · Try to get at least 150 minutes of exercise per week or 10,000 steps per day on a pedometer . · Order or download the FREE \"Exercise & Physical Activity: Your Everyday Guide\" from [employer]. Call 1-710.721.6629 or search [employer] online. · You need 0123-0286 mg of calcium and 3661-2662 IU of vitamin D per day. It is possible to meet your calcium requirement with diet alone, but a vitamin D supplement is usually necessary to meet this goal.  · When exposed to the sun, use a sunscreen that protects against both UVA and UVB radiation with an SPF of 30 or greater. Reapply every 2 to 3 hours or after sweating, drying off with a towel, or swimming. · Always wear a seat belt when traveling in a car. Always wear a helmet when riding a bicycle or motorcycle. NOTE: This report was transcribed using voice recognition software. Every effort was made to ensure accuracy, however, inadvertent computerized transcription errors may be present. Medicare Annual Wellness Visit  Name: Jon Jeff Date: 2020   MRN: [id number] Sex: Female   Age: 66 y.o. Ethnicity: Non-/Non    : 1945 Race: Alexandra Sullivan is here for Medicare AWV (wellness)    Screenings for behavioral, psychosocial and functional/safety risks, and cognitive dysfunction are all negative except as indicated below. These results, as well as other patient data from the [address] form, are documented in Flowsheets linked to this Encounter.     Allergies   Allergen Reactions    Medrol [Methylprednisolone]      multiple side effects , able to tolerate prednisone without side effects     Oxycontin [Oxycodone Hcl]      Pruritis, vomiting     Vicodin [Hydrocodone-Acetaminophen] Nausea Only         Prior to Visit Medications    Medication Sig Taking?  Authorizing Provider   predniSONE (DELTASONE) 5 MG tablet TAKE 1 TABLET BY MOUTH DAILY FOR 30 DOSES Yes Kris Bell MD   mirtazapine (REMERON) 7.5 MG tablet TAKE 1 TABLET BY MOUTH EVERY NIGHT Yes Colby Sexton MD   furosemide (LASIX) 40 MG tablet TAKE 1 TABLET BY MOUTH EVERY OTHER DAY Yes Colby Sexton MD   pantoprazole (PROTONIX) 20 MG tablet TAKE 1 TABLET BY MOUTH TWICE DAILY Yes Colby Sexton MD   furosemide (LASIX) 20 MG tablet Take 1 tablet by mouth every other day Yes Colby Sexton MD   clopidogrel (PLAVIX) 75 MG tablet TAKE 1 TABLET BY MOUTH DAILY Yes Colby Sexton MD   hydroxychloroquine (PLAQUENIL) 200 MG tablet TAKE 1 TABLET BY MOUTH TWICE DAILY Yes Kris Bell MD   azaTHIOprine (IMURAN) 50 MG tablet TAKE 1 TABLETS BY MOUTH DAILY Yes Colby Sexton MD   Cholecalciferol (VITAMIN D3) 1.25 MG (41251 UT) CAPS TAKE 1 CAPSULE BY MOUTH EVERY OTHER WEEK Yes Colby Sexton MD   predniSONE (DELTASONE) 2.5 MG tablet Take 1 tab daily with 5 mg tab for 7.5 mg Yes Kris Bell MD   rosuvastatin (CRESTOR) 20 MG tablet TAKE 1 TABLET BY MOUTH DAILY Yes Colby Sexton MD   carvedilol (COREG) 6.25 MG tablet Take 1 tablet by mouth 2 times daily Yes Colby Sexton MD   ondansetron (ZOFRAN) 4 MG tablet TAKE 1 TABLET BY MOUTH EVERY 12 HOURS AS NEEDED FOR NAUSEA OR VOMITING Yes Colby Sexton MD   buPROPion (WELLBUTRIN XL) 300 MG extended release tablet TAKE 1 TABLET BY MOUTH EVERY MORNING Yes Kamar Whyte MD   docusate (COLACE, DULCOLAX) 100 MG CAPS Take 100 mg by mouth 2 times daily Yes Najma Casillas DO   clobetasol (TEMOVATE) 0.05 % external solution Apply topically 2 times daily Apply topically 2 times daily. Yes Historical Provider, MD   acetaminophen 650 MG TABS Take 650 mg by mouth every 6 hours as needed for Pain or Fever (Fever >100.5 F (38 C)). Yes Soha Prince MD         Past Medical History:   Diagnosis Date    Anxiety     CAD (coronary artery disease)     CH[facility]estive [facility] failure) ([facility] [address]     Depression     DVT of lower extremity (deep venous thrombosis) (HCC)     Falls     Hypertension     Lupus ([facility] [address]     TIA (transient ischemic attack)     x3    Urinary incontinence     UTI (urinary tract infection)        Past Surgical History:   Procedure Laterality Date    BACK SURGERY  9/24/2014     DR. Morrison     COLONOSCOPY  7/12/2010    WNL-repeat in 10 years    FEMUR SURGERY Right     HIP SURGERY Left 12/03/2013    left hip trochanteric femoral nailing   Archana Westly  8/3/2010    Dr. Asmita Forbes, L5-S1    UPPER GASTROINTESTINAL ENDOSCOPY  7/19/10    reflux with no Barretts/no H-pylori    UPPER GASTROINTESTINAL ENDOSCOPY  01/2017    Dr. Amelia Mays - gastritis and duodinitis , neg H pylori     UPPER GASTROINTESTINAL ENDOSCOPY N/A 12/2/2019    EGD DILATION SAVORY 17mm/ 46 F performed by Delaney Robbins MD at [facility] ENDOSCOPY         Family History   Problem Relation Age of Onset    High Blood Pressure Brother        CareTeam (Including outside providers/suppliers regularly involved in providing care):   Patient Care Team:  Robin Irwin MD as PCP - General  Robin Irwin MD as PCP - [facility] Empaneled Provider  Te Norris DO as Surgeon (General Surgery)  Saeed Kohli RN as Registered Nurse  Mercy Bethea RN as Registered Nurse  Archana Lindo MD as Consulting Physician (Pulmonology)  Duane Sorrow, RD, LD as Dietitian (Dietitian)    Wt Readings from Last 3 Encounters:   09/14/20 166 lb (75.3 kg)   08/14/20 163 lb 9.6 oz (74.2 kg)   08/06/20 162 lb (73.5 kg)     Vitals:    09/14/20 medicare awv. Diagnoses and all orders for this visit:    Medicare annual wellness visit, subsequent    Essential hypertension, benign    Systemic lupus erythematosus, unspecified SLE type, unspecified organ involvement status ([facility] [address]    Pure hypercholesterolemia    Need for influenza vaccination  -     INFLUENZA, QUADV, ADJUVANTED, 72 YRS =, IM, PF, PREFILL SYR, 0.5ML (FLUAD)    Routine general medical examination at a health care facility    Screening for breast cancer  -     [facility] DIGITAL SCREENING AUGMENTED BILATERAL; Future    Screen for colon cancer  -     Fecal Blood Immunochemical Test; Future    Encounter for screening mammogram for malignant neoplasm of breast   -     [facility]AU DIGITAL SCREENING AUGMENTED BILATERAL;  Future

## 2020-09-15 ENCOUNTER — OFFICE VISIT (OUTPATIENT)
Dept: PULMONOLOGY | Age: 75
End: 2020-09-15
Payer: MEDICARE

## 2020-09-15 VITALS — OXYGEN SATURATION: 98 % | HEART RATE: 70 BPM

## 2020-09-15 PROCEDURE — G8399 PT W/DXA RESULTS DOCUMENT: HCPCS | Performed by: INTERNAL MEDICINE

## 2020-09-15 PROCEDURE — G8427 DOCREV CUR MEDS BY ELIG CLIN: HCPCS | Performed by: INTERNAL MEDICINE

## 2020-09-15 PROCEDURE — 1090F PRES/ABSN URINE INCON ASSESS: CPT | Performed by: INTERNAL MEDICINE

## 2020-09-15 PROCEDURE — 4040F PNEUMOC VAC/ADMIN/RCVD: CPT | Performed by: INTERNAL MEDICINE

## 2020-09-15 PROCEDURE — 1123F ACP DISCUSS/DSCN MKR DOCD: CPT | Performed by: INTERNAL MEDICINE

## 2020-09-15 PROCEDURE — 99214 OFFICE O/P EST MOD 30 MIN: CPT | Performed by: INTERNAL MEDICINE

## 2020-09-15 PROCEDURE — 1036F TOBACCO NON-USER: CPT | Performed by: INTERNAL MEDICINE

## 2020-09-15 PROCEDURE — G8417 CALC BMI ABV UP PARAM F/U: HCPCS | Performed by: INTERNAL MEDICINE

## 2020-09-15 NOTE — PROGRESS NOTES
Subjective:      Patient ID: Yunier Bejarano is a 66 y.o. female. HPI . Miesha Mota returns for follow-up for interstitial lung disease associated with CTD. She recently had a pulmonary function study. She has been managed on Imuran, Plaquenil, and prednisone. Prednisone dose has been reduced to 5 mg daily. She and her son complain that it has caused a lot of weight gain. She continues to have exercise limitation, although notes that it appears to be more of a muscle weakness problem than dyspnea causing this limitation. She walks usually with a walker in her home. She does not feel quite steady enough using a cane. She does not complain of shortness of breath at rest.  She has cough which is largely nonproductive. She does not notice the cough very much, her son notices it much more frequently. It does not appear to be any worse now than it has been for quite a long time. She has not had any lower respiratory infection, febrile illness. She denies chest pain. Review of Systems    Objective:   Physical Exam  Vitals signs reviewed. Constitutional:       Appearance: She is well-developed and overweight. HENT:      Head: Normocephalic and atraumatic. Mouth/Throat:      Mouth: Mucous membranes are moist.      Pharynx: Oropharynx is clear. No oropharyngeal exudate or posterior oropharyngeal erythema. Eyes:      General: No scleral icterus. Right eye: No discharge. Left eye: No discharge. Neck:      Thyroid: No thyromegaly. Trachea: No tracheal deviation. Cardiovascular:      Rate and Rhythm: Normal rate and regular rhythm. Pulses:           Radial pulses are 1+ on the right side and 1+ on the left side. Heart sounds: S1 normal and S2 normal. Murmur present. Crescendo  systolic murmur present with a grade of 1/6. Pulmonary:      Effort: Pulmonary effort is normal.      Comments: Mildly reduced breath sounds bilaterally.   No adventitious breath sounds  Musculoskeletal:      Right lower le+ Edema present. Left lower le+ Edema present. Lymphadenopathy:      Cervical: No cervical adenopathy. Skin:     General: Skin is warm and dry. Neurological:      Mental Status: She is alert and oriented to person, place, and time. Psychiatric:         Mood and Affect: Mood normal.         Behavior: Behavior normal.         Thought Content: Thought content normal.         Judgment: Judgment normal.     Pulmonary function study on 8/3/2020 shows normal spirometry. Total lung capacity and RV are mildly reduced, FRC normal.  Single breath diffusion capacity  Moderately reduced. 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 Her limitations at this point are more musculoskeletal, may be related in part to deconditioning. From a respiratory standpoint, she should be able to tolerate a more structured exercise routine. Plan:      Continue current treatment for connective tissue disorder. Follow-up clinically.   Pulmonary function study may be useful if she has decline in respiratory status, or has decreased response to treatment        Rasta Strickland MD

## 2020-09-22 ENCOUNTER — TELEMEDICINE (OUTPATIENT)
Dept: PSYCHOLOGY | Age: 75
End: 2020-09-22
Payer: MEDICARE

## 2020-09-22 PROCEDURE — 90832 PSYTX W PT 30 MINUTES: CPT | Performed by: PSYCHOLOGIST

## 2020-09-22 NOTE — PROGRESS NOTES
Behavioral Health Consultation  Inderjit Goncalves PsyD  Psychologist  9/22/2020  10:38 AM      Time spent with Patient: 30 minutes  This is patient's [de-identified] third [facility] appointment. Reason for Consult:  Depression, anxiety, stress  Referring Provider: Tiffany Hsieh MD    TELEHEALTH VISIT -- Audio/Video (During XATRV-40 public health emergency)  }  Pursuant to the emergency declaration under the [address] [place] [address] waiver authority and the [employer] Act, this Virtual Visit was conducted, with patient's consent, to reduce the patient's risk of exposure to COVID-19 and provide continuity of care for an established patient. Services were provided through a video synchronous discussion virtually to substitute for in-person clinic visit. Pt gave verbal informed consent to participate in telehealth services. Conducted a risk-benefit analysis and determined that the patient's presenting problems are consistent with the use of telepsychology. Determined that the patient has sufficient knowledge and skills in the use of technology enabling them to adequately benefit from telepsychology. It was determined that this patient was able to be properly treated without an in-person session. Patient verified that they were currently located at the [facility] address that was provided during registration.     Verified the following information:  Patient's identification: Yes  Patient location: [address] [place] [id number]   Patient's call back number: [phone]   Patient's emergency contact's name and number, as well as permission to contact them if needed: Extended Emergency Contact Information  Primary Emergency Contact: Anya Strange [address] [place] [address] Phone: [phone]  Relation: Other  Secondary Emergency Contact: [address] [place] [address] Phone: [phone]  Relation: Child mouth every other day 45 tablet 0    clopidogrel (PLAVIX) 75 MG tablet TAKE 1 TABLET BY MOUTH DAILY 30 tablet 2    hydroxychloroquine (PLAQUENIL) 200 MG tablet TAKE 1 TABLET BY MOUTH TWICE DAILY 60 tablet 2    azaTHIOprine (IMURAN) 50 MG tablet TAKE 1 TABLETS BY MOUTH DAILY 90 tablet 0    Cholecalciferol (VITAMIN D3) 1.25 MG (56300 UT) CAPS TAKE 1 CAPSULE BY MOUTH EVERY OTHER WEEK 4 capsule 2    predniSONE (DELTASONE) 2.5 MG tablet Take 1 tab daily with 5 mg tab for 7.5 mg 30 tablet 3    rosuvastatin (CRESTOR) 20 MG tablet TAKE 1 TABLET BY MOUTH DAILY 30 tablet 5    carvedilol (COREG) 6.25 MG tablet Take 1 tablet by mouth 2 times daily 180 tablet 2    ondansetron (ZOFRAN) 4 MG tablet TAKE 1 TABLET BY MOUTH EVERY 12 HOURS AS NEEDED FOR NAUSEA OR VOMITING 30 tablet 3    buPROPion (WELLBUTRIN XL) 300 MG extended release tablet TAKE 1 TABLET BY MOUTH EVERY MORNING 90 tablet 1    docusate (COLACE, DULCOLAX) 100 MG CAPS Take 100 mg by mouth 2 times daily 60 capsule 3    clobetasol (TEMOVATE) 0.05 % external solution Apply topically 2 times daily Apply topically 2 times daily.  acetaminophen 650 MG TABS Take 650 mg by mouth every 6 hours as needed for Pain or Fever (Fever >100.5 F (38 C)). 60 tablet 1     No current facility-administered medications for this visit.         Social History:   Social History     Socioeconomic History    Marital status:      Spouse name: Not on file    Number of children: 1    Years of education: 15    Highest education level: Not on file   Occupational History    Occupation: Retired   Social Needs    Financial resource strain: Not on file    Food insecurity     Worry: Not on file     Inability: Not on file   [employer] needs     Medical: Not on file     Non-medical: Not on file   Tobacco Use    Smoking status: Former Smoker     Packs/day: 0.50     Years: 30.00     Pack years: 15.00     Types: Cigarettes     Start date: 1965     Last attempt to quit: 2014     Years since quittin.0    Smokeless tobacco: Never Used   Substance and Sexual Activity    Alcohol use: Yes     Alcohol/week: 0.0 standard drinks     Comment: 2 cocktails on weekends    Drug use: No    Sexual activity: Not Currently   Lifestyle    Physical activity     Days per week: Not on file     Minutes per session: Not on file    Stress: Not on file   Relationships    Social connections     Talks on phone: Not on file     Gets together: Not on file     Attends Worship service: Not on file     Active member of club or organization: Not on file     Attends meetings of clubs or organizations: Not on file     Relationship status: Not on file    Intimate partner violence     Fear of current or ex partner: Not on file     Emotionally abused: Not on file     Physically abused: Not on file     Forced sexual activity: Not on file   Other Topics Concern    Not on file   Social History Narrative    Not on file       TOBACCO:   reports that she quit smoking about 6 years ago. Her smoking use included cigarettes. She started smoking about 55 years ago. She has a 15.00 pack-year smoking history. She has never used smokeless tobacco.  ETOH:   reports current alcohol use. Family History:   Family History   Problem Relation Age of Onset    High Blood Pressure Brother          A:  Ms. Khushbu Guadalupe mood has been stable since the last visit. She has ongoing interpersonal stress but is coping well. She continues to be active and engaged and responds positively to behavioral interventions.     Diagnosis:    MDD, Recurrent, Moderate  Anxiety  Stress      Plan:  Pt interventions:  [place]-setting to identify pt's primary goals for [facility] visit / overall health and Supportive techniques,  ACT interventions and reinforced pt's skill use, treatment planning    Pt Behavioral Change Plan:  Pt set goals to 1)return for f/u in 4 weeks

## 2020-09-24 ENCOUNTER — TELEPHONE (OUTPATIENT)
Dept: INTERNAL MEDICINE CLINIC | Age: 75
End: 2020-09-24

## 2020-09-24 NOTE — TELEPHONE ENCOUNTER
Spoke with patient and CXR ordered and covid tet will be scheduled and patient stated that she will schedule VV after CXR and test

## 2020-09-25 ENCOUNTER — OFFICE VISIT (OUTPATIENT)
Dept: PRIMARY CARE CLINIC | Age: 75
End: 2020-09-25
Payer: MEDICARE

## 2020-09-25 ENCOUNTER — HOSPITAL ENCOUNTER (OUTPATIENT)
Dept: GENERAL RADIOLOGY | Age: 75
Discharge: HOME | End: 2020-09-25
Payer: MEDICARE

## 2020-09-25 PROCEDURE — G8417 CALC BMI ABV UP PARAM F/U: HCPCS | Performed by: NURSE PRACTITIONER

## 2020-09-25 PROCEDURE — 71046 X-RAY EXAM CHEST 2 VIEWS: CPT

## 2020-09-25 PROCEDURE — 99211 OFF/OP EST MAY X REQ PHY/QHP: CPT | Performed by: NURSE PRACTITIONER

## 2020-09-25 PROCEDURE — G8428 CUR MEDS NOT DOCUMENT: HCPCS | Performed by: NURSE PRACTITIONER

## 2020-09-25 NOTE — PROGRESS NOTES
[facility] received a viral test for COVID-19. They were educated on isolation and quarantine as appropriate. For any symptoms, they were directed to seek care from their PCP, given contact information to establish with a doctor, directed to an urgent care or the emergency room.

## 2020-09-28 LAB — SARS-COV-2, NAA: NOT DETECTED

## 2020-09-30 ENCOUNTER — TELEPHONE (OUTPATIENT)
Dept: INTERNAL MEDICINE CLINIC | Age: 75
End: 2020-09-30

## 2020-10-02 ENCOUNTER — TELEPHONE (OUTPATIENT)
Dept: INTERNAL MEDICINE CLINIC | Age: 75
End: 2020-10-02

## 2020-10-02 NOTE — TELEPHONE ENCOUNTER
*IMPORTANT*  Patients care giver Robbie, called to inform us that patient started to choke on a tootsie roll last night. Patient then started to have a coughing fit afterwards which seemed to last about 5 minutes. Euel Paget would like to speak to someone about what to do the next time. Patient and caregiver are still shaken up. Please call and advise.

## 2020-10-02 NOTE — TELEPHONE ENCOUNTER
Recommend sticking with softer foods, if there are any further concerns can follow up with Dr. Nelsy Santamaria

## 2020-10-05 ENCOUNTER — HOSPITAL ENCOUNTER (OUTPATIENT)
Dept: CT IMAGING | Age: 75
Discharge: HOME | End: 2020-10-05
Payer: MEDICARE

## 2020-10-05 LAB
GFR AFRICAN AMERICAN: 44
GFR NON-AFRICAN AMERICAN: 36
PERFORMED ON: ABNORMAL
POC CREATININE: 1.4 MG/DL (ref 0.6–1.2)
POC SAMPLE TYPE: ABNORMAL

## 2020-10-05 PROCEDURE — 71260 CT THORAX DX C+: CPT

## 2020-10-05 PROCEDURE — 82565 ASSAY OF CREATININE: CPT

## 2020-10-05 PROCEDURE — 6360000004 HC RX CONTRAST MEDICATION: Performed by: INTERNAL MEDICINE

## 2020-10-05 RX ADMIN — IOPAMIDOL 80 ML: 755 INJECTION, SOLUTION INTRAVENOUS at 13:40

## 2020-10-08 ENCOUNTER — TELEPHONE (OUTPATIENT)
Dept: INTERNAL MEDICINE CLINIC | Age: 75
End: 2020-10-08

## 2020-10-08 DIAGNOSIS — E87.1 HYPONATREMIA: ICD-10-CM

## 2020-10-08 DIAGNOSIS — Z12.11 SCREEN FOR COLON CANCER: ICD-10-CM

## 2020-10-08 LAB
ALBUMIN SERPL-MCNC: 3.7 G/DL (ref 3.4–5)
ANION GAP SERPL CALCULATED.3IONS-SCNC: 13 MMOL/L (ref 3–16)
BUN BLDV-MCNC: 19 MG/DL (ref 7–20)
CALCIUM SERPL-MCNC: 9.4 MG/DL (ref 8.3–10.6)
CHLORIDE BLD-SCNC: 106 MMOL/L (ref 99–110)
CO2: 24 MMOL/L (ref 21–32)
CREAT SERPL-MCNC: 0.8 MG/DL (ref 0.6–1.2)
GFR AFRICAN AMERICAN: >60
GFR NON-AFRICAN AMERICAN: >60
GLUCOSE BLD-MCNC: 80 MG/DL (ref 70–99)
PHOSPHORUS: 3.4 MG/DL (ref 2.5–4.9)
POTASSIUM SERPL-SCNC: 4.3 MMOL/L (ref 3.5–5.1)
SODIUM BLD-SCNC: 143 MMOL/L (ref 136–145)

## 2020-10-08 NOTE — TELEPHONE ENCOUNTER
Patient called and notified that the wrong test and specimen was dropped off. Explained to caregiver that the correct test needed to be picked from our office. It is upfront with patient name on the envelope to be picked up.

## 2020-10-08 NOTE — TELEPHONE ENCOUNTER
[address] called to received clear verbal orders for patient. Phone Number [phone] Name Natasha Mcmullen to give clear orders on the Fecal Blood Immunochemical Test lab orders. Please call and advise.

## 2020-10-09 ENCOUNTER — OFFICE VISIT (OUTPATIENT)
Dept: INTERNAL MEDICINE CLINIC | Age: 75
End: 2020-10-09
Payer: MEDICARE

## 2020-10-09 VITALS
BODY MASS INDEX: 34.18 KG/M2 | WEIGHT: 175 LBS | TEMPERATURE: 97.2 F | OXYGEN SATURATION: 97 % | DIASTOLIC BLOOD PRESSURE: 68 MMHG | SYSTOLIC BLOOD PRESSURE: 122 MMHG | HEART RATE: 64 BPM

## 2020-10-09 PROCEDURE — G8417 CALC BMI ABV UP PARAM F/U: HCPCS | Performed by: INTERNAL MEDICINE

## 2020-10-09 PROCEDURE — 4040F PNEUMOC VAC/ADMIN/RCVD: CPT | Performed by: INTERNAL MEDICINE

## 2020-10-09 PROCEDURE — 1123F ACP DISCUSS/DSCN MKR DOCD: CPT | Performed by: INTERNAL MEDICINE

## 2020-10-09 PROCEDURE — 1036F TOBACCO NON-USER: CPT | Performed by: INTERNAL MEDICINE

## 2020-10-09 PROCEDURE — G8399 PT W/DXA RESULTS DOCUMENT: HCPCS | Performed by: INTERNAL MEDICINE

## 2020-10-09 PROCEDURE — G8484 FLU IMMUNIZE NO ADMIN: HCPCS | Performed by: INTERNAL MEDICINE

## 2020-10-09 PROCEDURE — 99213 OFFICE O/P EST LOW 20 MIN: CPT | Performed by: INTERNAL MEDICINE

## 2020-10-09 PROCEDURE — G8427 DOCREV CUR MEDS BY ELIG CLIN: HCPCS | Performed by: INTERNAL MEDICINE

## 2020-10-09 PROCEDURE — 1090F PRES/ABSN URINE INCON ASSESS: CPT | Performed by: INTERNAL MEDICINE

## 2020-10-09 RX ORDER — FUROSEMIDE 40 MG/1
40 TABLET ORAL DAILY
Qty: 90 TABLET | Refills: 1
Start: 2020-10-09 | End: 2020-10-26

## 2020-10-09 ASSESSMENT — ENCOUNTER SYMPTOMS
NAUSEA: 0
VOMITING: 0
CHEST TIGHTNESS: 0
ABDOMINAL PAIN: 0
SHORTNESS OF BREATH: 0

## 2020-10-09 NOTE — PATIENT INSTRUCTIONS
Add Claritin 10 mg oral daily. Keep Tylenol as needed. If your headache does not resolved: Add nasal spray Flonase. Please call the office (and ask to see me) or be seen by other provider for any worsening or lack of improvement of the symptoms within a few days. Please send me weight readings next week.

## 2020-10-09 NOTE — PROGRESS NOTES
Outpatient Note for established Patient - regular Visit     History Obtained From:  patient, electronic medical record  Is the patient new to me ? - No    HISTORY OF PRESENT ILLNESS:   The patient is a 66 y.o. female who is here today for :  Diagnoses and all orders for this visit:    Acute nonintractable headache, unspecified headache type    Weight gain    Other orders  -     furosemide (LASIX) 40 MG tablet; Take 1 tablet by mouth daily      1 week ago Pt is here while dinner she started to feel that she is chocking   Javy pressed on her upper abdomen which helped with the chocking and then she cough prolonged (1/2 hour). Pt denies CP/SOB/palpitations/abdominal pain/N/V/cough   Pt is c/o headache in the past few days. She does reports stress. repet renal showed normal Cr 0.8(<--1.4)  She is till off lasix and gained weight   Her weight today is 175 lbs   For the past few days pt is c/o frontal headache which she relates to her sinu es  midl nasal congestion. No trauma  No fever/ chills. headache is episodic, Tylenol is helping   She has mild diarrhea in the past few days. No recent antibiotics    Past Medical History:        Diagnosis Date    Anxiety     CAD (coronary artery disease)     CHF (congestive heart failure) ([facility]     Depression     DVT of lower extremity (deep venous thrombosis) ([facility]     Falls     Hypertension     Lupus ([facility])     TIA (transient ischemic attack)     x3    Urinary incontinence     UTI (urinary tract infection)        Social History:   TOBACCO:   reports that she quit smoking about 6 years ago. Her smoking use included cigarettes. She started smoking about 55 years ago. She has a 15.00 pack-year smoking history. She has never used smokeless tobacco.    ETOH:   reports current alcohol use. Current Medications:    Prior to Admission medications    Medication Sig Start Date End Date Taking?  Authorizing Provider   furosemide (LASIX) 40 MG tablet Take 1 tablet by mouth daily 10/9/20  Yes Karen Ralph MD   predniSONE (DELTASONE) 5 MG tablet TAKE 1 TABLET BY MOUTH DAILY FOR 30 DOSES 9/8/20   Sarai Jackson MD   mirtazapine (REMERON) 7.5 MG tablet TAKE 1 TABLET BY MOUTH EVERY NIGHT 9/2/20   Karen Ralph MD   pantoprazole (PROTONIX) 20 MG tablet TAKE 1 TABLET BY MOUTH TWICE DAILY 8/26/20   Karen Ralph MD   clopidogrel (PLAVIX) 75 MG tablet TAKE 1 TABLET BY MOUTH DAILY 8/7/20   Karen Ralph MD   hydroxychloroquine (PLAQUENIL) 200 MG tablet TAKE 1 TABLET BY MOUTH TWICE DAILY 7/23/20   Sarai Jackson MD   azaTHIOprine (IMURAN) 50 MG tablet TAKE 1 TABLETS BY MOUTH DAILY 7/9/20   Karen Ralph MD   Cholecalciferol (VITAMIN D3) 1.25 MG (35935 UT) CAPS TAKE 1 CAPSULE BY MOUTH EVERY OTHER WEEK 6/16/20   Karen Ralph MD   predniSONE (DELTASONE) 2.5 MG tablet Take 1 tab daily with 5 mg tab for 7.5 mg 6/11/20   Sarai Jackson MD   rosuvastatin (CRESTOR) 20 MG tablet TAKE 1 TABLET BY MOUTH DAILY 5/5/20   Karen Ralph MD   carvedilol (COREG) 6.25 MG tablet Take 1 tablet by mouth 2 times daily 2/24/20   Karen Ralph MD   ondansetron (ZOFRAN) 4 MG tablet TAKE 1 TABLET BY MOUTH EVERY 12 HOURS AS NEEDED FOR NAUSEA OR VOMITING 2/17/20   Karen Ralph MD   buPROPion (WELLBUTRIN XL) 300 MG extended release tablet TAKE 1 TABLET BY MOUTH EVERY MORNING 2/11/20   Caroline Harrison MD   docusate (COLACE, DULCOLAX) 100 MG CAPS Take 100 mg by mouth 2 times daily 12/2/19   Najma Casillas DO   clobetasol (TEMOVATE) 0.05 % external solution Apply topically 2 times daily Apply topically 2 times daily. Historical Provider, MD   acetaminophen 650 MG TABS Take 650 mg by mouth every 6 hours as needed for Pain or Fever (Fever >100.5 F (38 C)). 12/9/13   Mayte Pham MD       REVIEW OF SYSTEMS:  Review of Systems   Constitutional: Negative for activity change, appetite change, chills, fever and unexpected weight change.    HENT: Negative submental or submandibular adenopathy. Cervical: No cervical adenopathy. Right cervical: No superficial or deep cervical adenopathy. Left cervical: No superficial or deep cervical adenopathy. Skin:     Findings: No rash. Neurological:      General: No focal deficit present. Mental Status: She is alert and oriented to person, place, and time. GCS: GCS eye subscore is 4. GCS verbal subscore is 5. GCS motor subscore is 6. Cranial Nerves: No cranial nerve deficit. Sensory: No sensory deficit. Motor: No weakness or abnormal muscle tone. Deep Tendon Reflexes: Reflexes are normal and symmetric. Psychiatric:         Behavior: Behavior normal.         Thought Content: Thought content normal.            Assessment/Plan:   Diagnoses and all orders for this visit:    Acute nonintractable headache, unspecified headache type  likely tension or sinus related  Try  Calritin, Flonase  Let me know if not better     Weight gain  Fluid related?- resume lasix and monitor weight  No dyspnea. Other orders  -     furosemide (LASIX) 40 MG tablet; Take 1 tablet by mouth daily  - Patient was encouraged to call the office (and ask to see me) or be seen by other provider for any worsening or lack of improvement of the symptoms within a few days / weeks. - Pt was asked toschedule an appointment in 1 months, and to let me know of any scheduling difficulties. Additional patients instructions (if given):   Patient Instructions   Add Claritin 10 mg oral daily. Keep Tylenol as needed. If your headache does not resolved: Add nasal spray Flonase. Please call the office (and ask to see me) or be seen by other provider for any worsening or lack of improvement of the symptoms within a few days. Please send me weight readings next week. Amelia Redmond M.D.   10/9/2020, 9:51 AM      NOTE: This report was transcribed using voice recognition software.  Every effort was made to ensure accuracy, however, inadvertent computerized transcription errors may be present.

## 2020-10-13 ENCOUNTER — HOSPITAL ENCOUNTER (OUTPATIENT)
Dept: MAMMOGRAPHY | Age: 75
Discharge: HOME | End: 2020-10-13
Payer: MEDICARE

## 2020-10-13 PROCEDURE — 77063 BREAST TOMOSYNTHESIS BI: CPT

## 2020-10-19 RX ORDER — HYDROXYCHLOROQUINE SULFATE 200 MG/1
TABLET, FILM COATED ORAL
Qty: 60 TABLET | Refills: 2 | Status: SHIPPED | OUTPATIENT
Start: 2020-10-19 | End: 2021-01-25

## 2020-10-20 ENCOUNTER — TELEMEDICINE (OUTPATIENT)
Dept: PSYCHOLOGY | Age: 75
End: 2020-10-20
Payer: MEDICARE

## 2020-10-20 PROCEDURE — 90832 PSYTX W PT 30 MINUTES: CPT | Performed by: PSYCHOLOGIST

## 2020-10-20 NOTE — PROGRESS NOTES
Behavioral Health Consultation  [facility] Quyen Goncalves PsyD  Psychologist  10/20/2020  10:34 AM      Time spent with Patient: 30 minutes  This is patient's [de-identified] fourth [facility] appointment. Reason for Consult:  Depression, anxiety, stress  Referring Provider: Lynn Campos MD    TELEHEALTH VISIT -- Audio/Video (During ADUMO-00 public health emergency)  }  Pursuant to the emergency declaration under the [address] [place] [address] waiver authority and the [employer] Act, this Virtual Visit was conducted, with patient's consent, to reduce the patient's risk of exposure to COVID-19 and provide continuity of care for an established patient. Services were provided through a video synchronous discussion virtually to substitute for in-person clinic visit. Pt gave verbal informed consent to participate in telehealth services. Conducted a risk-benefit analysis and determined that the patient's presenting problems are consistent with the use of telepsychology. Determined that the patient has sufficient knowledge and skills in the use of technology enabling them to adequately benefit from telepsychology. It was determined that this patient was able to be properly treated without an in-person session. Patient verified that they were currently located at the [facility] address that was provided during registration.     Verified the following information:  Patient's identification: Yes  Patient location: [address] [place] [address]   Patient's call back number: [phone]   Patient's emergency contact's name and number, as well as permission to contact them if needed: Extended Emergency Contact Information  Primary Emergency Contact: Anya Strange [address] [place] [address] Phone: [phone]  Relation: Other  Secondary Emergency Contact: [address] [place] [address] Phone: [phone]  Relation: Child Provider location: [facility]:  Pt reports she is \"ok. \" Has ongoing stress with son and finances. Feels disappointed and sad. Not sure how to handle the situation.  Still hasn't changed POA but plans to.      O:  MSE:     Appearance: good hygiene   Attitude: cooperative and friendly  Consciousness: alert  Orientation: oriented to person, place, time, general circumstance  Memory: recent and remote memory intact  Attention/Concentration: intact during session  Psychomotor Activity: normal  Eye Contact: normal  Speech: normal rate and volume, well-articulated  Mood: \"good\"  Affect: euthymic and congruent  Perception: within normal limits  Thought Content: within normal limits  Thought Process: logical, coherent and goal-directed  Insight: fair  Judgment: intact  Ability to understand instructions: Yes  Ability to respond meaningfully: Yes  Morbid Ideation: no   Suicide Assessment: no suicidal ideation, plan, or intent  Homicidal Ideation: no        History:    Medications:   Current Outpatient Medications   Medication Sig Dispense Refill    hydroxychloroquine (PLAQUENIL) 200 MG tablet TAKE 1 TABLET BY MOUTH TWICE DAILY 60 tablet 2    furosemide (LASIX) 40 MG tablet Take 1 tablet by mouth daily (Patient not taking: Reported on 10/9/2020) 90 tablet 1    predniSONE (DELTASONE) 5 MG tablet TAKE 1 TABLET BY MOUTH DAILY FOR 30 DOSES 30 tablet 2    mirtazapine (REMERON) 7.5 MG tablet TAKE 1 TABLET BY MOUTH EVERY NIGHT 30 tablet 2    pantoprazole (PROTONIX) 20 MG tablet TAKE 1 TABLET BY MOUTH TWICE DAILY 60 tablet 2    clopidogrel (PLAVIX) 75 MG tablet TAKE 1 TABLET BY MOUTH DAILY 30 tablet 2    azaTHIOprine (IMURAN) 50 MG tablet TAKE 1 TABLETS BY MOUTH DAILY 90 tablet 0    Cholecalciferol (VITAMIN D3) 1.25 MG (44278 UT) CAPS TAKE 1 CAPSULE BY MOUTH EVERY OTHER WEEK 4 capsule 2    predniSONE (DELTASONE) 2.5 MG tablet Take 1 tab daily with 5 mg tab for 7.5 mg 30 tablet 3    rosuvastatin (CRESTOR) 20 MG tablet TAKE 1 TABLET BY MOUTH DAILY 30 tablet 5    carvedilol (COREG) 6.25 MG tablet Take 1 tablet by mouth 2 times daily 180 tablet 2    ondansetron (ZOFRAN) 4 MG tablet TAKE 1 TABLET BY MOUTH EVERY 12 HOURS AS NEEDED FOR NAUSEA OR VOMITING 30 tablet 3    buPROPion (WELLBUTRIN XL) 300 MG extended release tablet TAKE 1 TABLET BY MOUTH EVERY MORNING 90 tablet 1    docusate (COLACE, DULCOLAX) 100 MG CAPS Take 100 mg by mouth 2 times daily 60 capsule 3    clobetasol (TEMOVATE) 0.05 % external solution Apply topically 2 times daily Apply topically 2 times daily.  acetaminophen 650 MG TABS Take 650 mg by mouth every 6 hours as needed for Pain or Fever (Fever >100.5 F (38 C)). 60 tablet 1     No current facility-administered medications for this visit. Social History:   Social History     Socioeconomic History    Marital status:      Spouse name: Not on file    Number of children: 1    Years of education: 15    Highest education level: Not on file   Occupational History    Occupation: Retired   Social Needs    Financial resource strain: Not on file    Food insecurity     Worry: Not on file     Inability: Not on file   [employer] needs     Medical: Not on file     Non-medical: Not on file   Tobacco Use    Smoking status: Former Smoker     Packs/day: 0.50     Years: 30.00     Pack years: 15.00     Types: Cigarettes     Start date:      Last attempt to quit: 2014     Years since quittin.0    Smokeless tobacco: Never Used   Substance and Sexual Activity    Alcohol use:  Yes     Alcohol/week: 0.0 standard drinks     Comment: 2 cocktails on weekends    Drug use: No    Sexual activity: Not Currently   Lifestyle    Physical activity     Days per week: Not on file     Minutes per session: Not on file    Stress: Not on file   Relationships    Social connections     Talks on phone: Not on file     Gets together: Not on file     Attends Mu-[employer] service: Not on file Active member of club or organization: Not on file     Attends meetings of clubs or organizations: Not on file     Relationship status: Not on file    Intimate partner violence     Fear of current or ex partner: Not on file     Emotionally abused: Not on file     Physically abused: Not on file     Forced sexual activity: Not on file   Other Topics Concern    Not on file   Social History Narrative    Not on file       TOBACCO:   reports that she quit smoking about 6 years ago. Her smoking use included cigarettes. She started smoking about 55 years ago. She has a 15.00 pack-year smoking history. She has never used smokeless tobacco.  ETOH:   reports current alcohol use. Family History:   Family History   Problem Relation Age of Onset    High Blood Pressure Brother          A:  Ms. Jim Iverson mood continues to be stable. She continues to experience ongoing interpersonal stress but is managing well. She continues to be active and engaged and responds positively to behavioral interventions.      Diagnosis:    MDD, Recurrent, Moderate  Anxiety  Stress      Plan:  Pt interventions:  [place]-setting to identify pt's primary goals for [facility] visit / overall health and Supportive techniques,  ACT interventions and reinforced pt's skill use, treatment planning    Pt Behavioral Change Plan:  Pt set goals to 1) return for f/u in 6 weeks

## 2020-10-26 RX ORDER — FUROSEMIDE 20 MG/1
20 TABLET ORAL EVERY OTHER DAY
Qty: 45 TABLET | Refills: 3 | Status: SHIPPED | OUTPATIENT
Start: 2020-10-26 | End: 2020-12-07

## 2020-10-26 NOTE — TELEPHONE ENCOUNTER
Last visit 10-9-2020   Labs 10-8-2020   Last Filled  10-9- w/directions qd  This Rx has directions qod?

## 2020-10-30 RX ORDER — BUPROPION HYDROCHLORIDE 300 MG/1
TABLET ORAL
Qty: 90 TABLET | Refills: 1 | Status: SHIPPED | OUTPATIENT
Start: 2020-10-30 | End: 2021-04-26 | Stop reason: SDUPTHER

## 2020-10-30 NOTE — TELEPHONE ENCOUNTER
Last Ov: 10/9/2020  Next Appt: Not Found  Last Refill: 2.11.20     Dorothye Sacks ([facility]) wants to know if Dr. Fouzia Reynolds can take over prescribing prescription because Dr. Becerril is no longer pts MD.

## 2020-10-30 NOTE — TELEPHONE ENCOUNTER
Wellbutrin 300mg  [place] [phone]  Last Ov: 10/9/2020    Javy Sandoval) wants to know if Dr. Fouzia Reynolds can take over prescribing prescription because Dr. Becerril is no longer pts MD.

## 2020-11-02 RX ORDER — ROSUVASTATIN CALCIUM 20 MG/1
20 TABLET, COATED ORAL DAILY
Qty: 30 TABLET | Refills: 5 | Status: SHIPPED | OUTPATIENT
Start: 2020-11-02 | End: 2021-05-09 | Stop reason: SDUPTHER

## 2020-11-02 RX ORDER — CLOPIDOGREL BISULFATE 75 MG/1
TABLET ORAL
Qty: 30 TABLET | Refills: 2 | Status: SHIPPED | OUTPATIENT
Start: 2020-11-02 | End: 2021-02-03 | Stop reason: SDUPTHER

## 2020-11-03 DIAGNOSIS — Z79.899 HIGH RISK MEDICATION USE: ICD-10-CM

## 2020-11-03 LAB
BASOPHILS ABSOLUTE: 0 K/UL (ref 0–0.2)
BASOPHILS RELATIVE PERCENT: 0.6 %
EOSINOPHILS ABSOLUTE: 0.1 K/UL (ref 0–0.6)
EOSINOPHILS RELATIVE PERCENT: 1.3 %
HCT VFR BLD CALC: 39.6 % (ref 36–48)
HEMOGLOBIN: 13.3 G/DL (ref 12–16)
LYMPHOCYTES ABSOLUTE: 0.7 K/UL (ref 1–5.1)
LYMPHOCYTES RELATIVE PERCENT: 11.5 %
MCH RBC QN AUTO: 32.6 PG (ref 26–34)
MCHC RBC AUTO-ENTMCNC: 33.5 G/DL (ref 31–36)
MCV RBC AUTO: 97.2 FL (ref 80–100)
MONOCYTES ABSOLUTE: 0.4 K/UL (ref 0–1.3)
MONOCYTES RELATIVE PERCENT: 6.9 %
NEUTROPHILS ABSOLUTE: 4.9 K/UL (ref 1.7–7.7)
NEUTROPHILS RELATIVE PERCENT: 79.7 %
PDW BLD-RTO: 13.8 % (ref 12.4–15.4)
PLATELET # BLD: 250 K/UL (ref 135–450)
PMV BLD AUTO: 7.1 FL (ref 5–10.5)
RBC # BLD: 4.08 M/UL (ref 4–5.2)
SEDIMENTATION RATE, ERYTHROCYTE: 41 MM/HR (ref 0–30)
WBC # BLD: 6.2 K/UL (ref 4–11)

## 2020-11-04 LAB
ALBUMIN SERPL-MCNC: 4 G/DL (ref 3.4–5)
ALP BLD-CCNC: 128 U/L (ref 40–129)
ALT SERPL-CCNC: 21 U/L (ref 10–40)
AST SERPL-CCNC: 32 U/L (ref 15–37)
BILIRUB SERPL-MCNC: 0.3 MG/DL (ref 0–1)
BILIRUBIN DIRECT: <0.2 MG/DL (ref 0–0.3)
BILIRUBIN, INDIRECT: NORMAL MG/DL (ref 0–1)
C-REACTIVE PROTEIN: 6.1 MG/L (ref 0–5.1)
CREAT SERPL-MCNC: 1 MG/DL (ref 0.6–1.2)
GFR AFRICAN AMERICAN: >60
GFR NON-AFRICAN AMERICAN: 54
TOTAL PROTEIN: 7 G/DL (ref 6.4–8.2)

## 2020-11-10 ENCOUNTER — TELEMEDICINE (OUTPATIENT)
Dept: RHEUMATOLOGY | Age: 75
End: 2020-11-10
Payer: MEDICARE

## 2020-11-10 PROCEDURE — 1123F ACP DISCUSS/DSCN MKR DOCD: CPT | Performed by: INTERNAL MEDICINE

## 2020-11-10 PROCEDURE — 1090F PRES/ABSN URINE INCON ASSESS: CPT | Performed by: INTERNAL MEDICINE

## 2020-11-10 PROCEDURE — G8427 DOCREV CUR MEDS BY ELIG CLIN: HCPCS | Performed by: INTERNAL MEDICINE

## 2020-11-10 PROCEDURE — 4040F PNEUMOC VAC/ADMIN/RCVD: CPT | Performed by: INTERNAL MEDICINE

## 2020-11-10 PROCEDURE — G8399 PT W/DXA RESULTS DOCUMENT: HCPCS | Performed by: INTERNAL MEDICINE

## 2020-11-10 PROCEDURE — 99214 OFFICE O/P EST MOD 30 MIN: CPT | Performed by: INTERNAL MEDICINE

## 2020-11-10 NOTE — PROGRESS NOTES
Shahana Jade is a 66 y.o. female being evaluated by a Virtual Visit (video visit) encounter to address concerns as mentioned above. A caregiver was present when appropriate. Due to this being a TeleHealth encounter (During CSYLX-16 public health emergency), evaluation of the following organ systems was limited: Vitals/Constitutional/EENT/Resp/CV/GI//MS/Neuro/Skin/Heme-Lymph-Imm. Pursuant to the emergency declaration under the [address] [place] [address] and the [employer] Act, this Virtual Visit was conducted with patient's (and/or legal guardian's) consent, to reduce the patient's risk of exposure to COVID-19 and provide necessary medical care. The patient (and/or legal guardian) has also been advised to contact this office for worsening conditions or problems, and seek emergency medical treatment and/or call 911 if deemed necessary. Patient identification was verified at the start of the visit: Yes    Total time spent for this encounter: Not billed by time    Services were provided through a video synchronous discussion virtually to substitute for in-person clinic visit. Patient and provider were located at their individual homes. --Levar Parrish MD on 11/10/2020 at 3:40 PM    An electronic signature was used to authenticate this note. [address] [place] [address] () [phone] (F)      Dear Dr. Amelia Redmond MD:  Please find Rheumatology assessment. Thank you for giving me the opportunity to be involved in Liat Grave care and I look forward following Mikey Castellanos along with you.  If you have any questions or concerns please feel free to reach me. Note is transcribed using voice recognition software. Inadvertent computerized transcription errors may be present. Patient identification: Rohan Blair,: ,82 y.o. Sex: female     A/P  Deberah Cockayne was seen today for follow-up. Diagnoses and all orders for this visit:    Connective tissue disease overlap syndrome ([facility]    High risk medication use    ILD (interstitial lung disease) ([facility]    Osteopenia of multiple sites        Cutaneous lupus + Sjogren's overlap with ILD -positive REBECCA, SSA, sicca symptoms and intermittent photosensitive rash. Myositis panel, RF, CCP, anti-Tawnya negative. Today's visit-  All clinical manifestations remain stable including sicca symptoms, respiratory symptoms, and photosensitive eruptions. Gaining weight from overeating and physical inactivity. Continues to take Imuran 50 mg a day, hydroxychloroquine 400 leg milligrams a day and prednisone 5 mg daily. Recent CT scan chest and PFT is also stable. DEXA scan-osteopenia with low score -2.4 at left radius, however is on long-term prednisone use. FRAX score necessitates use of antiresorptive therapy- Has Reclast 2020. Continues to take calcium and vitamin D supplementation. Tolerated well. DEXA scan 2020-T score lumbar spine -1.7, left radius -2.4    Plan-  Since is stable on current regimen, continue current therapy. She is advised to call me with any worsening symptoms. Follow-up in 3 months. Patient indicates understanding and agrees with the management plan. I reviewed patient's history, referral documents and electronic medical records. #######################################################################    Udrjihyxpy-afsumt-ko for cutaneous lupus and Sjogren's overlap, also has ILD. Other medical problems anxiety, depression, hypertension.     Interval changes-  Other than weight gain and dry cough, she does not have any complaints or concerns. Denies shortness of breath with daily activities, does get short of breath with exertion. Physical activity is very limited because of osteoarthritis and back pain, uses walker or cane for mobility. No musculoskeletal symptoms. No rashes. Sicca symptoms are manageable with conservative measures. Denies any flares since last seen. Tolerating medications well. All other review of systems are negative. Past Medical History:   Diagnosis Date    Anxiety     CAD (coronary artery disease)     CHF (congestive heart failure) (HCC)     Depression     DVT of lower extremity (deep venous thrombosis) (HCC)     Falls     Hypertension     Lupus (HCC)     TIA (transient ischemic attack)     x3    Urinary incontinence     UTI (urinary tract infection)      Past Surgical History:   Procedure Laterality Date    BACK SURGERY  9/24/2014     DR. Morrison     COLONOSCOPY  7/12/2010    WNL-repeat in 10 years    FEMUR SURGERY Right     HIP SURGERY Left 12/03/2013    left hip trochanteric femoral nailing   [facility]  8/3/2010    Dr. Romana Dunning, L5-S1    UPPER GASTROINTESTINAL ENDOSCOPY  7/19/10    reflux with no Barretts/no H-pylori    UPPER GASTROINTESTINAL ENDOSCOPY  01/2017    Dr. Daphne Hernandez - gastritis and duodinitis , neg H pylori     UPPER GASTROINTESTINAL ENDOSCOPY N/A 12/2/2019    EGD DILATION SAVORY 17mm/ 46 F performed by Halima Teran MD at [address] History     Socioeconomic History    Marital status:      Spouse name: Not on file    Number of children: 1    Years of education: 15    Highest education level: Not on file   Occupational History    Occupation: Retired   Social Needs    Financial resource strain: Not on file    Food insecurity     Worry: Not on file     Inability: Not on file   [employer] needs     Medical: Not on file     Non-medical: Not on file   Tobacco Use    Smoking status: Former Smoker     Packs/day: 0.50     Years: 30.00     Pack years: 15.00     Types: Cigarettes     Start date: 63     Last attempt to quit: 2014     Years since quittin.1    Smokeless tobacco: Never Used   Substance and Sexual Activity    Alcohol use:  Yes     Alcohol/week: 0.0 standard drinks     Comment: 2 cocktails on weekends    Drug use: No    Sexual activity: Not Currently   Lifestyle    Physical activity     Days per week: Not on file     Minutes per session: Not on file    Stress: Not on file   Relationships    Social connections     Talks on phone: Not on file     Gets together: Not on file     Attends Jehovah's witness service: Not on file     Active member of club or organization: Not on file     Attends meetings of clubs or organizations: Not on file     Relationship status: Not on file    Intimate partner violence     Fear of current or ex partner: Not on file     Emotionally abused: Not on file     Physically abused: Not on file     Forced sexual activity: Not on file   Other Topics Concern    Not on file   Social History Narrative    Not on file       No family history of autoimmune diseases    Current Outpatient Medications   Medication Sig Dispense Refill    clopidogrel (PLAVIX) 75 MG tablet TAKE 1 TABLET BY MOUTH DAILY 30 tablet 2    rosuvastatin (CRESTOR) 20 MG tablet TAKE 1 TABLET BY MOUTH DAILY 30 tablet 5    buPROPion (WELLBUTRIN XL) 300 MG extended release tablet TAKE 1 TABLET BY MOUTH EVERY MORNING 90 tablet 1    furosemide (LASIX) 20 MG tablet TAKE 1 TABLET BY MOUTH EVERY OTHER DAY 45 tablet 3    hydroxychloroquine (PLAQUENIL) 200 MG tablet TAKE 1 TABLET BY MOUTH TWICE DAILY 60 tablet 2    predniSONE (DELTASONE) 5 MG tablet TAKE 1 TABLET BY MOUTH DAILY FOR 30 DOSES 30 tablet 2    mirtazapine (REMERON) 7.5 MG tablet TAKE 1 TABLET BY MOUTH EVERY NIGHT 30 tablet 2    pantoprazole (PROTONIX) 20 MG tablet TAKE 1 TABLET BY MOUTH TWICE DAILY 60 tablet 2    azaTHIOprine (IMURAN) 50 MG tablet TAKE 1 TABLETS BY MOUTH DAILY 90 tablet 0    Cholecalciferol (VITAMIN D3) 1.25 MG (64310 UT) CAPS TAKE 1 CAPSULE BY MOUTH EVERY OTHER WEEK 4 capsule 2    predniSONE (DELTASONE) 2.5 MG tablet Take 1 tab daily with 5 mg tab for 7.5 mg 30 tablet 3    carvedilol (COREG) 6.25 MG tablet Take 1 tablet by mouth 2 times daily 180 tablet 2    ondansetron (ZOFRAN) 4 MG tablet TAKE 1 TABLET BY MOUTH EVERY 12 HOURS AS NEEDED FOR NAUSEA OR VOMITING 30 tablet 3    docusate (COLACE, DULCOLAX) 100 MG CAPS Take 100 mg by mouth 2 times daily 60 capsule 3    clobetasol (TEMOVATE) 0.05 % external solution Apply topically 2 times daily Apply topically 2 times daily.  acetaminophen 650 MG TABS Take 650 mg by mouth every 6 hours as needed for Pain or Fever (Fever >100.5 F (38 C)). 60 tablet 1     No current facility-administered medications for this visit. Allergies   Allergen Reactions    Medrol [Methylprednisolone]      multiple side effects , able to tolerate prednisone without side effects     Oxycontin [Oxycodone Hcl]      Pruritis, vomiting     Vicodin [Hydrocodone-Acetaminophen] Nausea Only       PHYSICAL EXAM:    Vitals: There were no vitals taken for this visit. General appearance/ Psychiatric: well nourished, and well groomed, normal judgement, alert, appears stated age and cooperative. MKS: No musculoskeletal findings to be seen in the video visit. Skin: No rashes,  No evidence ischemia or deformities noted in digits or nails.   External inspection of the eyes, nose, salivary glands looks normal.  Chest: Normal effort at rest/looks comfortable at rest.    DATA:   Lab Results   Component Value Date    WBC 6.2 11/03/2020    HGB 13.3 11/03/2020    HCT 39.6 11/03/2020    MCV 97.2 11/03/2020     11/03/2020         Chemistry        Component Value Date/Time     10/08/2020 0748    K 4.3 10/08/2020 0748    K 4.1 11/26/2019 0821     10/08/2020 0748    CO2 24 10/08/2020 0748    BUN 19 10/08/2020 0748    CREATININE 1.0 11/03/2020 1339        Component Value Date/Time    CALCIUM 9.4 10/08/2020 0748    ALKPHOS 128 11/03/2020 1339    AST 32 11/03/2020 1339    ALT 21 11/03/2020 1339    BILITOT 0.3 11/03/2020 1339          Lab Results   Component Value Date    LABURIC 2.5 (L) 11/28/2019     Lab Results   Component Value Date    SEDRATE 41 (H) 11/03/2020     Lab Results   Component Value Date    CRP 6.1 (H) 11/03/2020     Lab Results   Component Value Date    REBECCA POSITIVE (A) 10/02/2019    SEDRATE 41 (H) 11/03/2020     Lab Results   Component Value Date    CKTOTAL 87 10/02/2019     No results found for: TSH  Lab Results   Component Value Date    VITD25 86.7 11/26/2019         Radiology Review:     PFT 1/21/2020  Diffusion capacity is 8.29, which is 45% of predicted.     IMPRESSION:  1. Normal spirometry. 2.  Normal lung volumes. 3.  Moderate decrease in diffusion capacity. 4.  When compared to previous pulmonary function test dated 10/11/2019,  there is a significant improvement in spirometry and lung volumes. Diffusion capacity remains relatively unchanged.         CT chest 9/19/2019-  Impression       1.  Bilateral subpleural reticular opacities and traction bronchiectasis and developing subpleural honeycombing compatible with mild to moderate pulmonary fibrosis with progression from comparison chest CT. 2.  Clustered nodular opacities in the right upper lobe may be inflammatory/infectious but nonspecific. Following the Fleischner Society 2017 guidelines, if patient is low risk no routine follow-up is suggested unless suspicious morphology or upper lobe    location. If patient is high risk, then optional CT at 12 months is suggested.  qzxv   3.  Unchanged severe T9 and T11 vertebral compression fractures with retropulsion.        PFT 10/11/2019      Pulse ox is 94 % on room air    Spirometry data:    FEV1/FVC: 78. Predicted ratio 75    FEV1 1.07 L, which is 65 % predicted    FVC is 1.38 L, which is 62 % predicted  Lung Volumes:    TLC (by Plethysmography) is 3.21 L, which is 73 % predicted    RV is 1.35 L which is 64 % predicted    Diffusion Capacity:    DLCO is 7.55 which is 39 % predicted  Impression:    1. There is no obstruction present    2. There is mild restriction with TLC of 73% predicted    4. There is severe reduction in diffusion capacity    Comment:   The presence of restriction and reduction of diffusion capacity   is a new in comparison with the PFT she had on 11/3/2017  PFT findings are suggestive of progression of ILD.  Clinical   correlation is recommended. A/P- See above.

## 2020-11-16 RX ORDER — CARVEDILOL 6.25 MG/1
TABLET ORAL
Qty: 180 TABLET | Refills: 3 | Status: SHIPPED | OUTPATIENT
Start: 2020-11-16 | End: 2021-05-10 | Stop reason: SDUPTHER

## 2020-11-27 ENCOUNTER — PATIENT MESSAGE (OUTPATIENT)
Dept: FAMILY MEDICINE CLINIC | Age: 75
End: 2020-11-27

## 2020-11-29 RX ORDER — PANTOPRAZOLE SODIUM 20 MG/1
TABLET, DELAYED RELEASE ORAL
Qty: 60 TABLET | Refills: 2 | Status: SHIPPED | OUTPATIENT
Start: 2020-11-29 | End: 2021-02-21 | Stop reason: SDUPTHER

## 2020-11-29 NOTE — TELEPHONE ENCOUNTER
From: Americo Wayne  To: Carlos Conley MD  Sent: 11/27/2020 3:09 PM EST  Subject: Prescription Question    I need Madeline Pro

## 2020-12-01 ENCOUNTER — TELEMEDICINE (OUTPATIENT)
Dept: PSYCHOLOGY | Age: 75
End: 2020-12-01
Payer: MEDICARE

## 2020-12-01 PROCEDURE — 90832 PSYTX W PT 30 MINUTES: CPT | Performed by: PSYCHOLOGIST

## 2020-12-01 NOTE — PROGRESS NOTES
Behavioral Health Consultation  [facility] Quyen Goncalves PsyD  Psychologist  12/1/2020  10:33 AM      Time spent with Patient: 30 minutes  This is patient's [de-identified] fifth [facility] appointment. Reason for Consult:  grief  Referring Provider: Nick Dickson MD    TELEHEALTH VISIT -- Audio/Video (During [facility] [place]- public health emergency)  }  Pursuant to the emergency declaration under the [address] [place] [address] [facility] authority and the [employer] Act, this Virtual Visit was conducted, with patient's consent, to reduce the patient's risk of exposure to COVID-19 and provide continuity of care for an established patient. Services were provided through a video synchronous discussion virtually to substitute for in-person clinic visit. Pt gave verbal informed consent to participate in telehealth services. Conducted a risk-benefit analysis and determined that the patient's presenting problems are consistent with the use of telepsychology. Determined that the patient has sufficient knowledge and skills in the use of technology enabling them to adequately benefit from telepsychology. It was determined that this patient was able to be properly treated without an in-person session. Patient verified that they were currently located at the [address] [place] [address] Dr address that was provided during registration.     Verified the following information:  Patient's identification: Yes  Patient location: [address] [place] [id number]   Patient's call back number: [phone]   Patient's emergency contact's name and number, as well as permission to contact them if needed: Extended Emergency Contact Information  Primary Emergency Contact: Anya Strange [address] [place] [address] Phone: [phone]  Relation: Other  Secondary Emergency Contact: [address] [place] [address] Phone: [phone]  Relation: Child     [facility]:  Patient reports she lost one of her 2 dogs 2 weeks ago. Has really been struggling as she has had this dog for 18 years. Did not get to say goodbye that has been really difficult. Has known her dogs are not well but did not expect to lose her dog this way. Is tearful often and is allowing herself to grieve as much as possible. Nothing else is been different. Has ongoing stress with her son but is not focusing on that at this time because of her recent loss.      O:  MSE:     Appearance: good hygiene   Attitude: cooperative and friendly  Consciousness: alert  Orientation: oriented to person, place, time, general circumstance  Memory: recent and remote memory intact  Attention/Concentration: intact during session  Psychomotor Activity: normal  Eye Contact: normal  Speech: normal rate and volume, well-articulated  Mood: \"down\"  Affect: euthymic and congruent  Perception: within normal limits  Thought Content: within normal limits  Thought Process: logical, coherent and goal-directed  Insight: fair  Judgment: intact  Ability to understand instructions: Yes  Ability to respond meaningfully: Yes  Morbid Ideation: no   Suicide Assessment: no suicidal ideation, plan, or intent  Homicidal Ideation: no        History:    Medications:   Current Outpatient Medications   Medication Sig Dispense Refill    pantoprazole (PROTONIX) 20 MG tablet TAKE 1 TABLET BY MOUTH TWICE DAILY 60 tablet 2    carvedilol (COREG) 6.25 MG tablet TAKE 1 TABLET BY MOUTH TWICE DAILY 180 tablet 3    clopidogrel (PLAVIX) 75 MG tablet TAKE 1 TABLET BY MOUTH DAILY 30 tablet 2    rosuvastatin (CRESTOR) 20 MG tablet TAKE 1 TABLET BY MOUTH DAILY 30 tablet 5    buPROPion (WELLBUTRIN XL) 300 MG extended release tablet TAKE 1 TABLET BY MOUTH EVERY MORNING 90 tablet 1    furosemide (LASIX) 20 MG tablet TAKE 1 TABLET BY MOUTH EVERY OTHER DAY 45 tablet 3    hydroxychloroquine (PLAQUENIL) 200 MG tablet TAKE 1 TABLET BY MOUTH TWICE DAILY 60 tablet 2    predniSONE (DELTASONE) 5 MG tablet TAKE 1 TABLET BY MOUTH DAILY FOR 30 DOSES 30 tablet 2    mirtazapine (REMERON) 7.5 MG tablet TAKE 1 TABLET BY MOUTH EVERY NIGHT 30 tablet 2    azaTHIOprine (IMURAN) 50 MG tablet TAKE 1 TABLETS BY MOUTH DAILY 90 tablet 0    Cholecalciferol (VITAMIN D3) 1.25 MG (58352 UT) CAPS TAKE 1 CAPSULE BY MOUTH EVERY OTHER WEEK 4 capsule 2    ondansetron (ZOFRAN) 4 MG tablet TAKE 1 TABLET BY MOUTH EVERY 12 HOURS AS NEEDED FOR NAUSEA OR VOMITING 30 tablet 3    docusate (COLACE, DULCOLAX) 100 MG CAPS Take 100 mg by mouth 2 times daily 60 capsule 3    clobetasol (TEMOVATE) 0.05 % external solution Apply topically 2 times daily Apply topically 2 times daily.  acetaminophen 650 MG TABS Take 650 mg by mouth every 6 hours as needed for Pain or Fever (Fever >100.5 F (38 C)). 60 tablet 1     No current facility-administered medications for this visit. Social History:   Social History     Socioeconomic History    Marital status:      Spouse name: Not on file    Number of children: 1    Years of education: 15    Highest education level: Not on file   Occupational History    Occupation: Retired   Social Needs    Financial resource strain: Not on file    Food insecurity     Worry: Not on file     Inability: Not on file   [employer] needs     Medical: Not on file     Non-medical: Not on file   Tobacco Use    Smoking status: Former Smoker     Packs/day: 0.50     Years: 30.00     Pack years: 15.00     Types: Cigarettes     Start date:      Last attempt to quit: 2014     Years since quittin.1    Smokeless tobacco: Never Used   Substance and Sexual Activity    Alcohol use:  Yes     Alcohol/week: 0.0 standard drinks     Comment: 2 cocktails on weekends    Drug use: No    Sexual activity: Not Currently   Lifestyle    Physical activity     Days per week: Not on file     Minutes per session: Not on file    Stress: Not on file Relationships    Social connections     Talks on phone: Not on file     Gets together: Not on file     Attends Yaz[employer] service: Not on file     Active member of club or organization: Not on file     Attends meetings of clubs or organizations: Not on file     Relationship status: Not on file    Intimate partner violence     Fear of current or ex partner: Not on file     Emotionally abused: Not on file     Physically abused: Not on file     Forced sexual activity: Not on file   Other Topics Concern    Not on file   Social History Narrative    Not on file       TOBACCO:   reports that she quit smoking about 6 years ago. Her smoking use included cigarettes. She started smoking about 55 years ago. She has a 15.00 pack-year smoking history. She has never used smokeless tobacco.  ETOH:   reports current alcohol use. Family History:   Family History   Problem Relation Age of Onset    High Blood Pressure Brother          A:  Ms. Irvin Dickerson recently lost one of her 2 dogs who plays a very significant role in her life. She has been grieving but denies depression. She continues to be active and engaged and responds positively to behavioral interventions.      Diagnosis:    Grief  MDD, Recurrent, Moderate  Anxiety  Stress      Plan:  Pt interventions:  [place]-setting to identify pt's primary goals for [facility] visit / overall health and Supportive techniques,  ACT interventions and reinforced pt's skill use, treatment planning, grief interventions    Pt Behavioral Change Plan:  Pt set goals to 1) return for f/u in 6 weeks

## 2020-12-04 ENCOUNTER — TELEPHONE (OUTPATIENT)
Dept: FAMILY MEDICINE CLINIC | Age: 75
End: 2020-12-04

## 2020-12-07 ENCOUNTER — TELEMEDICINE (OUTPATIENT)
Dept: FAMILY MEDICINE CLINIC | Age: 75
End: 2020-12-07
Payer: MEDICARE

## 2020-12-07 PROCEDURE — 1090F PRES/ABSN URINE INCON ASSESS: CPT | Performed by: INTERNAL MEDICINE

## 2020-12-07 PROCEDURE — 1036F TOBACCO NON-USER: CPT | Performed by: INTERNAL MEDICINE

## 2020-12-07 PROCEDURE — 1123F ACP DISCUSS/DSCN MKR DOCD: CPT | Performed by: INTERNAL MEDICINE

## 2020-12-07 PROCEDURE — G8417 CALC BMI ABV UP PARAM F/U: HCPCS | Performed by: INTERNAL MEDICINE

## 2020-12-07 PROCEDURE — 4040F PNEUMOC VAC/ADMIN/RCVD: CPT | Performed by: INTERNAL MEDICINE

## 2020-12-07 PROCEDURE — G8484 FLU IMMUNIZE NO ADMIN: HCPCS | Performed by: INTERNAL MEDICINE

## 2020-12-07 PROCEDURE — G8399 PT W/DXA RESULTS DOCUMENT: HCPCS | Performed by: INTERNAL MEDICINE

## 2020-12-07 PROCEDURE — G8428 CUR MEDS NOT DOCUMENT: HCPCS | Performed by: INTERNAL MEDICINE

## 2020-12-07 PROCEDURE — 99213 OFFICE O/P EST LOW 20 MIN: CPT | Performed by: INTERNAL MEDICINE

## 2020-12-07 RX ORDER — PREDNISONE 1 MG/1
5 TABLET ORAL DAILY
Qty: 30 TABLET | Refills: 3 | Status: SHIPPED | OUTPATIENT
Start: 2020-12-07 | End: 2021-02-22 | Stop reason: SDUPTHER

## 2020-12-07 RX ORDER — PREDNISONE 1 MG/1
TABLET ORAL
Qty: 30 TABLET | Refills: 2 | Status: SHIPPED | OUTPATIENT
Start: 2020-12-07 | End: 2021-05-03 | Stop reason: SDUPTHER

## 2020-12-07 RX ORDER — CHOLECALCIFEROL (VITAMIN D3) 1250 MCG
CAPSULE ORAL
Qty: 4 CAPSULE | Refills: 2 | Status: SHIPPED | OUTPATIENT
Start: 2020-12-07 | End: 2021-05-11

## 2020-12-07 RX ORDER — FUROSEMIDE 20 MG/1
20 TABLET ORAL DAILY
Qty: 90 TABLET | Refills: 3 | Status: SHIPPED | OUTPATIENT
Start: 2020-12-07 | End: 2021-06-07

## 2020-12-07 ASSESSMENT — ENCOUNTER SYMPTOMS
VOMITING: 0
SHORTNESS OF BREATH: 0
ABDOMINAL PAIN: 0
NAUSEA: 0
CHEST TIGHTNESS: 0

## 2020-12-07 NOTE — PROGRESS NOTES
2020    TELEHEALTH EVALUATION --Visual (During KADWI-99 public health emergency)    HPI:    Ermias Lua (:  1945) has requested an audio/video evaluation for the following concern(s):    Pt has a rash in the back , flank area   Itching, less red today, still spots   They applied steroids cream which helped- less itching   No fever/ chills/ cough   No topical patch/ lotion before   She takes lasix every day   BP is good 155/94 last night , other night -140s  This morning is good 120/80  Also sometimes drop is BP   Pt denies CP/SOB/palpitations/abdominal pain/N/V. She will see Dr Jah Frias       Review of Systems   Constitutional: Negative for activity change, appetite change, chills, fever and unexpected weight change. Eyes: Negative for visual disturbance. Respiratory: Negative for chest tightness and shortness of breath. Cardiovascular: Negative for chest pain. Gastrointestinal: Negative for abdominal pain, nausea and vomiting. Genitourinary: Negative for hematuria. Skin: Negative for rash. Allergic/Immunologic: Negative for immunocompromised state. Neurological: Negative for dizziness and headaches. Psychiatric/Behavioral: Negative for dysphoric mood and suicidal ideas. Prior to Visit Medications    Medication Sig Taking?  Authorizing Provider   furosemide (LASIX) 20 MG tablet Take 1 tablet by mouth daily Yes Callie Kirk MD   predniSONE (DELTASONE) 5 MG tablet Take 1 tablet by mouth daily Yes Callie Kirk MD   predniSONE (DELTASONE) 5 MG tablet TAKE 1 TABLET BY MOUTH DAILY FOR 30 DOSES  Scott Huntley MD   Cholecalciferol (VITAMIN D3) 1.25 MG (53128 UT) CAPS TAKE 1 CAPSULE BY MOUTH EVERY OTHER WEEK  Scott Huntley MD   pantoprazole (PROTONIX) 20 MG tablet TAKE 1 TABLET BY MOUTH TWICE DAILY  Callie Kirk MD   carvedilol (COREG) 6.25 MG tablet TAKE 1 TABLET BY MOUTH TWICE DAILY  Callie Kirk MD   clopidogrel (PLAVIX) 75 MG tablet TAKE 1 TABLET BY MOUTH DAILY  Callie Kirk MD   rosuvastatin (CRESTOR) 20 MG tablet TAKE 1 TABLET BY MOUTH DAILY  Callie Kirk MD   buPROPion (WELLBUTRIN XL) 300 MG extended release tablet TAKE 1 TABLET BY MOUTH EVERY MORNING  Callie Kirk MD   hydroxychloroquine (PLAQUENIL) 200 MG tablet TAKE 1 TABLET BY MOUTH TWICE DAILY  Scott Huntley MD   mirtazapine (REMERON) 7.5 MG tablet TAKE 1 TABLET BY MOUTH EVERY NIGHT  Callie Kirk MD   azaTHIOprine (IMURAN) 50 MG tablet TAKE 1 TABLETS BY MOUTH DAILY  Deion Johns MD   ondansetron (ZOFRAN) 4 MG tablet TAKE 1 TABLET BY MOUTH EVERY 12 HOURS AS NEEDED FOR NAUSEA OR VOMITING  Deion Johns MD   docusate (COLACE, DULCOLAX) 100 MG CAPS Take 100 mg by mouth 2 times daily  Najma Casillas DO   clobetasol (TEMOVATE) 0.05 % external solution Apply topically 2 times daily Apply topically 2 times daily. Historical Provider, MD   acetaminophen 650 MG TABS Take 650 mg by mouth every 6 hours as needed for Pain or Fever (Fever >100.5 F (38 C)). Keisha Sinha MD       Social History     Tobacco Use    Smoking status: Former Smoker     Packs/day: 0.50     Years: 30.00     Pack years: 15.00     Types: Cigarettes     Start date:      Last attempt to quit: 2014     Years since quittin.2    Smokeless tobacco: Never Used   Substance Use Topics    Alcohol use:  Yes     Alcohol/week: 0.0 standard drinks     Comment: 2 cocktails on weekends    Drug use: No        Past Medical History:   Diagnosis Date    Anxiety     CAD (coronary artery disease)     [facility]     Depression     DVT of lower extremity (deep venous thrombosis) ([facility])     Falls     Hypertension     Lupus ([facility] [address]     TIA (transient ischemic attack)     x3    Urinary incontinence     UTI (urinary tract infection)        PHYSICAL EXAMINATION:  [ INSTRUCTIONS:  \"[x]\" Indicates a positive item  \"[]\" Indicates a negative item  -- DELETE ALL ITEMS NOT EXAMINED]  Vital Signs: (As obtained by patient/caregiver or practitioner observation)    Blood pressure- as above Heart rate-    Respiratory rate-    Temperature-  Pulse oximetry-     Constitutional: [x] Appears well-developed and well-nourished [] No apparent distress      [] Abnormal-   Mental status  [x] Alert and awake  [x] Oriented to person/place/time []Able to follow commands      Eyes:  EOM    [x]  Normal  [] Abnormal-  Sclera  [x]  Normal  [] Abnormal -         Discharge []  None visible  [] Abnormal -    HENT:   [x] Normocephalic, atraumatic. [x] Abnormal   [] Mouth/Throat: Mucous membranes are moist.     External Ears [] Normal  [] Abnormal-     Neck: [x] No visualized mass     Pulmonary/Chest: [x] Respiratory effort normal.  [x] No visualized signs of difficulty breathing or respiratory distress        [] Abnormal-      Musculoskeletal:   [x] Normal gait with no signs of ataxia         [x] Normal range of motion of neck        [] Abnormal-       Neurological:        [x] No Facial Asymmetry (Cranial nerve 7 motor function) (limited exam to video visit)          [] No gaze palsy        [] Abnormal-         Skin:        [] No significant exanthematous lesions or discoloration noted on facial skin         [x] Abnormal- faily macular rash on flank no vesicles            Psychiatric:       [x] Normal Affect [x] No Hallucinations        [] Abnormal-     Other pertinent observable physical exam findings-     ASSESSMENT/PLAN:  1. ILD (interstitial lung disease) ([facility] [address]  Keep follow-up with rheumatologist and keep prednisone  - COMPREHENSIVE METABOLIC PANEL; Future  - predniSONE (DELTASONE) 5 MG tablet; Take 1 tablet by mouth daily  Dispense: 30 tablet; Refill: 3    2. Essential hypertension, benign  Blood pressure is overall reasonable. She will take the Lasix daily and let me know if the blood pressure is not better  - COMPREHENSIVE METABOLIC PANEL; Future  Rash-likely dermatitis.   Not responding to hydrocortisone. She will keep hydrocortisone for few days let me know if this does not resolve    No follow-ups on file. Americo Wayne is a 66 y.o. female being evaluated by a Virtual Visit (video visit) encounter to address concerns as mentioned above. A caregiver was present when appropriate. Due to this being a TeleHealth encounter (During [facility] public [facility] emergency), evaluation of the following organ systems was limited: Vitals/Constitutional/EENT/Resp/CV/GI//MS/Neuro/Skin/Heme-Lymph-Imm. Pursuant to the emergency declaration under the [address] [place] [address], [address] [place] [address] authority and the [employer] and [employer] Act, this Virtual Visit was conducted with patient's (and/or legal guardian's) consent, to reduce the patient's risk of exposure to COVID-19 and provide necessary medical care. The patient (and/or legal guardian) has also been advised to contact this office for worsening conditions or problems, and seek emergency medical treatment and/or call 1 if deemed necessary. Patient identification was verified at the start of the visit: Yes    Total time spent on this encounter: ~ 9 min    Services were provided through a video synchronous discussion virtually to substitute for in-person clinic visit. Patient and provider were located at their individual homes. --Carlos Conley MD on 12/7/2020 at 3:38 PM    An electronic signature was used to authenticate this note.

## 2020-12-11 DIAGNOSIS — J84.9 ILD (INTERSTITIAL LUNG DISEASE) (HCC): ICD-10-CM

## 2020-12-11 DIAGNOSIS — I10 ESSENTIAL HYPERTENSION, BENIGN: ICD-10-CM

## 2020-12-11 LAB
A/G RATIO: 1.3 (ref 1.1–2.2)
ALBUMIN SERPL-MCNC: 3.9 G/DL (ref 3.4–5)
ALP BLD-CCNC: 103 U/L (ref 40–129)
ALT SERPL-CCNC: 16 U/L (ref 10–40)
ANION GAP SERPL CALCULATED.3IONS-SCNC: 12 MMOL/L (ref 3–16)
AST SERPL-CCNC: 32 U/L (ref 15–37)
BILIRUB SERPL-MCNC: 0.3 MG/DL (ref 0–1)
BUN BLDV-MCNC: 27 MG/DL (ref 7–20)
CALCIUM SERPL-MCNC: 9.7 MG/DL (ref 8.3–10.6)
CHLORIDE BLD-SCNC: 106 MMOL/L (ref 99–110)
CO2: 26 MMOL/L (ref 21–32)
CREAT SERPL-MCNC: 1 MG/DL (ref 0.6–1.2)
GFR AFRICAN AMERICAN: >60
GFR NON-AFRICAN AMERICAN: 54
GLOBULIN: 3.1 G/DL
GLUCOSE BLD-MCNC: 88 MG/DL (ref 70–99)
POTASSIUM SERPL-SCNC: 4 MMOL/L (ref 3.5–5.1)
SODIUM BLD-SCNC: 144 MMOL/L (ref 136–145)
TOTAL PROTEIN: 7 G/DL (ref 6.4–8.2)

## 2020-12-22 ENCOUNTER — TELEPHONE (OUTPATIENT)
Dept: FAMILY MEDICINE CLINIC | Age: 75
End: 2020-12-22

## 2020-12-22 NOTE — TELEPHONE ENCOUNTER
Home health nurse Jorden Quan called in to report patient has ankle swelling +2 blood pressure was 129/70 please advise if patient needs to do anything

## 2020-12-23 ENCOUNTER — TELEMEDICINE (OUTPATIENT)
Dept: FAMILY MEDICINE CLINIC | Age: 75
End: 2020-12-23
Payer: MEDICARE

## 2020-12-23 PROBLEM — F32.9 REACTIVE DEPRESSION: Status: ACTIVE | Noted: 2018-03-06

## 2020-12-23 PROCEDURE — 99441 PR PHYS/QHP TELEPHONE EVALUATION 5-10 MIN: CPT | Performed by: INTERNAL MEDICINE

## 2020-12-23 ASSESSMENT — ENCOUNTER SYMPTOMS
CHEST TIGHTNESS: 0
NAUSEA: 0
VOMITING: 0
ABDOMINAL PAIN: 0
SHORTNESS OF BREATH: 0

## 2020-12-23 NOTE — PROGRESS NOTES
2020    TELEHEALTH EVALUATION -- phone (During OSAWU-76 public health emergency). HPI:    Adeline Rutledge (:  1945) has requested an audio/video evaluation for the following concern(s):    Her nurse heard worse crackles yesterday   ankles are a little swollen \  Pt gained weight: now 179.6  weeks ago 176  Her care giver Lynsey Bob does not see leg swelling. Pt feels her chronic SOB , no worsening, chronic cough    no CP/ abdominal pain. No nausea/ vomiting. Pt is on Lasix 20 mg daily   mild worsening in depression  No suicidal ideation  She does see Dr Brittany Jensen     Review of Systems   Constitutional: Negative for activity change, appetite change, chills, fever and unexpected weight change (2-3 lbs weight gain ). Eyes: Negative for visual disturbance. Respiratory: Negative for chest tightness and shortness of breath (chronic ). Cardiovascular: Negative for chest pain. Gastrointestinal: Negative for abdominal pain, nausea and vomiting. Genitourinary: Negative for hematuria. Skin: Negative for rash. Allergic/Immunologic: Negative for immunocompromised state. Neurological: Negative for dizziness and headaches. Psychiatric/Behavioral: Positive for dysphoric mood. Negative for suicidal ideas. Prior to Visit Medications    Medication Sig Taking?  Authorizing Provider   predniSONE (DELTASONE) 5 MG tablet TAKE 1 TABLET BY MOUTH DAILY FOR 30 DOSES  Kiran Pope MD   Cholecalciferol (VITAMIN D3) 1.25 MG (52636 UT) CAPS TAKE 1 CAPSULE BY MOUTH EVERY OTHER WEEK  Kiran Pope MD   furosemide (LASIX) 20 MG tablet Take 1 tablet by mouth daily  Meet Joseph MD   predniSONE (DELTASONE) 5 MG tablet Take 1 tablet by mouth daily  Meet Joseph MD   pantoprazole (PROTONIX) 20 MG tablet TAKE 1 TABLET BY MOUTH TWICE DAILY  Meet Joseph MD   carvedilol (COREG) 6.25 MG tablet TAKE 1 TABLET BY MOUTH TWICE DAILY  Meet Joseph MD [ INSTRUCTIONS:  \"[x]\" Indicates a positive item  \"[]\" Indicates a negative item  -- DELETE ALL ITEMS NOT EXAMINED]  Vital Signs: (As obtained by patient/caregiver or practitioner observation)    Blood pressure- ~120s/70-74 Heart rate-    Respiratory rate-    Temperature-  Pulse oximetry-   Mental status  [x] Alert and awake  [x] Oriented to person/place/time []Able to follow commands      Pulmonary/Chest: [x] Respiratory effort normal.  [] No visualized signs of difficulty breathing or respiratory distress        [] Abnormal-            Psychiatric:       [x] Normal Affect [x] No Hallucinations        [] Abnormal-     Other pertinent observable physical exam findings-     ASSESSMENT/PLAN:  1. Essential hypertension, benign  Well controlled- no changes in medication today. Minimal weight gain- monitor clinically     2. ILD (interstitial lung disease) ([facility] [address]  No clinical worsening. 3. Reactive depression  Slight worsening. Not suicidal intention. No follow-ups on file. Clementina Ferrell is a 66 y.o. female being evaluated by a Virtual Visit (video visit) encounter to address concerns as mentioned above. A caregiver was present when appropriate. Due to this being a TeleHealth encounter (During [place] [address] public health emergency), evaluation of the following organ systems was limited: Vitals/Constitutional/EENT/Resp/CV/GI//MS/Neuro/Skin/Heme-Lymph-Imm. Pursuant to the emergency declaration under the [address] [place] [address], [address] [place] [address] authority and the [employer] and [employer] Act, this Virtual Visit was conducted with patient's (and/or legal guardian's) consent, to reduce the patient's risk of exposure to COVID-19 and provide necessary medical care. The patient (and/or legal guardian) has also been advised to contact this office for worsening conditions or problems, and seek emergency medical treatment and/or call 911 if deemed necessary. Patient identification was verified at the start of the visit: Yes    Total time spent on this encounter: 9 min     Services were provided through a video synchronous discussion virtually to substitute for in-person clinic visit. Patient and provider were located at their individual homes. --Brigette Sidhu MD on 12/23/2020 at 5:09 PM    An electronic signature was used to authenticate this note.

## 2021-01-04 DIAGNOSIS — M35.1 CONNECTIVE TISSUE DISEASE OVERLAP SYNDROME (HCC): Primary | ICD-10-CM

## 2021-01-04 RX ORDER — AZATHIOPRINE 50 MG/1
TABLET ORAL
Qty: 90 TABLET | Refills: 0 | Status: SHIPPED | OUTPATIENT
Start: 2021-01-04 | End: 2021-04-13 | Stop reason: SDUPTHER

## 2021-01-06 ENCOUNTER — TELEPHONE (OUTPATIENT)
Dept: FAMILY MEDICINE CLINIC | Age: 76
End: 2021-01-06

## 2021-01-06 NOTE — TELEPHONE ENCOUNTER
I spoke to pt and care giver   She has her regular cough  Mild increase in SOB and wheezing.    She will take extra dose of lasix and see me tomorrow

## 2021-01-06 NOTE — TELEPHONE ENCOUNTER
Alexia Han is calling to speak to Dr. Camilla Litten about pt being advised to get Covid test. Pls advise.     [phone]

## 2021-01-06 NOTE — TELEPHONE ENCOUNTER
[facility] home health care nurse called to report patient is wheezing, having SOB, non productive cough for about 2 weeks she has +1 edema in left leg weight increase to 184. Constipated she is having a bm about every other day, she also has back pain left side 1 week not sure the cause and lupus flare up.   Please advise

## 2021-01-07 NOTE — TELEPHONE ENCOUNTER
Discussed w/Dr. Talia Oglesby. She is to take her extra lasix and call us to let us know how she is doing.

## 2021-01-08 DIAGNOSIS — M35.1 CONNECTIVE TISSUE DISEASE OVERLAP SYNDROME (HCC): ICD-10-CM

## 2021-01-08 LAB
A/G RATIO: 1.3 (ref 1.1–2.2)
ALBUMIN SERPL-MCNC: 4 G/DL (ref 3.4–5)
ALP BLD-CCNC: 138 U/L (ref 40–129)
ALT SERPL-CCNC: 23 U/L (ref 10–40)
ANION GAP SERPL CALCULATED.3IONS-SCNC: 10 MMOL/L (ref 3–16)
AST SERPL-CCNC: 32 U/L (ref 15–37)
BACTERIA: ABNORMAL /HPF
BASOPHILS ABSOLUTE: 0 K/UL (ref 0–0.2)
BASOPHILS RELATIVE PERCENT: 0.6 %
BILIRUB SERPL-MCNC: <0.2 MG/DL (ref 0–1)
BILIRUBIN URINE: ABNORMAL
BLOOD, URINE: NEGATIVE
BUN BLDV-MCNC: 31 MG/DL (ref 7–20)
C3 COMPLEMENT: 158.5 MG/DL (ref 90–180)
C4 COMPLEMENT: 27.5 MG/DL (ref 10–40)
CALCIUM SERPL-MCNC: 9.7 MG/DL (ref 8.3–10.6)
CHLORIDE BLD-SCNC: 103 MMOL/L (ref 99–110)
CLARITY: ABNORMAL
CO2: 30 MMOL/L (ref 21–32)
COLOR: YELLOW
COMMENT UA: ABNORMAL
CREAT SERPL-MCNC: 1 MG/DL (ref 0.6–1.2)
CREATININE URINE: 197.8 MG/DL (ref 28–259)
EOSINOPHILS ABSOLUTE: 0.1 K/UL (ref 0–0.6)
EOSINOPHILS RELATIVE PERCENT: 1.8 %
EPITHELIAL CELLS, UA: 9 /HPF (ref 0–5)
GFR AFRICAN AMERICAN: >60
GFR NON-AFRICAN AMERICAN: 54
GLOBULIN: 3.1 G/DL
GLUCOSE BLD-MCNC: 90 MG/DL (ref 70–99)
GLUCOSE URINE: NEGATIVE MG/DL
HCT VFR BLD CALC: 38.3 % (ref 36–48)
HEMOGLOBIN: 12.7 G/DL (ref 12–16)
KETONES, URINE: ABNORMAL MG/DL
LEUKOCYTE ESTERASE, URINE: ABNORMAL
LYMPHOCYTES ABSOLUTE: 0.8 K/UL (ref 1–5.1)
LYMPHOCYTES RELATIVE PERCENT: 11.2 %
MCH RBC QN AUTO: 32.1 PG (ref 26–34)
MCHC RBC AUTO-ENTMCNC: 33.2 G/DL (ref 31–36)
MCV RBC AUTO: 96.6 FL (ref 80–100)
MICROSCOPIC EXAMINATION: YES
MONOCYTES ABSOLUTE: 0.5 K/UL (ref 0–1.3)
MONOCYTES RELATIVE PERCENT: 7.5 %
NEUTROPHILS ABSOLUTE: 5.6 K/UL (ref 1.7–7.7)
NEUTROPHILS RELATIVE PERCENT: 78.9 %
NITRITE, URINE: POSITIVE
PDW BLD-RTO: 13.8 % (ref 12.4–15.4)
PH UA: 5.5 (ref 5–8)
PLATELET # BLD: 265 K/UL (ref 135–450)
PMV BLD AUTO: 6.8 FL (ref 5–10.5)
POTASSIUM SERPL-SCNC: 4.1 MMOL/L (ref 3.5–5.1)
PROTEIN PROTEIN: 43 MG/DL
PROTEIN UA: ABNORMAL MG/DL
PROTEIN/CREAT RATIO: 0.2 MG/DL
RBC # BLD: 3.96 M/UL (ref 4–5.2)
RBC UA: 3 /HPF (ref 0–4)
SODIUM BLD-SCNC: 143 MMOL/L (ref 136–145)
SPECIFIC GRAVITY UA: >1.03 (ref 1–1.03)
TOTAL PROTEIN: 7.1 G/DL (ref 6.4–8.2)
URINE TYPE: ABNORMAL
UROBILINOGEN, URINE: 1 E.U./DL
WBC # BLD: 7 K/UL (ref 4–11)
WBC UA: 93 /HPF (ref 0–5)

## 2021-01-09 ENCOUNTER — TELEPHONE (OUTPATIENT)
Dept: FAMILY MEDICINE CLINIC | Age: 76
End: 2021-01-09

## 2021-01-09 NOTE — TELEPHONE ENCOUNTER
Home health called--had labs and U/A  Done that showed pyuria--has vague feelings of being unwell--started keflex until urine culture comes back

## 2021-01-10 LAB — DSDNA ANTIBODY TITER: NORMAL

## 2021-01-11 ENCOUNTER — TELEPHONE (OUTPATIENT)
Dept: RHEUMATOLOGY | Age: 76
End: 2021-01-11

## 2021-01-11 NOTE — TELEPHONE ENCOUNTER
Caregiver, Wood Deng, calling to see if you've had a chance to review pt's labs that were completed on Friday 1/8/21. Wood Deng states pt's lupus was flaring really bad. He can be reached at [phone].  Yolanda Parkinson is also on HIPAA)

## 2021-01-11 NOTE — TELEPHONE ENCOUNTER
Called patient's caregiver Javy-actually has skin flare in her scalp, using clobetasol ointment given by dermatology that has helped. Continue using it. Lupus activity labs are stable. No need to change any systemic treatment.

## 2021-01-12 ENCOUNTER — TELEMEDICINE (OUTPATIENT)
Dept: PSYCHOLOGY | Age: 76
End: 2021-01-12
Payer: MEDICARE

## 2021-01-12 DIAGNOSIS — F43.21 GRIEF: Primary | ICD-10-CM

## 2021-01-12 DIAGNOSIS — F33.1 MODERATE EPISODE OF RECURRENT MAJOR DEPRESSIVE DISORDER (HCC): ICD-10-CM

## 2021-01-12 DIAGNOSIS — F32.1 CURRENT MODERATE EPISODE OF MAJOR DEPRESSIVE DISORDER WITHOUT PRIOR EPISODE (HCC): ICD-10-CM

## 2021-01-12 PROCEDURE — 90832 PSYTX W PT 30 MINUTES: CPT | Performed by: PSYCHOLOGIST

## 2021-01-12 RX ORDER — MIRTAZAPINE 7.5 MG/1
TABLET, FILM COATED ORAL
Qty: 30 TABLET | Refills: 2 | Status: SHIPPED | OUTPATIENT
Start: 2021-01-12 | End: 2021-03-01

## 2021-01-12 NOTE — PROGRESS NOTES
Behavioral Health Consultation  [facility][note 3] Quyen Goncalves PsyD  Psychologist  1/12/2021  10:25 AM      Time spent with Patient: 30 minutes  This is patient's [de-identified] sixth [facility] appointment. Reason for Consult:  grief  Referring Provider: David Cruz MD    TELEHEALTH VISIT -- Audio/Video (During BDJPN-25 public health emergency)  }  Pursuant to the emergency declaration under the [address] [place] [address] [facility] authority and the [employer] and [employer] Act, this Virtual Visit was conducted, with patient's consent, to reduce the patient's risk of exposure to COVID-19 and provide continuity of care for an established patient. Services were provided through a video synchronous discussion virtually to substitute for in-person clinic visit. Pt gave verbal informed consent to participate in telehealth services. Conducted a risk-benefit analysis and determined that the patient's presenting problems are consistent with the use of telepsychology. Determined that the patient has sufficient knowledge and skills in the use of technology enabling them to adequately benefit from telepsychology. It was determined that this patient was able to be properly treated without an in-person session. Patient verified that they were currently located at the [facility] address that was provided during registration.     Verified the following information:  Patient's identification: Yes  Patient location: [address] [place] [address]   Patient's call back number: [phone]   Patient's emergency contact's name and number, as well as permission to contact them if needed: Extended Emergency Contact Information  Primary Emergency Contact: Anya Strange [address] [place] [address] Phone: [phone]  Relation: Other  Secondary Emergency Contact: [address] [place] [address] Phone: [phone]  Relation: Child Provider location: [facility]:  Pt reports she is \"ok. \" Dog Kenneth is hanging on after other dog Melissa passed away. She is struggling with her weight due to medications. She feels uncomfortable and can't wear her clothes anymore. Wears sweats. Has ups and downs with her mood. Still feels very sad about her loss but doesn't feels she gets stuck in her down mood. Wonders about her vaccine opportunity. Feels it will help a lot to get it.      O:  MSE:     Appearance: good hygiene   Attitude: cooperative and friendly  Consciousness: alert  Orientation: oriented to person, place, time, general circumstance  Memory: recent and remote memory intact  Attention/Concentration: intact during session  Psychomotor Activity: normal  Eye Contact: normal  Speech: normal rate and volume, well-articulated  Mood: \"ok\"  Affect: euthymic and congruent  Perception: within normal limits  Thought Content: within normal limits  Thought Process: logical, coherent and goal-directed  Insight: fair  Judgment: intact  Ability to understand instructions: Yes  Ability to respond meaningfully: Yes  Morbid Ideation: no   Suicide Assessment: no suicidal ideation, plan, or intent  Homicidal Ideation: no        History:    Medications:   Current Outpatient Medications   Medication Sig Dispense Refill    azaTHIOprine (IMURAN) 50 MG tablet TAKE 1 TABLET BY MOUTH DAILY 90 tablet 0    predniSONE (DELTASONE) 5 MG tablet TAKE 1 TABLET BY MOUTH DAILY FOR 30 DOSES 30 tablet 2    Cholecalciferol (VITAMIN D3) 1.25 MG (40820 UT) CAPS TAKE 1 CAPSULE BY MOUTH EVERY OTHER WEEK 4 capsule 2    furosemide (LASIX) 20 MG tablet Take 1 tablet by mouth daily 90 tablet 3    predniSONE (DELTASONE) 5 MG tablet Take 1 tablet by mouth daily 30 tablet 3    pantoprazole (PROTONIX) 20 MG tablet TAKE 1 TABLET BY MOUTH TWICE DAILY 60 tablet 2    carvedilol (COREG) 6.25 MG tablet TAKE 1 TABLET BY MOUTH TWICE DAILY 180 tablet 3  clopidogrel (PLAVIX) 75 MG tablet TAKE 1 TABLET BY MOUTH DAILY 30 tablet 2    rosuvastatin (CRESTOR) 20 MG tablet TAKE 1 TABLET BY MOUTH DAILY 30 tablet 5    buPROPion (WELLBUTRIN XL) 300 MG extended release tablet TAKE 1 TABLET BY MOUTH EVERY MORNING 90 tablet 1    hydroxychloroquine (PLAQUENIL) 200 MG tablet TAKE 1 TABLET BY MOUTH TWICE DAILY 60 tablet 2    mirtazapine (REMERON) 7.5 MG tablet TAKE 1 TABLET BY MOUTH EVERY NIGHT 30 tablet 2    ondansetron (ZOFRAN) 4 MG tablet TAKE 1 TABLET BY MOUTH EVERY 12 HOURS AS NEEDED FOR NAUSEA OR VOMITING 30 tablet 3    docusate (COLACE, DULCOLAX) 100 MG CAPS Take 100 mg by mouth 2 times daily 60 capsule 3    clobetasol (TEMOVATE) 0.05 % external solution Apply topically 2 times daily Apply topically 2 times daily.  acetaminophen 650 MG TABS Take 650 mg by mouth every 6 hours as needed for Pain or Fever (Fever >100.5 F (38 C)). 60 tablet 1     No current facility-administered medications for this visit. Social History:   Social History     Socioeconomic History    Marital status:      Spouse name: Not on file    Number of children: 1    Years of education: 15    Highest education level: Not on file   Occupational History    Occupation: Retired   Social Needs    Financial resource strain: Not on file    Food insecurity     Worry: Not on file     Inability: Not on file   [employer] needs     Medical: Not on file     Non-medical: Not on file   Tobacco Use    Smoking status: Former Smoker     Packs/day: 0.50     Years: 30.00     Pack years: 15.00     Types: Cigarettes     Start date:      Quit date: 2014     Years since quittin.3    Smokeless tobacco: Never Used   Substance and Sexual Activity    Alcohol use:  Yes     Alcohol/week: 0.0 standard drinks     Comment: 2 cocktails on weekends    Drug use: No    Sexual activity: Not Currently   Lifestyle    Physical activity     Days per week: Not on file Minutes per session: Not on file    Stress: Not on file   Relationships    Social connections     Talks on phone: Not on file     Gets together: Not on file     Attends Anabaptist service: Not on file     Active member of club or organization: Not on file     Attends meetings of clubs or organizations: Not on file     Relationship status: Not on file    Intimate partner violence     Fear of current or ex partner: Not on file     Emotionally abused: Not on file     Physically abused: Not on file     Forced sexual activity: Not on file   Other Topics Concern    Not on file   Social History Narrative    Not on file       TOBACCO:   reports that she quit smoking about 6 years ago. Her smoking use included cigarettes. She started smoking about 56 years ago. She has a 15.00 pack-year smoking history. She has never used smokeless tobacco.  ETOH:   reports current alcohol use. Family History:   Family History   Problem Relation Age of Onset    High Blood Pressure Brother          A:  Ms. Erendira Nogueira continues to grief but feels she is managing well overall. She continues to be active and engaged and responds positively to behavioral interventions.      Diagnosis:    Grief  MDD, Recurrent, Moderate  Anxiety  Stress      Plan:  Pt interventions:  [place]-setting to identify pt's primary goals for [facility] visit / overall health and Supportive techniques,  ACT interventions and reinforced pt's skill use, treatment planning, grief interventions    Pt Behavioral Change Plan:  Pt set goals to 1) return for f/u in 4 weeks

## 2021-01-26 ENCOUNTER — TELEPHONE (OUTPATIENT)
Dept: FAMILY MEDICINE CLINIC | Age: 76
End: 2021-01-26

## 2021-01-26 NOTE — TELEPHONE ENCOUNTER
Pt caregiver is very upset because he was told that he didn't have to do anything to get pt on the list for the covid vaccine. Now pt is just finding out that there's a number that he has to call to have pt scheduled and that it's now booked. Pt is requesting a call from Dr. Beba Ryan. Pls advise.

## 2021-01-26 NOTE — TELEPHONE ENCOUNTER
I added patient to list. Attempted to call caregiver and no answer and no vm to leave message.  Closing encounter

## 2021-02-02 ENCOUNTER — TELEPHONE (OUTPATIENT)
Dept: FAMILY MEDICINE CLINIC | Age: 76
End: 2021-02-02

## 2021-02-02 ENCOUNTER — TELEMEDICINE (OUTPATIENT)
Dept: PSYCHOLOGY | Age: 76
End: 2021-02-02
Payer: MEDICARE

## 2021-02-02 DIAGNOSIS — F43.21 GRIEF: ICD-10-CM

## 2021-02-02 DIAGNOSIS — R39.15 URGENCY OF URINATION: Primary | ICD-10-CM

## 2021-02-02 DIAGNOSIS — F33.1 MODERATE EPISODE OF RECURRENT MAJOR DEPRESSIVE DISORDER (HCC): Primary | ICD-10-CM

## 2021-02-02 DIAGNOSIS — F41.1 GAD (GENERALIZED ANXIETY DISORDER): ICD-10-CM

## 2021-02-02 LAB
BILIRUBIN, URINE: NEGATIVE
BLOOD, URINE: POSITIVE
CLARITY: CLEAR
COLOR: YELLOW
GLUCOSE URINE: NEGATIVE
KETONES, URINE: NEGATIVE
LEUKOCYTE ESTERASE, URINE: ABNORMAL
NITRITE, URINE: NEGATIVE
PH UA: 5.5 (ref 4.5–8)
PROTEIN UA: ABNORMAL
SPECIFIC GRAVITY, URINE: 1.02
UROBILINOGEN, URINE: NORMAL

## 2021-02-02 PROCEDURE — 90832 PSYTX W PT 30 MINUTES: CPT | Performed by: PSYCHOLOGIST

## 2021-02-02 NOTE — TELEPHONE ENCOUNTER
Talked to caregiver Rina Moody. An order for UA and UC was faxed to [facility] (p [phone])  Patient has an appt on Thursday.

## 2021-02-02 NOTE — TELEPHONE ENCOUNTER
----- Message from Boom Hinojosa sent at 2/2/2021 11:13 AM EST -----  Subject: Message to Provider    QUESTIONS  Information for Provider? Wants to talk to someone about possible UTI   ---------------------------------------------------------------------------  --------------  CALL BACK INFO  What is the best way for the office to contact you? OK to leave message on   voicemail  Preferred Call Back Phone Number? [phone]  ---------------------------------------------------------------------------  --------------  SCRIPT ANSWERS  Relationship to Patient?  Self

## 2021-02-02 NOTE — PROGRESS NOTES
Behavioral Health Consultation  Laura Arthur PsyD  Psychologist  2/2/2021  10:27 AM      Time spent with Patient: 30 minutes  This is patient's [de-identified] seventh [facility] appointment. Reason for Consult:  depression  Referring Provider: Annie Valenzuela MD    TELEHEALTH VISIT -- Audio/Video (During DBUSA-57 public health emergency)  }  Pursuant to the emergency declaration under the [address] [place] [address] [facility] authority and the [employer] Act, this Virtual Visit was conducted, with patient's consent, to reduce the patient's risk of exposure to COVID-19 and provide continuity of care for an established patient. Services were provided through a video synchronous discussion virtually to substitute for in-person clinic visit. Pt gave verbal informed consent to participate in telehealth services. Conducted a risk-benefit analysis and determined that the patient's presenting problems are consistent with the use of telepsychology. Determined that the patient has sufficient knowledge and skills in the use of technology enabling them to adequately benefit from telepsychology. It was determined that this patient was able to be properly treated without an in-person session. Patient verified that they were currently located at the [facility] address that was provided during registration.     Verified the following information:  Patient's identification: Yes  Patient location: [address] [place] [address]   Patient's call back number: [phone]   Patient's emergency contact's name and number, as well as permission to contact them if needed: Extended Emergency Contact Information  Primary Emergency Contact: Anya Strange [address] [place] [address] Phone: [phone]  Relation: Other  Secondary Emergency Contact: [address] [place] [address] Phone: [phone]  Relation: Child     Provider location: Ez Speaks:  Pt reports she has been having a hard week with grief and depression. Missing her dog a lot. Really upset about her weight gain too. Can't do much so is bored. Tries to do coloring and word puzzles daily. Feels nothing is going well right now. Frustrated with caregiver/partner and her son.           O:  MSE:     Appearance: good hygiene   Attitude: cooperative and friendly  Consciousness: alert  Orientation: oriented to person, place, time, general circumstance  Memory: recent and remote memory intact  Attention/Concentration: intact during session  Psychomotor Activity: normal  Eye Contact: normal  Speech: normal rate and volume, well-articulated  Mood: \"depressed\"  Affect: euthymic and congruent  Perception: within normal limits  Thought Content: within normal limits  Thought Process: logical, coherent and goal-directed  Insight: fair  Judgment: intact  Ability to understand instructions: Yes  Ability to respond meaningfully: Yes  Morbid Ideation: no   Suicide Assessment: no suicidal ideation, plan, or intent  Homicidal Ideation: no        History:    Medications:   Current Outpatient Medications   Medication Sig Dispense Refill    hydroxychloroquine (PLAQUENIL) 200 MG tablet TAKE 1 TABLET BY MOUTH TWICE DAILY 60 tablet 11    mirtazapine (REMERON) 7.5 MG tablet TAKE 1 TABLET BY MOUTH EVERY NIGHT 30 tablet 2    azaTHIOprine (IMURAN) 50 MG tablet TAKE 1 TABLET BY MOUTH DAILY 90 tablet 0    predniSONE (DELTASONE) 5 MG tablet TAKE 1 TABLET BY MOUTH DAILY FOR 30 DOSES 30 tablet 2    Cholecalciferol (VITAMIN D3) 1.25 MG (73357 UT) CAPS TAKE 1 CAPSULE BY MOUTH EVERY OTHER WEEK 4 capsule 2    furosemide (LASIX) 20 MG tablet Take 1 tablet by mouth daily 90 tablet 3    predniSONE (DELTASONE) 5 MG tablet Take 1 tablet by mouth daily 30 tablet 3    pantoprazole (PROTONIX) 20 MG tablet TAKE 1 TABLET BY MOUTH TWICE DAILY 60 tablet 2    carvedilol (COREG) 6.25 MG tablet TAKE 1 TABLET BY MOUTH TWICE DAILY 180 tablet 3    clopidogrel (PLAVIX) 75 MG tablet TAKE 1 TABLET BY MOUTH DAILY 30 tablet 2    rosuvastatin (CRESTOR) 20 MG tablet TAKE 1 TABLET BY MOUTH DAILY 30 tablet 5    buPROPion (WELLBUTRIN XL) 300 MG extended release tablet TAKE 1 TABLET BY MOUTH EVERY MORNING 90 tablet 1    ondansetron (ZOFRAN) 4 MG tablet TAKE 1 TABLET BY MOUTH EVERY 12 HOURS AS NEEDED FOR NAUSEA OR VOMITING 30 tablet 3    docusate (COLACE, DULCOLAX) 100 MG CAPS Take 100 mg by mouth 2 times daily 60 capsule 3    clobetasol (TEMOVATE) 0.05 % external solution Apply topically 2 times daily Apply topically 2 times daily.  acetaminophen 650 MG TABS Take 650 mg by mouth every 6 hours as needed for Pain or Fever (Fever >100.5 F (38 C)). 60 tablet 1     No current facility-administered medications for this visit. Social History:   Social History     Socioeconomic History    Marital status:      Spouse name: Not on file    Number of children: 1    Years of education: 15    Highest education level: Not on file   Occupational History    Occupation: Retired   Social Needs    Financial resource strain: Not on file    Food insecurity     Worry: Not on file     Inability: Not on file   [employer] needs     Medical: Not on file     Non-medical: Not on file   Tobacco Use    Smoking status: Former Smoker     Packs/day: 0.50     Years: 30.00     Pack years: 15.00     Types: Cigarettes     Start date:      Quit date: 2014     Years since quittin.3    Smokeless tobacco: Never Used   Substance and Sexual Activity    Alcohol use:  Yes     Alcohol/week: 0.0 standard drinks     Comment: 2 cocktails on weekends    Drug use: No    Sexual activity: Not Currently   Lifestyle    Physical activity     Days per week: Not on file     Minutes per session: Not on file    Stress: Not on file   Relationships    Social connections     Talks on phone: Not on file     Gets together: Not on file Attends Cheondoism service: Not on file     Active member of club or organization: Not on file     Attends meetings of clubs or organizations: Not on file     Relationship status: Not on file    Intimate partner violence     Fear of current or ex partner: Not on file     Emotionally abused: Not on file     Physically abused: Not on file     Forced sexual activity: Not on file   Other Topics Concern    Not on file   Social History Narrative    Not on file       TOBACCO:   reports that she quit smoking about 6 years ago. Her smoking use included cigarettes. She started smoking about 56 years ago. She has a 15.00 pack-year smoking history. She has never used smokeless tobacco.  ETOH:   reports current alcohol use. Family History:   Family History   Problem Relation Age of Onset    High Blood Pressure Brother          A:  Ms. Magalie Rivas continues to grieve the loss of her dog which has exacerbated her depression. She continues to be active and engaged and responds positively to behavioral interventions. She would benefit from a psychiatry referral and is agreeable.      Diagnosis:    Grief  MDD, Recurrent, Moderate  Anxiety  Stress      Plan:  Pt interventions:  [place]-setting to identify pt's primary goals for [facility] visit / overall health and Supportive techniques,behavioral activation interventions,  ACT interventions and reinforced pt's skill use, treatment planning    Pt Behavioral Change Plan:  Pt set goals to 1) call Dr. Bentley Frazier 2) return for f/u in 4 weeks, sooner if needed

## 2021-02-03 ENCOUNTER — TELEPHONE (OUTPATIENT)
Dept: FAMILY MEDICINE CLINIC | Age: 76
End: 2021-02-03

## 2021-02-03 ENCOUNTER — IMMUNIZATION (OUTPATIENT)
Dept: PRIMARY CARE CLINIC | Age: 76
End: 2021-02-03
Payer: MEDICARE

## 2021-02-03 DIAGNOSIS — R39.15 URGENCY OF URINATION: ICD-10-CM

## 2021-02-03 PROCEDURE — 91301 COVID-19, MODERNA VACCINE 100MCG/0.5ML DOSE: CPT | Performed by: FAMILY MEDICINE

## 2021-02-03 PROCEDURE — 0011A COVID-19, MODERNA VACCINE 100MCG/0.5ML DOSE: CPT | Performed by: FAMILY MEDICINE

## 2021-02-03 RX ORDER — CLOPIDOGREL BISULFATE 75 MG/1
TABLET ORAL
Qty: 90 TABLET | Refills: 1 | Status: SHIPPED | OUTPATIENT
Start: 2021-02-03 | End: 2021-05-03 | Stop reason: SDUPTHER

## 2021-02-03 RX ORDER — CIPROFLOXACIN 250 MG/1
250 TABLET, FILM COATED ORAL 2 TIMES DAILY
Qty: 6 TABLET | Refills: 0 | Status: SHIPPED | OUTPATIENT
Start: 2021-02-03 | End: 2021-02-04

## 2021-02-03 NOTE — TELEPHONE ENCOUNTER
----- Message from Severo Rock sent at 2/2/2021  8:38 AM EST -----  Subject: Appointment Request    Reason for Call: Urgent (Patient Request) No Script    QUESTIONS  Type of Appointment? Established Patient  Reason for appointment request? Available appointments did not meet   patient need  Additional Information for Provider? Carline Krishnamurthy says that he feels   that she may still have a uti she is also having a lot of other things   going on. He would like to speak with someone today. The [employer] aid is coming today at 1:30 if any test needed they could do   it if ordered for today.  ---------------------------------------------------------------------------  --------------  CALL BACK INFO  What is the best way for the office to contact you? OK to leave message on   voicemail  Preferred Call Back Phone Number? [phone]  ---------------------------------------------------------------------------  --------------  SCRIPT ANSWERS  Relationship to Patient? Other  Representative Name? Ezella Litten  Additional information verified (besides Name and Date of Birth)? Address  Appointment reason? Symptomatic  Select script based on patient symptoms? Adult No Script   (Is the patient requesting to see the provider for a procedure?)? No  (Is the patient requesting to see the provider urgently  today or   tomorrow. )? Yes  Have you been diagnosed with   tested for   or told that you are suspected of having COVID-19 (Coronavirus)? No  Have you had a fever or taken medication to treat a fever within the past   3 days? No  Have you had a cough   shortness of breath or flu-like symptoms within the past 3 days? No  Do you currently have flu-like symptoms including fever or chills   cough   shortness of breath   or difficulty breathing   or new loss of taste or smell? No  (Service Expert  click yes below to proceed with [employer] As Usual   Scheduling)?  Yes

## 2021-02-03 NOTE — TELEPHONE ENCOUNTER
Talked to Dr. Ludmila Lopez. She has to be seen. She has a virtual appointment set up for tomorrow. Called Itzel Godoy - we will change to in person visit.

## 2021-02-03 NOTE — TELEPHONE ENCOUNTER
Pt feels achy. Pts caregiver is wondering if an ekg or chest x-ray can be ordered for her. Coughing but unable to cough it up. Rigo Borrero thinks maybe she has a touch of flu. Looks pale. She has to leave the house for her covid vaccine at 1:10 [address] wondered if something could be done while she is out.   -----------------------------------------------------------------------  OK to leave message on voicemail  Preferred Call Back Phone Number? [phone]  ---------------------------------------------------------------------------  --------------  SCRIPT ANSWERS  Relationship to Patient? Other  Representative Name? Rigo Borrero  Is the Representative on the appropriate HIPAA document in Epic?  Yes

## 2021-02-04 ENCOUNTER — OFFICE VISIT (OUTPATIENT)
Dept: FAMILY MEDICINE CLINIC | Age: 76
End: 2021-02-04
Payer: MEDICARE

## 2021-02-04 ENCOUNTER — NURSE ONLY (OUTPATIENT)
Dept: PRIMARY CARE CLINIC | Age: 76
End: 2021-02-04
Payer: MEDICARE

## 2021-02-04 VITALS
WEIGHT: 185 LBS | SYSTOLIC BLOOD PRESSURE: 130 MMHG | OXYGEN SATURATION: 96 % | HEART RATE: 71 BPM | DIASTOLIC BLOOD PRESSURE: 80 MMHG | HEIGHT: 60 IN | BODY MASS INDEX: 36.32 KG/M2

## 2021-02-04 DIAGNOSIS — N30.00 ACUTE CYSTITIS WITHOUT HEMATURIA: ICD-10-CM

## 2021-02-04 DIAGNOSIS — F33.1 MODERATE EPISODE OF RECURRENT MAJOR DEPRESSIVE DISORDER (HCC): ICD-10-CM

## 2021-02-04 DIAGNOSIS — Z20.822 SUSPECTED COVID-19 VIRUS INFECTION: Primary | ICD-10-CM

## 2021-02-04 DIAGNOSIS — F41.1 GAD (GENERALIZED ANXIETY DISORDER): ICD-10-CM

## 2021-02-04 DIAGNOSIS — J06.9 VIRAL URI: Primary | ICD-10-CM

## 2021-02-04 PROCEDURE — G8484 FLU IMMUNIZE NO ADMIN: HCPCS | Performed by: INTERNAL MEDICINE

## 2021-02-04 PROCEDURE — 99211 OFF/OP EST MAY X REQ PHY/QHP: CPT | Performed by: NURSE PRACTITIONER

## 2021-02-04 PROCEDURE — 4040F PNEUMOC VAC/ADMIN/RCVD: CPT | Performed by: INTERNAL MEDICINE

## 2021-02-04 PROCEDURE — G8427 DOCREV CUR MEDS BY ELIG CLIN: HCPCS | Performed by: INTERNAL MEDICINE

## 2021-02-04 PROCEDURE — G8417 CALC BMI ABV UP PARAM F/U: HCPCS | Performed by: INTERNAL MEDICINE

## 2021-02-04 PROCEDURE — 1090F PRES/ABSN URINE INCON ASSESS: CPT | Performed by: INTERNAL MEDICINE

## 2021-02-04 PROCEDURE — 3288F FALL RISK ASSESSMENT DOCD: CPT | Performed by: INTERNAL MEDICINE

## 2021-02-04 PROCEDURE — 99213 OFFICE O/P EST LOW 20 MIN: CPT | Performed by: INTERNAL MEDICINE

## 2021-02-04 PROCEDURE — 1123F ACP DISCUSS/DSCN MKR DOCD: CPT | Performed by: INTERNAL MEDICINE

## 2021-02-04 PROCEDURE — 1036F TOBACCO NON-USER: CPT | Performed by: INTERNAL MEDICINE

## 2021-02-04 PROCEDURE — G8399 PT W/DXA RESULTS DOCUMENT: HCPCS | Performed by: INTERNAL MEDICINE

## 2021-02-04 RX ORDER — ALBUTEROL SULFATE 90 UG/1
2 AEROSOL, METERED RESPIRATORY (INHALATION) 4 TIMES DAILY PRN
Qty: 1 INHALER | Refills: 0 | Status: SHIPPED | OUTPATIENT
Start: 2021-02-04 | End: 2021-03-03 | Stop reason: SDUPTHER

## 2021-02-04 RX ORDER — LEVOFLOXACIN 250 MG/1
250 TABLET ORAL DAILY
Qty: 5 TABLET | Refills: 0 | Status: SHIPPED | OUTPATIENT
Start: 2021-02-04 | End: 2021-02-09

## 2021-02-04 ASSESSMENT — ENCOUNTER SYMPTOMS
CHEST TIGHTNESS: 0
COUGH: 1
VOMITING: 0
NAUSEA: 0
SHORTNESS OF BREATH: 0
ABDOMINAL PAIN: 0

## 2021-02-04 ASSESSMENT — PATIENT HEALTH QUESTIONNAIRE - PHQ9
1. LITTLE INTEREST OR PLEASURE IN DOING THINGS: 0
SUM OF ALL RESPONSES TO PHQ QUESTIONS 1-9: 0

## 2021-02-04 NOTE — PROGRESS NOTES
Rod Mayers received a viral test for COVID-19. They were educated on isolation and quarantine as appropriate. For any symptoms, they were directed to seek care from their PCP, given contact information to establish with a doctor, directed to an urgent care or the emergency room.

## 2021-02-04 NOTE — PROGRESS NOTES
Outpatient Note for established Patient - regular Visit     History Obtained From:  patient, electronic medical record  Is the patient new to me ? - No    HISTORY OF PRESENT ILLNESS:   The patient is a 64 y.o. female who is here today for :  Betsy Zurita was seen today for headache. Diagnoses and all orders for this visit:    Viral URI  -     albuterol sulfate HFA (VENTOLIN HFA) 108 (90 Base) MCG/ACT inhaler; Inhale 2 puffs into the lungs 4 times daily as needed for Wheezing  -     COVID-19 Ambulatory; Future  -     levoFLOXacin (LEVAQUIN) 250 MG tablet; Take 1 tablet by mouth daily for 5 days    Acute cystitis without hematuria    BLANCA (generalized anxiety disorder)  -     External Referral to Psychiatry    Moderate episode of recurrent major depressive disorder (HCC)        Pt si c/o chills, joint pain (diffuse), headache for a week   She has cough more than usual.   No chest pain. She does have mild worsening of her SOB   She does not have dysurix   She does feel weak       Past Medical History:        Diagnosis Date    Anxiety     CAD (coronary artery disease)     CHF (congestive heart failure) (HCC)     Depression     DVT of lower extremity (deep venous thrombosis) (HCC)     Falls     Hypertension     Lupus (HCC)     TIA (transient ischemic attack)     x3    Urinary incontinence     UTI (urinary tract infection)        Social History:   TOBACCO:   reports that she quit smoking about 6 years ago. Her smoking use included cigarettes. She started smoking about 56 years ago. She has a 15.00 pack-year smoking history. She has never used smokeless tobacco.    ETOH:   reports current alcohol use. Current Medications:    Prior to Admission medications    Medication Sig Start Date End Date Taking?  Authorizing Provider   albuterol sulfate HFA (VENTOLIN HFA) 108 (90 Base) MCG/ACT inhaler Inhale 2 puffs into the lungs 4 times daily as needed for Wheezing 2/4/21  Yes Nino Curiel MD levoFLOXacin (LEVAQUIN) 250 MG tablet Take 1 tablet by mouth daily for 5 days 2/4/21 2/9/21 Yes Cassia Eckert MD   clopidogrel (PLAVIX) 75 MG tablet TAKE 1 TABLET BY MOUTH DAILY 2/3/21  Yes Cassia Eckert MD   hydroxychloroquine (PLAQUENIL) 200 MG tablet TAKE 1 TABLET BY MOUTH TWICE DAILY 1/25/21  Yes Carolee Baron MD   mirtazapine (REMERON) 7.5 MG tablet TAKE 1 TABLET BY MOUTH EVERY NIGHT 1/12/21  Yes Cassia Eckert MD   azaTHIOprine (IMURAN) 50 MG tablet TAKE 1 TABLET BY MOUTH DAILY 1/4/21  Yes Cassia Eckert MD   predniSONE (DELTASONE) 5 MG tablet TAKE 1 TABLET BY MOUTH DAILY FOR 30 DOSES 12/7/20  Yes Caorlee Baron MD   Cholecalciferol (VITAMIN D3) 1.25 MG (86476 UT) CAPS TAKE 1 CAPSULE BY MOUTH EVERY OTHER WEEK 12/7/20  Yes Carolee Baron MD   furosemide (LASIX) 20 MG tablet Take 1 tablet by mouth daily 12/7/20 3/7/21 Yes Cassia Eckert MD   predniSONE (DELTASONE) 5 MG tablet Take 1 tablet by mouth daily 12/7/20  Yes Cassia Eckert MD   pantoprazole (PROTONIX) 20 MG tablet TAKE 1 TABLET BY MOUTH TWICE DAILY 11/29/20  Yes Cassia Eckert MD   carvedilol (COREG) 6.25 MG tablet TAKE 1 TABLET BY MOUTH TWICE DAILY 11/16/20  Yes Cassia Eckert MD   rosuvastatin (CRESTOR) 20 MG tablet TAKE 1 TABLET BY MOUTH DAILY 11/2/20  Yes Cassia Eckert MD   buPROPion (WELLBUTRIN XL) 300 MG extended release tablet TAKE 1 TABLET BY MOUTH EVERY MORNING 10/30/20  Yes Cassia Eckert MD   ondansetron (ZOFRAN) 4 MG tablet TAKE 1 TABLET BY MOUTH EVERY 12 HOURS AS NEEDED FOR NAUSEA OR VOMITING 2/17/20  Yes Cassia Eckert MD   docusate (COLACE, DULCOLAX) 100 MG CAPS Take 100 mg by mouth 2 times daily 12/2/19  Yes Najma Casillas,    clobetasol (TEMOVATE) 0.05 % external solution Apply topically 2 times daily Apply topically 2 times daily.    Yes Historical Provider, MD acetaminophen 650 MG TABS Take 650 mg by mouth every 6 hours as needed for Pain or Fever (Fever >100.5 F (38 C)). 12/9/13  Yes Hua Matson MD       REVIEW OF SYSTEMS:  Review of Systems   Constitutional: Negative for activity change, appetite change, chills, fever and unexpected weight change. Eyes: Negative for visual disturbance. Respiratory: Positive for cough. Negative for chest tightness and shortness of breath. Cardiovascular: Negative for chest pain. Gastrointestinal: Negative for abdominal pain, nausea and vomiting. Genitourinary: Negative for hematuria. Skin: Negative for rash. Allergic/Immunologic: Negative for immunocompromised state. Neurological: Negative for dizziness and headaches. Psychiatric/Behavioral: Negative for dysphoric mood and suicidal ideas. Physical Exam:      Vitals: /80   Pulse 71   Ht 5' (1.524 m)   Wt 185 lb (83.9 kg)   SpO2 96%   BMI 36.13 kg/m²     Body mass index is 36.13 kg/m². Wt Readings from Last 3 Encounters:   02/04/21 185 lb (83.9 kg)   11/12/20 174 lb (78.9 kg)   10/09/20 175 lb (79.4 kg)     Physical Exam  Constitutional:       General: She is not in acute distress. Appearance: She is well-developed. She is not diaphoretic. HENT:      Head: Normocephalic and atraumatic. Mouth/Throat:      Pharynx: No oropharyngeal exudate. Eyes:      General: No scleral icterus. Right eye: No discharge. Left eye: No discharge. Conjunctiva/sclera: Conjunctivae normal.   Neck:      Musculoskeletal: Normal range of motion and neck supple. Cardiovascular:      Rate and Rhythm: Normal rate. Heart sounds: Normal heart sounds. No murmur. Pulmonary:      Effort: Pulmonary effort is normal. No respiratory distress. Breath sounds: Wheezing and rales (chronic) present. Abdominal:      General: There is no distension. Palpations: Abdomen is soft. Tenderness: There is no abdominal tenderness. There is no guarding. Musculoskeletal:         General: No deformity. Lymphadenopathy:      Head:      Right side of head: No submental or submandibular adenopathy. Left side of head: No submental or submandibular adenopathy. Cervical: No cervical adenopathy. Right cervical: No superficial or deep cervical adenopathy. Left cervical: No superficial or deep cervical adenopathy. Skin:     Findings: No rash. Neurological:      Mental Status: She is alert and oriented to person, place, and time. GCS: GCS eye subscore is 4. GCS verbal subscore is 5. GCS motor subscore is 6. Motor: No abnormal muscle tone. Deep Tendon Reflexes: Reflexes are normal and symmetric. Psychiatric:         Behavior: Behavior normal.         Thought Content: Thought content normal.            Assessment/Plan:   Deepa Munoz was seen today for headache. Diagnoses and all orders for this visit:    Viral URI  Pt with resp symptoms  Saturating well  Not resp distress on exam but she does c/o dyspnea  She has wheezing and rales  likely viral illness  I will check for Covid  Since I will treat possible UTI I will do Levofloxacin which will cover  Also for possible bacteria resp infections   Asked care giver to let me know in the following few days how is there pt feeling   -     albuterol sulfate HFA (VENTOLIN HFA) 108 (90 Base) MCG/ACT inhaler; Inhale 2 puffs into the lungs 4 times daily as needed for Wheezing  -     COVID-19 Ambulatory; Future  -     levoFLOXacin (LEVAQUIN) 250 MG tablet;  Take 1 tablet by mouth daily for 5 days    Acute cystitis without hematuria  As abvoe    BLANCA (generalized anxiety disorder)  -     External Referral to Psychiatry    Moderate episode of recurrent major depressive disorder [facility])  Referral to psych depression a little worse - Patient was encouraged to call the office (and ask to see me) or be seen by other provider for any worsening or lack of improvement of the symptoms or any new symptoms.    - Pt was asked toschedule an appointment in 1 months, and to let me know of any scheduling difficulties. Additional patients instructions (if given):   Patient Instructions   Please call the office (and ask to see me) or be seen by other provider for any worsening or lack of improvement of the symptoms or for any new symptoms as soon as possible. Please make sure to schedule your follow appointment as discussed. Please let me know if ay further questions. Please let me know if there are any symptoms or concerns that you feel that needs to be further addressed. Let me know how are you feeling in a few days         Esther Goodson M.D.   2/4/2021, 9:05 AM      NOTE: This report was transcribed using voice recognition software. Every effort was made to ensure accuracy, however, inadvertent computerized transcription errors may be present.

## 2021-02-04 NOTE — PATIENT INSTRUCTIONS
Please call the office (and ask to see me) or be seen by other provider for any worsening or lack of improvement of the symptoms or for any new symptoms as soon as possible. Please make sure to schedule your follow appointment as discussed. Please let me know if ay further questions. Please let me know if there are any symptoms or concerns that you feel that needs to be further addressed.      Let me know how are you feeling in a few days

## 2021-02-05 LAB — SARS-COV-2, NAA: NOT DETECTED

## 2021-02-15 ENCOUNTER — TELEPHONE (OUTPATIENT)
Dept: FAMILY MEDICINE CLINIC | Age: 76
End: 2021-02-15

## 2021-02-15 DIAGNOSIS — R39.15 URGENCY OF URINATION: Primary | ICD-10-CM

## 2021-02-15 LAB
BILIRUBIN, URINE: NEGATIVE
BLOOD, URINE: NEGATIVE
CLARITY: ABNORMAL
COLOR: YELLOW
GLUCOSE URINE: NEGATIVE
KETONES, URINE: NEGATIVE
LEUKOCYTE ESTERASE, URINE: ABNORMAL
NITRITE, URINE: NEGATIVE
PH UA: 5.5 (ref 4.5–8)
PROTEIN UA: ABNORMAL
SPECIFIC GRAVITY, URINE: 1.03
UROBILINOGEN, URINE: NORMAL

## 2021-02-17 DIAGNOSIS — R39.15 URGENCY OF URINATION: ICD-10-CM

## 2021-02-17 NOTE — PROGRESS NOTES
[facility]     Outpatient Cardiology         Chief Complaint   Patient presents with    Weight Gain    Fatigue       HPI     Riddhi Torres a 64 y.o. female here to establish care for HFpEF. Hx of CAD, HTN, DVT, and ILD. CAD, prior LAD occlusion most recent EF within normal limits, no PCI performed at that time (2012), continue current medications. No symptoms concerning for worsening or progression of her CAD. Hypertension,, well-controlled on current medications. DVT, warfarin, overall she does have chronic lower extremity edema for which she takes Lasix, she has noticed significant weight gain recently likely given to medication side effects. PMH  Past Medical History:   Diagnosis Date    Anxiety     CAD (coronary artery disease)     CHF (congestive heart failure) (HCC)     Depression     DVT of lower extremity (deep venous thrombosis) (HCC)     Falls     Hypertension     Lupus (HCC)     TIA (transient ischemic attack)     x3    Urinary incontinence     UTI (urinary tract infection)        PSH  Past Surgical History:   Procedure Laterality Date    BACK SURGERY  9/24/2014     DR. Morrison     COLONOSCOPY  7/12/2010    WNL-repeat in 10 years    FEMUR SURGERY Right     HIP SURGERY Left 12/03/2013    left hip trochanteric femoral nailing   Gabe Bernal  8/3/2010    Dr. Prince Sep, L5-S1    UPPER GASTROINTESTINAL ENDOSCOPY  7/19/10    reflux with no Barretts/no H-pylori    UPPER GASTROINTESTINAL ENDOSCOPY  01/2017    Dr. Margoth Leigh - gastritis and duodinitis , neg H pylori     UPPER GASTROINTESTINAL ENDOSCOPY N/A 12/2/2019    EGD DILATION SAVORY 17mm/ 46 F performed by Wanda Gallegos MD at [facility] [address]  Social History     Tobacco Use    Smoking status: Former Smoker     Packs/day: 0.50     Years: 30.00     Pack years: 15.00     Types: Cigarettes     Start date: 1965     Quit date: 9/24/2014     Years DAILY 11/29/20  Yes Tracee Vázquez MD   carvedilol (COREG) 6.25 MG tablet TAKE 1 TABLET BY MOUTH TWICE DAILY 11/16/20  Yes Tracee Vázquez MD   buPROPion (WELLBUTRIN XL) 300 MG extended release tablet TAKE 1 TABLET BY MOUTH EVERY MORNING 10/30/20  Yes Tracee Vázquez MD   ondansetron (ZOFRAN) 4 MG tablet TAKE 1 TABLET BY MOUTH EVERY 12 HOURS AS NEEDED FOR NAUSEA OR VOMITING 2/17/20  Yes Tracee Vázquez MD   docusate (COLACE, DULCOLAX) 100 MG CAPS Take 100 mg by mouth 2 times daily 12/2/19  Yes Najma Casillas DO   clobetasol (TEMOVATE) 0.05 % external solution Apply topically 2 times daily Apply topically 2 times daily. Yes Paz Cordoba MD   acetaminophen 650 MG TABS Take 650 mg by mouth every 6 hours as needed for Pain or Fever (Fever >100.5 F (38 C)). 12/9/13  Yes Christopher Aguilar MD   rosuvastatin (CRESTOR) 20 MG tablet TAKE 1 TABLET BY MOUTH DAILY 11/2/20   Tracee Vázquez MD        Review of Systems   Constitutional: Positive for fatigue. Negative for activity change, appetite change, diaphoresis, fever and unexpected weight change. HENT: Negative for congestion, facial swelling, mouth sores and nosebleeds. Eyes: Negative for discharge and visual disturbance. Respiratory: Negative for cough, chest tightness, shortness of breath and wheezing. Cardiovascular: Positive for leg swelling. Negative for chest pain and palpitations. Gastrointestinal: Negative for abdominal distention, abdominal pain, blood in stool and vomiting. Endocrine: Negative for cold intolerance, heat intolerance and polyuria. Genitourinary: Negative for difficulty urinating, dysuria, frequency and hematuria. Musculoskeletal: Negative for back pain, joint swelling, myalgias and neck pain. Skin: Negative for color change, pallor and rash. Allergic/Immunologic: Negative for immunocompromised state.    Neurological: Negative for dizziness, syncope, weakness, light-headedness, numbness and headaches. Hematological: Negative for adenopathy. Does not bruise/bleed easily. Psychiatric/Behavioral: Negative for behavioral problems, confusion, decreased concentration and suicidal ideas. The patient is not nervous/anxious. Vitals:    02/22/21 1352   BP: 110/80   Pulse: 60   Temp: 96.1 °F (35.6 °C)    Weight: 181 lb (82.1 kg)       Vitals:    02/22/21 1352   BP: 110/80   Pulse: 60   Temp: 96.1 °F (35.6 °C)   Weight: 181 lb (82.1 kg)   Height: 4' 11\" (1.499 m)       BP Readings from Last 3 Encounters:   02/22/21 110/80   02/04/21 130/80   11/12/20 117/67       Wt Readings from Last 3 Encounters:   02/22/21 181 lb (82.1 kg)   02/04/21 185 lb (83.9 kg)   11/12/20 174 lb (78.9 kg)       Physical Exam  Constitutional:       General: She is not in acute distress. Appearance: She is well-developed. She is not diaphoretic. HENT:      Head: Normocephalic and atraumatic. Eyes:      Pupils: Pupils are equal, round, and reactive to light. Neck:      Musculoskeletal: Normal range of motion. Thyroid: No thyromegaly. Vascular: No JVD. Cardiovascular:      Rate and Rhythm: Normal rate and regular rhythm. Chest Wall: PMI is not displaced. Heart sounds: Normal heart sounds, S1 normal and S2 normal. No murmur. No friction rub. No gallop. Pulmonary:      Effort: Pulmonary effort is normal. No respiratory distress. Breath sounds: Normal breath sounds. No stridor. No wheezing or rales. Chest:      Chest wall: No tenderness. Abdominal:      General: Bowel sounds are normal. There is no distension. Palpations: Abdomen is soft. Tenderness: There is no abdominal tenderness. There is no guarding or rebound. Musculoskeletal: Normal range of motion. General: Swelling present. No tenderness. Right lower leg: Edema present. Left lower leg: Edema present. Lymphadenopathy:      Cervical: No cervical adenopathy. Skin:     General: Skin is warm and dry. Findings: No erythema or rash. Neurological:      Mental Status: She is alert and oriented to person, place, and time. Coordination: Coordination normal.   Psychiatric:         Behavior: Behavior normal.         Thought Content: Thought content normal.         Judgment: Judgment normal.         Labs:       Lab Results   Component Value Date    WBC 7.0 01/08/2021    HGB 12.7 01/08/2021    HCT 38.3 01/08/2021    MCV 96.6 01/08/2021     01/08/2021     Lab Results   Component Value Date     01/08/2021    K 4.1 01/08/2021     01/08/2021    CO2 30 01/08/2021    BUN 31 (H) 01/08/2021    CREATININE 1.0 01/08/2021    GLUCOSE 90 01/08/2021    CALCIUM 9.7 01/08/2021    PROT 7.1 01/08/2021    LABALBU 4.0 01/08/2021    BILITOT <0.2 01/08/2021    ALKPHOS 138 (H) 01/08/2021    AST 32 01/08/2021    ALT 23 01/08/2021    LABGLOM 54 (A) 01/08/2021    GFRAA >60 01/08/2021    AGRATIO 1.3 01/08/2021    GLOB 3.1 01/08/2021         Lab Results   Component Value Date    CHOL 126 10/10/2019    CHOL 140 05/03/2018    CHOL 179 10/24/2017     Lab Results   Component Value Date    TRIG 70 10/10/2019    TRIG 112 05/03/2018    TRIG 97 10/24/2017     Lab Results   Component Value Date    HDL 66 (H) 10/10/2019    HDL 66 (H) 05/03/2018    HDL 80 (H) 10/24/2017     Lab Results   Component Value Date    LDLCALC 46 10/10/2019    LDLCALC 52 05/03/2018    LDLCALC 80 10/24/2017     Lab Results   Component Value Date    LABVLDL 14 10/10/2019    LABVLDL 22 05/03/2018    LABVLDL 19 10/24/2017     No results found for: CHOLHDLRATIO    Lab Results   Component Value Date    INR 0.99 11/26/2019    INR 1.03 08/22/2016    INR 2.19 (H) 02/13/2015    PROTIME 11.5 11/26/2019    PROTIME 11.7 08/22/2016    PROTIME 24.6 (H) 02/13/2015       The ASCVD Risk score (Joelle Fagan et al., 2013) failed to calculate for the following reasons:     The patient has a prior MI or stroke diagnosis      Imaging:       Last ECG (if available):  Normal sinus rhythm, old anterior infarct. Last Monitor/Holter    Last Stress (if available):    Last Cath (if available): 10/1/14  Impression[de-identified]   1. Single vessel coronary artery disease with occlusion of the   ostial/proximal LAD  2. Severely reduced LV systolic function estimated ejection   fraction 30-35%  3. Elevated LV diastolic pressure 25 mm Hg  4. Normal valve function     Last TTE/MIMI(if available): 8/25/19  Summary   Left ventricular cavity size is normal. Normal left ventricular wall   thickness. Overall left ventricular systolic function appears normal with an   ejection fraction of 55%. No regional wall motion abnormalities are noted. Indeterminate diastolic function. The aortic valve appears slightly thickened but opens adequately. Trivial tricuspid regurgitation. Estimated pulmonary artery systolic pressure is at 27 mmHg assuming a right   atrial pressure of 8 mmHg. Mild pulmonic regurgitation present. The right ventricle is not well visualized, function appears normal.    Last CMR  (if available):      Assessment / Plan:     Coronary artery disease involving native coronary artery of native heart without angina pectoris  Stable, no symptoms concerning for CAD. Continue current medications. Diastolic congestive heart failure (HCC)  Continue current dose of diuretic. Check BNP and echo. Given ILD we will also like to know RV function and size. Essential hypertension, benign  Controlled on current medications. Mixed hyperlipidemia  Crestor 20, no side effects. Follow up in 3 months. I had the opportunity to review the clinical symptoms and presentation of Klever Waggoner.     Patient's allergies and medications were reviewed and updated. Patient's past medical, surgical, social and family history were reviewed and updated. Patient's testing including laboratory, ECGs, monitor, imaging (TTE,MIMI,CMR,cath) were reviewed.        Tobacco use was discussed with the patient and educated on the negative effects. I have asked the patient to not utilize these agents. All questions and concerns were addressed to the patient/family. Alternatives to my treatment were discussed. The note was completed using EMR. Every effort wasmade to ensure accuracy; however, inadvertent computerized transcription errors may be present. Thank you for allowing me to participate in thelonnie or Rachel Malloy MD, [facility]

## 2021-02-18 ENCOUNTER — TELEPHONE (OUTPATIENT)
Dept: FAMILY MEDICINE CLINIC | Age: 76
End: 2021-02-18

## 2021-02-18 DIAGNOSIS — F32.1 CURRENT MODERATE EPISODE OF MAJOR DEPRESSIVE DISORDER WITHOUT PRIOR EPISODE (HCC): ICD-10-CM

## 2021-02-18 RX ORDER — NITROFURANTOIN 25; 75 MG/1; MG/1
100 CAPSULE ORAL 2 TIMES DAILY
Qty: 10 CAPSULE | Refills: 0 | Status: SHIPPED | OUTPATIENT
Start: 2021-02-18 | End: 2021-02-23

## 2021-02-18 NOTE — TELEPHONE ENCOUNTER
Ayleen Rice called - Urine culture was positive. (in lab results or cc results)  Please phone antibiotic to [employer]

## 2021-02-19 NOTE — TELEPHONE ENCOUNTER
Called patient spoke with Karleen Angelucci the caregiver he picked up medication she will start today Karleen Angelucci stated that patient is showing more signs of depressions and does not want to see a psychiatrist he would like to know if her medication can be increased to 10mg

## 2021-02-21 DIAGNOSIS — M35.1 CONNECTIVE TISSUE DISEASE OVERLAP SYNDROME (HCC): ICD-10-CM

## 2021-02-22 ENCOUNTER — OFFICE VISIT (OUTPATIENT)
Dept: CARDIOLOGY CLINIC | Age: 76
End: 2021-02-22
Payer: MEDICARE

## 2021-02-22 VITALS
DIASTOLIC BLOOD PRESSURE: 80 MMHG | HEART RATE: 60 BPM | HEIGHT: 59 IN | SYSTOLIC BLOOD PRESSURE: 110 MMHG | WEIGHT: 181 LBS | BODY MASS INDEX: 36.49 KG/M2 | TEMPERATURE: 96.1 F

## 2021-02-22 DIAGNOSIS — I10 ESSENTIAL HYPERTENSION, BENIGN: Primary | ICD-10-CM

## 2021-02-22 DIAGNOSIS — E78.2 MIXED HYPERLIPIDEMIA: ICD-10-CM

## 2021-02-22 DIAGNOSIS — R63.5 WEIGHT GAIN: ICD-10-CM

## 2021-02-22 DIAGNOSIS — R53.82 CHRONIC FATIGUE: ICD-10-CM

## 2021-02-22 DIAGNOSIS — I25.10 CORONARY ARTERY DISEASE INVOLVING NATIVE CORONARY ARTERY OF NATIVE HEART WITHOUT ANGINA PECTORIS: ICD-10-CM

## 2021-02-22 DIAGNOSIS — I50.32 CHRONIC DIASTOLIC CONGESTIVE HEART FAILURE (HCC): ICD-10-CM

## 2021-02-22 PROCEDURE — 4040F PNEUMOC VAC/ADMIN/RCVD: CPT | Performed by: INTERNAL MEDICINE

## 2021-02-22 PROCEDURE — G8417 CALC BMI ABV UP PARAM F/U: HCPCS | Performed by: INTERNAL MEDICINE

## 2021-02-22 PROCEDURE — 93000 ELECTROCARDIOGRAM COMPLETE: CPT | Performed by: INTERNAL MEDICINE

## 2021-02-22 PROCEDURE — 1036F TOBACCO NON-USER: CPT | Performed by: INTERNAL MEDICINE

## 2021-02-22 PROCEDURE — G8484 FLU IMMUNIZE NO ADMIN: HCPCS | Performed by: INTERNAL MEDICINE

## 2021-02-22 PROCEDURE — 99204 OFFICE O/P NEW MOD 45 MIN: CPT | Performed by: INTERNAL MEDICINE

## 2021-02-22 PROCEDURE — 1123F ACP DISCUSS/DSCN MKR DOCD: CPT | Performed by: INTERNAL MEDICINE

## 2021-02-22 PROCEDURE — 1090F PRES/ABSN URINE INCON ASSESS: CPT | Performed by: INTERNAL MEDICINE

## 2021-02-22 PROCEDURE — G8427 DOCREV CUR MEDS BY ELIG CLIN: HCPCS | Performed by: INTERNAL MEDICINE

## 2021-02-22 PROCEDURE — G8399 PT W/DXA RESULTS DOCUMENT: HCPCS | Performed by: INTERNAL MEDICINE

## 2021-02-22 RX ORDER — PANTOPRAZOLE SODIUM 20 MG/1
TABLET, DELAYED RELEASE ORAL
Qty: 60 TABLET | Refills: 2 | Status: SHIPPED | OUTPATIENT
Start: 2021-02-22 | End: 2021-05-23 | Stop reason: SDUPTHER

## 2021-02-22 RX ORDER — HYDROXYCHLOROQUINE SULFATE 200 MG/1
TABLET, FILM COATED ORAL
Qty: 60 TABLET | Refills: 11 | Status: SHIPPED | OUTPATIENT
Start: 2021-02-22 | End: 2021-05-23 | Stop reason: SDUPTHER

## 2021-02-22 ASSESSMENT — ENCOUNTER SYMPTOMS
FACIAL SWELLING: 0
VOMITING: 0
WHEEZING: 0
ABDOMINAL PAIN: 0
BACK PAIN: 0
BLOOD IN STOOL: 0
COUGH: 0
CHEST TIGHTNESS: 0
EYE DISCHARGE: 0
SHORTNESS OF BREATH: 0
ABDOMINAL DISTENTION: 0
COLOR CHANGE: 0

## 2021-02-22 NOTE — ASSESSMENT & PLAN NOTE
Continue current dose of diuretic. Check BNP and echo. Given ILD we will also like to know RV function and size.

## 2021-02-24 DIAGNOSIS — I50.32 CHRONIC DIASTOLIC CONGESTIVE HEART FAILURE (HCC): ICD-10-CM

## 2021-02-24 LAB
A/G RATIO: 1.2 (ref 1.1–2.2)
ALBUMIN SERPL-MCNC: 4 G/DL (ref 3.4–5)
ALP BLD-CCNC: 109 U/L (ref 40–129)
ALT SERPL-CCNC: 24 U/L (ref 10–40)
ANION GAP SERPL CALCULATED.3IONS-SCNC: 16 MMOL/L (ref 3–16)
AST SERPL-CCNC: 39 U/L (ref 15–37)
BILIRUB SERPL-MCNC: 0.4 MG/DL (ref 0–1)
BUN BLDV-MCNC: 29 MG/DL (ref 7–20)
CALCIUM SERPL-MCNC: 9.9 MG/DL (ref 8.3–10.6)
CHLORIDE BLD-SCNC: 101 MMOL/L (ref 99–110)
CO2: 26 MMOL/L (ref 21–32)
CREAT SERPL-MCNC: 1.1 MG/DL (ref 0.6–1.2)
GFR AFRICAN AMERICAN: 58
GFR NON-AFRICAN AMERICAN: 48
GLOBULIN: 3.3 G/DL
GLUCOSE BLD-MCNC: 90 MG/DL (ref 70–99)
POTASSIUM SERPL-SCNC: 3.8 MMOL/L (ref 3.5–5.1)
PRO-BNP: 305 PG/ML (ref 0–449)
SODIUM BLD-SCNC: 143 MMOL/L (ref 136–145)
TOTAL PROTEIN: 7.3 G/DL (ref 6.4–8.2)

## 2021-02-24 NOTE — TELEPHONE ENCOUNTER
According to patient med list she is currently taking 7.5 mg. The next dose up would be 15mg . Would you like me to queue that up?

## 2021-02-26 ENCOUNTER — HOSPITAL ENCOUNTER (OUTPATIENT)
Dept: NON INVASIVE DIAGNOSTICS | Age: 76
Discharge: HOME | End: 2021-02-26
Payer: MEDICARE

## 2021-02-26 DIAGNOSIS — I50.32 CHRONIC DIASTOLIC CONGESTIVE HEART FAILURE (HCC): ICD-10-CM

## 2021-02-26 LAB
LV EF: 58 %
LVEF MODALITY: NORMAL

## 2021-02-26 PROCEDURE — 93306 TTE W/DOPPLER COMPLETE: CPT

## 2021-02-26 RX ORDER — MIRTAZAPINE 7.5 MG/1
15 TABLET, FILM COATED ORAL
Qty: 30 TABLET | Refills: 2 | Status: CANCELLED | OUTPATIENT
Start: 2021-02-26

## 2021-03-01 RX ORDER — MIRTAZAPINE 15 MG/1
15 TABLET, FILM COATED ORAL NIGHTLY
Qty: 90 TABLET | Refills: 1 | Status: SHIPPED | OUTPATIENT
Start: 2021-03-01 | End: 2021-03-15

## 2021-03-03 ENCOUNTER — IMMUNIZATION (OUTPATIENT)
Dept: PRIMARY CARE CLINIC | Age: 76
End: 2021-03-03
Payer: MEDICARE

## 2021-03-03 ENCOUNTER — OFFICE VISIT (OUTPATIENT)
Dept: FAMILY MEDICINE CLINIC | Age: 76
End: 2021-03-03
Payer: MEDICARE

## 2021-03-03 VITALS
DIASTOLIC BLOOD PRESSURE: 78 MMHG | BODY MASS INDEX: 38.59 KG/M2 | HEIGHT: 59 IN | WEIGHT: 191.4 LBS | SYSTOLIC BLOOD PRESSURE: 108 MMHG | OXYGEN SATURATION: 93 % | TEMPERATURE: 96.5 F | HEART RATE: 77 BPM

## 2021-03-03 DIAGNOSIS — R74.01 ELEVATED AST (SGOT): ICD-10-CM

## 2021-03-03 DIAGNOSIS — M79.89 LEFT LEG SWELLING: Primary | ICD-10-CM

## 2021-03-03 DIAGNOSIS — R47.81 SLURRING OF SPEECH: ICD-10-CM

## 2021-03-03 DIAGNOSIS — J06.9 VIRAL URI: ICD-10-CM

## 2021-03-03 PROCEDURE — G8417 CALC BMI ABV UP PARAM F/U: HCPCS | Performed by: INTERNAL MEDICINE

## 2021-03-03 PROCEDURE — 0012A COVID-19, MODERNA VACCINE 100MCG/0.5ML DOSE: CPT | Performed by: FAMILY MEDICINE

## 2021-03-03 PROCEDURE — G8399 PT W/DXA RESULTS DOCUMENT: HCPCS | Performed by: INTERNAL MEDICINE

## 2021-03-03 PROCEDURE — 4040F PNEUMOC VAC/ADMIN/RCVD: CPT | Performed by: INTERNAL MEDICINE

## 2021-03-03 PROCEDURE — G8427 DOCREV CUR MEDS BY ELIG CLIN: HCPCS | Performed by: INTERNAL MEDICINE

## 2021-03-03 PROCEDURE — 91301 COVID-19, MODERNA VACCINE 100MCG/0.5ML DOSE: CPT | Performed by: FAMILY MEDICINE

## 2021-03-03 PROCEDURE — 99214 OFFICE O/P EST MOD 30 MIN: CPT | Performed by: INTERNAL MEDICINE

## 2021-03-03 PROCEDURE — 1036F TOBACCO NON-USER: CPT | Performed by: INTERNAL MEDICINE

## 2021-03-03 PROCEDURE — G8484 FLU IMMUNIZE NO ADMIN: HCPCS | Performed by: INTERNAL MEDICINE

## 2021-03-03 PROCEDURE — 1090F PRES/ABSN URINE INCON ASSESS: CPT | Performed by: INTERNAL MEDICINE

## 2021-03-03 PROCEDURE — 1123F ACP DISCUSS/DSCN MKR DOCD: CPT | Performed by: INTERNAL MEDICINE

## 2021-03-03 ASSESSMENT — ENCOUNTER SYMPTOMS
ABDOMINAL PAIN: 0
NAUSEA: 0
CHEST TIGHTNESS: 0
SHORTNESS OF BREATH: 0
VOMITING: 0

## 2021-03-03 NOTE — PROGRESS NOTES
Outpatient Note for established Patient - regular Visit     History Obtained From:  patient, electronic medical record, care giver  Is the patient new to me ? - No    HISTORY OF PRESENT ILLNESS:   The patient is a 64 y.o. female who is here today for :  Kendra Heredia was seen today for 1 month follow-up. Diagnoses and all orders for this visit:    Left leg swelling  -     US DUP LOWER EXTREMITY LEFT JOSE; Future  -     COMPREHENSIVE METABOLIC PANEL; Future  -     CBC Auto Differential; Future    Slurring of speech  -     MRI BRAIN WO CONTRAST; Future    Elevated AST (SGOT)  -     COMPREHENSIVE METABOLIC PANEL; Future      lasix was discontinued by nephrologist: she now takes every other day   Per care giver- she gained three lbs. Pt get episode of slurred speech, associated w/ confusion   No tingling numbness/ weakness in hands  She does have chronic tingling b/l legs L> R  Pt had chronic DVT diagnosed in 2014. Care giver states that it happened after back surgery. Pt denies CP/SOB/palpitations/abdominal pain/N/V. Looking on AKGs until 2015- I do not see any avid ence of AF   No dysuira/ hematuria     Past Medical History:        Diagnosis Date    Anxiety     CAD (coronary artery disease)     CHF (congestive heart failure) (HCC)     Depression     DVT of lower extremity (deep venous thrombosis) (HCC)     Falls     Hypertension     Lupus (HCC)     TIA (transient ischemic attack)     x3    Urinary incontinence     UTI (urinary tract infection)        Social History:   TOBACCO:   reports that she quit smoking about 6 years ago. Her smoking use included cigarettes. She started smoking about 56 years ago. She has a 15.00 pack-year smoking history. She has never used smokeless tobacco.    ETOH:   reports current alcohol use. Current Medications:    Prior to Admission medications    Medication Sig Start Date End Date Taking?  Authorizing Provider mirtazapine (REMERON) 15 MG tablet Take 1 tablet by mouth nightly 3/1/21  Yes Laurie Mora MD   pantoprazole (PROTONIX) 20 MG tablet TAKE 1 TABLET BY MOUTH TWICE DAILY 2/22/21  Yes Laurie Mora MD   hydroxychloroquine (PLAQUENIL) 200 MG tablet Take 1 tab po BID 2/22/21  Yes Rosa Stanford MD   albuterol sulfate HFA (VENTOLIN HFA) 108 (90 Base) MCG/ACT inhaler Inhale 2 puffs into the lungs 4 times daily as needed for Wheezing 2/4/21  Yes Laurie Mora MD   clopidogrel (PLAVIX) 75 MG tablet TAKE 1 TABLET BY MOUTH DAILY 2/3/21  Yes Laurie Mora MD   azaTHIOprine (IMURAN) 50 MG tablet TAKE 1 TABLET BY MOUTH DAILY 1/4/21  Yes Laurie Mora MD   predniSONE (DELTASONE) 5 MG tablet TAKE 1 TABLET BY MOUTH DAILY FOR 30 DOSES 12/7/20  Yes Rosa Stanford MD   Cholecalciferol (VITAMIN D3) 1.25 MG (25908 UT) CAPS TAKE 1 CAPSULE BY MOUTH EVERY OTHER WEEK 12/7/20  Yes Rosa Stanford MD   furosemide (LASIX) 20 MG tablet Take 1 tablet by mouth daily 12/7/20 3/7/21 Yes Laurie Mora MD   carvedilol (COREG) 6.25 MG tablet TAKE 1 TABLET BY MOUTH TWICE DAILY 11/16/20  Yes Laurie Mora MD   rosuvastatin (CRESTOR) 20 MG tablet TAKE 1 TABLET BY MOUTH DAILY 11/2/20  Yes Laurie Mora MD   buPROPion (WELLBUTRIN XL) 300 MG extended release tablet TAKE 1 TABLET BY MOUTH EVERY MORNING 10/30/20  Yes Laurie Mora MD   ondansetron (ZOFRAN) 4 MG tablet TAKE 1 TABLET BY MOUTH EVERY 12 HOURS AS NEEDED FOR NAUSEA OR VOMITING 2/17/20  Yes Laurie Mora MD   docusate (COLACE, DULCOLAX) 100 MG CAPS Take 100 mg by mouth 2 times daily 12/2/19  Yes Najma Casillas DO   clobetasol (TEMOVATE) 0.05 % external solution Apply topically 2 times daily Apply topically 2 times daily. Yes Paz Cordoba MD   acetaminophen 650 MG TABS Take 650 mg by mouth every 6 hours as needed for Pain or Fever (Fever >100.5 F (38 C)).  12/9/13  Yes Paola Montez MD       REVIEW OF SYSTEMS: Review of Systems   Constitutional: Negative for activity change, appetite change, chills, fever and unexpected weight change. HENT: Negative for hearing loss. Eyes: Negative for visual disturbance. Respiratory: Negative for chest tightness and shortness of breath. Cardiovascular: Negative for chest pain. Gastrointestinal: Negative for abdominal pain, nausea and vomiting. Genitourinary: Negative for hematuria. Skin: Negative for rash. Allergic/Immunologic: Negative for immunocompromised state. Neurological: Negative for dizziness and headaches. Psychiatric/Behavioral: Negative for dysphoric mood and suicidal ideas. Physical Exam:      Vitals: /78 (Site: Right Upper Arm, Position: Sitting, Cuff Size: Medium Adult)   Pulse 77   Temp 96.5 °F (35.8 °C)   Ht 4' 11\" (1.499 m)   Wt 191 lb 6.4 oz (86.8 kg)   SpO2 93%   BMI 38.66 kg/m²     Body mass index is 38.66 kg/m². Wt Readings from Last 3 Encounters:   03/03/21 191 lb 6.4 oz (86.8 kg)   02/22/21 181 lb (82.1 kg)   02/04/21 185 lb (83.9 kg)     Physical Exam  Constitutional:       General: She is not in acute distress. Appearance: She is well-developed. She is not diaphoretic. HENT:      Head: Normocephalic and atraumatic. Mouth/Throat:      Pharynx: No oropharyngeal exudate. Eyes:      General: No scleral icterus. Right eye: No discharge. Left eye: No discharge. Conjunctiva/sclera: Conjunctivae normal.   Neck:      Musculoskeletal: Normal range of motion and neck supple. Cardiovascular:      Rate and Rhythm: Normal rate. Heart sounds: Normal heart sounds. No murmur. Pulmonary:      Effort: Pulmonary effort is normal. No respiratory distress. Breath sounds: Normal breath sounds. No wheezing or rales. Abdominal:      General: There is no distension. Palpations: Abdomen is soft. Tenderness: There is no abdominal tenderness. There is no guarding.    Musculoskeletal: General: No deformity. Lymphadenopathy:      Head:      Right side of head: No submental or submandibular adenopathy. Left side of head: No submental or submandibular adenopathy. Cervical: No cervical adenopathy. Right cervical: No superficial or deep cervical adenopathy. Left cervical: No superficial or deep cervical adenopathy. Skin:     Findings: No rash. Neurological:      Mental Status: She is alert and oriented to person, place, and time. GCS: GCS eye subscore is 4. GCS verbal subscore is 5. GCS motor subscore is 6. Motor: No abnormal muscle tone. Deep Tendon Reflexes: Reflexes are normal and symmetric. Psychiatric:         Behavior: Behavior normal.         Thought Content: Thought content normal.        Assessment/Plan:   Keri Correa was seen today for 1 month follow-up. Diagnoses and all orders for this visit:    Left leg swelling  R/o acute DVT  We will do US doppler   -     US DUP LOWER EXTREMITY LEFT JOSE; Future  -     COMPREHENSIVE METABOLIC PANEL; Future  -     CBC Auto Differential; Future    Slurring of speech  Per caregiver- repeat episodes. Do MRI carlos due to multiple risks factors  -     MRI BRAIN WO CONTRAST; Future    Elevated AST (SGOT)  Minimally   No other related Sx.   recheck blood in 2-3 weeks   -     COMPREHENSIVE METABOLIC PANEL; Future    I spoke to Dr [address]- he is not aware of any AF    - Patient was encouraged to call the office (and ask to see me) or be seen by other provider for any worsening or lack of improvement of the symptoms or any new symptoms.    - Pt was asked toschedule an appointment in 1 months, and to let me know of any scheduling difficulties. Additional patients instructions (if given):   Patient Instructions   Please call the office (and ask to see me) or be seen by other provider for any worsening or lack of improvement of the symptoms or for any new symptoms as soon as possible. Please make sure to schedule your follow appointment as discussed. Please let me know if ay further questions. Please let me know if there are any symptoms or concerns that you feel that needs to be further addressed. Shirley Yeh M.D.   3/3/2021, 10:41 AM      NOTE: This report was transcribed using voice recognition software. Every effort was made to ensure accuracy, however, inadvertent computerized transcription errors may be present.

## 2021-03-04 RX ORDER — ALBUTEROL SULFATE 90 UG/1
2 AEROSOL, METERED RESPIRATORY (INHALATION) 4 TIMES DAILY PRN
Qty: 1 INHALER | Refills: 5 | Status: SHIPPED | OUTPATIENT
Start: 2021-03-04 | End: 2021-04-11 | Stop reason: SDUPTHER

## 2021-03-11 ENCOUNTER — HOSPITAL ENCOUNTER (OUTPATIENT)
Dept: VASCULAR LAB | Age: 76
Discharge: HOME | End: 2021-03-11
Payer: MEDICARE

## 2021-03-11 ENCOUNTER — HOSPITAL ENCOUNTER (OUTPATIENT)
Dept: MRI IMAGING | Age: 76
Discharge: HOME | End: 2021-03-11
Payer: MEDICARE

## 2021-03-11 DIAGNOSIS — R47.81 SLURRING OF SPEECH: ICD-10-CM

## 2021-03-11 DIAGNOSIS — M79.89 LEFT LEG SWELLING: ICD-10-CM

## 2021-03-11 PROCEDURE — 93971 EXTREMITY STUDY: CPT

## 2021-03-11 PROCEDURE — 70551 MRI BRAIN STEM W/O DYE: CPT

## 2021-03-15 ENCOUNTER — OFFICE VISIT (OUTPATIENT)
Dept: PULMONOLOGY | Age: 76
End: 2021-03-15
Payer: MEDICARE

## 2021-03-15 VITALS
HEIGHT: 59 IN | HEART RATE: 66 BPM | DIASTOLIC BLOOD PRESSURE: 66 MMHG | BODY MASS INDEX: 38.38 KG/M2 | TEMPERATURE: 96.8 F | WEIGHT: 190.4 LBS | SYSTOLIC BLOOD PRESSURE: 110 MMHG | OXYGEN SATURATION: 93 %

## 2021-03-15 DIAGNOSIS — J84.9 ILD (INTERSTITIAL LUNG DISEASE) (HCC): Primary | ICD-10-CM

## 2021-03-15 DIAGNOSIS — M32.9 SYSTEMIC LUPUS ERYTHEMATOSUS, UNSPECIFIED SLE TYPE, UNSPECIFIED ORGAN INVOLVEMENT STATUS (HCC): ICD-10-CM

## 2021-03-15 PROCEDURE — G8484 FLU IMMUNIZE NO ADMIN: HCPCS | Performed by: INTERNAL MEDICINE

## 2021-03-15 PROCEDURE — 1036F TOBACCO NON-USER: CPT | Performed by: INTERNAL MEDICINE

## 2021-03-15 PROCEDURE — 99214 OFFICE O/P EST MOD 30 MIN: CPT | Performed by: INTERNAL MEDICINE

## 2021-03-15 PROCEDURE — G8427 DOCREV CUR MEDS BY ELIG CLIN: HCPCS | Performed by: INTERNAL MEDICINE

## 2021-03-15 PROCEDURE — 1123F ACP DISCUSS/DSCN MKR DOCD: CPT | Performed by: INTERNAL MEDICINE

## 2021-03-15 PROCEDURE — G8417 CALC BMI ABV UP PARAM F/U: HCPCS | Performed by: INTERNAL MEDICINE

## 2021-03-15 PROCEDURE — G8399 PT W/DXA RESULTS DOCUMENT: HCPCS | Performed by: INTERNAL MEDICINE

## 2021-03-15 PROCEDURE — 1090F PRES/ABSN URINE INCON ASSESS: CPT | Performed by: INTERNAL MEDICINE

## 2021-03-15 PROCEDURE — 4040F PNEUMOC VAC/ADMIN/RCVD: CPT | Performed by: INTERNAL MEDICINE

## 2021-03-15 RX ORDER — MIRTAZAPINE 7.5 MG/1
7.5 TABLET, FILM COATED ORAL NIGHTLY
Qty: 90 TABLET | Refills: 1 | Status: SHIPPED | OUTPATIENT
Start: 2021-03-15

## 2021-03-15 NOTE — PROGRESS NOTES
She has gained about 40 pounds over the past year. Physical Exam  Vitals signs reviewed. Constitutional:       Appearance: She is well-developed. She is obese. Comments: Comes to exam room by way of wheelchair, pushed by her son. HENT:      Head: Normocephalic and atraumatic. Mouth/Throat:      Mouth: Mucous membranes are moist.      Pharynx: Oropharynx is clear. No oropharyngeal exudate or posterior oropharyngeal erythema. Eyes:      General: No scleral icterus. Right eye: No discharge. Left eye: No discharge. Neck:      Thyroid: No thyromegaly. Trachea: No tracheal deviation. Cardiovascular:      Rate and Rhythm: Normal rate and regular rhythm. Pulses:           Radial pulses are 1+ on the right side and 1+ on the left side. Heart sounds: Normal heart sounds. No murmur. Pulmonary:      Effort: Pulmonary effort is normal.      Breath sounds: Examination of the right-lower field reveals rales. Examination of the left-lower field reveals rales. Rales present. No wheezing or rhonchi. Comments: Mildly decreased breath sounds. Auscultation over the neck reveals few expiratory noises, not radiating into the chest.  Musculoskeletal:      Right lower leg: No edema. Left lower leg: No edema. Lymphadenopathy:      Cervical: No cervical adenopathy. Skin:     General: Skin is warm and dry. Neurological:      Mental Status: She is alert and oriented to person, place, and time. Psychiatric:         Mood and Affect: Mood normal.         Behavior: Behavior normal.         Thought Content: Thought content normal.         Judgment: Judgment normal.           On this date 3/15/2021 I have spent 35 minutes reviewing previous notes, test results and face to face with the patient discussing the diagnosis and importance of compliance with the treatment plan as well as documenting on the day of the visit.       An electronic signature was used to authenticate this note.    --David Gunter MD

## 2021-03-17 DIAGNOSIS — E78.00 PURE HYPERCHOLESTEROLEMIA: Primary | ICD-10-CM

## 2021-03-26 DIAGNOSIS — E78.00 PURE HYPERCHOLESTEROLEMIA: ICD-10-CM

## 2021-03-26 DIAGNOSIS — M79.89 LEFT LEG SWELLING: ICD-10-CM

## 2021-03-26 DIAGNOSIS — R74.01 ELEVATED AST (SGOT): ICD-10-CM

## 2021-03-26 LAB
A/G RATIO: 1.4 (ref 1.1–2.2)
ALBUMIN SERPL-MCNC: 3.9 G/DL (ref 3.4–5)
ALP BLD-CCNC: 104 U/L (ref 40–129)
ALT SERPL-CCNC: 18 U/L (ref 10–40)
ANION GAP SERPL CALCULATED.3IONS-SCNC: 10 MMOL/L (ref 3–16)
AST SERPL-CCNC: 31 U/L (ref 15–37)
BASOPHILS ABSOLUTE: 0.1 K/UL (ref 0–0.2)
BASOPHILS RELATIVE PERCENT: 0.8 %
BILIRUB SERPL-MCNC: 0.3 MG/DL (ref 0–1)
BUN BLDV-MCNC: 21 MG/DL (ref 7–20)
CALCIUM SERPL-MCNC: 9.7 MG/DL (ref 8.3–10.6)
CHLORIDE BLD-SCNC: 104 MMOL/L (ref 99–110)
CHOLESTEROL, TOTAL: 166 MG/DL (ref 0–199)
CO2: 29 MMOL/L (ref 21–32)
CREAT SERPL-MCNC: 1.1 MG/DL (ref 0.6–1.2)
EOSINOPHILS ABSOLUTE: 0.2 K/UL (ref 0–0.6)
EOSINOPHILS RELATIVE PERCENT: 2 %
GFR AFRICAN AMERICAN: 58
GFR NON-AFRICAN AMERICAN: 48
GLOBULIN: 2.8 G/DL
GLUCOSE BLD-MCNC: 87 MG/DL (ref 70–99)
HCT VFR BLD CALC: 36.4 % (ref 36–48)
HDLC SERPL-MCNC: 77 MG/DL (ref 40–60)
HEMOGLOBIN: 12.6 G/DL (ref 12–16)
LDL CHOLESTEROL CALCULATED: 77 MG/DL
LYMPHOCYTES ABSOLUTE: 1.7 K/UL (ref 1–5.1)
LYMPHOCYTES RELATIVE PERCENT: 21.4 %
MCH RBC QN AUTO: 33 PG (ref 26–34)
MCHC RBC AUTO-ENTMCNC: 34.6 G/DL (ref 31–36)
MCV RBC AUTO: 95.5 FL (ref 80–100)
MONOCYTES ABSOLUTE: 0.9 K/UL (ref 0–1.3)
MONOCYTES RELATIVE PERCENT: 11.2 %
NEUTROPHILS ABSOLUTE: 5.2 K/UL (ref 1.7–7.7)
NEUTROPHILS RELATIVE PERCENT: 64.6 %
PDW BLD-RTO: 14.2 % (ref 12.4–15.4)
PLATELET # BLD: 230 K/UL (ref 135–450)
PMV BLD AUTO: 7 FL (ref 5–10.5)
POTASSIUM SERPL-SCNC: 4.1 MMOL/L (ref 3.5–5.1)
RBC # BLD: 3.82 M/UL (ref 4–5.2)
SODIUM BLD-SCNC: 143 MMOL/L (ref 136–145)
TOTAL PROTEIN: 6.7 G/DL (ref 6.4–8.2)
TRIGL SERPL-MCNC: 60 MG/DL (ref 0–150)
VLDLC SERPL CALC-MCNC: 12 MG/DL
WBC # BLD: 8 K/UL (ref 4–11)

## 2021-03-29 ENCOUNTER — OFFICE VISIT (OUTPATIENT)
Dept: FAMILY MEDICINE CLINIC | Age: 76
End: 2021-03-29
Payer: MEDICARE

## 2021-03-29 VITALS
HEIGHT: 59 IN | TEMPERATURE: 97.7 F | DIASTOLIC BLOOD PRESSURE: 74 MMHG | BODY MASS INDEX: 38.1 KG/M2 | WEIGHT: 189 LBS | OXYGEN SATURATION: 96 % | SYSTOLIC BLOOD PRESSURE: 116 MMHG | HEART RATE: 73 BPM

## 2021-03-29 DIAGNOSIS — I10 ESSENTIAL HYPERTENSION, BENIGN: ICD-10-CM

## 2021-03-29 DIAGNOSIS — J84.9 ILD (INTERSTITIAL LUNG DISEASE) (HCC): Primary | ICD-10-CM

## 2021-03-29 DIAGNOSIS — I87.2 VENOUS STASIS DERMATITIS OF BOTH LOWER EXTREMITIES: ICD-10-CM

## 2021-03-29 DIAGNOSIS — G47.00 INSOMNIA, UNSPECIFIED TYPE: ICD-10-CM

## 2021-03-29 PROCEDURE — 1036F TOBACCO NON-USER: CPT | Performed by: INTERNAL MEDICINE

## 2021-03-29 PROCEDURE — 1123F ACP DISCUSS/DSCN MKR DOCD: CPT | Performed by: INTERNAL MEDICINE

## 2021-03-29 PROCEDURE — 1090F PRES/ABSN URINE INCON ASSESS: CPT | Performed by: INTERNAL MEDICINE

## 2021-03-29 PROCEDURE — G8427 DOCREV CUR MEDS BY ELIG CLIN: HCPCS | Performed by: INTERNAL MEDICINE

## 2021-03-29 PROCEDURE — G8484 FLU IMMUNIZE NO ADMIN: HCPCS | Performed by: INTERNAL MEDICINE

## 2021-03-29 PROCEDURE — 4040F PNEUMOC VAC/ADMIN/RCVD: CPT | Performed by: INTERNAL MEDICINE

## 2021-03-29 PROCEDURE — G8399 PT W/DXA RESULTS DOCUMENT: HCPCS | Performed by: INTERNAL MEDICINE

## 2021-03-29 PROCEDURE — G8417 CALC BMI ABV UP PARAM F/U: HCPCS | Performed by: INTERNAL MEDICINE

## 2021-03-29 PROCEDURE — 99212 OFFICE O/P EST SF 10 MIN: CPT | Performed by: INTERNAL MEDICINE

## 2021-03-29 ASSESSMENT — ENCOUNTER SYMPTOMS
ABDOMINAL PAIN: 0
VOMITING: 0
SHORTNESS OF BREATH: 0
NAUSEA: 0
CHEST TIGHTNESS: 0

## 2021-03-29 NOTE — PROGRESS NOTES
Outpatient Note for established Patient - regular Visit     History Obtained From:  patient, electronic medical record  Is the patient new to me ? - No    HISTORY OF PRESENT ILLNESS:   The patient is a 64 y.o. female who is here today for :  Greta Guadalupe was seen today for results. Diagnoses and all orders for this visit:    ILD (interstitial lung disease) ([facility] [address]    Essential hypertension, benign    Insomnia, unspecified type  -     Shruthi Asher MD, Sleep Medicine, [facility]    Venous stasis dermatitis of both lower extremities      MRI results: Small vessel disease and atrophy. No evidence of stroke. MRI was done for episodes of slurred speech. Leg statis dermatitis-patient is complaining of pruritus in the legs no swelling  Pt wakes up and then falls back to sleep   Cough got worse. She takes lasix 20 mg every other day  Care giver reports stable weight  She is also c/o wheezing. Last PFT shows: \"IMPRESSION:  1. Normal spirometry. 2.  Normal lung volumes. 3.  Moderate decrease in diffusion capacity. 4.  When compared to previous pulmonary function test dated 10/11/2019,  there is a significant improvement in spirometry and lung volumes. Diffusion capacity remains relatively unchanged. \"    Past Medical History:        Diagnosis Date    Anxiety     CAD (coronary artery disease)     [facility]est[facility]     Depression     DVT of lower extremity (deep venous thrombosis) ([facility]     Falls     Hypertension     Lupus ([facility])     TIA (transient ischemic attack)     x3    Urinary incontinence     UTI (urinary tract infection)        Social History:   TOBACCO:   reports that she quit smoking about 6 years ago. Her smoking use included cigarettes. She started smoking about 56 years ago. She has a 15.00 pack-year smoking history. She has never used smokeless tobacco.    ETOH:   reports current alcohol use.     Current Medications:    Prior to Admission medications    Medication Sig Start Date End Date Taking? Authorizing Provider   mirtazapine (REMERON) 7.5 MG tablet Take 1 tablet by mouth nightly 3/15/21  Yes William Jon MD   albuterol sulfate HFA (VENTOLIN HFA) 108 (90 Base) MCG/ACT inhaler Inhale 2 puffs into the lungs 4 times daily as needed for Wheezing 3/4/21  Yes William Jon MD   pantoprazole (PROTONIX) 20 MG tablet TAKE 1 TABLET BY MOUTH TWICE DAILY 2/22/21  Yes William Jon MD   hydroxychloroquine (PLAQUENIL) 200 MG tablet Take 1 tab po BID 2/22/21  Yes Yoselin Townsend MD   clopidogrel (PLAVIX) 75 MG tablet TAKE 1 TABLET BY MOUTH DAILY 2/3/21  Yes William Jon MD   azaTHIOprine (IMURAN) 50 MG tablet TAKE 1 TABLET BY MOUTH DAILY 1/4/21  Yes William Jon MD   predniSONE (DELTASONE) 5 MG tablet TAKE 1 TABLET BY MOUTH DAILY FOR 30 DOSES 12/7/20  Yes Yoselin Townsend MD   Cholecalciferol (VITAMIN D3) 1.25 MG (46218 UT) CAPS TAKE 1 CAPSULE BY MOUTH EVERY OTHER WEEK 12/7/20  Yes Yoselin Townsend MD   carvedilol (COREG) 6.25 MG tablet TAKE 1 TABLET BY MOUTH TWICE DAILY 11/16/20  Yes William Jon MD   rosuvastatin (CRESTOR) 20 MG tablet TAKE 1 TABLET BY MOUTH DAILY 11/2/20  Yes William Jon MD   buPROPion (WELLBUTRIN XL) 300 MG extended release tablet TAKE 1 TABLET BY MOUTH EVERY MORNING 10/30/20  Yes William Jon MD   ondansetron (ZOFRAN) 4 MG tablet TAKE 1 TABLET BY MOUTH EVERY 12 HOURS AS NEEDED FOR NAUSEA OR VOMITING 2/17/20  Yes William Jon MD   docusate (COLACE, DULCOLAX) 100 MG CAPS Take 100 mg by mouth 2 times daily 12/2/19  Yes Najma Casillas,    clobetasol (TEMOVATE) 0.05 % external solution Apply topically 2 times daily Apply topically 2 times daily. Yes Historical Provider, MD   acetaminophen 650 MG TABS Take 650 mg by mouth every 6 hours as needed for Pain or Fever (Fever >100.5 F (38 C)).  12/9/13  Yes Nancy Davey MD   furosemide (LASIX) 20 MG tablet Take 1 tablet by mouth daily  Patient taking differently: Take 20 mg by mouth daily Indications: uses q other day  12/7/20 3/15/21  Mihir Phan MD       REVIEW OF SYSTEMS:  Review of Systems   Constitutional: Negative for activity change, appetite change, chills, fever and unexpected weight change. Eyes: Negative for visual disturbance. Respiratory: Positive for cough. Negative for chest tightness and shortness of breath. Cardiovascular: Negative for chest pain. Gastrointestinal: Negative for abdominal pain, nausea and vomiting. Genitourinary: Negative for hematuria. Skin: Negative for rash. Bilateral leg itch   Allergic/Immunologic: Negative for immunocompromised state. Neurological: Negative for dizziness and headaches. Psychiatric/Behavioral: Positive for sleep disturbance. Negative for dysphoric mood and suicidal ideas. Physical Exam:      Vitals: /74 (Site: Right Upper Arm, Position: Sitting, Cuff Size: Medium Adult)   Pulse 73   Temp 97.7 °F (36.5 °C)   Ht 4' 11\" (1.499 m)   Wt 189 lb (85.7 kg)   SpO2 96%   BMI 38.17 kg/m²     Body mass index is 38.17 kg/m². Wt Readings from Last 3 Encounters:   03/29/21 189 lb (85.7 kg)   03/15/21 190 lb 6.4 oz (86.4 kg)   03/03/21 191 lb 6.4 oz (86.8 kg)     Physical Exam  Constitutional:       General: She is not in acute distress. Appearance: She is well-developed. She is not diaphoretic. HENT:      Head: Normocephalic and atraumatic. Mouth/Throat:      Pharynx: No oropharyngeal exudate. Eyes:      General: No scleral icterus. Right eye: No discharge. Left eye: No discharge. Conjunctiva/sclera: Conjunctivae normal.   Neck:      Musculoskeletal: Normal range of motion and neck supple. Cardiovascular:      Rate and Rhythm: Normal rate. Heart sounds: Normal heart sounds. No murmur. Pulmonary:      Effort: Pulmonary effort is normal. No respiratory distress. Breath sounds: Normal breath sounds. No wheezing or rales. Abdominal:      General: There is no distension. Palpations: Abdomen is soft. Tenderness: There is no abdominal tenderness. There is no guarding. Musculoskeletal:         General: No deformity. Right lower leg: No edema. Left lower leg: No edema. Lymphadenopathy:      Head:      Right side of head: No submental or submandibular adenopathy. Left side of head: No submental or submandibular adenopathy. Cervical: No cervical adenopathy. Right cervical: No superficial or deep cervical adenopathy. Left cervical: No superficial or deep cervical adenopathy. Skin:     Findings: No rash. Neurological:      Mental Status: She is alert and oriented to person, place, and time. GCS: GCS eye subscore is 4. GCS verbal subscore is 5. GCS motor subscore is 6. Motor: No abnormal muscle tone. Deep Tendon Reflexes: Reflexes are normal and symmetric. Psychiatric:         Behavior: Behavior normal.         Thought Content: Thought content normal.        Assessment/Plan:   Obdulio Vences was seen today for results. Diagnoses and all orders for this visit:    ILD (interstitial lung disease) ([facility] [address]  Patient with significant cystic lung disease. Cough is likely progression of her interstitial lung disease. She is followed by pulmonologist.   Does have some wheezing and I will keep refill short acting beta-2 agonist which are clinically helping despite results of pulmonary function testing. Essential hypertension, benign  Well controlled- no changes in medication today. Insomnia, unspecified type  Patient due to her age and obesity I would like to rule out sleep apnea before treating her insomnia  -     Michelle Calzada MD, Sleep Medicine, [facility]    Venous stasis dermatitis of both lower extremities  Ultrasound Doppler rule out DVT. There are inflammatory changes consistent with previous DVT.   Recommend elevating legs    - Patient was encouraged to call the office (and ask to see me) or be seen by other provider for any worsening or lack of improvement of the symptoms or any new symptoms.    - Pt was asked toschedule an appointment in 2 months, and to let me know of any scheduling difficulties. Additional patients instructions (if given):   Patient Instructions   Please call the office (and ask to see me) or be seen by other provider for any worsening or lack of improvement of the symptoms or for any new symptoms as soon as possible. Please make sure to schedule your follow appointment as discussed. Please let me know if ay further questions. Please let me know if there are any symptoms or concerns that you feel that needs to be further addressed. Martinez Mckeon M.D.   3/30/2021, 9:19 AM      NOTE: This report was transcribed using voice recognition software. Every effort was made to ensure accuracy, however, inadvertent computerized transcription errors may be present.

## 2021-03-30 ASSESSMENT — ENCOUNTER SYMPTOMS: COUGH: 1

## 2021-04-05 ENCOUNTER — TELEPHONE (OUTPATIENT)
Dept: FAMILY MEDICINE CLINIC | Age: 76
End: 2021-04-05

## 2021-04-06 ENCOUNTER — TELEMEDICINE (OUTPATIENT)
Dept: PSYCHOLOGY | Age: 76
End: 2021-04-06
Payer: MEDICARE

## 2021-04-06 DIAGNOSIS — F41.9 ANXIETY: ICD-10-CM

## 2021-04-06 DIAGNOSIS — F32.A DEPRESSION, UNSPECIFIED DEPRESSION TYPE: ICD-10-CM

## 2021-04-06 DIAGNOSIS — F43.21 GRIEF: Primary | ICD-10-CM

## 2021-04-06 PROCEDURE — 90832 PSYTX W PT 30 MINUTES: CPT | Performed by: PSYCHOLOGIST

## 2021-04-06 NOTE — PROGRESS NOTES
Behavioral Health Consultation  [address] [place], [place] [address]bill, [address] [place] [address]  Psychologist  4/6/2021  10:34 AM      Time spent with Patient: 30 minutes  This is patient's [de-identified] eighth [facility] appointment. Reason for Consult:  depression  Referring Provider: Yaw Hall MD    TELEHEALTH VISIT -- Audio/Video (During CXDNW-67 public health emergency)  }  Pursuant to the emergency declaration under the [address] [place] [address] [facility] authority and the [employer] Act, this Virtual Visit was conducted, with patient's consent, to reduce the patient's risk of exposure to COVID-19 and provide continuity of care for an established patient. Services were provided through a video synchronous discussion virtually to substitute for in-person clinic visit. Pt gave verbal informed consent to participate in telehealth services. Conducted a risk-benefit analysis and determined that the patient's presenting problems are consistent with the use of telepsychology. Determined that the patient has sufficient knowledge and skills in the use of technology enabling them to adequately benefit from telepsychology. It was determined that this patient was able to be properly treated without an in-person session. Patient verified that they were currently located at the [address] [place] [address] Dr address that was provided during registration.     Verified the following information:  Patient's identification: Yes  Patient location: [address] [place] [address]   Patient's call back number: [phone]   Patient's emergency contact's name and number, as well as permission to contact them if needed: Extended Emergency Contact Information  Primary Emergency Contact: Anya Strange [address] [place] [address] Phone: [phone]  Relation: Other  Secondary Emergency Contact: [address] [place] [address] Phone: [phone]  Relation: Child     Provider location: Angela Leon:  Pt reports she had to put her dog down yesterday. Dog had 2 heart attacks yesterday so it was time. Very hard on her. Doesn't want to get more pets because it's too hard on her. Friends of hers that used to live down the street moved back. Very close with the friends. Discussed going back to mass together. Thinks it is going to be very helpful to her overall because it is been a long time. Is not sure what her new purpose is going to be as she has been very focused on her dog and never really leaving him home alone. Has some other ideas about how she will spend her time but wants to brainstorm new purpose without dogs. Feels she is coping and holding up pretty well considering the circumstances.        O:  MSE:     Appearance: good hygiene   Attitude: cooperative and friendly  Consciousness: alert  Orientation: oriented to person, place, time, general circumstance  Memory: recent and remote memory intact  Attention/Concentration: intact during session  Psychomotor Activity: normal  Eye Contact: normal  Speech: normal rate and volume, well-articulated  Mood: \"Sad\"  Affect: euthymic and congruent  Perception: within normal limits  Thought Content: within normal limits  Thought Process: logical, coherent and goal-directed  Insight: fair  Judgment: intact  Ability to understand instructions: Yes  Ability to respond meaningfully: Yes  Morbid Ideation: no   Suicide Assessment: no suicidal ideation, plan, or intent  Homicidal Ideation: no        History:    Medications:   Current Outpatient Medications   Medication Sig Dispense Refill    mirtazapine (REMERON) 7.5 MG tablet Take 1 tablet by mouth nightly 90 tablet 1    albuterol sulfate HFA (VENTOLIN HFA) 108 (90 Base) MCG/ACT inhaler Inhale 2 puffs into the lungs 4 times daily as needed for Wheezing 1 Inhaler 5    pantoprazole (PROTONIX) 20 MG tablet TAKE 1 TABLET BY MOUTH TWICE DAILY 60 tablet 2    hydroxychloroquine (PLAQUENIL) 200 MG tablet Take 1 tab po BID 60 tablet 11    clopidogrel (PLAVIX) 75 MG tablet TAKE 1 TABLET BY MOUTH DAILY 90 tablet 1    azaTHIOprine (IMURAN) 50 MG tablet TAKE 1 TABLET BY MOUTH DAILY 90 tablet 0    predniSONE (DELTASONE) 5 MG tablet TAKE 1 TABLET BY MOUTH DAILY FOR 30 DOSES 30 tablet 2    Cholecalciferol (VITAMIN D3) 1.25 MG (08843 UT) CAPS TAKE 1 CAPSULE BY MOUTH EVERY OTHER WEEK 4 capsule 2    furosemide (LASIX) 20 MG tablet Take 1 tablet by mouth daily (Patient taking differently: Take 20 mg by mouth daily Indications: uses q other day ) 90 tablet 3    carvedilol (COREG) 6.25 MG tablet TAKE 1 TABLET BY MOUTH TWICE DAILY 180 tablet 3    rosuvastatin (CRESTOR) 20 MG tablet TAKE 1 TABLET BY MOUTH DAILY 30 tablet 5    buPROPion (WELLBUTRIN XL) 300 MG extended release tablet TAKE 1 TABLET BY MOUTH EVERY MORNING 90 tablet 1    ondansetron (ZOFRAN) 4 MG tablet TAKE 1 TABLET BY MOUTH EVERY 12 HOURS AS NEEDED FOR NAUSEA OR VOMITING 30 tablet 3    docusate (COLACE, DULCOLAX) 100 MG CAPS Take 100 mg by mouth 2 times daily 60 capsule 3    clobetasol (TEMOVATE) 0.05 % external solution Apply topically 2 times daily Apply topically 2 times daily.  acetaminophen 650 MG TABS Take 650 mg by mouth every 6 hours as needed for Pain or Fever (Fever >100.5 F (38 C)). 60 tablet 1     No current facility-administered medications for this visit.         Social History:   Social History     Socioeconomic History    Marital status:      Spouse name: Not on file    Number of children: 1    Years of education: 15    Highest education level: Not on file   Occupational History    Occupation: Retired   Social Needs    Financial resource strain: Not on file    Food insecurity     Worry: Not on file     Inability: Not on file   [employer] needs     Medical: Not on file     Non-medical: Not on file   Tobacco Use    Smoking status: Former Smoker     Packs/day: 0.50     Years: 30.00     Pack years: 15.00 Types: Cigarettes     Start date: 63     Quit date: 2014     Years since quittin.5    Smokeless tobacco: Never Used   Substance and Sexual Activity    Alcohol use: Yes     Alcohol/week: 0.0 standard drinks     Comment: 2 cocktails on weekends    Drug use: No    Sexual activity: Not Currently   Lifestyle    Physical activity     Days per week: Not on file     Minutes per session: Not on file    Stress: Not on file   Relationships    Social connections     Talks on phone: Not on file     Gets together: Not on file     Attends Latter day service: Not on file     Active member of club or organization: Not on file     Attends meetings of clubs or organizations: Not on file     Relationship status: Not on file    Intimate partner violence     Fear of current or ex partner: Not on file     Emotionally abused: Not on file     Physically abused: Not on file     Forced sexual activity: Not on file   Other Topics Concern    Not on file   Social History Narrative    Not on file       TOBACCO:   reports that she quit smoking about 6 years ago. Her smoking use included cigarettes. She started smoking about 56 years ago. She has a 15.00 pack-year smoking history. She has never used smokeless tobacco.  ETOH:   reports current alcohol use. Family History:   Family History   Problem Relation Age of Onset    High Blood Pressure Brother          A:  Ms. Zackary Dalton had to put her other dog down yesterday so she is now grieving the loss of both of her dogs. She does not feel this is triggered her depression and feels she is grieving appropriately at this time. She continues to be active and engaged and responds positively to behavioral interventions.        Diagnosis:    Grief  MDD, Recurrent, Moderate  Anxiety  Stress      Plan:  Pt interventions:  [place]-setting to identify pt's primary goals for [facility] visit / overall health and Supportive techniques,behavioral activation interventions,  ACT interventions and reinforced pt's skill use, treatment planning    Pt Behavioral Change Plan:  Pt set goals to 1) return to mass and schedule activities with friends 2) return for f/u in 4 weeks

## 2021-04-09 RX ORDER — AZATHIOPRINE 50 MG/1
TABLET ORAL
Qty: 90 TABLET | Refills: 0 | OUTPATIENT
Start: 2021-04-09

## 2021-04-11 DIAGNOSIS — J06.9 VIRAL URI: ICD-10-CM

## 2021-04-12 RX ORDER — AZATHIOPRINE 50 MG/1
TABLET ORAL
Qty: 90 TABLET | Refills: 0 | OUTPATIENT
Start: 2021-04-12

## 2021-04-12 RX ORDER — ALBUTEROL SULFATE 90 UG/1
2 AEROSOL, METERED RESPIRATORY (INHALATION) 4 TIMES DAILY PRN
Qty: 1 INHALER | Refills: 5 | Status: SHIPPED | OUTPATIENT
Start: 2021-04-12

## 2021-04-13 RX ORDER — AZATHIOPRINE 50 MG/1
TABLET ORAL
Qty: 90 TABLET | Refills: 0 | Status: SHIPPED | OUTPATIENT
Start: 2021-04-13 | End: 2021-07-08

## 2021-04-19 ENCOUNTER — TELEPHONE (OUTPATIENT)
Dept: FAMILY MEDICINE CLINIC | Age: 76
End: 2021-04-19

## 2021-04-26 DIAGNOSIS — F33.40 DEPRESSION, MAJOR, RECURRENT, IN REMISSION (HCC): ICD-10-CM

## 2021-04-26 RX ORDER — BUPROPION HYDROCHLORIDE 300 MG/1
TABLET ORAL
Qty: 90 TABLET | Refills: 1 | Status: SHIPPED | OUTPATIENT
Start: 2021-04-26

## 2021-05-03 RX ORDER — CLOPIDOGREL BISULFATE 75 MG/1
TABLET ORAL
Qty: 90 TABLET | Refills: 1 | Status: SHIPPED | OUTPATIENT
Start: 2021-05-03

## 2021-05-07 DIAGNOSIS — E78.00 PURE HYPERCHOLESTEROLEMIA: ICD-10-CM

## 2021-05-07 DIAGNOSIS — N39.43 POST-VOID DRIBBLING: Primary | ICD-10-CM

## 2021-05-07 RX ORDER — ROSUVASTATIN CALCIUM 20 MG/1
20 TABLET, COATED ORAL DAILY
Qty: 30 TABLET | Refills: 5 | OUTPATIENT
Start: 2021-05-07

## 2021-05-09 RX ORDER — ROSUVASTATIN CALCIUM 20 MG/1
20 TABLET, COATED ORAL DAILY
Qty: 30 TABLET | Refills: 5 | Status: SHIPPED | OUTPATIENT
Start: 2021-05-09 | End: 2021-05-10 | Stop reason: SDUPTHER

## 2021-05-10 DIAGNOSIS — I10 ESSENTIAL HYPERTENSION, BENIGN: ICD-10-CM

## 2021-05-10 DIAGNOSIS — E78.00 PURE HYPERCHOLESTEROLEMIA: ICD-10-CM

## 2021-05-10 LAB
BILIRUBIN, URINE: NEGATIVE
BLOOD, URINE: NEGATIVE
CLARITY: CLEAR
COLOR: YELLOW
GLUCOSE URINE: NEGATIVE
KETONES, URINE: NEGATIVE
LEUKOCYTE ESTERASE, URINE: NEGATIVE
NITRITE, URINE: NEGATIVE
PH UA: 5.5 (ref 4.5–8)
PROTEIN UA: ABNORMAL
SPECIFIC GRAVITY, URINE: 1.02
UROBILINOGEN, URINE: NORMAL

## 2021-05-10 RX ORDER — CARVEDILOL 6.25 MG/1
TABLET ORAL
Qty: 180 TABLET | Refills: 3 | Status: SHIPPED | OUTPATIENT
Start: 2021-05-10

## 2021-05-10 RX ORDER — ROSUVASTATIN CALCIUM 20 MG/1
20 TABLET, COATED ORAL DAILY
Qty: 90 TABLET | Refills: 3 | Status: SHIPPED | OUTPATIENT
Start: 2021-05-10 | End: 2021-05-10 | Stop reason: SDUPTHER

## 2021-05-10 RX ORDER — CARVEDILOL 6.25 MG/1
TABLET ORAL
Qty: 180 TABLET | Refills: 3 | OUTPATIENT
Start: 2021-05-10

## 2021-05-10 RX ORDER — ROSUVASTATIN CALCIUM 20 MG/1
20 TABLET, COATED ORAL DAILY
Qty: 90 TABLET | Refills: 3 | Status: SHIPPED | OUTPATIENT
Start: 2021-05-10

## 2021-05-10 NOTE — TELEPHONE ENCOUNTER
----- Message from Angus Tahmina sent at 5/10/2021  9:45 AM EDT -----  Subject: Refill Request    QUESTIONS  Name of Medication? carvedilol (COREG) 6.25 MG tablet  Patient-reported dosage and instructions? TAKE 1 TABLET BY MOUTH TWICE   DAILY  How many days do you have left? 0  [facility]? [id number]  Pharmacy phone number (if available)? [phone]  ---------------------------------------------------------------------------  --------------  Mandi MUÑOZ  What is the best way for the office to contact you? OK to leave message on   voicemail  Preferred Call Back Phone Number?  [phone]

## 2021-05-11 RX ORDER — CHOLECALCIFEROL (VITAMIN D3) 1250 MCG
CAPSULE ORAL
Qty: 4 CAPSULE | Refills: 2 | Status: SHIPPED | OUTPATIENT
Start: 2021-05-11 | End: 2021-10-18

## 2021-05-19 ENCOUNTER — TELEPHONE (OUTPATIENT)
Dept: FAMILY MEDICINE CLINIC | Age: 76
End: 2021-05-19

## 2021-05-19 NOTE — TELEPHONE ENCOUNTER
Deon Citizen called to request that Dr Quin Navarrete give orders for labs to be drawn for pt before he leaves - last labs drawn 3/26/21

## 2021-05-20 DIAGNOSIS — E87.1 HYPONATREMIA: ICD-10-CM

## 2021-05-20 DIAGNOSIS — E78.00 PURE HYPERCHOLESTEROLEMIA: ICD-10-CM

## 2021-05-20 DIAGNOSIS — I10 ESSENTIAL HYPERTENSION, BENIGN: Primary | ICD-10-CM

## 2021-05-20 ASSESSMENT — ENCOUNTER SYMPTOMS
EYE DISCHARGE: 0
ABDOMINAL PAIN: 0
COLOR CHANGE: 0
COUGH: 0
BACK PAIN: 0
SHORTNESS OF BREATH: 0
BLOOD IN STOOL: 0
WHEEZING: 0
ABDOMINAL DISTENTION: 0
VOMITING: 0
FACIAL SWELLING: 0
CHEST TIGHTNESS: 0

## 2021-05-20 NOTE — TELEPHONE ENCOUNTER
Patients caregiver Deon Molina called practice yelling and cursing at staff today. He once again demanded labs be ordered. Per Dr Ryder Navarro request practice manager contacted caregiver again, to inquire which labs he was requesting. Deon Molina cursed and yelled at practice manager and explained that the patient had another PCP lined up since Dr Quin Navarrete was leaving the practice. Practice manager discussed this with Dr Quin Navarrete. Per Dr Quin Navarrete, labs orders were placed, however Dr Quin Navarrete instructed that patient should take the lab orders and results to the new PCP for results and instructions. Practice manager called patient and discussed these instruction and informed her that her future appointment with Dr Quin Navarrete was cancelled, and that the lab orders and the letter informing of Dr Ryder Navarro departing [facility] with list of other providers was placed in the mail to her attention.

## 2021-05-20 NOTE — PROGRESS NOTES
[facility]     Outpatient Cardiology         Chief Complaint   Patient presents with    3 Month Follow-Up       HPI     Franck Esquivel a 64 y.o. female here for follow up for HFpEF. Hx of CAD, HTN, DVT, and ILD. CAD, prior LAD occlusion with preserved LV function (no PCI), no angina tolerating well current medications. Hypertension,, well-controlled on current medications. Hyperlipidemia, on a statin, no side effects. Lower extremity edema, on Lasix, GFR stable when compared to prior labs, continue current dose of Lasix. Chronic diastolic heart failure, tolerating well current medications including diuretics. PMH  Past Medical History:   Diagnosis Date    Anxiety     CAD (coronary artery disease)     CHF (congestive heart failure) (HCC)     Depression     DVT of lower extremity (deep venous thrombosis) (HCC)     Falls     Hypertension     Lupus (HCC)     TIA (transient ischemic attack)     x3    Urinary incontinence     UTI (urinary tract infection)        PSH  Past Surgical History:   Procedure Laterality Date    BACK SURGERY  2014     DR. Morrison     COLONOSCOPY  2010    WNL-repeat in 10 years    FEMUR SURGERY Right     HIP SURGERY Left 2013    left hip trochanteric femoral nailing   Art Saeed  8/3/2010    Dr. Chapis Powell, L5-S1    UPPER GASTROINTESTINAL ENDOSCOPY  7/19/10    reflux with no Barretts/no H-pylori    UPPER GASTROINTESTINAL ENDOSCOPY  2017    Dr. Mallory Avila - gastritis and duodinitis , neg H pylori     UPPER GASTROINTESTINAL ENDOSCOPY N/A 2019    EGD DILATION SAVORY 17mm/ 46 F performed by Jenelle Powell MD at [address] [facility]  Social History     Tobacco Use    Smoking status: Former Smoker     Packs/day: 0.50     Years: 30.00     Pack years: 15.00     Types: Cigarettes     Start date:      Quit date: 2014     Years since quittin.6    Smokeless tobacco: Never Used   Substance Use Topics    Alcohol use: Yes     Alcohol/week: 0.0 standard drinks     Comment: 2 cocktails on weekends    Drug use: No       Family History  Family History   Problem Relation Age of Onset    High Blood Pressure Brother        Allergies   Allergies   Allergen Reactions    Medrol [Methylprednisolone]      multiple side effects , able to tolerate prednisone without side effects     Oxycontin [Oxycodone Hcl]      Pruritis, vomiting     Vicodin [Hydrocodone-Acetaminophen] Nausea Only       Medications:     Home Medications:  Were reviewed and are listed in nursing record. and/or listed below    Prior to Admission medications    Medication Sig Start Date End Date Taking? Authorizing Provider   hydroxychloroquine (PLAQUENIL) 200 MG tablet Take 1 tab po BID 5/25/21  Yes Valerio Pineda MD   triamcinolone (KENALOG) 0.1 % lotion Apply topically 3 times daily Apply topically 3 times daily.    Yes Historical Provider, MD   pantoprazole (PROTONIX) 20 MG tablet TAKE 1 TABLET BY MOUTH TWICE DAILY 5/24/21  Yes Stacia Dolan MD   Cholecalciferol (VITAMIN D3) 1.25 MG (09617 UT) CAPS TAKE 1 CAPSULE BY MOUTH EVERY OTHER WEEK 5/11/21  Yes Valerio Pineda MD   carvedilol (COREG) 6.25 MG tablet TAKE 1 TABLET BY MOUTH TWICE DAILY 5/10/21  Yes Stacia Dolan MD   rosuvastatin (CRESTOR) 20 MG tablet Take 1 tablet by mouth daily 5/10/21  Yes Stacia Dolan MD   clopidogrel (PLAVIX) 75 MG tablet TAKE 1 TABLET BY MOUTH DAILY 5/3/21  Yes Stacia Dolan MD   predniSONE (DELTASONE) 5 MG tablet TAKE 1 TABLET BY MOUTH DAILY 5/3/21  Yes Valerio Pineda MD   buPROPion (WELLBUTRIN XL) 300 MG extended release tablet TAKE 1 TABLET BY MOUTH EVERY MORNING 4/26/21  Yes Stacia Dolan MD   azaTHIOprine (IMURAN) 50 MG tablet TAKE 1 TABLET BY MOUTH DAILY 4/13/21  Yes Stacia Dolan MD   albuterol sulfate HFA (VENTOLIN HFA) 108 (90 Base) MCG/ACT inhaler Inhale 2 puffs into the lungs 4 times daily as needed for Wheezing 4/12/21  Yes Manda Gonzalez MD   mirtazapine (REMERON) 7.5 MG tablet Take 1 tablet by mouth nightly 3/15/21  Yes Manda Gonzalez MD   furosemide (LASIX) 20 MG tablet Take 1 tablet by mouth daily  Patient taking differently: Take 20 mg by mouth daily Indications: uses q other day  12/7/20 5/26/21 Yes Manda Gonzalez MD   ondansetron (ZOFRAN) 4 MG tablet TAKE 1 TABLET BY MOUTH EVERY 12 HOURS AS NEEDED FOR NAUSEA OR VOMITING 2/17/20  Yes Manda Gonzalez MD   docusate (COLACE, DULCOLAX) 100 MG CAPS Take 100 mg by mouth 2 times daily 12/2/19  Yes Najma Casillas,    acetaminophen 650 MG TABS Take 650 mg by mouth every 6 hours as needed for Pain or Fever (Fever >100.5 F (38 C)). 12/9/13  Yes Dhara Shelby MD        Review of Systems   Constitutional: Positive for fatigue. Negative for activity change, appetite change, diaphoresis, fever and unexpected weight change. HENT: Negative for congestion, facial swelling, mouth sores and nosebleeds. Eyes: Negative for discharge and visual disturbance. Respiratory: Negative for cough, chest tightness, shortness of breath and wheezing. Cardiovascular: Positive for leg swelling. Negative for chest pain and palpitations. Gastrointestinal: Negative for abdominal distention, abdominal pain, blood in stool and vomiting. Endocrine: Negative for cold intolerance, heat intolerance and polyuria. Genitourinary: Negative for difficulty urinating, dysuria, frequency and hematuria. Musculoskeletal: Negative for back pain, joint swelling, myalgias and neck pain. Skin: Negative for color change, pallor and rash. Allergic/Immunologic: Negative for immunocompromised state. Neurological: Negative for dizziness, syncope, weakness, light-headedness, numbness and headaches. Hematological: Negative for adenopathy. Does not bruise/bleed easily.    Psychiatric/Behavioral: Negative for behavioral problems, confusion, Coordination: Coordination normal.   Psychiatric:         Behavior: Behavior normal.         Thought Content: Thought content normal.         Judgment: Judgment normal.         Labs:       Lab Results   Component Value Date    WBC 7.8 05/25/2021    HGB 12.3 05/25/2021    HCT 36.7 05/25/2021    MCV 95.7 05/25/2021     05/25/2021     Lab Results   Component Value Date     05/25/2021    K 3.8 05/25/2021     05/25/2021    CO2 23 05/25/2021    BUN 23 (H) 05/25/2021    CREATININE 1.2 05/25/2021    GLUCOSE 90 05/25/2021    CALCIUM 9.3 05/25/2021    PROT 7.0 05/25/2021    LABALBU 3.8 05/25/2021    BILITOT 0.3 05/25/2021    ALKPHOS 98 05/25/2021    AST 35 05/25/2021    ALT 12 05/25/2021    LABGLOM 43 (A) 05/25/2021    GFRAA 53 (A) 05/25/2021    AGRATIO 1.2 05/25/2021    GLOB 3.2 05/25/2021         Lab Results   Component Value Date    CHOL 166 03/26/2021    CHOL 126 10/10/2019    CHOL 140 05/03/2018     Lab Results   Component Value Date    TRIG 60 03/26/2021    TRIG 70 10/10/2019    TRIG 112 05/03/2018     Lab Results   Component Value Date    HDL 77 (H) 03/26/2021    HDL 66 (H) 10/10/2019    HDL 66 (H) 05/03/2018     Lab Results   Component Value Date    LDLCALC 77 03/26/2021    LDLCALC 46 10/10/2019    LDLCALC 52 05/03/2018     Lab Results   Component Value Date    LABVLDL 12 03/26/2021    LABVLDL 14 10/10/2019    LABVLDL 22 05/03/2018     No results found for: CHOLHDLRATIO    Lab Results   Component Value Date    INR 0.99 11/26/2019    INR 1.03 08/22/2016    INR 2.19 (H) 02/13/2015    PROTIME 11.5 11/26/2019    PROTIME 11.7 08/22/2016    PROTIME 24.6 (H) 02/13/2015       The ASCVD Risk score (Valeria Ates., et al., 2013) failed to calculate for the following reasons: The patient has a prior MI or stroke diagnosis      Imaging:       Last ECG (if available):  Normal sinus rhythm, old anterior infarct.     Last Monitor/Holter    Last Stress (if available):    Last Cath (if available): 10/1/14  Impression[de-identified]   1. Single vessel coronary artery disease with occlusion of the   ostial/proximal LAD  2. Severely reduced LV systolic function estimated ejection   fraction 30-35%  3. Elevated LV diastolic pressure 25 mm Hg  4. Normal valve function     Last TTE/MIMI(if available): 8/25/19  Summary   Left ventricular cavity size is normal. Normal left ventricular wall   thickness. Overall left ventricular systolic function appears normal with an   ejection fraction of 55%. No regional wall motion abnormalities are noted. Indeterminate diastolic function. The aortic valve appears slightly thickened but opens adequately. Trivial tricuspid regurgitation. Estimated pulmonary artery systolic pressure is at 27 mmHg assuming a right   atrial pressure of 8 mmHg. Mild pulmonic regurgitation present. The right ventricle is not well visualized, function appears normal.    Last CMR  (if available):      Assessment / Plan:     Pure hypercholesterolemia  Continue statin. No side effects. Essential hypertension, benign  Controlled on current medications. Acute on chronic heart failure with preserved ejection fraction (HCC)  Compensated, continue current dose of Lasix    Mixed hyperlipidemia  Continue statin. Coronary artery disease involving native coronary artery of native heart without angina pectoris  Asymptomatic, continue current medications. Continue Plavix. No prior PCI's, had LAD occlusion that resolved. Follow up in 6 months. I had the opportunity to review the clinical symptoms and presentation of Tj Dilling.     Patient's allergies and medications were reviewed and updated. Patient's past medical, surgical, social and family history were reviewed and updated. Patient's testing including laboratory, ECGs, monitor, imaging (TTE,MIMI,CMR,cath) were reviewed. Tobacco use was discussed with the patient and educated on the negative effects. I have asked the patient to not utilize these agents. All questions and concerns were addressed to the patient/family. Alternatives to my treatment were discussed. The note was completed using EMR. Every effort wasmade to ensure accuracy; however, inadvertent computerized transcription errors may be present. Thank you for allowing me to participate in thelonnie or Richardson Henning MD, [address] [facility]

## 2021-05-23 DIAGNOSIS — M35.1 CONNECTIVE TISSUE DISEASE OVERLAP SYNDROME (HCC): ICD-10-CM

## 2021-05-24 ENCOUNTER — TELEPHONE (OUTPATIENT)
Dept: PULMONOLOGY | Age: 76
End: 2021-05-24

## 2021-05-24 RX ORDER — PANTOPRAZOLE SODIUM 20 MG/1
TABLET, DELAYED RELEASE ORAL
Qty: 60 TABLET | Refills: 2 | Status: SHIPPED | OUTPATIENT
Start: 2021-05-24

## 2021-05-24 NOTE — TELEPHONE ENCOUNTER
Gaviota's phone # was called, answered by Irasema Rhodes, caregiver (on HIPAA) in response to MyChart message sent today addressing struggle to breathe. A MyChart has been sent suggesting going to ED.

## 2021-05-25 ENCOUNTER — OFFICE VISIT (OUTPATIENT)
Dept: RHEUMATOLOGY | Age: 76
End: 2021-05-25
Payer: MEDICARE

## 2021-05-25 VITALS
BODY MASS INDEX: 38.1 KG/M2 | HEIGHT: 59 IN | DIASTOLIC BLOOD PRESSURE: 84 MMHG | WEIGHT: 189 LBS | SYSTOLIC BLOOD PRESSURE: 128 MMHG

## 2021-05-25 DIAGNOSIS — M85.89 OSTEOPENIA OF MULTIPLE SITES: ICD-10-CM

## 2021-05-25 DIAGNOSIS — I10 ESSENTIAL HYPERTENSION, BENIGN: ICD-10-CM

## 2021-05-25 DIAGNOSIS — M35.1 CONNECTIVE TISSUE DISEASE OVERLAP SYNDROME (HCC): Primary | ICD-10-CM

## 2021-05-25 DIAGNOSIS — E78.00 PURE HYPERCHOLESTEROLEMIA: ICD-10-CM

## 2021-05-25 DIAGNOSIS — J84.9 ILD (INTERSTITIAL LUNG DISEASE) (HCC): ICD-10-CM

## 2021-05-25 DIAGNOSIS — E87.1 HYPONATREMIA: ICD-10-CM

## 2021-05-25 DIAGNOSIS — Z79.899 HIGH RISK MEDICATION USE: ICD-10-CM

## 2021-05-25 DIAGNOSIS — M35.1 CONNECTIVE TISSUE DISEASE OVERLAP SYNDROME (HCC): ICD-10-CM

## 2021-05-25 LAB
A/G RATIO: 1.2 (ref 1.1–2.2)
ALBUMIN SERPL-MCNC: 3.8 G/DL (ref 3.4–5)
ALP BLD-CCNC: 98 U/L (ref 40–129)
ALT SERPL-CCNC: 12 U/L (ref 10–40)
ANION GAP SERPL CALCULATED.3IONS-SCNC: 14 MMOL/L (ref 3–16)
AST SERPL-CCNC: 35 U/L (ref 15–37)
BASOPHILS ABSOLUTE: 0.1 K/UL (ref 0–0.2)
BASOPHILS RELATIVE PERCENT: 0.7 %
BILIRUB SERPL-MCNC: 0.3 MG/DL (ref 0–1)
BUN BLDV-MCNC: 23 MG/DL (ref 7–20)
C-REACTIVE PROTEIN: 4.8 MG/L (ref 0–5.1)
CALCIUM SERPL-MCNC: 9.3 MG/DL (ref 8.3–10.6)
CHLORIDE BLD-SCNC: 103 MMOL/L (ref 99–110)
CO2: 23 MMOL/L (ref 21–32)
CREAT SERPL-MCNC: 1.2 MG/DL (ref 0.6–1.2)
CREATININE URINE: 212.8 MG/DL (ref 28–259)
EOSINOPHILS ABSOLUTE: 0.2 K/UL (ref 0–0.6)
EOSINOPHILS RELATIVE PERCENT: 2.4 %
GFR AFRICAN AMERICAN: 53
GFR NON-AFRICAN AMERICAN: 43
GLOBULIN: 3.2 G/DL
GLUCOSE BLD-MCNC: 90 MG/DL (ref 70–99)
HCT VFR BLD CALC: 36.7 % (ref 36–48)
HEMOGLOBIN: 12.3 G/DL (ref 12–16)
LYMPHOCYTES ABSOLUTE: 1.7 K/UL (ref 1–5.1)
LYMPHOCYTES RELATIVE PERCENT: 21.3 %
MCH RBC QN AUTO: 32.2 PG (ref 26–34)
MCHC RBC AUTO-ENTMCNC: 33.6 G/DL (ref 31–36)
MCV RBC AUTO: 95.7 FL (ref 80–100)
MONOCYTES ABSOLUTE: 0.8 K/UL (ref 0–1.3)
MONOCYTES RELATIVE PERCENT: 10.6 %
NEUTROPHILS ABSOLUTE: 5 K/UL (ref 1.7–7.7)
NEUTROPHILS RELATIVE PERCENT: 65 %
PDW BLD-RTO: 14.5 % (ref 12.4–15.4)
PLATELET # BLD: 214 K/UL (ref 135–450)
PMV BLD AUTO: 6.8 FL (ref 5–10.5)
POTASSIUM SERPL-SCNC: 3.8 MMOL/L (ref 3.5–5.1)
PROTEIN PROTEIN: 35 MG/DL
PROTEIN/CREAT RATIO: 0.2 MG/DL
RBC # BLD: 3.84 M/UL (ref 4–5.2)
SEDIMENTATION RATE, ERYTHROCYTE: 34 MM/HR (ref 0–30)
SODIUM BLD-SCNC: 140 MMOL/L (ref 136–145)
TOTAL PROTEIN: 7 G/DL (ref 6.4–8.2)
WBC # BLD: 7.8 K/UL (ref 4–11)

## 2021-05-25 PROCEDURE — 4040F PNEUMOC VAC/ADMIN/RCVD: CPT | Performed by: INTERNAL MEDICINE

## 2021-05-25 PROCEDURE — G8417 CALC BMI ABV UP PARAM F/U: HCPCS | Performed by: INTERNAL MEDICINE

## 2021-05-25 PROCEDURE — 1090F PRES/ABSN URINE INCON ASSESS: CPT | Performed by: INTERNAL MEDICINE

## 2021-05-25 PROCEDURE — G8427 DOCREV CUR MEDS BY ELIG CLIN: HCPCS | Performed by: INTERNAL MEDICINE

## 2021-05-25 PROCEDURE — 99214 OFFICE O/P EST MOD 30 MIN: CPT | Performed by: INTERNAL MEDICINE

## 2021-05-25 PROCEDURE — 1123F ACP DISCUSS/DSCN MKR DOCD: CPT | Performed by: INTERNAL MEDICINE

## 2021-05-25 PROCEDURE — 1036F TOBACCO NON-USER: CPT | Performed by: INTERNAL MEDICINE

## 2021-05-25 PROCEDURE — G8399 PT W/DXA RESULTS DOCUMENT: HCPCS | Performed by: INTERNAL MEDICINE

## 2021-05-25 RX ORDER — HYDROXYCHLOROQUINE SULFATE 200 MG/1
TABLET, FILM COATED ORAL
Qty: 60 TABLET | Refills: 11 | Status: SHIPPED | OUTPATIENT
Start: 2021-05-25

## 2021-05-25 RX ORDER — TRIAMCINOLONE ACETONIDE 1 MG/ML
LOTION TOPICAL 3 TIMES DAILY
COMMUNITY

## 2021-05-25 NOTE — TELEPHONE ENCOUNTER
Spoke with Maddison on 05/24/2021. He confirmed that he will keep appt being held for them on Thursday, [address]. He sent another MyChart msg stating   \"We can wait for appointment and if I have a repeat of yesterday   Then I won't hesitate to take her to emergency. it scared her more than  Anything\"

## 2021-05-25 NOTE — PROGRESS NOTES
[address] [place] [address] (k) [phone] (F)      Dear Dr. Shelley Hiseh MD:  Please find Rheumatology assessment. Thank you for giving me the opportunity to be involved in Rianna Cook care and I look forward following Haydee Melendez along with you. If you have any questions or concerns please feel free to reach me. Note is transcribed using voice recognition software. Inadvertent computerized transcription errors may be present. Patient identification: Luz Blair,: [birth year],14 y.o. Sex: female     A/P  Haydee Melendez was seen today for follow-up. Diagnoses and all orders for this visit:    [facility]ctive [facility] disease overlap syndrome ([facility]  -     Protein / creatinine ratio, urine; Future    ILD (interstitial lung disease) (HCC)    High risk medication use  -     C-Reactive Protein; Future  -     Sedimentation Rate; Future    Osteopenia of multiple sites        Cutaneous lupus + Sjogren's overlap with ILD -positive REBECCA, SSA, sicca symptoms and intermittent photosensitive rash. Myositis panel, RF, CCP, anti-Tawnya negative. 1.Cutaneous lupus-No active lesions, on topical steroid as needed and hydroxychloroquine. 2.Sjogren's-persistent sicca symptoms but manageable with nonpharmacological therapy. 3. ILD- Intermittent cough and worsening shortness of breath-has appointment to see Dr. Sarah Fisher. She does have ILD, has also gained quite a bit of weight in last year and a half. Is on 5 mg of prednisone, azathioprine 50 mg a day and hydroxychloroquine 200 mg twice daily. Due for eye exam as well as safety labs.   She also has labs from Dr. Bentley Saab, will combine both of her lab orders. 4. Prolonged steroid use due to osteopenia-continue calcium supplementation, Reclast first dose 8/17/2020. Will redose at the time of next office visit. RTC 12 weeks. Patient indicates understanding and agrees with the management plan. I reviewed patient's history, referral documents and electronic medical records. Copy of consult note is being routed electronically/faxed to referring physician. #######################################################################    Biacelwmov-xlghzx-on for cutaneous lupus and Sjogren's overlap, also has ILD. Other medical problems anxiety, depression, hypertension. Interval changes-  States that she has gained a lot of weight in last 1 year, just feels heavy and more short of breath. Her caregiver tells me that she just has coughing spells and gets very short of breath, that may just last for a few minutes or longer. Overall feels that her breathing has gotten worse. Skin lupus is in remission. Sicca symptoms are persistent but manageable. Denies any swollen or inflamed joints, hemoptysis, fever, chills, any mucositis, GI/ side effects. 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  All other review of systems are negative. PFT from 8/2020-DLCO is significantly reduced to 39%. All other review of systems are negative. Past Medical History:   Diagnosis Date    Anxiety     CAD (coronary artery disease)     CHF (congestive heart failure) (HCC)     Depression     DVT of lower extremity (deep venous thrombosis) (HCC)     Falls     Hypertension     Lupus (HCC)     TIA (transient ischemic attack)     x3    Urinary incontinence     UTI (urinary tract infection)      Past Surgical History:   Procedure Laterality Date    BACK SURGERY  9/24/2014     DR. Morrison     COLONOSCOPY  7/12/2010    WNL-repeat in 10 years    FEMUR SURGERY Right     HIP SURGERY Left 12/03/2013    left hip trochanteric femoral nailing    JOINT REPLACEMENT      LUMBAR LAMINECTOMY  8/3/2010    Dr. Gabriela Bhakta, L5-S1    UPPER GASTROINTESTINAL ENDOSCOPY  7/19/10    reflux with no Barretts/no H-pylori    UPPER GASTROINTESTINAL ENDOSCOPY  01/2017    Dr. Emily Carr - gastritis and duodinitis , neg H pylori     UPPER GASTROINTESTINAL ENDOSCOPY N/A 12/2/2019    EGD DILATION SAVKIRA 17mm/ 46 F performed by Shoshana Dimas MD at [facility] END[facility]         No family history of autoimmune diseases    Current Outpatient Medications   Medication Sig Dispense Refill    triamcinolone (KENALOG) 0.1 % lotion Apply topically 3 times daily Apply topically 3 times daily.       pantoprazole (PROTONIX) 20 MG tablet TAKE 1 TABLET BY MOUTH TWICE DAILY 60 tablet 2    Cholecalciferol (VITAMIN D3) 1.25 MG (82253 UT) CAPS TAKE 1 CAPSULE BY MOUTH EVERY OTHER WEEK 4 capsule 2    carvedilol (COREG) 6.25 MG tablet TAKE 1 TABLET BY MOUTH TWICE DAILY 180 tablet 3    rosuvastatin (CRESTOR) 20 MG tablet Take 1 tablet by mouth daily 90 tablet 3    clopidogrel (PLAVIX) 75 MG tablet TAKE 1 TABLET BY MOUTH DAILY 90 tablet 1    predniSONE (DELTASONE) 5 MG tablet TAKE 1 TABLET BY MOUTH DAILY 90 tablet 1    buPROPion (WELLBUTRIN XL) 300 MG extended release tablet TAKE 1 TABLET BY MOUTH EVERY MORNING 90 tablet 1    azaTHIOprine (IMURAN) 50 MG tablet TAKE 1 TABLET BY MOUTH DAILY 90 tablet 0    albuterol sulfate HFA (VENTOLIN HFA) 108 (90 Base) MCG/ACT inhaler Inhale 2 puffs into the lungs 4 times daily as needed for Wheezing 1 Inhaler 5    mirtazapine (REMERON) 7.5 MG tablet Take 1 tablet by mouth nightly 90 tablet 1    hydroxychloroquine (PLAQUENIL) 200 MG tablet Take 1 tab po BID 60 tablet 11    furosemide (LASIX) 20 MG tablet Take 1 tablet by mouth daily (Patient taking differently: Take 20 mg by mouth daily Indications: uses q other day ) 90 tablet 3    ondansetron (ZOFRAN) 4 MG tablet TAKE 1 TABLET BY MOUTH EVERY 12 HOURS AS NEEDED FOR NAUSEA OR VOMITING 30 tablet 3    docusate (COLACE, DULCOLAX) 100 MG CAPS Take 100 mg by mouth 2 times daily 60 capsule 3    clobetasol (TEMOVATE) 0.05 % external solution Apply topically 2 times daily Apply topically 2 times daily.  acetaminophen 650 MG TABS Take 650 mg by mouth every 6 hours as needed for Pain or Fever (Fever >100.5 F (38 C)). 60 tablet 1     No current facility-administered medications for this visit. Allergies   Allergen Reactions    Medrol [Methylprednisolone]      multiple side effects , able to tolerate prednisone without side effects     Oxycontin [Oxycodone Hcl]      Pruritis, vomiting     Vicodin [Hydrocodone-Acetaminophen] Nausea Only       PHYSICAL EXAM:    Vitals:    /84   Ht 4' 11\" (1.499 m)   Wt 189 lb (85.7 kg)   BMI 38.17 kg/m²   General appearance/ Psychiatric: well nourished, and well groomed, normal judgement, alert, appears stated age and cooperative. Is slow to respond. MKS: I do not appreciate any tender, swollen or inflamed joints in upper or lower extremities. Full range of motion all peripheral joints. She has gained weight, appears physically deconditioned, using wheelchair for ambulation. Skin: No rashes, no induration or skin thickening or nodules. No evidence ischemia or deformities noted in digits or nails. HEENT: Normal lacrimal glands, cornea, conjunctiva, lids. She does have dry oral mucosa with no saliva pool under her tongue. Saliva glands are not enlarged. Neck: No masses or asymmetry. No thyroid enlargement. Chest: Normal effort, bibasilar fine crackles unchanged since last visit. Heart:  Normal s1/s2, no leg edema.       DATA:   Lab Results   Component Value Date    WBC 8.0 03/26/2021    HGB 12.6 03/26/2021    HCT 36.4 03/26/2021    MCV 95.5 03/26/2021     03/26/2021         Chemistry        Component Value Date/Time     03/26/2021 0747    K 4.1 03/26/2021 0747    K 4.1 11/26/2019 0821     03/26/2021 0747    CO2 29 03/26/2021 0747    BUN 21 (H) 03/26/2021 0747    CREATININE 1.1 03/26/2021 0747        Component Value Date/Time    CALCIUM 9.7 03/26/2021 0747    ALKPHOS 104 03/26/2021 0747    AST 31 03/26/2021 0747    ALT 18 03/26/2021 0747    BILITOT 0.3 03/26/2021 0747          Lab Results   Component Value Date    LABURIC 2.5 (L) 11/28/2019     Lab Results   Component Value Date    SEDRATE 41 (H) 11/03/2020     Lab Results   Component Value Date    CRP 6.1 (H) 11/03/2020     Lab Results   Component Value Date    REBECCA POSITIVE (A) 10/02/2019    SEDRATE 41 (H) 11/03/2020     Lab Results   Component Value Date    CKTOTAL 87 10/02/2019     No results found for: TSH  Lab Results   Component Value Date    VITD25 86.7 11/26/2019         Radiology Review:   CT chest 9/19/2019-  Impression       1.  Bilateral subpleural reticular opacities and traction bronchiectasis and developing subpleural honeycombing compatible with mild to moderate pulmonary fibrosis with progression from comparison chest CT. 2.  Clustered nodular opacities in the right upper lobe may be inflammatory/infectious but nonspecific. Following the Fleischner Society 2017 guidelines, if patient is low risk no routine follow-up is suggested unless suspicious morphology or upper lobe    location. If patient is high risk, then optional CT at 12 months is suggested.  qzxv   3.  Unchanged severe T9 and T11 vertebral compression fractures with retropulsion. PFT 10/11/2019      Pulse ox is 94 % on room air    Spirometry data:    FEV1/FVC: 78. Predicted ratio 75    FEV1 1.07 L, which is 65 % predicted    FVC is 1.38 L, which is 62 % predicted  Lung Volumes:    TLC (by Plethysmography) is 3.21 L, which is 73 % predicted    RV is 1.35 L which is 64 % predicted    Diffusion Capacity:    DLCO is 7.55 which is 39 % predicted  Impression:    1. There is no obstruction present    2. There is mild restriction with TLC of 73% predicted    4. There is severe reduction in diffusion capacity    Comment:    The presence of restriction and reduction of diffusion capacity   is a new in comparison with the PFT she had on 11/3/2017  PFT findings are suggestive of progression of ILD.  Clinical   correlation is recommended. A/P- See above.

## 2021-05-26 ENCOUNTER — OFFICE VISIT (OUTPATIENT)
Dept: CARDIOLOGY CLINIC | Age: 76
End: 2021-05-26
Payer: MEDICARE

## 2021-05-26 VITALS
WEIGHT: 190 LBS | SYSTOLIC BLOOD PRESSURE: 122 MMHG | HEIGHT: 60 IN | BODY MASS INDEX: 37.3 KG/M2 | OXYGEN SATURATION: 96 % | HEART RATE: 68 BPM | DIASTOLIC BLOOD PRESSURE: 70 MMHG

## 2021-05-26 DIAGNOSIS — I10 ESSENTIAL HYPERTENSION, BENIGN: ICD-10-CM

## 2021-05-26 DIAGNOSIS — I50.33 ACUTE ON CHRONIC HEART FAILURE WITH PRESERVED EJECTION FRACTION (HCC): ICD-10-CM

## 2021-05-26 DIAGNOSIS — E78.00 PURE HYPERCHOLESTEROLEMIA: ICD-10-CM

## 2021-05-26 DIAGNOSIS — E78.2 MIXED HYPERLIPIDEMIA: ICD-10-CM

## 2021-05-26 DIAGNOSIS — I25.10 CORONARY ARTERY DISEASE INVOLVING NATIVE CORONARY ARTERY OF NATIVE HEART WITHOUT ANGINA PECTORIS: ICD-10-CM

## 2021-05-26 PROCEDURE — G8427 DOCREV CUR MEDS BY ELIG CLIN: HCPCS | Performed by: INTERNAL MEDICINE

## 2021-05-26 PROCEDURE — 1123F ACP DISCUSS/DSCN MKR DOCD: CPT | Performed by: INTERNAL MEDICINE

## 2021-05-26 PROCEDURE — 1090F PRES/ABSN URINE INCON ASSESS: CPT | Performed by: INTERNAL MEDICINE

## 2021-05-26 PROCEDURE — G8417 CALC BMI ABV UP PARAM F/U: HCPCS | Performed by: INTERNAL MEDICINE

## 2021-05-26 PROCEDURE — 4040F PNEUMOC VAC/ADMIN/RCVD: CPT | Performed by: INTERNAL MEDICINE

## 2021-05-26 PROCEDURE — G8399 PT W/DXA RESULTS DOCUMENT: HCPCS | Performed by: INTERNAL MEDICINE

## 2021-05-26 PROCEDURE — 1036F TOBACCO NON-USER: CPT | Performed by: INTERNAL MEDICINE

## 2021-05-26 PROCEDURE — 99214 OFFICE O/P EST MOD 30 MIN: CPT | Performed by: INTERNAL MEDICINE

## 2021-05-26 NOTE — ASSESSMENT & PLAN NOTE
Asymptomatic, continue current medications. Continue Plavix. No prior PCI's, had LAD occlusion that resolved.

## 2021-06-03 ENCOUNTER — OFFICE VISIT (OUTPATIENT)
Dept: PULMONOLOGY | Age: 76
End: 2021-06-03
Payer: MEDICARE

## 2021-06-03 VITALS
OXYGEN SATURATION: 97 % | TEMPERATURE: 96.9 F | DIASTOLIC BLOOD PRESSURE: 80 MMHG | HEART RATE: 68 BPM | SYSTOLIC BLOOD PRESSURE: 104 MMHG

## 2021-06-03 DIAGNOSIS — E66.09 CLASS 2 OBESITY DUE TO EXCESS CALORIES WITHOUT SERIOUS COMORBIDITY WITH BODY MASS INDEX (BMI) OF 37.0 TO 37.9 IN ADULT: ICD-10-CM

## 2021-06-03 DIAGNOSIS — M32.9 SYSTEMIC LUPUS ERYTHEMATOSUS, UNSPECIFIED SLE TYPE, UNSPECIFIED ORGAN INVOLVEMENT STATUS (HCC): ICD-10-CM

## 2021-06-03 DIAGNOSIS — J84.9 ILD (INTERSTITIAL LUNG DISEASE) (HCC): Primary | ICD-10-CM

## 2021-06-03 PROCEDURE — 99213 OFFICE O/P EST LOW 20 MIN: CPT | Performed by: INTERNAL MEDICINE

## 2021-06-03 PROCEDURE — 1090F PRES/ABSN URINE INCON ASSESS: CPT | Performed by: INTERNAL MEDICINE

## 2021-06-03 PROCEDURE — 4040F PNEUMOC VAC/ADMIN/RCVD: CPT | Performed by: INTERNAL MEDICINE

## 2021-06-03 PROCEDURE — G8399 PT W/DXA RESULTS DOCUMENT: HCPCS | Performed by: INTERNAL MEDICINE

## 2021-06-03 PROCEDURE — 1123F ACP DISCUSS/DSCN MKR DOCD: CPT | Performed by: INTERNAL MEDICINE

## 2021-06-03 PROCEDURE — G8428 CUR MEDS NOT DOCUMENT: HCPCS | Performed by: INTERNAL MEDICINE

## 2021-06-03 PROCEDURE — 1036F TOBACCO NON-USER: CPT | Performed by: INTERNAL MEDICINE

## 2021-06-03 PROCEDURE — G8417 CALC BMI ABV UP PARAM F/U: HCPCS | Performed by: INTERNAL MEDICINE

## 2021-06-03 RX ORDER — ONDANSETRON 4 MG/1
4 TABLET, FILM COATED ORAL EVERY 12 HOURS PRN
Qty: 30 TABLET | Refills: 3 | Status: CANCELLED | OUTPATIENT
Start: 2021-06-03

## 2021-06-04 PROBLEM — E66.09 OBESITY DUE TO EXCESS CALORIES WITHOUT SERIOUS COMORBIDITY: Status: ACTIVE | Noted: 2021-06-04

## 2021-06-04 NOTE — PROGRESS NOTES
Laurence Corrigan (:  1945) is a 64 y.o. female,Established patient, here for evaluation of the following chief complaint(s):  No chief complaint on file. ASSESSMENT/PLAN:  1. ILD (interstitial lung disease) (HCC)  2. Class 2 obesity due to excess calories without serious comorbidity with body mass index (BMI) of 37.0 to 37.9 in adult  3. Systemic lupus erythematosus, unspecified SLE type, unspecified organ involvement status ([facility]  Interstitial lung disease associated with SLE has been stable radiographically, and on her last PFT showed improvement. Exercise tolerance remains quite impaired and is related particularly to marked weight gain and loss of conditioning. Resuming physical therapy and aerobic exercise is recommended. No medical treatment recommended, beyond that for SLE. No follow-ups on file. Subjective   SUBJECTIVE/OBJECTIVE:  HPI   Mrs. Nancy Grey returns for follow-up because of continued problem of shortness of breath with exertion. No dyspnea at rest and no nocturnal respiratory symptoms. She has cough that is exhibited whenever she initially lies down. However this resolves and she is able to sleep on one pillow without interruption from cough. She denies heartburn. She has not felt postnasal drainage. She has not had wheezing as described in the past.  Her caregiver has ceased giving her albuterol inhalation, as she has not had any improvement with this. She denies any chest pain. She has not had any febrile illness. Her activity level is extremely limited. Objective   Physical Exam  Vitals reviewed. Constitutional:       Appearance: She is well-developed. She is obese. HENT:      Head: Normocephalic and atraumatic. Mouth/Throat:      Mouth: Mucous membranes are moist.      Pharynx: Oropharynx is clear. No oropharyngeal exudate or posterior oropharyngeal erythema. Eyes:      General: No scleral icterus. Right eye: No discharge. Left eye: No discharge. Neck:      Thyroid: No thyromegaly. Trachea: No tracheal deviation. Cardiovascular:      Rate and Rhythm: Normal rate and regular rhythm. Pulses:           Radial pulses are 1+ on the right side and 1+ on the left side. Heart sounds: Normal heart sounds. No murmur heard. Pulmonary:      Effort: Pulmonary effort is normal.      Breath sounds: Examination of the right-lower field reveals rales. Examination of the left-lower field reveals rales. Rales present. No decreased breath sounds, wheezing or rhonchi. Musculoskeletal:      Right lower leg: No edema. Left lower leg: No edema. Lymphadenopathy:      Cervical: No cervical adenopathy. Skin:     General: Skin is warm and dry. Neurological:      Mental Status: She is alert and oriented to person, place, and time. Psychiatric:         Mood and Affect: Mood normal.         Behavior: Behavior normal.         Thought Content: Thought content normal.         Judgment: Judgment normal.            On this date 6/3/2021 I have spent 25 minutes reviewing previous notes, test results and face to face with the patient discussing the diagnosis and importance of compliance with the treatment plan as well as documenting on the day of the visit. An electronic signature was used to authenticate this note.     --Tommi Lennox, MD

## 2021-06-07 ENCOUNTER — HOSPITAL ENCOUNTER (OUTPATIENT)
Age: 76
Setting detail: OUTPATIENT SURGERY
Discharge: HOME | End: 2021-06-07
Attending: INTERNAL MEDICINE | Admitting: INTERNAL MEDICINE
Payer: MEDICARE

## 2021-06-07 VITALS
OXYGEN SATURATION: 95 % | HEART RATE: 60 BPM | SYSTOLIC BLOOD PRESSURE: 125 MMHG | BODY MASS INDEX: 37.5 KG/M2 | DIASTOLIC BLOOD PRESSURE: 63 MMHG | WEIGHT: 191 LBS | HEIGHT: 60 IN | RESPIRATION RATE: 20 BRPM | TEMPERATURE: 97.5 F

## 2021-06-07 DIAGNOSIS — K21.9 GASTROESOPHAGEAL REFLUX DISEASE, UNSPECIFIED WHETHER ESOPHAGITIS PRESENT: ICD-10-CM

## 2021-06-07 PROCEDURE — 3609012700 HC EGD DILATION SAVORY: Performed by: INTERNAL MEDICINE

## 2021-06-07 PROCEDURE — 2709999900 HC NON-CHARGEABLE SUPPLY: Performed by: INTERNAL MEDICINE

## 2021-06-07 PROCEDURE — 6370000000 HC RX 637 (ALT 250 FOR IP): Performed by: INTERNAL MEDICINE

## 2021-06-07 PROCEDURE — 2580000003 HC RX 258: Performed by: INTERNAL MEDICINE

## 2021-06-07 PROCEDURE — 88305 TISSUE EXAM BY PATHOLOGIST: CPT

## 2021-06-07 PROCEDURE — 7100000010 HC PHASE II RECOVERY - FIRST 15 MIN: Performed by: INTERNAL MEDICINE

## 2021-06-07 PROCEDURE — 7100000011 HC PHASE II RECOVERY - ADDTL 15 MIN: Performed by: INTERNAL MEDICINE

## 2021-06-07 PROCEDURE — 3609012400 HC EGD TRANSORAL BIOPSY SINGLE/MULTIPLE: Performed by: INTERNAL MEDICINE

## 2021-06-07 PROCEDURE — 99152 MOD SED SAME PHYS/QHP 5/>YRS: CPT | Performed by: INTERNAL MEDICINE

## 2021-06-07 PROCEDURE — 6360000002 HC RX W HCPCS: Performed by: INTERNAL MEDICINE

## 2021-06-07 RX ORDER — FENTANYL CITRATE 50 UG/ML
INJECTION, SOLUTION INTRAMUSCULAR; INTRAVENOUS PRN
Status: DISCONTINUED | OUTPATIENT
Start: 2021-06-07 | End: 2021-06-07 | Stop reason: ALTCHOICE

## 2021-06-07 RX ORDER — SODIUM CHLORIDE 9 MG/ML
INJECTION, SOLUTION INTRAVENOUS CONTINUOUS
Status: DISCONTINUED | OUTPATIENT
Start: 2021-06-07 | End: 2021-06-07 | Stop reason: HOSPADM

## 2021-06-07 RX ORDER — MIDAZOLAM HYDROCHLORIDE 1 MG/ML
INJECTION INTRAMUSCULAR; INTRAVENOUS PRN
Status: DISCONTINUED | OUTPATIENT
Start: 2021-06-07 | End: 2021-06-07 | Stop reason: ALTCHOICE

## 2021-06-07 RX ADMIN — SODIUM CHLORIDE: 9 INJECTION, SOLUTION INTRAVENOUS at 12:49

## 2021-06-07 ASSESSMENT — PAIN - FUNCTIONAL ASSESSMENT
PAIN_FUNCTIONAL_ASSESSMENT: FACES

## 2021-06-07 ASSESSMENT — PAIN SCALES - GENERAL: PAINLEVEL_OUTOF10: 0

## 2021-06-07 NOTE — PROGRESS NOTES
Discharge instructions reviewed with patient/responsible adult and understanding verbalized. Discharge instructions signed and copies given. Patient discharged home with belongings. Caregiver with pt.

## 2021-06-07 NOTE — H&P
History and Physical / Pre-Sedation Assessment    Charlie Ashton is a 64 y.o. female who presents today for EGD procedure. PMHx:    Past Medical History:   Diagnosis Date    Anxiety     CAD (coronary artery disease)     CHF (congestive heart failure) ([facility] [address]     Depression     DVT of lower extremity (deep venous thrombosis) (HCC)     Falls     Hypertension     Lupus (HCC)     TIA (transient ischemic attack)     x3    Urinary incontinence     UTI (urinary tract infection)        Medications:    Prior to Admission medications    Medication Sig Start Date End Date Taking? Authorizing Provider   hydroxychloroquine (PLAQUENIL) 200 MG tablet Take 1 tab po BID 5/25/21  Yes Napoleon Lund MD   pantoprazole (PROTONIX) 20 MG tablet TAKE 1 TABLET BY MOUTH TWICE DAILY 5/24/21  Yes Brinda Bashir MD   carvedilol (COREG) 6.25 MG tablet TAKE 1 TABLET BY MOUTH TWICE DAILY 5/10/21  Yes Brinda Bashir MD   rosuvastatin (CRESTOR) 20 MG tablet Take 1 tablet by mouth daily 5/10/21  Yes Brinda Bashir MD   clopidogrel (PLAVIX) 75 MG tablet TAKE 1 TABLET BY MOUTH DAILY 5/3/21  Yes Brinda Bashir MD   predniSONE (DELTASONE) 5 MG tablet TAKE 1 TABLET BY MOUTH DAILY 5/3/21  Yes Napoleon Lund MD   buPROPion (WELLBUTRIN XL) 300 MG extended release tablet TAKE 1 TABLET BY MOUTH EVERY MORNING 4/26/21  Yes Brinda Bashir MD   mirtazapine (REMERON) 7.5 MG tablet Take 1 tablet by mouth nightly 3/15/21  Yes Brinda Bashir MD   furosemide (LASIX) 20 MG tablet Take 1 tablet by mouth daily  Patient taking differently: Take 20 mg by mouth daily Indications: uses q other day  12/7/20 6/7/21 Yes Brinda Bashir MD   triamcinolone (KENALOG) 0.1 % lotion Apply topically 3 times daily Apply topically 3 times daily.     Historical Provider, MD   Cholecalciferol (VITAMIN D3) 1.25 MG (48032 UT) CAPS TAKE 1 CAPSULE BY MOUTH EVERY OTHER WEEK 5/11/21   Napoleon Lund MD   azaTHIOprine (IMURAN) 50 MG 30.00     Pack years: 15.00     Types: Cigarettes     Start date: 63     Quit date: 2014     Years since quittin.7    Smokeless tobacco: Never Used   Vaping Use    Vaping Use: Never used   Substance and Sexual Activity    Alcohol use: Not Currently     Alcohol/week: 0.0 standard drinks     Comment: none for a year    Drug use: No    Sexual activity: Not Currently   Other Topics Concern    Not on file   Social History Narrative    Not on file     Social Determinants of Health     Financial Resource Strain:     Difficulty of Paying Living Expenses:    Food Insecurity:     Worried About Running Out of Food in the Last Year:     Ran Out of Food in the Last Year:    Transportation Needs:     Lack of Transportation (Medical):  Lack of Transportation (Non-Medical):    Physical Activity:     Days of Exercise per Week:     Minutes of Exercise per Session:    Stress:     Feeling of Stress :    Social Connections:     Frequency of Communication with Friends and Family:     Frequency of Social Gatherings with Friends and Family:     Attends Latter-day Services:     Active Member of Clubs or Organizations:     Attends Club or Organization Meetings:     Marital Status:    Intimate Partner Violence:     Fear of Current or Ex-Partner:     Emotionally Abused:     Physically Abused:     Sexually Abused:        Family Hx:   Family History   Problem Relation Age of Onset    High Blood Pressure Brother        Physical Exam:  Vital Signs: /63   Pulse 60   Temp 97.5 °F (36.4 °C) (Temporal)   Resp 20   Ht 5' (1.524 m)   Wt 191 lb (86.6 kg)   SpO2 95%   BMI 37.30 kg/m²    Pulmonary: Normal  Cardiac: Normal  Abdomen: Normal    Pre-Procedure Assessment / Plan:  ASA Classification: Class 2 - A normal healthy patient with mild systemic disease  Level of Sedation Plan: Moderate sedation   Mallampati Score:  I (soft palate, uvula, fauces, tonsillar pillars visible)  Post Procedure plan: Return to same level of care      I assessed the patient and find that the patient is in satisfactory condition to proceed with the planned procedure and sedation plan. Risks/benefits/alternatives of procedure discussed with patient and any present family members. Risks including, but not limited to: bleeding, perforation, post polypectomy syndrome, splenic injury, need for additional procedures or surgery, risks of anesthesia. Patient understands it is their responsibility to call office for pathology results if they do not hear from my office within 1-2 weeks. All questions answered.     Dejan Manrique MD  6/7/2021

## 2021-06-07 NOTE — BRIEF OP NOTE
Brief Postoperative Note      Patient: Yesenia Doe  YOB: 1945  MRN: [id number]    Date of Procedure: 6/7/2021    Pre-Op Diagnosis: ESOPHAGEAL REFLUX    Post-Op Diagnosis: Same       Procedure(s):  EGD BIOPSY  EGD DILATION MARY    Surgeon(s):  Tahmina Morales MD    Assistant:  * No surgical staff found *    Anesthesia: IV Sedation    Estimated Blood Loss (mL): Minimal    Complications: None    Specimens:   ID Type Source Tests Collected by Time Destination   A : esophageal bx for reflux Tissue Esophagus SURGICAL PATHOLOGY Tahmina Morales MD 6/7/2021 1348        Implants:  * No implants in log *      Drains:   External Urinary Catheter (Active)       Findings: normal    Electronically signed by Sarah Denise MD on 6/7/2021 at 3:07 PM

## 2021-06-08 NOTE — PROCEDURES
[facility] [address] [address]                                 PROCEDURE NOTE    PATIENT NAME: López Baker                      :        1945  MED REC NO:   [id number]                          ROOM:  ACCOUNT NO:   [de-identified]                           ADMIT DATE: 2021  PROVIDER:     Cherelle Zimmerman MD    DATE OF PROCEDURE:  2021    PREOPERATIVE DIAGNOSIS:  Evaluation for dysphagia. POSTOPERATIVE DIAGNOSES:  1. Sliding type hiatal hernia. 2.  Rule out gastroesophageal reflux disease. PROCEDURES:  Esophagogastroduodenoscopy with 51-Northern Irish Savary dilatation  over a guidewire and distal esophageal biopsy. SURGEON:  Cherelle Zimmerman MD    ANESTHESIA:  Demerol 25 mg and Versed 5 mg. COMPLICATIONS:  None. DESCRIPTION OF PROCEDURE:  Informed consent was obtained after  explaining the risks of bleeding, infection, allergy, perforation,  medical and surgical management. Forward viewing endoscope to  oropharynx under direct visualization down to second portion of the  duodenum, normal.  Pylorus was normal.  Antrum, angularis, corpus,  fundus, and cardia were normal.  Examination of the GE junction was  about 35 cm consistent with sliding type hiatal hernia. No esophagitis,  Foreman's metaplasia, or obvious strictures. Endoscope re-introduced  into the stomach. Guidewire was passed through the endoscope, withdrawn  in a retrograde fashion. A 51-Northern Irish Savary dilator was passed over the  guidewire, both withdrawn in toto. Endoscope re-introduced  demonstrating no tears from Savary dilatation. Biopsy was taken from  distal esophagus for reflux. Endoscope withdrawn in stable condition. IMPRESSION AND PLAN:  We will call the patient with biopsy results and  recommendations to see if empiric 51-Northern Irish Savary dilatation resolves  any overt dysphagia.   Prior history of some cricopharyngeal stenosis  which was humiliated by similar intervention in the past.  We will see  if this intervention resolves her reported chocking episode, if not,  further evaluation will be proceeded.         Valdemar Jaquez MD    D: 06/07/2021 13:53:03       T: 06/07/2021 14:56:09     [id number]  Job#: [id number]     Doc#: [id number]    CC:  Arlene Márquez MD

## 2021-06-11 ENCOUNTER — TELEMEDICINE (OUTPATIENT)
Dept: PSYCHOLOGY | Age: 76
End: 2021-06-11
Payer: MEDICARE

## 2021-06-11 DIAGNOSIS — F33.1 MODERATE EPISODE OF RECURRENT MAJOR DEPRESSIVE DISORDER (HCC): Primary | ICD-10-CM

## 2021-06-11 DIAGNOSIS — F43.21 GRIEF: ICD-10-CM

## 2021-06-11 PROCEDURE — 90832 PSYTX W PT 30 MINUTES: CPT | Performed by: PSYCHOLOGIST

## 2021-06-11 NOTE — PROGRESS NOTES
Behavioral Health Consultation  [facility] Quyen Goncalves PsyD  Psychologist  6/11/2021  10:58 AM      Time spent with Patient: 30 minutes  This is patient's [de-identified] ninth [facility] appointment. Reason for Consult:  depression  Referring Provider: Kip Gaspar MD    TELEHEALTH VISIT -- Audio/Video (During ENQPX-16 public health emergency)  }  Pursuant to the emergency declaration under the [address] [place] [address] [facility] authority and the [employer] Act, this Virtual Visit was conducted, with patient's consent, to reduce the patient's risk of exposure to COVID-19 and provide continuity of care for an established patient. Services were provided through a video synchronous discussion virtually to substitute for in-person clinic visit. Pt gave verbal informed consent to participate in telehealth services. Conducted a risk-benefit analysis and determined that the patient's presenting problems are consistent with the use of telepsychology. Determined that the patient has sufficient knowledge and skills in the use of technology enabling them to adequately benefit from telepsychology. It was determined that this patient was able to be properly treated without an in-person session. Patient verified that they were currently located at the [facility] address that was provided during registration.     Verified the following information:  Patient's identification: Yes  Patient location: [address] [place] [id number]   Patient's call back number: [phone]   Patient's emergency contact's name and number, as well as permission to contact them if needed: Extended Emergency Contact Information  Primary Emergency Contact: Anya Strange [address] [place] [address] Phone: [phone]  Relation: Other  Secondary Emergency Contact: [address] [place] [address] Phone: [phone]  Relation: Child     Provider location: [place], [place]       S:  During the last visit Pt set goals to 1) return to [facility] and schedule activities with friends 2) return for f/u in 4 weeks  Pt reports she has been [place] little better\" since the last visit. Has still been grieving the loss of her dogs, especially Cruz. Hates to think about it so tries to avoid it when she can. Feels she should be over it and not have it impact her. Other peoples opinions effects her. Realizes people don't understand her grief. Has thought about getting a new dog but doesn't want to feel like she does now. Has worried about spiraling depression. Doesn't feel it has gotten to that point. Spending time with a friend who comes over once a week and colors with her. Tried to go back to [employer] but it was so different than before and didn't know the order or songs. Wasn't the same experience so doesn't want to go back there. O:  MSE:     Appearance: good hygiene   Attitude: cooperative and friendly  Consciousness: alert  Orientation: oriented to person, place, time, general circumstance  Memory: recent and remote memory intact  Attention/Concentration: intact during session  Psychomotor Activity: normal  Eye Contact: normal  Speech: normal rate and volume, well-articulated  Mood: \"A little better\"  Affect: euthymic and congruent  Perception: within normal limits  Thought Content: within normal limits  Thought Process: logical, coherent and goal-directed  Insight: fair  Judgment: intact  Ability to understand instructions: Yes  Ability to respond meaningfully: Yes  Morbid Ideation: no   Suicide Assessment: no suicidal ideation, plan, or intent  Homicidal Ideation: no        History:    Medications:   Current Outpatient Medications   Medication Sig Dispense Refill    hydroxychloroquine (PLAQUENIL) 200 MG tablet Take 1 tab po BID 60 tablet 11    triamcinolone (KENALOG) 0.1 % lotion Apply topically 3 times daily Apply topically 3 times daily.       pantoprazole (PROTONIX) 20 MG tablet 2014     Years since quittin.7    Smokeless tobacco: Never Used   Vaping Use    Vaping Use: Never used   Substance and Sexual Activity    Alcohol use: Not Currently     Alcohol/week: 0.0 standard drinks     Comment: none for a year    Drug use: No    Sexual activity: Not Currently   Other Topics Concern    Not on file   Social History Narrative    Not on file     Social Determinants of Health     Financial Resource Strain:     Difficulty of Paying Living Expenses:    Food Insecurity:     Worried About Running Out of Food in the Last Year:     Ran Out of Food in the Last Year:    Transportation Needs:     Lack of Transportation (Medical):  Lack of Transportation (Non-Medical):    Physical Activity:     Days of Exercise per Week:     Minutes of Exercise per Session:    Stress:     Feeling of Stress :    Social Connections:     Frequency of Communication with Friends and Family:     Frequency of Social Gatherings with Friends and Family:     Attends Nondenominational Services:     Active Member of Clubs or Organizations:     Attends Club or Organization Meetings:     Marital Status:    Intimate Partner Violence:     Fear of Current or Ex-Partner:     Emotionally Abused:     Physically Abused:     Sexually Abused:        TOBACCO:   reports that she quit smoking about 6 years ago. Her smoking use included cigarettes. She started smoking about 56 years ago. She has a 15.00 pack-year smoking history. She has never used smokeless tobacco.  ETOH:   reports previous alcohol use. Family History:   Family History   Problem Relation Age of Onset    High Blood Pressure Brother          A:  Ms. Pantera Manrique continues to struggle with grief and depression. Her mood has been somewhat improved since the last visit a couple months ago. She continues to be active and engaged and responds positively to behavioral interventions.        Diagnosis:    Grief  MDD, Recurrent, Moderate        Plan:  Pt interventions:  [place]-setting to identify pt's primary goals for [facility] visit / overall health and Supportive techniques,behavioral activation interventions,  ACT interventions and reinforced pt's skill use, treatment planning    Pt Behavioral Change Plan:  Pt set goals to 1) identify other activities you could do outside of the home 2) consider a new [employer]ism to attend mass 3) return for f/u in 1 weeks

## 2021-06-18 ENCOUNTER — TELEMEDICINE (OUTPATIENT)
Dept: PSYCHOLOGY | Age: 76
End: 2021-06-18
Payer: MEDICARE

## 2021-06-18 DIAGNOSIS — F33.1 MODERATE EPISODE OF RECURRENT MAJOR DEPRESSIVE DISORDER (HCC): Primary | ICD-10-CM

## 2021-06-18 PROCEDURE — 90832 PSYTX W PT 30 MINUTES: CPT | Performed by: PSYCHOLOGIST

## 2021-06-18 NOTE — PROGRESS NOTES
Behavioral Health Consultation  [facility], Abhi  Psychologist  6/18/2021  11:05 AM      Time spent with Patient: 30 minutes  This is patient's fiftieth [facility] appointment. Reason for Consult:  depression  Referring Provider: Dr. Viviana Dupree (During SZCAU-14 public health emergency)  }  Pursuant to the emergency declaration under the [address] [place] [address] authority and the [employer] Act, this Virtual Visit was conducted, with patient's consent, to reduce the patient's risk of exposure to COVID-19 and provide continuity of care for an established patient. Services were provided through a video synchronous discussion virtually to substitute for in-person clinic visit. Pt gave verbal informed consent to participate in telehealth services. Conducted a risk-benefit analysis and determined that the patient's presenting problems are consistent with the use of telepsychology. Determined that the patient has sufficient knowledge and skills in the use of technology enabling them to adequately benefit from telepsychology. It was determined that this patient was able to be properly treated without an in-person session. Patient verified that they were currently located at the [address] [place] [address] address that was provided during registration.     Verified the following information:  Patient's identification: Yes  Patient location: [address] [place] [address]   Patient's call back number: [phone]   Patient's emergency contact's name and number, as well as permission to contact them if needed: Extended Emergency Contact Information  Primary Emergency Contact: Anya Strange [address] [place] [address] Phone: [phone]  Relation: Other  Secondary Emergency Contact: [address] [place] [address] Phone: [phone]  Relation: Child     Provider location: [place], [place]       S:  During the last visit Pt set goals to   Pt set goals to 1) identify other activities you could do outside of the home 2) consider a new [employer] to attend [facility] 3) return for f/u in 1 weeks     Pt reports she is '\"good. \" Has thought about some thins after the last visit that would hel her feel better. Spoke to son about finances which helped her. Is making him repay her. Continues to meet with her friend Candis weekly. Does want to get another dog or cat but worries about where it would go if she passes away. Caregiver/partner also has the same concern so doesn't agree with getting one. Has some conflict with her caregiver/partner in regards to things that need to get done at her home including caring for her and it being overwhelming. Has suggested maybe she find a different caregiver but complication of their relationship enters in. Not sure how she should manage it and wants to talk it through.      O:  MSE:     Appearance: good hygiene   Attitude: cooperative and friendly  Consciousness: alert  Orientation: oriented to person, place, time, general circumstance  Memory: recent and remote memory intact  Attention/Concentration: intact during session  Psychomotor Activity: normal  Eye Contact: normal  Speech: normal rate and volume, well-articulated  Mood: \"good\"  Affect: euthymic and congruent  Perception: within normal limits  Thought Content: within normal limits  Thought Process: logical, coherent and goal-directed  Insight: fair  Judgment: intact  Ability to understand instructions: Yes  Ability to respond meaningfully: Yes  Morbid Ideation: no   Suicide Assessment: no suicidal ideation, plan, or intent  Homicidal Ideation: no        History:    Medications:   Current Outpatient Medications   Medication Sig Dispense Refill    hydroxychloroquine (PLAQUENIL) 200 MG tablet Take 1 tab po BID 60 tablet 11    triamcinolone (KENALOG) 0.1 % lotion Apply topically 3 times daily Apply topically 3 times daily.  pantoprazole (PROTONIX) 20 MG tablet TAKE 1 TABLET BY MOUTH TWICE DAILY 60 tablet 2    Cholecalciferol (VITAMIN D3) 1.25 MG (02153 UT) CAPS TAKE 1 CAPSULE BY MOUTH EVERY OTHER WEEK 4 capsule 2    carvedilol (COREG) 6.25 MG tablet TAKE 1 TABLET BY MOUTH TWICE DAILY 180 tablet 3    rosuvastatin (CRESTOR) 20 MG tablet Take 1 tablet by mouth daily 90 tablet 3    clopidogrel (PLAVIX) 75 MG tablet TAKE 1 TABLET BY MOUTH DAILY 90 tablet 1    predniSONE (DELTASONE) 5 MG tablet TAKE 1 TABLET BY MOUTH DAILY 90 tablet 1    buPROPion (WELLBUTRIN XL) 300 MG extended release tablet TAKE 1 TABLET BY MOUTH EVERY MORNING 90 tablet 1    azaTHIOprine (IMURAN) 50 MG tablet TAKE 1 TABLET BY MOUTH DAILY 90 tablet 0    albuterol sulfate HFA (VENTOLIN HFA) 108 (90 Base) MCG/ACT inhaler Inhale 2 puffs into the lungs 4 times daily as needed for Wheezing (Patient not taking: Reported on 6/3/2021) 1 Inhaler 5    mirtazapine (REMERON) 7.5 MG tablet Take 1 tablet by mouth nightly 90 tablet 1    furosemide (LASIX) 20 MG tablet Take 1 tablet by mouth daily (Patient taking differently: Take 20 mg by mouth daily Indications: uses q other day ) 90 tablet 3    ondansetron (ZOFRAN) 4 MG tablet TAKE 1 TABLET BY MOUTH EVERY 12 HOURS AS NEEDED FOR NAUSEA OR VOMITING 30 tablet 3    docusate (COLACE, DULCOLAX) 100 MG CAPS Take 100 mg by mouth 2 times daily 60 capsule 3    acetaminophen 650 MG TABS Take 650 mg by mouth every 6 hours as needed for Pain or Fever (Fever >100.5 F (38 C)). 60 tablet 1     No current facility-administered medications for this visit.        Social History:   Social History     Socioeconomic History    Marital status:      Spouse name: Not on file    Number of children: 1    Years of education: 15    Highest education level: Not on file   Occupational History    Occupation: Retired   Tobacco Use    Smoking status: Former Smoker     Packs/day: 0.50     Years: 30.00     Pack years: 15.00 MDD, Recurrent, Moderate        Plan:  Pt interventions:  [place]-setting to identify pt's primary goals for [facility] visit / overall health and Supportive techniques,  ACT interventions and reinforced pt's skill use, treatment planning    Pt Behavioral Change Plan:  Pt set goals to 1) try a new [employer]ondenominational to attend mass 2) return for f/u in 1 weeks

## 2021-06-25 ENCOUNTER — TELEMEDICINE (OUTPATIENT)
Dept: PSYCHOLOGY | Age: 76
End: 2021-06-25
Payer: MEDICARE

## 2021-06-25 DIAGNOSIS — F43.9 STRESS: ICD-10-CM

## 2021-06-25 DIAGNOSIS — F33.1 MODERATE EPISODE OF RECURRENT MAJOR DEPRESSIVE DISORDER (HCC): Primary | ICD-10-CM

## 2021-06-25 PROCEDURE — 90832 PSYTX W PT 30 MINUTES: CPT | Performed by: PSYCHOLOGIST

## 2021-06-25 NOTE — PROGRESS NOTES
Behavioral Health Consultation  [address] [place] [address] Ivanna, [address] [place] [address]  Psychologist  6/25/2021  10:34 AM      Time spent with Patient: 30 minutes  This is patient's [de-identified] [facility]. Reason for Consult:  Depression, Stress   Referring Provider: Dr. Praveen Euceda (During RKEPV-73 public health emergency)  }  Pursuant to the emergency declaration under the [address] [place] [address] [facility] authority and the [employer] Act, this Virtual Visit was conducted, with patient's consent, to reduce the patient's risk of exposure to COVID-19 and provide continuity of care for an established patient. Services were provided through a video synchronous discussion virtually to substitute for in-person clinic visit. Pt gave verbal informed consent to participate in telehealth services. Conducted a risk-benefit analysis and determined that the patient's presenting problems are consistent with the use of telepsychology. Determined that the patient has sufficient knowledge and skills in the use of technology enabling them to adequately benefit from telepsychology. It was determined that this patient was able to be properly treated without an in-person session. Patient verified that they were currently located at the [facility] address that was provided during registration.     Verified the following information:  Patient's identification: Yes  Patient location: [address] [place] [address]   Patient's call back number: [phone]   Patient's emergency contact's name and number, as well as permission to contact them if needed: Extended Emergency Contact Information  Primary Emergency Contact: Anya Strange [address] [place] [address] Phone: [phone]  Relation: Other  Secondary Emergency Contact: [address] [place] [address] Phone: [phone]  Relation: Child     Provider location: [place], [place]       S:  During the last visit Pt set goals to 1) try a new Gnosticist to attend mass 2) return for f/u in 1 weeks    Pt reports has been a tough week but worse couple of days. Caretaker/partner is decided to leave so she is now having to figure out what she is going to do for in-home care. Son is with her today and is helping her sort through what her options are. Has been focused on this rather than anything else at this point. Does not want to go to a nursing home and feels very strongly about this. Knows finances are going to be much more difficult when she hires in home care from a private facility. Wonders if she has any other type of option. O:  MSE:     Appearance: good hygiene   Attitude: cooperative and friendly  Consciousness: alert  Orientation: oriented to person, place, time, general circumstance  Memory: recent and remote memory intact  Attention/Concentration: intact during session  Psychomotor Activity: normal  Eye Contact: normal  Speech: normal rate and volume, well-articulated  Mood: \"Overwhelmed\"  Affect: euthymic and congruent  Perception: within normal limits  Thought Content: within normal limits  Thought Process: logical, coherent and goal-directed  Insight: fair  Judgment: intact  Ability to understand instructions: Yes  Ability to respond meaningfully: Yes  Morbid Ideation: no   Suicide Assessment: no suicidal ideation, plan, or intent  Homicidal Ideation: no        History:    Medications:   Current Outpatient Medications   Medication Sig Dispense Refill    hydroxychloroquine (PLAQUENIL) 200 MG tablet Take 1 tab po BID 60 tablet 11    triamcinolone (KENALOG) 0.1 % lotion Apply topically 3 times daily Apply topically 3 times daily.       pantoprazole (PROTONIX) 20 MG tablet TAKE 1 TABLET BY MOUTH TWICE DAILY 60 tablet 2    Cholecalciferol (VITAMIN D3) 1.25 MG (19283 UT) CAPS TAKE 1 CAPSULE BY MOUTH EVERY OTHER WEEK 4 capsule 2    carvedilol (COREG) 6.25 MG tablet TAKE 1 TABLET BY MOUTH TWICE DAILY 180 tablet 3    rosuvastatin (CRESTOR) 20 MG tablet Take 1 tablet by mouth daily 90 tablet 3    clopidogrel (PLAVIX) 75 MG tablet TAKE 1 TABLET BY MOUTH DAILY 90 tablet 1    predniSONE (DELTASONE) 5 MG tablet TAKE 1 TABLET BY MOUTH DAILY 90 tablet 1    buPROPion (WELLBUTRIN XL) 300 MG extended release tablet TAKE 1 TABLET BY MOUTH EVERY MORNING 90 tablet 1    azaTHIOprine (IMURAN) 50 MG tablet TAKE 1 TABLET BY MOUTH DAILY 90 tablet 0    albuterol sulfate HFA (VENTOLIN HFA) 108 (90 Base) MCG/ACT inhaler Inhale 2 puffs into the lungs 4 times daily as needed for Wheezing (Patient not taking: Reported on 6/3/2021) 1 Inhaler 5    mirtazapine (REMERON) 7.5 MG tablet Take 1 tablet by mouth nightly 90 tablet 1    furosemide (LASIX) 20 MG tablet Take 1 tablet by mouth daily (Patient taking differently: Take 20 mg by mouth daily Indications: uses q other day ) 90 tablet 3    ondansetron (ZOFRAN) 4 MG tablet TAKE 1 TABLET BY MOUTH EVERY 12 HOURS AS NEEDED FOR NAUSEA OR VOMITING 30 tablet 3    docusate (COLACE, DULCOLAX) 100 MG CAPS Take 100 mg by mouth 2 times daily 60 capsule 3    acetaminophen 650 MG TABS Take 650 mg by mouth every 6 hours as needed for Pain or Fever (Fever >100.5 F (38 C)). 60 tablet 1     No current facility-administered medications for this visit.        Social History:   Social History     Socioeconomic History    Marital status:      Spouse name: Not on file    Number of children: 1    Years of education: 15    Highest education level: Not on file   Occupational History    Occupation: Retired   Tobacco Use    Smoking status: Former Smoker     Packs/day: 0.50     Years: 30.00     Pack years: 15.00     Types: Cigarettes     Start date:      Quit date: 2014     Years since quittin.7    Smokeless tobacco: Never Used   Vaping Use    Vaping Use: Never used   Substance and Sexual Activity    Alcohol use: Not Currently Alcohol/week: 0.0 standard drinks     Comment: none for a year    Drug use: No    Sexual activity: Not Currently   Other Topics Concern    Not on file   Social History Narrative    Not on file     Social Determinants of Health     Financial Resource Strain:     Difficulty of Paying Living Expenses:    Food Insecurity:     Worried About Running Out of Food in the Last Year:     [address] in the Last Year:    Transportation Needs:     Lack of Transportation (Medical):  Lack of Transportation (Non-Medical):    Physical Activity:     Days of Exercise per Week:     Minutes of Exercise per Session:    Stress:     Feeling of Stress :    Social Connections:     Frequency of Communication with Friends and Family:     Frequency of Social Gatherings with Friends and Family:     Attends Lutheran Services:     Active Member of Clubs or Organizations:     Attends Club or Organization Meetings:     Marital Status:    Intimate Partner Violence:     Fear of Current or Ex-Partner:     Emotionally Abused:     Physically Abused:     Sexually Abused:        TOBACCO:   reports that she quit smoking about 6 years ago. Her smoking use included cigarettes. She started smoking about 56 years ago. She has a 15.00 pack-year smoking history. She has never used smokeless tobacco.  ETOH:   reports previous alcohol use. Family History:   Family History   Problem Relation Age of Onset    High Blood Pressure Brother          A:  Ms. Ivelisse Paul continues to struggle with grief and depression. She has had many increases in stress recently which is exacerbated her depression. He continues to be active and engaged and responds positively to behavioral interventions.        Diagnosis:    Grief  MDD, Recurrent, Moderate        Plan:  Pt interventions:  [place]-setting to identify pt's primary goals for [facility] visit / overall health and Supportive techniques, consult with ,  ACT interventions and reinforced pt's

## 2021-07-02 ENCOUNTER — TELEMEDICINE (OUTPATIENT)
Dept: PSYCHOLOGY | Age: 76
End: 2021-07-02
Payer: MEDICARE

## 2021-07-02 DIAGNOSIS — F33.1 MODERATE EPISODE OF RECURRENT MAJOR DEPRESSIVE DISORDER (HCC): Primary | ICD-10-CM

## 2021-07-02 PROCEDURE — 90832 PSYTX W PT 30 MINUTES: CPT | Performed by: PSYCHOLOGIST

## 2021-07-02 NOTE — PROGRESS NOTES
Behavioral Health Consultation  [facility] Quyen Goncalves PsyD  Psychologist  7/2/2021  1:41 PM      Time spent with Patient: 30 minutes  This is patient's [de-identified] second [facility] appointment. Reason for Consult:  Depression, Stress   Referring Provider: Dr. Deepak Ruiz (During [id number]-61 public health emergency)  }  Pursuant to the emergency declaration under the [address] [place] [address] [facility] authority and the [employer] Act, this Virtual Visit was conducted, with patient's consent, to reduce the patient's risk of exposure to COVID-19 and provide continuity of care for an established patient. Services were provided through a video synchronous discussion virtually to substitute for in-person clinic visit. Pt gave verbal informed consent to participate in telehealth services. Conducted a risk-benefit analysis and determined that the patient's presenting problems are consistent with the use of telepsychology. Determined that the patient has sufficient knowledge and skills in the use of technology enabling them to adequately benefit from telepsychology. It was determined that this patient was able to be properly treated without an in-person session. Patient verified that they were currently located at the [address] [place] [address] address that was provided during registration.     Verified the following information:  Patient's identification: Yes  Patient location: [address] [place] [address]   Patient's call back number: [phone]   Patient's emergency contact's name and number, as well as permission to contact them if needed: Extended Emergency Contact Information  Primary Emergency Contact: Anya Strange [address] [place] [address] Phone: [phone]  Relation: Other  Secondary Emergency Contact: [address] [place] [address] Phone: [phone]  Relation: Child     Provider Smokeless tobacco: Never Used   Vaping Use    Vaping Use: Never used   Substance and Sexual Activity    Alcohol use: Not Currently     Alcohol/week: 0.0 standard drinks     Comment: none for a year    Drug use: No    Sexual activity: Not Currently   Other Topics Concern    Not on file   Social History Narrative    Not on file     Social Determinants of Health     Financial Resource Strain:     Difficulty of Paying Living Expenses:    Food Insecurity:     Worried About Running Out of Food in the Last Year:     Ran Out of Food in the Last Year:    Transportation Needs:     Lack of Transportation (Medical):  Lack of Transportation (Non-Medical):    Physical Activity:     Days of Exercise per Week:     Minutes of Exercise per Session:    Stress:     Feeling of Stress :    Social Connections:     Frequency of Communication with Friends and Family:     Frequency of Social Gatherings with Friends and Family:     Attends Restorationism Services:     Active Member of Clubs or Organizations:     Attends Club or Organization Meetings:     Marital Status:    Intimate Partner Violence:     Fear of Current or Ex-Partner:     Emotionally Abused:     Physically Abused:     Sexually Abused:        TOBACCO:   reports that she quit smoking about 6 years ago. Her smoking use included cigarettes. She started smoking about 56 years ago. She has a 15.00 pack-year smoking history. She has never used smokeless tobacco.  ETOH:   reports previous alcohol use. Family History:   Family History   Problem Relation Age of Onset    High Blood Pressure Brother          A:  Ms. Angie Lynn continues to struggle with depression. Stressors from last week have improved somewhat. She continues to be active and engaged and responds positively to behavioral interventions.        Diagnosis:      MDD, Recurrent, Moderate        Plan:  Pt interventions:  [place]-setting to identify pt's primary goals for [facility] visit / overall health and

## 2021-07-08 RX ORDER — AZATHIOPRINE 50 MG/1
TABLET ORAL
Qty: 90 TABLET | Refills: 0 | Status: SHIPPED | OUTPATIENT
Start: 2021-07-08 | End: 2021-10-13 | Stop reason: SDUPTHER

## 2021-07-16 ENCOUNTER — TELEMEDICINE (OUTPATIENT)
Dept: PSYCHOLOGY | Age: 76
End: 2021-07-16
Payer: MEDICARE

## 2021-07-16 DIAGNOSIS — F32.A DEPRESSIVE DISORDER: Primary | ICD-10-CM

## 2021-07-16 PROCEDURE — 1036F TOBACCO NON-USER: CPT | Performed by: PSYCHOLOGIST

## 2021-07-16 PROCEDURE — 90832 PSYTX W PT 30 MINUTES: CPT | Performed by: PSYCHOLOGIST

## 2021-07-16 NOTE — PROGRESS NOTES
Behavioral Health Consultation  Hansel Bocanegra, [address] [place] [address]  Psychologist  7/16/2021  12:48 PM      Time spent with Patient: 30 minutes  This is patient's [de-identified] third [facility] appointment. Reason for Consult:  Depression, Stress   Referring Provider: Dr. Porfirio Suárez (During ENTBB-26 public health emergency)  }  Pursuant to the emergency declaration under the [address] [place] [address] [facility] authority and the [employer] Act, this Virtual Visit was conducted, with patient's consent, to reduce the patient's risk of exposure to COVID-19 and provide continuity of care for an established patient. Services were provided through a video synchronous discussion virtually to substitute for in-person clinic visit. Pt gave verbal informed consent to participate in telehealth services. Conducted a risk-benefit analysis and determined that the patient's presenting problems are consistent with the use of telepsychology. Determined that the patient has sufficient knowledge and skills in the use of technology enabling them to adequately benefit from telepsychology. It was determined that this patient was able to be properly treated without an in-person session. Patient verified that they were currently located at the [facility] address that was provided during registration.     Verified the following information:  Patient's identification: Yes  Patient location: [address] [place] [address]   Patient's call back number: [phone]   Patient's emergency contact's name and number, as well as permission to contact them if needed: Extended Emergency Contact Information  Primary Emergency Contact: Anya Strange [address] [place] [address] Phone: [phone]  Relation: Other  Secondary Emergency Contact: [address] [place] [address] Phone: [phone]  Relation: Child     Provider location: [place], [place]       S:  During the last visit t set goals to 1) continue to do research to explore all options 2) return for f/u in 1 week      Patient reports she is doing fairly well. Has been working on [employer] related activities for [employer]. Has kept her busy as this is something she has done yearly for a long time. Home stress has improved somewhat. Plans to continue to seek options for in-home health care as caregiver/partner does still need a break. Is not really sure what that means exactly but is still gathering information. No movement with her son paying her back at this point. Feels frustrated but is not surprised. Mood overall has been stable and not problematic since the last visit         O:  MSE:     Appearance: good hygiene   Attitude: cooperative and friendly  Consciousness: alert  Orientation: oriented to person, place, time, general circumstance  Memory: recent and remote memory intact  Attention/Concentration: intact during session  Psychomotor Activity: normal  Eye Contact: normal  Speech: normal rate and volume, well-articulated  Mood: \"Good\"  Affect: euthymic and congruent  Perception: within normal limits  Thought Content: within normal limits  Thought Process: logical, coherent and goal-directed  Insight: fair  Judgment: intact  Ability to understand instructions: Yes  Ability to respond meaningfully: Yes  Morbid Ideation: no   Suicide Assessment: no suicidal ideation, plan, or intent  Homicidal Ideation: no        History:    Medications:   Current Outpatient Medications   Medication Sig Dispense Refill    azaTHIOprine (IMURAN) 50 MG tablet TAKE 1 TABLET BY MOUTH DAILY 90 tablet 0    hydroxychloroquine (PLAQUENIL) 200 MG tablet Take 1 tab po BID 60 tablet 11    triamcinolone (KENALOG) 0.1 % lotion Apply topically 3 times daily Apply topically 3 times daily.       pantoprazole (PROTONIX) 20 MG tablet TAKE 1 TABLET BY MOUTH TWICE DAILY 60 tablet 2    Cholecalciferol (VITAMIN D3) 1.25 MG (44621 UT) CAPS TAKE 1 CAPSULE BY MOUTH EVERY OTHER WEEK 4 capsule 2    carvedilol (COREG) 6.25 MG tablet TAKE 1 TABLET BY MOUTH TWICE DAILY 180 tablet 3    rosuvastatin (CRESTOR) 20 MG tablet Take 1 tablet by mouth daily 90 tablet 3    clopidogrel (PLAVIX) 75 MG tablet TAKE 1 TABLET BY MOUTH DAILY 90 tablet 1    predniSONE (DELTASONE) 5 MG tablet TAKE 1 TABLET BY MOUTH DAILY 90 tablet 1    buPROPion (WELLBUTRIN XL) 300 MG extended release tablet TAKE 1 TABLET BY MOUTH EVERY MORNING 90 tablet 1    albuterol sulfate HFA (VENTOLIN HFA) 108 (90 Base) MCG/ACT inhaler Inhale 2 puffs into the lungs 4 times daily as needed for Wheezing (Patient not taking: Reported on 6/3/2021) 1 Inhaler 5    mirtazapine (REMERON) 7.5 MG tablet Take 1 tablet by mouth nightly 90 tablet 1    furosemide (LASIX) 20 MG tablet Take 1 tablet by mouth daily (Patient taking differently: Take 20 mg by mouth daily Indications: uses q other day ) 90 tablet 3    ondansetron (ZOFRAN) 4 MG tablet TAKE 1 TABLET BY MOUTH EVERY 12 HOURS AS NEEDED FOR NAUSEA OR VOMITING 30 tablet 3    docusate (COLACE, DULCOLAX) 100 MG CAPS Take 100 mg by mouth 2 times daily 60 capsule 3    acetaminophen 650 MG TABS Take 650 mg by mouth every 6 hours as needed for Pain or Fever (Fever >100.5 F (38 C)). 60 tablet 1     No current facility-administered medications for this visit.        Social History:   Social History     Socioeconomic History    Marital status:      Spouse name: Not on file    Number of children: 1    Years of education: 15    Highest education level: Not on file   Occupational History    Occupation: Retired   Tobacco Use    Smoking status: Former Smoker     Packs/day: 0.50     Years: 30.00     Pack years: 15.00     Types: Cigarettes     Start date:      Quit date: 2014     Years since quittin.8    Smokeless tobacco: Never Used   Vaping Use    Vaping Use: Never used   Substance and Sexual Activity    Alcohol use: Not Currently     Alcohol/week: 0.0 standard drinks     Comment: none for a year    Drug use: No    Sexual activity: Not Currently   Other Topics Concern    Not on file   Social History Narrative    Not on file     Social Determinants of Health     Financial Resource Strain:     Difficulty of Paying Living Expenses:    Food Insecurity:     Worried About Running Out of Food in the Last Year:     Ran Out of Food in the Last Year:    Transportation Needs:     Lack of Transportation (Medical):  Lack of Transportation (Non-Medical):    Physical Activity:     Days of Exercise per Week:     Minutes of Exercise per Session:    Stress:     Feeling of Stress :    Social Connections:     Frequency of Communication with Friends and Family:     Frequency of Social Gatherings with Friends and Family:     Attends Protestant Services:     Active Member of Clubs or Organizations:     Attends Club or Organization Meetings:     Marital Status:    Intimate Partner Violence:     Fear of Current or Ex-Partner:     Emotionally Abused:     Physically Abused:     Sexually Abused:        TOBACCO:   reports that she quit smoking about 6 years ago. Her smoking use included cigarettes. She started smoking about 56 years ago. She has a 15.00 pack-year smoking history. She has never used smokeless tobacco.  ETOH:   reports previous alcohol use. Family History:   Family History   Problem Relation Age of Onset    High Blood Pressure Brother          A:  Ms. Justin Simpson continues to struggle with depression although mood has been somewhat improved and stable since last visit. She continues to be active and engaged and responds positively to behavioral interventions.        Diagnosis:      Visit Diagnoses       Codes    Depressive disorder    -  Primary F32.9            Plan:  Pt interventions:  [place]-setting to identify pt's primary goals for [facility] visit / overall health and Supportive techniques, ACT interventions and reinforced pt's skill use, behavioral activation interventions, treatment planning    Pt Behavioral Change Plan:  Pt set goals to 1) identify other hobbies or activities that you can spend some time with 2) return for f/u in 2 weeks

## 2021-07-19 ENCOUNTER — TELEPHONE (OUTPATIENT)
Dept: RHEUMATOLOGY | Age: 76
End: 2021-07-19

## 2021-07-19 NOTE — TELEPHONE ENCOUNTER
Pt's caregiver, Diana Perez, called requesting to speak with you regarding pt's arthritis. He  would not elaborate on concern. Pt can be reached at [phone].

## 2021-07-27 DIAGNOSIS — E22.2 SIADH (SYNDROME OF INAPPROPRIATE ADH PRODUCTION) (HCC): ICD-10-CM

## 2021-07-27 LAB
ALBUMIN SERPL-MCNC: 3.6 G/DL (ref 3.4–5)
ANION GAP SERPL CALCULATED.3IONS-SCNC: 16 MMOL/L (ref 3–16)
BUN BLDV-MCNC: 16 MG/DL (ref 7–20)
CALCIUM SERPL-MCNC: 9 MG/DL (ref 8.3–10.6)
CHLORIDE BLD-SCNC: 102 MMOL/L (ref 99–110)
CO2: 19 MMOL/L (ref 21–32)
CREAT SERPL-MCNC: 0.6 MG/DL (ref 0.6–1.2)
GFR AFRICAN AMERICAN: >60
GFR NON-AFRICAN AMERICAN: >60
GLUCOSE BLD-MCNC: 128 MG/DL (ref 70–99)
PHOSPHORUS: 2.4 MG/DL (ref 2.5–4.9)
POTASSIUM SERPL-SCNC: 3.6 MMOL/L (ref 3.5–5.1)
SODIUM BLD-SCNC: 137 MMOL/L (ref 136–145)

## 2021-07-30 ENCOUNTER — TELEMEDICINE (OUTPATIENT)
Dept: PSYCHOLOGY | Age: 76
End: 2021-07-30
Payer: MEDICARE

## 2021-07-30 DIAGNOSIS — F32.A DEPRESSIVE DISORDER: Primary | ICD-10-CM

## 2021-07-30 DIAGNOSIS — F41.9 ANXIETY: ICD-10-CM

## 2021-07-30 PROCEDURE — 90832 PSYTX W PT 30 MINUTES: CPT | Performed by: PSYCHOLOGIST

## 2021-07-30 NOTE — PROGRESS NOTES
Behavioral Health Consultation  Veronica Farrell PsyD  Psychologist  7/30/2021  12:29 PM      Time spent with Patient: 30 minutes  This is patient's [de-identified] fourth [facility] appointment. Reason for Consult:  Depression, Stress   Referring Provider: Dr. John Mckeon (During ZSNSE-01 public health emergency)  }  Pursuant to the emergency declaration under the [address] [place] [address] [facility] authority and the [employer] Act, this Virtual Visit was conducted, with patient's consent, to reduce the patient's risk of exposure to COVID-19 and provide continuity of care for an established patient. Services were provided through a video synchronous discussion virtually to substitute for in-person clinic visit. Pt gave verbal informed consent to participate in telehealth services. Conducted a risk-benefit analysis and determined that the patient's presenting problems are consistent with the use of telepsychology. Determined that the patient has sufficient knowledge and skills in the use of technology enabling them to adequately benefit from telepsychology. It was determined that this patient was able to be properly treated without an in-person session. Patient verified that they were currently located at the [facility] address that was provided during registration.     Verified the following information:  Patient's identification: Yes  Patient location: [address] [place] [id number]   Patient's call back number: [phone]   Patient's emergency contact's name and number, as well as permission to contact them if needed: Extended Emergency Contact Information  Primary Emergency Contact: Anya Strange [address] [place] [address] Phone: [phone]  Relation: Other  Secondary Emergency Contact: [address] [place] [address] Phone: [phone]  Relation: Child     Provider location: [place], [place]       S:  During the last visit t set goals to 1) identify other hobbies or activities that you can spend some time with  patient reports she is not doing very well today. Since the last visit has had multiple health issues and was in the hospital for suspected pneumonia. Has been struggling with low energy, decreased appetite and weakness. Caregiver reports she is not eating or sleeping much. Still drinking her Ensure's but other foods are dramatically less. Reports she has lost 10 pounds. States caregiver has been very frustrated with her providers because he is expressed concerns and feels they do not lead to any additional help. Will be seeing PCP in a couple days. Patient reports her son has been more involved lately with her health care which she has been appreciative of.  Mood has been \" up-and-down\"     O:  MSE:     Appearance: good hygiene   Attitude: cooperative and friendly  Consciousness: alert  Orientation: oriented to person, place, time, general circumstance  Memory: recent and remote memory intact  Attention/Concentration: intact during session  Psychomotor Activity: normal  Eye Contact: normal  Speech: normal rate and volume, well-articulated  Mood: \"not good\"  Affect: euthymic and congruent  Perception: within normal limits  Thought Content: within normal limits  Thought Process: logical, coherent and goal-directed  Insight: fair  Judgment: intact  Ability to understand instructions: Yes  Ability to respond meaningfully: Yes  Morbid Ideation: no   Suicide Assessment: no suicidal ideation, plan, or intent  Homicidal Ideation: no        History:    Medications:   Current Outpatient Medications   Medication Sig Dispense Refill    azaTHIOprine (IMURAN) 50 MG tablet TAKE 1 TABLET BY MOUTH DAILY 90 tablet 0    hydroxychloroquine (PLAQUENIL) 200 MG tablet Take 1 tab po BID 60 tablet 11    triamcinolone (KENALOG) 0.1 % lotion Apply topically 3 times daily Apply topically 3 times daily.      pantoprazole (PROTONIX) 20 MG tablet TAKE 1 TABLET BY MOUTH TWICE DAILY 60 tablet 2    Cholecalciferol (VITAMIN D3) 1.25 MG (81135 UT) CAPS TAKE 1 CAPSULE BY MOUTH EVERY OTHER WEEK 4 capsule 2    carvedilol (COREG) 6.25 MG tablet TAKE 1 TABLET BY MOUTH TWICE DAILY 180 tablet 3    rosuvastatin (CRESTOR) 20 MG tablet Take 1 tablet by mouth daily 90 tablet 3    clopidogrel (PLAVIX) 75 MG tablet TAKE 1 TABLET BY MOUTH DAILY 90 tablet 1    predniSONE (DELTASONE) 5 MG tablet TAKE 1 TABLET BY MOUTH DAILY 90 tablet 1    buPROPion (WELLBUTRIN XL) 300 MG extended release tablet TAKE 1 TABLET BY MOUTH EVERY MORNING 90 tablet 1    albuterol sulfate HFA (VENTOLIN HFA) 108 (90 Base) MCG/ACT inhaler Inhale 2 puffs into the lungs 4 times daily as needed for Wheezing (Patient not taking: Reported on 6/3/2021) 1 Inhaler 5    mirtazapine (REMERON) 7.5 MG tablet Take 1 tablet by mouth nightly 90 tablet 1    furosemide (LASIX) 20 MG tablet Take 1 tablet by mouth daily (Patient taking differently: Take 20 mg by mouth daily Indications: uses q other day ) 90 tablet 3    ondansetron (ZOFRAN) 4 MG tablet TAKE 1 TABLET BY MOUTH EVERY 12 HOURS AS NEEDED FOR NAUSEA OR VOMITING 30 tablet 3    docusate (COLACE, DULCOLAX) 100 MG CAPS Take 100 mg by mouth 2 times daily 60 capsule 3    acetaminophen 650 MG TABS Take 650 mg by mouth every 6 hours as needed for Pain or Fever (Fever >100.5 F (38 C)). 60 tablet 1     No current facility-administered medications for this visit.        Social History:   Social History     Socioeconomic History    Marital status:      Spouse name: Not on file    Number of children: 1    Years of education: 15    Highest education level: Not on file   Occupational History    Occupation: Retired   Tobacco Use    Smoking status: Former Smoker     Packs/day: 0.50     Years: 30.00     Pack years: 15.00     Types: Cigarettes     Start date: 1965     Quit date: 9/24/2014     Years since quittin.8    Smokeless tobacco: Never Used   Vaping Use    Vaping Use: Never used   Substance and Sexual Activity    Alcohol use: Not Currently     Alcohol/week: 0.0 standard drinks     Comment: none for a year    Drug use: No    Sexual activity: Not Currently   Other Topics Concern    Not on file   Social History Narrative    Not on file     Social Determinants of Health     Financial Resource Strain:     Difficulty of Paying Living Expenses:    Food Insecurity:     Worried About Running Out of Food in the Last Year:     Ran Out of Food in the Last Year:    Transportation Needs:     Lack of Transportation (Medical):  Lack of Transportation (Non-Medical):    Physical Activity:     Days of Exercise per Week:     Minutes of Exercise per Session:    Stress:     Feeling of Stress :    Social Connections:     Frequency of Communication with Friends and Family:     Frequency of Social Gatherings with Friends and Family:     Attends Gnosticism Services:     Active Member of Clubs or Organizations:     Attends Club or Organization Meetings:     Marital Status:    Intimate Partner Violence:     Fear of Current or Ex-Partner:     Emotionally Abused:     Physically Abused:     Sexually Abused:        TOBACCO:   reports that she quit smoking about 6 years ago. Her smoking use included cigarettes. She started smoking about 56 years ago. She has a 15.00 pack-year smoking history. She has never used smokeless tobacco.  ETOH:   reports previous alcohol use. Family History:   Family History   Problem Relation Age of Onset    High Blood Pressure Brother          A:  Ms. Odell Allen continues to struggle with depression in recent medical problems have exacerbated her mood condition. She was more fatigued during today's visit. She continues to be active and engaged and responds positively to behavioral interventions.        Diagnosis:    Visit Diagnoses       Codes    Depressive disorder    -  Primary F32.9

## 2021-08-03 ENCOUNTER — OFFICE VISIT (OUTPATIENT)
Dept: PSYCHOLOGY | Age: 76
End: 2021-08-03

## 2021-08-03 DIAGNOSIS — F33.1 MODERATE EPISODE OF RECURRENT MAJOR DEPRESSIVE DISORDER (HCC): Primary | ICD-10-CM

## 2021-08-03 ASSESSMENT — PATIENT HEALTH QUESTIONNAIRE - PHQ9
3. TROUBLE FALLING OR STAYING ASLEEP: 3
9. THOUGHTS THAT YOU WOULD BE BETTER OFF DEAD, OR OF HURTING YOURSELF: 0
SUM OF ALL RESPONSES TO PHQ QUESTIONS 1-9: 23
SUM OF ALL RESPONSES TO PHQ9 QUESTIONS 1 & 2: 6
4. FEELING TIRED OR HAVING LITTLE ENERGY: 3
2. FEELING DOWN, DEPRESSED OR HOPELESS: 3
5. POOR APPETITE OR OVEREATING: 3
SUM OF ALL RESPONSES TO PHQ QUESTIONS 1-9: 23
7. TROUBLE CONCENTRATING ON THINGS, SUCH AS READING THE NEWSPAPER OR WATCHING TELEVISION: 3
10. IF YOU CHECKED OFF ANY PROBLEMS, HOW DIFFICULT HAVE THESE PROBLEMS MADE IT FOR YOU TO DO YOUR WORK, TAKE CARE OF THINGS AT HOME, OR GET ALONG WITH OTHER PEOPLE: 2
8. MOVING OR SPEAKING SO SLOWLY THAT OTHER PEOPLE COULD HAVE NOTICED. OR THE OPPOSITE, BEING SO FIGETY OR RESTLESS THAT YOU HAVE BEEN MOVING AROUND A LOT MORE THAN USUAL: 3
1. LITTLE INTEREST OR PLEASURE IN DOING THINGS: 3
6. FEELING BAD ABOUT YOURSELF - OR THAT YOU ARE A FAILURE OR HAVE LET YOURSELF OR YOUR FAMILY DOWN: 2
SUM OF ALL RESPONSES TO PHQ QUESTIONS 1-9: 23

## 2021-08-03 ASSESSMENT — ANXIETY QUESTIONNAIRES
GAD7 TOTAL SCORE: 18
2. NOT BEING ABLE TO STOP OR CONTROL WORRYING: 3
4. TROUBLE RELAXING: 3
1. FEELING NERVOUS, ANXIOUS, OR ON EDGE: 3
6. BECOMING EASILY ANNOYED OR IRRITABLE: 2
5. BEING SO RESTLESS THAT IT IS HARD TO SIT STILL: 1
3. WORRYING TOO MUCH ABOUT DIFFERENT THINGS: 3
7. FEELING AFRAID AS IF SOMETHING AWFUL MIGHT HAPPEN: 3

## 2021-08-04 NOTE — PROGRESS NOTES
Behavioral Health Consultation  [address] Quyen Goncalves PsyD  Psychologist  8/03/2021  11:05AM      Time spent with Patient: 30 minutes  This is patient's [de-identified] fifth [facility] appointment. Reason for Consult:  Depression, Stress   Referring Provider: Dr. Flo West      S:  During the last visit pt set goals to 1) focus on self-care and recovery from medical problems 2) return for follow-up in one week     Pt seen with caregiver/partner, Aria Funes. Pt reports she is not doing well. Feeling down today. Concerned about her lack of appetite. Has lost 14 lbs in a couple weeks. Saw PCP earlier this week and shared concerns- felt frustrated that not much seem to be done about it. Has appointment with [facility] in Sept. Wants to know if a medication change should be made now for depression. Pt reports not having much to look forward to. Feels tired often. Wants to go home and sleep now. Pt's caregiver states he is trying to keep her distracted as much as possible.       O:  MSE:     Appearance: good hygiene   Attitude: cooperative and friendly  Consciousness: alert  Orientation: oriented to person, place, time, general circumstance  Memory: recent and remote memory intact  Attention/Concentration: intact during session  Psychomotor Activity: normal  Eye Contact: normal  Speech: normal rate and volume, well-articulated  Mood: \"depressed\"  Affect: euthymic and congruent  Perception: within normal limits  Thought Content: within normal limits  Thought Process: logical, coherent and goal-directed  Insight: fair  Judgment: intact  Ability to understand instructions: Yes  Ability to respond meaningfully: Yes  Morbid Ideation: no   Suicide Assessment: no suicidal ideation, plan, or intent  Homicidal Ideation: no        History:    Medications:   Current Outpatient Medications   Medication Sig Dispense Refill    azaTHIOprine (IMURAN) 50 MG tablet TAKE 1 TABLET BY MOUTH DAILY 90 tablet 0    hydroxychloroquine (PLAQUENIL) 200 MG tablet Take 1 tab po BID 60 tablet 11    triamcinolone (KENALOG) 0.1 % lotion Apply topically 3 times daily Apply topically 3 times daily.  pantoprazole (PROTONIX) 20 MG tablet TAKE 1 TABLET BY MOUTH TWICE DAILY 60 tablet 2    Cholecalciferol (VITAMIN D3) 1.25 MG (08662 UT) CAPS TAKE 1 CAPSULE BY MOUTH EVERY OTHER WEEK 4 capsule 2    carvedilol (COREG) 6.25 MG tablet TAKE 1 TABLET BY MOUTH TWICE DAILY 180 tablet 3    rosuvastatin (CRESTOR) 20 MG tablet Take 1 tablet by mouth daily 90 tablet 3    clopidogrel (PLAVIX) 75 MG tablet TAKE 1 TABLET BY MOUTH DAILY 90 tablet 1    predniSONE (DELTASONE) 5 MG tablet TAKE 1 TABLET BY MOUTH DAILY 90 tablet 1    buPROPion (WELLBUTRIN XL) 300 MG extended release tablet TAKE 1 TABLET BY MOUTH EVERY MORNING 90 tablet 1    albuterol sulfate HFA (VENTOLIN HFA) 108 (90 Base) MCG/ACT inhaler Inhale 2 puffs into the lungs 4 times daily as needed for Wheezing (Patient not taking: Reported on 6/3/2021) 1 Inhaler 5    mirtazapine (REMERON) 7.5 MG tablet Take 1 tablet by mouth nightly 90 tablet 1    furosemide (LASIX) 20 MG tablet Take 1 tablet by mouth daily (Patient taking differently: Take 20 mg by mouth daily Indications: uses q other day ) 90 tablet 3    ondansetron (ZOFRAN) 4 MG tablet TAKE 1 TABLET BY MOUTH EVERY 12 HOURS AS NEEDED FOR NAUSEA OR VOMITING 30 tablet 3    docusate (COLACE, DULCOLAX) 100 MG CAPS Take 100 mg by mouth 2 times daily 60 capsule 3    acetaminophen 650 MG TABS Take 650 mg by mouth every 6 hours as needed for Pain or Fever (Fever >100.5 F (38 C)). 60 tablet 1     No current facility-administered medications for this visit.        Social History:   Social History     Socioeconomic History    Marital status:      Spouse name: Not on file    Number of children: 1    Years of education: 15    Highest education level: Not on file   Occupational History    Occupation: Retired   Tobacco Use    Smoking status: Former Smoker     Packs/day: 0.50     Years: 30.00     Pack years: 15.00     Types: Cigarettes     Start date: 63     Quit date: 2014     Years since quittin.8    Smokeless tobacco: Never Used   Vaping Use    Vaping Use: Never used   Substance and Sexual Activity    Alcohol use: Not Currently     Alcohol/week: 0.0 standard drinks     Comment: none for a year    Drug use: No    Sexual activity: Not Currently   Other Topics Concern    Not on file   Social History Narrative    Not on file     Social Determinants of Health     Financial Resource Strain:     Difficulty of Paying Living Expenses:    Food Insecurity:     Worried About Running Out of Food in the Last Year:     Ran Out of Food in the Last Year:    Transportation Needs:     Lack of Transportation (Medical):  Lack of Transportation (Non-Medical):    Physical Activity:     Days of Exercise per Week:     Minutes of Exercise per Session:    Stress:     Feeling of Stress :    Social Connections:     Frequency of Communication with Friends and Family:     Frequency of Social Gatherings with Friends and Family:     Attends Scientologist Services:     Active Member of Clubs or Organizations:     Attends Club or Organization Meetings:     Marital Status:    Intimate Partner Violence:     Fear of Current or Ex-Partner:     Emotionally Abused:     Physically Abused:     Sexually Abused:        TOBACCO:   reports that she quit smoking about 6 years ago. Her smoking use included cigarettes. She started smoking about 56 years ago. She has a 15.00 pack-year smoking history. She has never used smokeless tobacco.  ETOH:   reports previous alcohol use. Family History:   Family History   Problem Relation Age of Onset    High Blood Pressure Brother          A:  Ms. Bneito Pearson continues to struggle with depression. Ongoing medical conditions continue to exacerbate her depression. She continues to be active and engaged and responds positively to behavioral interventions.  She has an appointment with [facility] on 9/25/2021.        Diagnosis:       MDD, Recurrent, Moderate    Plan:  Pt interventions:  Marks-setting to identify pt's primary goals for [facility] visit / overall health and Supportive techniques, ACT interventions and reinforced pt's skill use, behavioral activation interventions, treatment planning    Pt Behavioral Change Plan:  Pt set goals to 1) leave the house once daily for change of scenery, even to sit in a park 2) return for follow-up in three weeks

## 2021-08-09 ENCOUNTER — TELEPHONE (OUTPATIENT)
Dept: PULMONOLOGY | Age: 76
End: 2021-08-09

## 2021-08-09 NOTE — TELEPHONE ENCOUNTER
Please advise  Aria Funes calls for me to speak with [place]   She complains she having trouble breathing   She has a dry cough, not productive   No fever, sore thraot, runny nose, headache  Would like to speak with Dr Angelina Ramsey   She would like us to call Aria Funes cell to speak with them both at your convenience   [phone]

## 2021-08-12 NOTE — TELEPHONE ENCOUNTER
I sent an email message back to Eliv Moreau suggesting he bring Esau Kendrick in for inpatient visit. I see she is on the schedule for Oct .  She could come in next week if he feels the need is more urgent.

## 2021-08-20 ENCOUNTER — OFFICE VISIT (OUTPATIENT)
Dept: OBGYN CLINIC | Age: 76
End: 2021-08-20
Payer: MEDICARE

## 2021-08-20 VITALS
HEART RATE: 70 BPM | WEIGHT: 168.2 LBS | TEMPERATURE: 97 F | BODY MASS INDEX: 32.85 KG/M2 | SYSTOLIC BLOOD PRESSURE: 118 MMHG | DIASTOLIC BLOOD PRESSURE: 68 MMHG

## 2021-08-20 DIAGNOSIS — N95.2 VAGINAL ATROPHY: ICD-10-CM

## 2021-08-20 DIAGNOSIS — N39.0 RECURRENT UTI: Primary | ICD-10-CM

## 2021-08-20 PROCEDURE — G8417 CALC BMI ABV UP PARAM F/U: HCPCS | Performed by: OBSTETRICS & GYNECOLOGY

## 2021-08-20 PROCEDURE — 4040F PNEUMOC VAC/ADMIN/RCVD: CPT | Performed by: OBSTETRICS & GYNECOLOGY

## 2021-08-20 PROCEDURE — G8427 DOCREV CUR MEDS BY ELIG CLIN: HCPCS | Performed by: OBSTETRICS & GYNECOLOGY

## 2021-08-20 PROCEDURE — 99202 OFFICE O/P NEW SF 15 MIN: CPT | Performed by: OBSTETRICS & GYNECOLOGY

## 2021-08-20 PROCEDURE — 1090F PRES/ABSN URINE INCON ASSESS: CPT | Performed by: OBSTETRICS & GYNECOLOGY

## 2021-08-20 PROCEDURE — 1036F TOBACCO NON-USER: CPT | Performed by: OBSTETRICS & GYNECOLOGY

## 2021-08-20 PROCEDURE — G8399 PT W/DXA RESULTS DOCUMENT: HCPCS | Performed by: OBSTETRICS & GYNECOLOGY

## 2021-08-20 PROCEDURE — 1123F ACP DISCUSS/DSCN MKR DOCD: CPT | Performed by: OBSTETRICS & GYNECOLOGY

## 2021-08-20 ASSESSMENT — ENCOUNTER SYMPTOMS
CHEST TIGHTNESS: 1
SORE THROAT: 0
ABDOMINAL PAIN: 0
SHORTNESS OF BREATH: 1
CONSTIPATION: 0
DIARRHEA: 1
COUGH: 0
VOMITING: 0
NAUSEA: 0
BACK PAIN: 1

## 2021-08-20 NOTE — PROGRESS NOTES
New Patient Gynecologic Exam      CC:   Chief Complaint   Patient presents with    New Patient       HPI:  64 y.o. Alexandra Wren presents for evaluation of recurrent UTIs. Patient seen and examined. Patient is referred for recurrent UTI within 6-7 weeks. Feels that it did not go completely away. Has had times where there was no growth with urine cultures though returned quickly. Reports symptoms have typically been dark urine, odor and burning with voiding. Had infections prior to this but maybe only once a year. Is using Yoselyn Andes for cleansing. Patient has recently had a significant yeast infection that required an extended level of treatment. History of Lupus, CHF, Ronnie's, interstitial lung disease and CAD. Hx of strokes an TIAs, HTN. Has recently been placed in palliative care due to medical co-morbidities. Health Maintenance:  Birth control: None  Pregnancy plans: None   Safe relationship: Together 10 years     Screening:  Last pap smear: Reports approx 5 years ago - reports negative   History of abnormal pap smears: Denies  Mammogram: 2020 - Birads 1  Colonoscopy: 3-4 years ago     Review of Systems:   Review of Systems   Constitutional: Negative for chills and fever. HENT: Negative for congestion and sore throat. Respiratory: Positive for chest tightness and shortness of breath. Negative for cough. CHF, interstitial lung disease   Gastrointestinal: Positive for diarrhea. Negative for abdominal pain, constipation, nausea and vomiting. Genitourinary: Negative for dysuria, frequency, menstrual problem, pelvic pain, vaginal bleeding and vaginal discharge. Recurrent UTIs   Musculoskeletal: Positive for back pain. Hematological: Negative. Psychiatric/Behavioral: Negative. All other systems reviewed and are negative.       Primary Care Physician: Chadwick Harry MD    Obstetric History  OB History    Para Term  AB Living   1 1 1     1 SAB TAB Ectopic Molar Multiple Live Births             1      # Outcome Date GA Lbr Mateusz/2nd Weight Sex Delivery Anes PTL Lv   1 Term    7 lb 11 oz (3.487 kg) M    ELSA       Gynecologic History  Menstrual History:   Post-menopausal bleeding: Denies    Pap History:   History of abnormal pap smears: see above   Last pap: see above      Medical History:  Past Medical History:   Diagnosis Date    Anxiety     Autoimmune disorder ([facility]     CAD (coronary artery disease)     CH[facility]congest[facility] failure) [facility]     Chronic kidney disease     Depression     DVT of lower extremity (deep venous thrombosis) ([facility])     Falls     Heart disease     Hypertension     Lupus ([facility]     Stress incontinence     Systemic lupus erythematosus ([facility])     TIA (transient ischemic attack)     x3    Urinary incontinence     UTI (urinary tract infection)        Medications:  Current Outpatient Medications   Medication Sig Dispense Refill    azaTHIOprine (IMURAN) 50 MG tablet TAKE 1 TABLET BY MOUTH DAILY 90 tablet 0    hydroxychloroquine (PLAQUENIL) 200 MG tablet Take 1 tab po BID 60 tablet 11    pantoprazole (PROTONIX) 20 MG tablet TAKE 1 TABLET BY MOUTH TWICE DAILY 60 tablet 2    Cholecalciferol (VITAMIN D3) 1.25 MG (12701 UT) CAPS TAKE 1 CAPSULE BY MOUTH EVERY OTHER WEEK 4 capsule 2    carvedilol (COREG) 6.25 MG tablet TAKE 1 TABLET BY MOUTH TWICE DAILY 180 tablet 3    rosuvastatin (CRESTOR) 20 MG tablet Take 1 tablet by mouth daily 90 tablet 3    clopidogrel (PLAVIX) 75 MG tablet TAKE 1 TABLET BY MOUTH DAILY 90 tablet 1    predniSONE (DELTASONE) 5 MG tablet TAKE 1 TABLET BY MOUTH DAILY 90 tablet 1    buPROPion (WELLBUTRIN XL) 300 MG extended release tablet TAKE 1 TABLET BY MOUTH EVERY MORNING 90 tablet 1    mirtazapine (REMERON) 7.5 MG tablet Take 1 tablet by mouth nightly 90 tablet 1    ondansetron (ZOFRAN) 4 MG tablet TAKE 1 TABLET BY MOUTH EVERY 12 HOURS AS NEEDED FOR NAUSEA OR VOMITING 30 tablet 3    docusate (COLACE, DULCOLAX) 100 MG CAPS Take 100 mg by mouth 2 times daily 60 capsule 3    acetaminophen 650 MG TABS Take 650 mg by mouth every 6 hours as needed for Pain or Fever (Fever >100.5 F (38 C)). 60 tablet 1    sennosides-docusate sodium (SENOKOT-S) 8.6-50 MG tablet Take 1 tablet by mouth daily 30 tablet 0    triamcinolone (KENALOG) 0.1 % lotion Apply topically 3 times daily Apply topically 3 times daily.  albuterol sulfate HFA (VENTOLIN HFA) 108 (90 Base) MCG/ACT inhaler Inhale 2 puffs into the lungs 4 times daily as needed for Wheezing (Patient not taking: Reported on 6/3/2021) 1 Inhaler 5    furosemide (LASIX) 20 MG tablet Take 1 tablet by mouth daily (Patient taking differently: Take 20 mg by mouth daily Indications: uses q other day ) 90 tablet 3     No current facility-administered medications for this visit. Surgical History:  Past Surgical History:   Procedure Laterality Date    BACK SURGERY  9/24/2014     DR. Morrison     COLONOSCOPY  7/12/2010    WNL-repeat in 10 years    FEMUR SURGERY Right     HIP SURGERY Left 12/03/2013    left hip trochanteric femoral nailing   [place]  8/3/2010    Dr. Annette Seaman, L5-S1    UPPER GASTROINTESTINAL ENDOSCOPY  7/19/10    reflux with no Barretts/no H-pylori    UPPER GASTROINTESTINAL ENDOSCOPY  01/2017    Dr. Jeramie Brewster - gastritis and duodinitis , neg H pylori     UPPER GASTROINTESTINAL ENDOSCOPY N/A 12/2/2019    EGD DILATION SAVORY 17mm/ 46 F performed by Franco Stuart MD at [address] 6/7/2021    EGD BIOPSY performed by Franco Stuart MD at [address] 6/7/2021    EGD DILATION SAVORY performed by Franco Stuart MD at [facility] ENDOSCOPY       Allergies:   Allergies   Allergen Reactions    Medrol [Methylprednisolone]      multiple side effects , able to tolerate prednisone without side effects     Oxycontin [Oxycodone Hcl]      Pruritis, vomiting     Vicodin [Hydrocodone-Acetaminophen] Nausea Only       Family History:  Family History   Problem Relation Age of Onset    High Blood Pressure Brother       Denies personal/family history of cervical, uterine, ovarian, vulvar, breast, or colon cancers. Social History:  Social History     Socioeconomic History    Marital status:      Spouse name: None    Number of children: 1    Years of education: 15    Highest education level: None   Occupational History    Occupation: Retired   Tobacco Use    Smoking status: Former Smoker     Packs/day: 0.50     Years: 30.00     Pack years: 15.00     Types: Cigarettes     Start date:      Quit date: 2014     Years since quittin.9    Smokeless tobacco: Never Used   Vaping Use    Vaping Use: Never used   Substance and Sexual Activity    Alcohol use: Not Currently     Alcohol/week: 0.0 standard drinks     Comment: none for a year    Drug use: No    Sexual activity: Not Currently   Other Topics Concern    None   Social History Narrative    None     Social Determinants of Health     Financial Resource Strain:     Difficulty of Paying Living Expenses:    Food Insecurity:     Worried About Running Out of Food in the Last Year:     Ran Out of Food in the Last Year:    Transportation Needs:     Lack of Transportation (Medical):      Lack of Transportation (Non-Medical):    Physical Activity:     Days of Exercise per Week:     Minutes of Exercise per Session:    Stress:     Feeling of Stress :    Social Connections:     Frequency of Communication with Friends and Family:     Frequency of Social Gatherings with Friends and Family:     Attends Buddhism Services:     Active Member of Clubs or Organizations:     Attends Club or Organization Meetings:     Marital Status:    Intimate Partner Violence:     Fear of Current or Ex-Partner:     Emotionally Abused:     Physically Abused:     Sexually Abused:        Objective:  /68 (Site: Right Upper Arm, Position: Sitting, Cuff Size: Medium Adult)   Pulse 70   Temp 97 °F (36.1 °C) (Infrared)   Wt 168 lb 3.2 oz (76.3 kg)   Breastfeeding Unknown   BMI 32.85 kg/m²     Exam:   Physical Exam  Vitals reviewed. Exam conducted with a chaperone present. Constitutional:       General: She is not in acute distress. Appearance: She is well-developed. HENT:      Head: Normocephalic and atraumatic. Eyes:      Conjunctiva/sclera: Conjunctivae normal.   Cardiovascular:      Rate and Rhythm: Normal rate. Pulmonary:      Effort: Pulmonary effort is normal. No respiratory distress. Abdominal:      General: There is no distension. Palpations: Abdomen is soft. Tenderness: There is no abdominal tenderness. There is no guarding or rebound. Genitourinary:     General: Normal vulva. Exam position: Lithotomy position. Labia:         Right: No rash, tenderness or lesion. Left: No rash, tenderness or lesion. Urethra: No prolapse, urethral pain, urethral swelling or urethral lesion. Comments: Exam limited secondary to patient ability to position in lithotomy. Normal appearing vulva with evidence of vaginal atrophy. No current concern for yeast or dermatitis condition. No urethral caruncle or other urethral abnormality. Musculoskeletal:         General: No swelling. Comments: Limited range of motion and mobility. Assist from chair to bed   Skin:     General: Skin is warm and dry. Neurological:      Mental Status: She is alert and oriented to person, place, and time. Psychiatric:         Mood and Affect: Mood normal.         Behavior: Behavior normal.         Thought Content: Thought content normal.         Assessment/Plan:  64 y.o. Ismael Hodge presenting for evaluation of recurrent UTIs.       1. Recurrent UTI     - Patient with a history of recurrent UTIs - seem to be primarily associated when she has frequent bowel movements while wearing Depends     - Patient has limited mobility due to chronic medical conditions     - Reviewed Recurrent UTIs and treatment of such, including hygiene practices, prophylactic antibiotics and vaginal estrogen     - Patient is currently enrolled in Palliative care secondary to congestive heart failure and patient and her significant other desires to avoid additional medications     - Desires to utilize hygiene methods and will call office if desires intervention    2.  Vaginal atrophy     - Patient with evidence of vaginal atrophy     - Risks, benefits and alternatives were reviewed with the patient     - Patient is not interested at this time, however will call office if desires in the future for recurrent [facility],

## 2021-08-24 ENCOUNTER — TELEMEDICINE (OUTPATIENT)
Dept: RHEUMATOLOGY | Age: 76
End: 2021-08-24
Payer: MEDICARE

## 2021-08-24 DIAGNOSIS — M85.89 OSTEOPENIA OF MULTIPLE SITES: ICD-10-CM

## 2021-08-24 DIAGNOSIS — M35.1 CONNECTIVE TISSUE DISEASE OVERLAP SYNDROME (HCC): Primary | ICD-10-CM

## 2021-08-24 DIAGNOSIS — J84.9 ILD (INTERSTITIAL LUNG DISEASE) (HCC): ICD-10-CM

## 2021-08-24 DIAGNOSIS — Z79.899 HIGH RISK MEDICATION USE: ICD-10-CM

## 2021-08-24 PROCEDURE — 1036F TOBACCO NON-USER: CPT | Performed by: INTERNAL MEDICINE

## 2021-08-24 PROCEDURE — 4040F PNEUMOC VAC/ADMIN/RCVD: CPT | Performed by: INTERNAL MEDICINE

## 2021-08-24 PROCEDURE — G8399 PT W/DXA RESULTS DOCUMENT: HCPCS | Performed by: INTERNAL MEDICINE

## 2021-08-24 PROCEDURE — 99214 OFFICE O/P EST MOD 30 MIN: CPT | Performed by: INTERNAL MEDICINE

## 2021-08-24 PROCEDURE — G8417 CALC BMI ABV UP PARAM F/U: HCPCS | Performed by: INTERNAL MEDICINE

## 2021-08-24 PROCEDURE — G8427 DOCREV CUR MEDS BY ELIG CLIN: HCPCS | Performed by: INTERNAL MEDICINE

## 2021-08-24 PROCEDURE — 1090F PRES/ABSN URINE INCON ASSESS: CPT | Performed by: INTERNAL MEDICINE

## 2021-08-24 PROCEDURE — 1123F ACP DISCUSS/DSCN MKR DOCD: CPT | Performed by: INTERNAL MEDICINE

## 2021-08-24 NOTE — PROGRESS NOTES
[address] [place] [address], [address] [place] [address] ) [phone] (F)      Dear Dr. Crystal Munguia MD:  Please find Rheumatology assessment. Thank you for giving me the opportunity to be involved in [facility] care and I look forward following Latrell Lafleur along with you. If you have any questions or concerns please feel free to reach me. Note is transcribed using voice recognition software. Inadvertent computerized transcription errors may be present. Patient identification: Raúl Blair,: ,66 y.o. Sex: female     A/P  Latrell Lafleur was seen today for follow-up. Diagnoses and all orders for this visit:    Connective tissue disease overlap syndrome ([facility]    ILD (interstitial lung disease) ([facility]    High risk medication use    Osteopenia of multiple sites        Cutaneous lupus + Sjogren's overlap with ILD -positive REBECCA, SSA, sicca symptoms and intermittent photosensitive rash. Myositis panel, RF, CCP, anti-Tawnya negative. 1.Cutaneous lupus- in remission. On  Hydroxychloroquine. Recommend eye exam.    2.Sjogren's-stable but manageable with nonpharmacological therapy. 3. ILD-  Continues to have Intermittent cough and  shortness of breath-  Dr. Umair Breen- pul. Continue 5 mg of prednisone, azathioprine 50 mg a day and hydroxychloroquine 200 mg twice daily. Stay on current meds, LFT are ordered, rest of the labs are WNL, reviewed from care everywhere. 4. Prolonged steroid use due to osteopenia-continue calcium supplementation, Reclast first dose 2020. Will redose, initiate PA. RTC 3 months. Patient indicates understanding and agrees with the management plan.   I reviewed patient's history, referral documents and electronic medical records. Copy of consult note is being routed electronically/faxed to referring physician. #######################################################################    Qgzmbcienu-[id number]-an for cutaneous lupus and Sjogren's overlap, also has ILD. Other medical problems anxiety, depression, hypertension. Interval changes-  States that she as admitted to UC due to hyponatremia, is doing better now. AI manifestations are stable, stable sicca symptoms, no lupus rash. Denies any worsening cough or SOB. Denies any swollen or inflamed joints, hemoptysis, fever, chills, any mucositis, GI/ side effects. 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 No fx. All other review of systems are negative. PFT from 8/2020-DLCO is significantly reduced to 39%. All other review of systems are negative. Past Medical History:   Diagnosis Date    Anxiety     Auto[facility]     CAD (coronary artery disease)     CHF (congestive heart failure) (HCC)     Chronic kidney disease     Depression     DVT of lower extremity (deep venous thrombosis) (HCC)     Falls     Heart disease     Hypertension     Lupus (HCC)     Stress incontinence     Systemic lupus erythematosus (HCC)     TIA (transient ischemic attack)     x3    Urinary incontinence     UTI (urinary tract infection)      Past Surgical History:   Procedure Laterality Date    BACK SURGERY  9/24/2014     DR. Morrison     COLONOSCOPY  7/12/2010    WNL-repeat in 10 years    FEMUR SURGERY Right     HIP SURGERY Left 12/03/2013    left hip trochanteric femoral nailing   [facility] [place]  8/3/2010    Dr. Keyonna Gutierres, L5-S1    UPPER GASTROINTESTINAL ENDOSCOPY  7/19/10    reflux with no Barretts/no H-pylori    UPPER GASTROINTESTINAL ENDOSCOPY  01/2017    Dr. Volodymyr Villar - gastritis and duodinitis , neg H pylori     UPPER GASTROINTESTINAL ENDOSCOPY N/A 12/2/2019    EGD DILATION MARY 17mm/ 51 F performed by Susy Irving MD at [facility] 6/7/2021    EGD BIOPSY performed by Susy Irving MD at [facility] N/A 6/7/2021    EGD DILATION SAVORY performed by Susy Irving MD at [place] [address]         No family history of autoimmune diseases    Current Outpatient Medications   Medication Sig Dispense Refill    azaTHIOprine (IMURAN) 50 MG tablet TAKE 1 TABLET BY MOUTH DAILY 90 tablet 0    hydroxychloroquine (PLAQUENIL) 200 MG tablet Take 1 tab po BID 60 tablet 11    triamcinolone (KENALOG) 0.1 % lotion Apply topically 3 times daily Apply topically 3 times daily.       pantoprazole (PROTONIX) 20 MG tablet TAKE 1 TABLET BY MOUTH TWICE DAILY 60 tablet 2    Cholecalciferol (VITAMIN D3) 1.25 MG (95731 UT) CAPS TAKE 1 CAPSULE BY MOUTH EVERY OTHER WEEK 4 capsule 2    carvedilol (COREG) 6.25 MG tablet TAKE 1 TABLET BY MOUTH TWICE DAILY 180 tablet 3    rosuvastatin (CRESTOR) 20 MG tablet Take 1 tablet by mouth daily 90 tablet 3    clopidogrel (PLAVIX) 75 MG tablet TAKE 1 TABLET BY MOUTH DAILY 90 tablet 1    predniSONE (DELTASONE) 5 MG tablet TAKE 1 TABLET BY MOUTH DAILY 90 tablet 1    buPROPion (WELLBUTRIN XL) 300 MG extended release tablet TAKE 1 TABLET BY MOUTH EVERY MORNING 90 tablet 1    albuterol sulfate HFA (VENTOLIN HFA) 108 (90 Base) MCG/ACT inhaler Inhale 2 puffs into the lungs 4 times daily as needed for Wheezing (Patient not taking: Reported on 6/3/2021) 1 Inhaler 5    mirtazapine (REMERON) 7.5 MG tablet Take 1 tablet by mouth nightly 90 tablet 1    furosemide (LASIX) 20 MG tablet Take 1 tablet by mouth daily (Patient taking differently: Take 20 mg by mouth daily Indications: uses q other day ) 90 tablet 3    ondansetron (ZOFRAN) 4 MG tablet TAKE 1 TABLET BY MOUTH EVERY 12 HOURS AS NEEDED FOR NAUSEA OR VOMITING 30 tablet 3    docusate (COLACE, DULCOLAX) 100 MG CAPS Take 100 mg by mouth 2 times daily 60 capsule 3    acetaminophen 650 MG TABS Take 650 mg by mouth every 6 hours as needed for Pain or Fever (Fever >100.5 F (38 C)). 60 tablet 1     No current facility-administered medications for this visit. Allergies   Allergen Reactions    Medrol [Methylprednisolone]      multiple side effects , able to tolerate prednisone without side effects     Oxycontin [Oxycodone Hcl]      Pruritis, vomiting     Vicodin [Hydrocodone-Acetaminophen] Nausea Only       PHYSICAL EXAM:    Vitals: There were no vitals taken for this visit. General appearance/ Psychiatric: well nourished, and well groomed, normal judgement, alert, appears stated age and cooperative. Is slow to respond. MKS: No findings to be seen in VS, looks comfortable, normal breathing effort.   Skin: No rashes      DATA:   Lab Results   Component Value Date    WBC 7.8 05/25/2021    HGB 12.3 05/25/2021    HCT 36.7 05/25/2021    MCV 95.7 05/25/2021     05/25/2021         Chemistry        Component Value Date/Time     07/27/2021 1305    K 3.6 07/27/2021 1305    K 4.1 11/26/2019 0821     07/27/2021 1305    CO2 19 (L) 07/27/2021 1305    BUN 16 07/27/2021 1305    CREATININE 0.6 07/27/2021 1305        Component Value Date/Time    CALCIUM 9.0 07/27/2021 1305    ALKPHOS 98 05/25/2021 1022    AST 35 05/25/2021 1022    ALT 12 05/25/2021 1022    BILITOT 0.3 05/25/2021 1022          Lab Results   Component Value Date    LABURIC 2.5 (L) 11/28/2019     Lab Results   Component Value Date    SEDRATE 34 (H) 05/25/2021     Lab Results   Component Value Date    CRP 4.8 05/25/2021     Lab Results   Component Value Date    REBECCA POSITIVE (A) 10/02/2019    SEDRATE 34 (H) 05/25/2021     Lab Results   Component Value Date    CKTOTAL 87 10/02/2019     No results found for: TSH  Lab Results   Component Value Date    VITD25 86.7 11/26/2019         Radiology Review:   CT chest 9/19/2019-  Impression       1.  Bilateral subpleural reticular opacities and traction bronchiectasis and developing subpleural honeycombing compatible with mild to moderate pulmonary fibrosis with progression from comparison chest CT. 2.  Clustered nodular opacities in the right upper lobe may be inflammatory/infectious but nonspecific. Following the Fleischner Society 2017 guidelines, if patient is low risk no routine follow-up is suggested unless suspicious morphology or upper lobe    location. If patient is high risk, then optional CT at 12 months is suggested.  qzxv   3.  Unchanged severe T9 and T11 vertebral compression fractures with retropulsion. PFT 10/11/2019      Pulse ox is 94 % on room air    Spirometry data:    FEV1/FVC: 78. Predicted ratio 75    FEV1 1.07 L, which is 65 % predicted    FVC is 1.38 L, which is 62 % predicted  Lung Volumes:    TLC (by Plethysmography) is 3.21 L, which is 73 % predicted    RV is 1.35 L which is 64 % predicted    Diffusion Capacity:    DLCO is 7.55 which is 39 % predicted  Impression:    1. There is no obstruction present    2. There is mild restriction with TLC of 73% predicted    4. There is severe reduction in diffusion capacity    Comment:   The presence of restriction and reduction of diffusion capacity   is a new in comparison with the PFT she had on 11/3/2017  PFT findings are suggestive of progression of ILD.  Clinical   correlation is recommended. A/P- See above.

## 2021-08-26 ENCOUNTER — TELEPHONE (OUTPATIENT)
Dept: RHEUMATOLOGY | Age: 76
End: 2021-08-26

## 2021-08-29 ENCOUNTER — APPOINTMENT (OUTPATIENT)
Dept: CT IMAGING | Age: 76
End: 2021-08-29
Payer: MEDICARE

## 2021-08-29 ENCOUNTER — HOSPITAL ENCOUNTER (EMERGENCY)
Age: 76
Discharge: HOME | End: 2021-08-29
Attending: EMERGENCY MEDICINE
Payer: MEDICARE

## 2021-08-29 VITALS
HEART RATE: 80 BPM | SYSTOLIC BLOOD PRESSURE: 110 MMHG | RESPIRATION RATE: 16 BRPM | TEMPERATURE: 97.5 F | OXYGEN SATURATION: 100 % | DIASTOLIC BLOOD PRESSURE: 80 MMHG

## 2021-08-29 DIAGNOSIS — M54.50 ACUTE EXACERBATION OF CHRONIC LOW BACK PAIN: Primary | ICD-10-CM

## 2021-08-29 DIAGNOSIS — G89.29 ACUTE EXACERBATION OF CHRONIC LOW BACK PAIN: Primary | ICD-10-CM

## 2021-08-29 DIAGNOSIS — N39.0 URINARY TRACT INFECTION WITHOUT HEMATURIA, SITE UNSPECIFIED: ICD-10-CM

## 2021-08-29 LAB
A/G RATIO: 0.7 (ref 1.1–2.2)
ALBUMIN SERPL-MCNC: 3 G/DL (ref 3.4–5)
ALP BLD-CCNC: 442 U/L (ref 40–129)
ALT SERPL-CCNC: 56 U/L (ref 10–40)
ANION GAP SERPL CALCULATED.3IONS-SCNC: 15 MMOL/L (ref 3–16)
AST SERPL-CCNC: 66 U/L (ref 15–37)
BACTERIA: ABNORMAL /HPF
BASOPHILS ABSOLUTE: 0 K/UL (ref 0–0.2)
BASOPHILS RELATIVE PERCENT: 0.2 %
BILIRUB SERPL-MCNC: 0.5 MG/DL (ref 0–1)
BILIRUBIN URINE: NEGATIVE
BLOOD, URINE: ABNORMAL
BUN BLDV-MCNC: 27 MG/DL (ref 7–20)
CALCIUM SERPL-MCNC: 9.9 MG/DL (ref 8.3–10.6)
CHLORIDE BLD-SCNC: 96 MMOL/L (ref 99–110)
CLARITY: CLEAR
CO2: 22 MMOL/L (ref 21–32)
COLOR: YELLOW
CREAT SERPL-MCNC: 1 MG/DL (ref 0.6–1.2)
EOSINOPHILS ABSOLUTE: 0 K/UL (ref 0–0.6)
EOSINOPHILS RELATIVE PERCENT: 0 %
EPITHELIAL CELLS, UA: ABNORMAL /HPF (ref 0–5)
GFR AFRICAN AMERICAN: >60
GFR NON-AFRICAN AMERICAN: 54
GLOBULIN: 4.1 G/DL
GLUCOSE BLD-MCNC: 97 MG/DL (ref 70–99)
GLUCOSE URINE: NEGATIVE MG/DL
HCT VFR BLD CALC: 35.3 % (ref 36–48)
HEMOGLOBIN: 12 G/DL (ref 12–16)
KETONES, URINE: 15 MG/DL
LEUKOCYTE ESTERASE, URINE: ABNORMAL
LYMPHOCYTES ABSOLUTE: 0.7 K/UL (ref 1–5.1)
LYMPHOCYTES RELATIVE PERCENT: 9.3 %
MAGNESIUM: 2.1 MG/DL (ref 1.8–2.4)
MCH RBC QN AUTO: 31.7 PG (ref 26–34)
MCHC RBC AUTO-ENTMCNC: 34.1 G/DL (ref 31–36)
MCV RBC AUTO: 93.1 FL (ref 80–100)
MICROSCOPIC EXAMINATION: YES
MONOCYTES ABSOLUTE: 0.7 K/UL (ref 0–1.3)
MONOCYTES RELATIVE PERCENT: 10 %
MUCUS: ABNORMAL /LPF
NEUTROPHILS ABSOLUTE: 5.7 K/UL (ref 1.7–7.7)
NEUTROPHILS RELATIVE PERCENT: 80.5 %
NITRITE, URINE: POSITIVE
PDW BLD-RTO: 15.2 % (ref 12.4–15.4)
PH UA: 6 (ref 5–8)
PLATELET # BLD: 295 K/UL (ref 135–450)
PMV BLD AUTO: 6.5 FL (ref 5–10.5)
POTASSIUM REFLEX MAGNESIUM: 3.5 MMOL/L (ref 3.5–5.1)
PROTEIN UA: 100 MG/DL
RBC # BLD: 3.79 M/UL (ref 4–5.2)
RBC UA: ABNORMAL /HPF (ref 0–4)
RENAL EPITHELIAL, UA: ABNORMAL /HPF (ref 0–1)
SODIUM BLD-SCNC: 133 MMOL/L (ref 136–145)
SPECIFIC GRAVITY UA: >=1.03 (ref 1–1.03)
TOTAL PROTEIN: 7.1 G/DL (ref 6.4–8.2)
TROPONIN: 0.04 NG/ML
TROPONIN: 0.06 NG/ML
URINE TYPE: ABNORMAL
UROBILINOGEN, URINE: 1 E.U./DL
WBC # BLD: 7.1 K/UL (ref 4–11)
WBC UA: ABNORMAL /HPF (ref 0–5)

## 2021-08-29 PROCEDURE — 93005 ELECTROCARDIOGRAM TRACING: CPT | Performed by: EMERGENCY MEDICINE

## 2021-08-29 PROCEDURE — 83735 ASSAY OF MAGNESIUM: CPT

## 2021-08-29 PROCEDURE — 80053 COMPREHEN METABOLIC PANEL: CPT

## 2021-08-29 PROCEDURE — 87186 SC STD MICRODIL/AGAR DIL: CPT

## 2021-08-29 PROCEDURE — 84484 ASSAY OF TROPONIN QUANT: CPT

## 2021-08-29 PROCEDURE — 36415 COLL VENOUS BLD VENIPUNCTURE: CPT

## 2021-08-29 PROCEDURE — 6360000002 HC RX W HCPCS: Performed by: EMERGENCY MEDICINE

## 2021-08-29 PROCEDURE — 81001 URINALYSIS AUTO W/SCOPE: CPT

## 2021-08-29 PROCEDURE — 96375 TX/PRO/DX INJ NEW DRUG ADDON: CPT

## 2021-08-29 PROCEDURE — 72131 CT LUMBAR SPINE W/O DYE: CPT

## 2021-08-29 PROCEDURE — 6370000000 HC RX 637 (ALT 250 FOR IP): Performed by: EMERGENCY MEDICINE

## 2021-08-29 PROCEDURE — 87086 URINE CULTURE/COLONY COUNT: CPT

## 2021-08-29 PROCEDURE — 99285 EMERGENCY DEPT VISIT HI MDM: CPT

## 2021-08-29 PROCEDURE — 2580000003 HC RX 258: Performed by: EMERGENCY MEDICINE

## 2021-08-29 PROCEDURE — 87077 CULTURE AEROBIC IDENTIFY: CPT

## 2021-08-29 PROCEDURE — 72128 CT CHEST SPINE W/O DYE: CPT

## 2021-08-29 PROCEDURE — 85025 COMPLETE CBC W/AUTO DIFF WBC: CPT

## 2021-08-29 PROCEDURE — 96365 THER/PROPH/DIAG IV INF INIT: CPT

## 2021-08-29 RX ORDER — SODIUM CHLORIDE, SODIUM LACTATE, POTASSIUM CHLORIDE, AND CALCIUM CHLORIDE .6; .31; .03; .02 G/100ML; G/100ML; G/100ML; G/100ML
1000 INJECTION, SOLUTION INTRAVENOUS ONCE
Status: COMPLETED | OUTPATIENT
Start: 2021-08-29 | End: 2021-08-29

## 2021-08-29 RX ORDER — OXYCODONE HYDROCHLORIDE 5 MG/1
5 TABLET ORAL ONCE
Status: DISCONTINUED | OUTPATIENT
Start: 2021-08-29 | End: 2021-08-29 | Stop reason: HOSPADM

## 2021-08-29 RX ORDER — OXYCODONE HYDROCHLORIDE AND ACETAMINOPHEN 5; 325 MG/1; MG/1
1 TABLET ORAL EVERY 6 HOURS PRN
Qty: 12 TABLET | Refills: 0 | Status: SHIPPED | OUTPATIENT
Start: 2021-08-29 | End: 2021-09-01

## 2021-08-29 RX ORDER — CEPHALEXIN 500 MG/1
500 CAPSULE ORAL 2 TIMES DAILY
Qty: 14 CAPSULE | Refills: 0 | Status: SHIPPED | OUTPATIENT
Start: 2021-08-29 | End: 2021-09-05

## 2021-08-29 RX ORDER — LIDOCAINE 4 G/G
1 PATCH TOPICAL DAILY
Status: DISCONTINUED | OUTPATIENT
Start: 2021-08-29 | End: 2021-08-29 | Stop reason: HOSPADM

## 2021-08-29 RX ORDER — SENNA AND DOCUSATE SODIUM 50; 8.6 MG/1; MG/1
1 TABLET, FILM COATED ORAL DAILY
Qty: 30 TABLET | Refills: 0 | Status: SHIPPED | OUTPATIENT
Start: 2021-08-29

## 2021-08-29 RX ORDER — MORPHINE SULFATE 2 MG/ML
2 INJECTION, SOLUTION INTRAMUSCULAR; INTRAVENOUS ONCE
Status: COMPLETED | OUTPATIENT
Start: 2021-08-29 | End: 2021-08-29

## 2021-08-29 RX ADMIN — SODIUM CHLORIDE, POTASSIUM CHLORIDE, SODIUM LACTATE AND CALCIUM CHLORIDE 1000 ML: 600; 310; 30; 20 INJECTION, SOLUTION INTRAVENOUS at 16:22

## 2021-08-29 RX ADMIN — CEFTRIAXONE SODIUM 2000 MG: 2 INJECTION, POWDER, FOR SOLUTION INTRAMUSCULAR; INTRAVENOUS at 19:38

## 2021-08-29 RX ADMIN — MORPHINE SULFATE 2 MG: 2 INJECTION, SOLUTION INTRAMUSCULAR; INTRAVENOUS at 15:18

## 2021-08-29 ASSESSMENT — PAIN DESCRIPTION - DESCRIPTORS: DESCRIPTORS: DISCOMFORT;STABBING

## 2021-08-29 ASSESSMENT — PAIN SCALES - GENERAL
PAINLEVEL_OUTOF10: 0
PAINLEVEL_OUTOF10: 9
PAINLEVEL_OUTOF10: 9

## 2021-08-29 ASSESSMENT — PAIN DESCRIPTION - LOCATION: LOCATION: BACK

## 2021-08-29 ASSESSMENT — PAIN DESCRIPTION - PAIN TYPE: TYPE: CHRONIC PAIN

## 2021-08-29 NOTE — ED PROVIDER NOTES
[address]          ATTENDING PHYSICIAN NOTE       Date of evaluation: 8/29/2021    Chief Complaint     Back Pain (x 7 days)      History of Present Illness     Lang Schumacher is a 64 y.o. female with a PMH as described below who presents to the ED today with a chief complaint of: Back pain. History is very difficult to take from the patient as she has a prior history of strokes and her ability to communicate is extremely limited. History is taken primarily from her in-home caregiver. Patient's caregiver states that she has had a long-term history of lower back pain. They are planning on getting her involved with hospice as she is essentially nonmobile at home and only occasionally transitions from her bed to her wheelchair. She had previously been able to use a walker but over the past few months her ability ambulate has decreased. Her caregiver states that she has had a prior history of a spinal fusion and that she has a long-term history of lower back pain. Pain became worse over the past week and she has been laying in bed more frequently. She has only been transitioning out of bed to urinate. Caregiver denies any recent falls, fever, chills, chest pain, vomiting, diarrhea, dysuria, or change in bowel movements. The caregiver states that he occasionally gets her half of an oxycodone that is prescribed to him however she does not like taking oxycodone because it makes her constipated. The caregiver further notes that she recently completed a course of antibiotics for UTI and it had instead given her diarrhea and constipation. The patient has a history of prior stroke with what appears to be some degree of left-sided weakness at baseline although again history is very limited and the majority of the history is taken from the caregiver.   The patient and the caregiver would like to be discharged home as they have previously declined placement in a skilled nursing facility. The patientstates that there were no other associated signs and symptoms or modifying factors. Patient rates pain as 8/10. Review of Systems     As described above in the HPI otherwise all the systems reviewed and negative as reported by the patient. Past Medical, Surgical, Family, and Social History     She has a past medical history of Anxiety, Autoimmune disorder ([address] CAD (coronary artery disease), CHF (congestive heart failure) ([address] Chronic kidney disease, Depression, DVT of lower extremity (deep venous thrombosis) ([address] Falls, Heart disease, Hypertension, Lupus ([address] Stress incontinence, Systemic lupus erythematosus ([address] TIA (transient ischemic attack), Urinary incontinence, and UTI (urinary tract infection). She has a past surgical history that includes Colonoscopy (7/12/2010); Upper gastrointestinal endoscopy (7/19/10); lumbar laminectomy (8/3/2010); Femur Surgery (Right); hip surgery (Left, 12/03/2013); back surgery (9/24/2014 ); Upper gastrointestinal endoscopy (01/2017); joint replacement; Upper gastrointestinal endoscopy (N/A, 12/2/2019); Upper gastrointestinal endoscopy (N/A, 6/7/2021); and Upper gastrointestinal endoscopy (N/A, 6/7/2021). Her family history includes High Blood Pressure in her brother. She reports that she quit smoking about 6 years ago. Her smoking use included cigarettes. She started smoking about 56 years ago. She has a 15.00 pack-year smoking history. She has never used smokeless tobacco. She reports previous alcohol use. She reports that she does not use drugs. Medications     Previous Medications    ACETAMINOPHEN 650 MG TABS    Take 650 mg by mouth every 6 hours as needed for Pain or Fever (Fever >100.5 F (38 C)).     ALBUTEROL SULFATE HFA (VENTOLIN HFA) 108 (90 BASE) MCG/ACT INHALER    Inhale 2 puffs into the lungs 4 times daily as needed for Wheezing    AZATHIOPRINE (IMURAN) 50 MG TABLET    TAKE 1 TABLET BY MOUTH DAILY    BUPROPION [facility] XL) 300 MG EXTENDED RELEASE TABLET    TAKE 1 TABLET BY MOUTH EVERY MORNING    CARVEDILOL (COREG) 6.25 MG TABLET    TAKE 1 TABLET BY MOUTH TWICE DAILY    CHOLECALCIFEROL (VITAMIN D3) 1.25 MG (28609 UT) CAPS    TAKE 1 CAPSULE BY MOUTH EVERY OTHER WEEK    CLOPIDOGREL (PLAVIX) 75 MG TABLET    TAKE 1 TABLET BY MOUTH DAILY    DOCUSATE (COLACE, DULCOLAX) 100 MG CAPS    Take 100 mg by mouth 2 times daily    FUROSEMIDE (LASIX) 20 MG TABLET    Take 1 tablet by mouth daily    HYDROXYCHLOROQUINE (PLAQUENIL) 200 MG TABLET    Take 1 tab po BID    MIRTAZAPINE (REMERON) 7.5 MG TABLET    Take 1 tablet by mouth nightly    ONDANSETRON (ZOFRAN) 4 MG TABLET    TAKE 1 TABLET BY MOUTH EVERY 12 HOURS AS NEEDED FOR NAUSEA OR VOMITING    PANTOPRAZOLE (PROTONIX) 20 MG TABLET    TAKE 1 TABLET BY MOUTH TWICE DAILY    PREDNISONE (DELTASONE) 5 MG TABLET    TAKE 1 TABLET BY MOUTH DAILY    ROSUVASTATIN (CRESTOR) 20 MG TABLET    Take 1 tablet by mouth daily    TRIAMCINOLONE (KENALOG) 0.1 % LOTION    Apply topically 3 times daily Apply topically 3 times daily. Allergies     She is allergic to medrol [methylprednisolone], oxycontin [oxycodone hcl], and vicodin [hydrocodone-acetaminophen]. Physical Exam     INITIAL VITALS: BP: 135/65, Temp: 97.5 °F (36.4 °C), Pulse: 63, Resp: 18, SpO2: 93 %   Physical Exam  Vitals and nursing note reviewed. Constitutional:       Appearance: Normal appearance. She is obese. HENT:      Head: Normocephalic and atraumatic. Nose: Nose normal.      Mouth/Throat:      Mouth: Mucous membranes are moist.   Eyes:      Extraocular Movements: Extraocular movements intact. Pupils: Pupils are equal, round, and reactive to light. Neck:      Comments: Midline lower thoracic/upper lumbar bony tenderness. No bony step-offs. No midline thoracic tenderness. Cardiovascular:      Rate and Rhythm: Normal rate and regular rhythm. Pulses: Normal pulses. Heart sounds: Normal heart sounds.    Pulmonary: Effort: Pulmonary effort is normal.      Breath sounds: Normal breath sounds. Abdominal:      General: There is no distension. Palpations: There is no mass. Tenderness: There is no abdominal tenderness. There is no guarding. Musculoskeletal:      Cervical back: Normal range of motion and neck supple. Tenderness present. Comments: Patient has decreased drinks in her left upper and left lower extremity compared to right, strength is 4/5 in the left upper extremity and 4/5 left lower extremity. Normal  strength and normal plantarflexion/dorsiflexion of the right lower extremity. She is able to lift her right lower leg off the bed and hold it up for approximately 7 seconds. With assistance, she can keep her left leg off the bed for approximately 5 seconds before dressing hits the bed. Straight leg raise is negative bilaterally. Dorsal pedis/posterior tibialis pulses are 2+ bilaterally. No significant sensation difference in her lower extremities. Skin:     Capillary Refill: Capillary refill takes less than 2 seconds. Neurological:      Mental Status: She is alert and oriented to person, place, and time. Mental status is at baseline. Psychiatric:         Mood and Affect: Mood normal.         DiagnosticResults     EKG   Indication: Weakness: Ventricular rate 58, MO interval 200, QRS 94, QTc 592, axis -45 degrees. Prolonged QTC, sinus bradycardia. No acute ST-T wave elevations depressions concerning for acute ischemia infarct. QTC is slightly prolonged compared to prior EKG performed in February 2021. RADIOLOGY:  CT THORACIC SPINE WO CONTRAST   Final Result      No acute process. Stable severe compression deformities of the T9 and T11 vertebral bodies with unchanged resulting central canal narrowing most pronounced at the T11 level. CT LUMBAR SPINE WO CONTRAST   Final Result      Stable mild compression deformity of the superior endplate of L3.       No acute fracture or acute process. Status post anterior/posterior fusion from L4 through S1 with dorsal decompression without visualized hardware complication.              LABS:   Results for orders placed or performed during the hospital encounter of 08/29/21   CBC auto differential   Result Value Ref Range    WBC 7.1 4.0 - 11.0 K/uL    RBC 3.79 (L) 4.00 - 5.20 M/uL    Hemoglobin 12.0 12.0 - 16.0 g/dL    Hematocrit 35.3 (L) 36.0 - 48.0 %    MCV 93.1 80.0 - 100.0 fL    MCH 31.7 26.0 - 34.0 pg    MCHC 34.1 31.0 - 36.0 g/dL    RDW 15.2 12.4 - 15.4 %    Platelets 483 266 - 640 K/uL    MPV 6.5 5.0 - 10.5 fL    Neutrophils % 80.5 %    Lymphocytes % 9.3 %    Monocytes % 10.0 %    Eosinophils % 0.0 %    Basophils % 0.2 %    Neutrophils Absolute 5.7 1.7 - 7.7 K/uL    Lymphocytes Absolute 0.7 (L) 1.0 - 5.1 K/uL    Monocytes Absolute 0.7 0.0 - 1.3 K/uL    Eosinophils Absolute 0.0 0.0 - 0.6 K/uL    Basophils Absolute 0.0 0.0 - 0.2 K/uL   Comprehensive Metabolic Panel w/ Reflex to MG   Result Value Ref Range    Sodium 133 (L) 136 - 145 mmol/L    Potassium reflex Magnesium 3.5 3.5 - 5.1 mmol/L    Chloride 96 (L) 99 - 110 mmol/L    CO2 22 21 - 32 mmol/L    Anion Gap 15 3 - 16    Glucose 97 70 - 99 mg/dL    BUN 27 (H) 7 - 20 mg/dL    CREATININE 1.0 0.6 - 1.2 mg/dL    GFR Non-African American 54 (A) >60    GFR African American >60 >60    Calcium 9.9 8.3 - 10.6 mg/dL    Total Protein 7.1 6.4 - 8.2 g/dL    Albumin 3.0 (L) 3.4 - 5.0 g/dL    Albumin/Globulin Ratio 0.7 (L) 1.1 - 2.2    Total Bilirubin 0.5 0.0 - 1.0 mg/dL    Alkaline Phosphatase 442 (H) 40 - 129 U/L    ALT 56 (H) 10 - 40 U/L    AST 66 (H) 15 - 37 U/L    Globulin 4.1 g/dL   Troponin   Result Value Ref Range    Troponin 0.06 (H) <0.01 ng/mL   Urinalysis, reflex to microscopic (Lab)   Result Value Ref Range    Color, UA Yellow Straw/Yellow    Clarity, UA Clear Clear    Glucose, Ur Negative Negative mg/dL    Bilirubin Urine Negative Negative    Ketones, Urine 15 (A) Negative mg/dL Specific Gravity, UA >=1.030 1.005 - 1.030    Blood, Urine MODERATE (A) Negative    pH, UA 6.0 5.0 - 8.0    Protein,  (A) Negative mg/dL    Urobilinogen, Urine 1.0 <2.0 E.U./dL    Nitrite, Urine POSITIVE (A) Negative    Leukocyte Esterase, Urine TRACE (A) Negative    Microscopic Examination YES     Urine Type Other    Magnesium   Result Value Ref Range    Magnesium 2.10 1.80 - 2.40 mg/dL   Troponin   Result Value Ref Range    Troponin 0.04 (H) <0.01 ng/mL   Microscopic Urinalysis   Result Value Ref Range    Mucus, UA 3+ (A) None Seen /LPF    WBC, UA 6-9 (A) 0 - 5 /HPF    RBC, UA 5-10 (A) 0 - 4 /HPF    Epithelial Cells, UA 21-50 (A) 0 - 5 /HPF    Renal Epithelial, UA 2-5 (A) 0 - 1 /HPF    Bacteria, UA 4+ (A) None Seen /HPF       ED BEDSIDE ULTRASOUND:      RECENT VITALS:  BP: 110/80, Temp: 97.5 °F (36.4 °C),Pulse: 80, Resp: 16, SpO2: 100 %     Procedures         ED Course     Nursing Notes, Past Medical Hx, Past Surgical Hx, Social Hx, Allergies, and Family Hx werereviewed. The patient was given thefollowing medications:  Orders Placed This Encounter   Medications    morphine (PF) injection 2 mg    oxyCODONE (ROXICODONE) immediate release tablet 5 mg    lidocaine 4 % external patch 1 patch    lactated ringers bolus    cefTRIAXone (ROCEPHIN) 2000 mg IVPB in D5W 50ml minibag     Order Specific Question:   Antimicrobial Indications     Answer:   Urinary Tract Infection    cephALEXin (KEFLEX) 500 MG capsule     Sig: Take 1 capsule by mouth 2 times daily for 7 days     Dispense:  14 capsule     Refill:  0    oxyCODONE-acetaminophen (PERCOCET) 5-325 MG per tablet     Sig: Take 1 tablet by mouth every 6 hours as needed for Pain for up to 3 days. Intended supply: 3 days.  Take lowest dose possible to manage pain     Dispense:  12 tablet     Refill:  0    sennosides-docusate sodium (SENOKOT-S) 8.6-50 MG tablet     Sig: Take 1 tablet by mouth daily     Dispense:  30 tablet     Refill:  0 CONSULTS:  None    MEDICAL DECISION MAKING / ASSESSMENT / PLAN     Gretchen Silvestre is a 64 y.o. female who presents with back pain. Patient has chronic history of back pain and seems to have exacerbated over the past week. Family states that they have used oxycodone in the past which seems to help a little bit but she does not have a chronic prescription. Given the patient's exam, an extensive work-up was performed including CT imaging of her thoracic and lumbar spine. Imaging showed chronic appearing fractures without any new fracture. Laboratory studies showed a slight troponin elevation that was downtrending. No complaints of chest pain and I believe the troponin is nonspecific. UA was concerning for UTI and she was started on Rocephin. Family was offered admission for placement however the patient does not want to go to a nursing facility and wants to go home. This was confirmed on multiple different reassessment periods. The patient's 24/7 caregiver is amenable to plan for discharge home. Patient will be discharged with prescription for Keflex with a short course of pain medication with instructions to follow-up with her primary care doctor. She was discharged home in good condition. Clinical Impression     1. Acute exacerbation of chronic low back pain    2. Urinary tract infection without hematuria, site unspecified        Disposition     PATIENT REFERRED TO:  Bentley Churchill, [address] [place] [id number] [phone]    Schedule an appointment as soon as possible for a visit         DISCHARGE MEDICATIONS:  New Prescriptions    CEPHALEXIN (KEFLEX) 500 MG CAPSULE    Take 1 capsule by mouth 2 times daily for 7 days    OXYCODONE-ACETAMINOPHEN (PERCOCET) 5-325 MG PER TABLET    Take 1 tablet by mouth every 6 hours as needed for Pain for up to 3 days. Intended supply: 3 days.  Take lowest dose possible to manage pain    SENNOSIDES-DOCUSATE SODIUM (SENOKOT-S) 8.6-50 MG TABLET    Take 1 tablet by mouth daily       DISPOSITION Decision To Discharge 08/29/2021 07:48:47 PM         Taryn Pike MD  08/29/21 7217

## 2021-08-29 NOTE — ED NOTES
Bed: B15-15  Expected date:   Expected time:   Means of arrival:   Comments:  Mayito Mehta RN  08/29/21 9914

## 2021-08-29 NOTE — ED NOTES
Patient BIBA from private residence for chronic back pain 9/10. Patient lives at home with 24/7 caregiver. Patient reports that she has been unable to walk for 6 weeks.      Paxton Scott RN  08/29/21 [id number]

## 2021-08-30 ENCOUNTER — TELEMEDICINE (OUTPATIENT)
Dept: PSYCHOLOGY | Age: 76
End: 2021-08-30
Payer: MEDICARE

## 2021-08-30 ENCOUNTER — TELEPHONE (OUTPATIENT)
Dept: PULMONOLOGY | Age: 76
End: 2021-08-30

## 2021-08-30 ENCOUNTER — TELEPHONE (OUTPATIENT)
Dept: RHEUMATOLOGY | Age: 76
End: 2021-08-30

## 2021-08-30 DIAGNOSIS — J84.9 INTERSTITIAL LUNG DISEASE (HCC): Primary | ICD-10-CM

## 2021-08-30 DIAGNOSIS — M35.9 CONNECTIVE TISSUE DISEASE (HCC): ICD-10-CM

## 2021-08-30 DIAGNOSIS — F33.1 MODERATE EPISODE OF RECURRENT MAJOR DEPRESSIVE DISORDER (HCC): Primary | ICD-10-CM

## 2021-08-30 LAB
EKG ATRIAL RATE: 58 BPM
EKG DIAGNOSIS: NORMAL
EKG P AXIS: 34 DEGREES
EKG P-R INTERVAL: 200 MS
EKG Q-T INTERVAL: 604 MS
EKG QRS DURATION: 94 MS
EKG QTC CALCULATION (BAZETT): 592 MS
EKG R AXIS: -45 DEGREES
EKG T AXIS: 106 DEGREES
EKG VENTRICULAR RATE: 58 BPM

## 2021-08-30 PROCEDURE — 90832 PSYTX W PT 30 MINUTES: CPT | Performed by: PSYCHOLOGIST

## 2021-08-30 NOTE — PROGRESS NOTES
Behavioral Health Consultation  [address] Goncalves PsyD  Psychologist  8/30/2021  11:05AM      Time spent with Patient: 16 minutes  This is patient's [de-identified] sixth [facility] appointment. Reason for Consult:  Depression, Stress   Referring Provider: Dr. Dede Jorgensen (During GWHAV-05 public health emergency)  }  Pursuant to the emergency declaration under the [address] [place] [address] [facility] authority and the [employer] Act, this Virtual Visit was conducted, with patient's consent, to reduce the patient's risk of exposure to COVID-19 and provide continuity of care for an established patient. Services were provided through a video synchronous discussion virtually to substitute for in-person clinic visit. Pt gave verbal informed consent to participate in telehealth services. Conducted a risk-benefit analysis and determined that the patient's presenting problems are consistent with the use of telepsychology. Determined that the patient has sufficient knowledge and skills in the use of technology enabling them to adequately benefit from telepsychology. It was determined that this patient was able to be properly treated without an in-person session. Patient verified that they were currently located at the [facility] address that was provided during registration.       Verified the following information:  Patient's identification: Yes  Patient location: [address] [place] [id number]  Patient's call back number: [phone]  Patient's emergency contact's name and number, as well as permission to contact them if needed: Extended Emergency Contact Information  Primary Emergency Contact: Anya Strange [address] [place] [address] Phone: [phone]  Relation: Other  Secondary Emergency Contact: [address] [place] [address] Phone: [phone]  Relation: Child    Provider location: Richard, [address] [place] [address] Road:  During the last visit Pt set goals to 1) leave the house once daily for change of scenery, even to sit in a park 2) return for follow-up in three weeks    Pt reports she is not doing very well. Has been feeling more down today as she got out of the hospital yesterday and has been bedbound. Feels frustrated and wants to be able to walk. Has been having multiple UTIs which hasn't helped much. Will be seeing psychiatrist at the end of September. Caregiver reports he increased her mirtazapine dose and noticed a big difference in her mood immediately. Feels that she is been smiling and laughing more often than previously. O:  MSE:     Appearance: good hygiene   Attitude: cooperative and friendly  Consciousness: drowsy  Orientation: oriented to person, place, time, general circumstance  Memory: recent and remote memory intact  Attention/Concentration: intact during session  Psychomotor Activity: normal  Eye Contact: normal  Speech: normal rate and volume, well-articulated  Mood: \"depressed\"  Affect: euthymic and congruent  Perception: within normal limits  Thought Content: within normal limits  Thought Process: logical, coherent and goal-directed  Insight: fair  Judgment: intact  Ability to understand instructions: Yes  Ability to respond meaningfully: Yes  Morbid Ideation: no   Suicide Assessment: no suicidal ideation, plan, or intent  Homicidal Ideation: no        History:    Medications:   Current Outpatient Medications   Medication Sig Dispense Refill    cephALEXin (KEFLEX) 500 MG capsule Take 1 capsule by mouth 2 times daily for 7 days 14 capsule 0    oxyCODONE-acetaminophen (PERCOCET) 5-325 MG per tablet Take 1 tablet by mouth every 6 hours as needed for Pain for up to 3 days. Intended supply: 3 days.  Take lowest dose possible to manage pain 12 tablet 0    sennosides-docusate sodium (SENOKOT-S) 8.6-50 MG tablet Take 1 tablet by mouth daily 30 tablet 0    azaTHIOprine (IMURAN) 50 MG tablet TAKE 1 TABLET BY MOUTH DAILY 90 tablet 0    hydroxychloroquine (PLAQUENIL) 200 MG tablet Take 1 tab po BID 60 tablet 11    triamcinolone (KENALOG) 0.1 % lotion Apply topically 3 times daily Apply topically 3 times daily.  pantoprazole (PROTONIX) 20 MG tablet TAKE 1 TABLET BY MOUTH TWICE DAILY 60 tablet 2    Cholecalciferol (VITAMIN D3) 1.25 MG (98600 UT) CAPS TAKE 1 CAPSULE BY MOUTH EVERY OTHER WEEK 4 capsule 2    carvedilol (COREG) 6.25 MG tablet TAKE 1 TABLET BY MOUTH TWICE DAILY 180 tablet 3    rosuvastatin (CRESTOR) 20 MG tablet Take 1 tablet by mouth daily 90 tablet 3    clopidogrel (PLAVIX) 75 MG tablet TAKE 1 TABLET BY MOUTH DAILY 90 tablet 1    predniSONE (DELTASONE) 5 MG tablet TAKE 1 TABLET BY MOUTH DAILY 90 tablet 1    buPROPion (WELLBUTRIN XL) 300 MG extended release tablet TAKE 1 TABLET BY MOUTH EVERY MORNING 90 tablet 1    albuterol sulfate HFA (VENTOLIN HFA) 108 (90 Base) MCG/ACT inhaler Inhale 2 puffs into the lungs 4 times daily as needed for Wheezing (Patient not taking: Reported on 6/3/2021) 1 Inhaler 5    mirtazapine (REMERON) 7.5 MG tablet Take 1 tablet by mouth nightly 90 tablet 1    furosemide (LASIX) 20 MG tablet Take 1 tablet by mouth daily (Patient taking differently: Take 20 mg by mouth daily Indications: uses q other day ) 90 tablet 3    ondansetron (ZOFRAN) 4 MG tablet TAKE 1 TABLET BY MOUTH EVERY 12 HOURS AS NEEDED FOR NAUSEA OR VOMITING 30 tablet 3    docusate (COLACE, DULCOLAX) 100 MG CAPS Take 100 mg by mouth 2 times daily 60 capsule 3    acetaminophen 650 MG TABS Take 650 mg by mouth every 6 hours as needed for Pain or Fever (Fever >100.5 F (38 C)). 60 tablet 1     No current facility-administered medications for this visit.        Social History:   Social History     Socioeconomic History    Marital status:      Spouse name: Not on file    Number of children: 1    Years of education: 15    Highest education level: Not on file Occupational History    Occupation: Retired   Tobacco Use    Smoking status: Former Smoker     Packs/day: 0.50     Years: 30.00     Pack years: 15.00     Types: Cigarettes     Start date:      Quit date: 2014     Years since quittin.9    Smokeless tobacco: Never Used   Vaping Use    Vaping Use: Never used   Substance and Sexual Activity    Alcohol use: Not Currently     Alcohol/week: 0.0 standard drinks     Comment: none for a year    Drug use: No    Sexual activity: Not Currently   Other Topics Concern    Not on file   Social History Narrative    Not on file     Social Determinants of Health     Financial Resource Strain:     Difficulty of Paying Living Expenses:    Food Insecurity:     Worried About Running Out of Food in the Last Year:     [address] in the Last Year:    Transportation Needs:     Lack of Transportation (Medical):  Lack of Transportation (Non-Medical):    Physical Activity:     Days of Exercise per Week:     Minutes of Exercise per Session:    Stress:     Feeling of Stress :    Social Connections:     Frequency of Communication with Friends and Family:     Frequency of Social Gatherings with Friends and Family:     Attends Oriental orthodox Services:     Active Member of Clubs or Organizations:     Attends Club or Organization Meetings:     Marital Status:    Intimate Partner Violence:     Fear of Current or Ex-Partner:     Emotionally Abused:     Physically Abused:     Sexually Abused:        TOBACCO:   reports that she quit smoking about 6 years ago. Her smoking use included cigarettes. She started smoking about 56 years ago. She has a 15.00 pack-year smoking history. She has never used smokeless tobacco.  ETOH:   reports previous alcohol use.     Family History:   Family History   Problem Relation Age of Onset    High Blood Pressure Brother          A:  Ms. Macarena Harrison continues to struggle with depression which is exacerbated by various ongoing health concerns. She continues to be active and engaged and responds positively to behavioral interventions although patient was more drowsy and less active during today's visit. She has an appointment with [facility] on 9/25/2021.        Diagnosis:    MDD, Recurrent, Moderate    Plan:  Pt interventions:  [place]-setting to identify pt's primary goals for [facility] visit / overall health and Supportive techniques, ACT interventions and reinforced pt's skill use,treatment planning    Pt Behavioral Change Plan:  Pt set goals to 1) return for follow-up in three weeks

## 2021-08-30 NOTE — TELEPHONE ENCOUNTER
Caregiver answered phone. Patient currently \"resting. \"  This nurse informed caregiver, Patient is due for Reclast.  Caregiver verbalized \"patient also needs a MD appiontment. \"  Per Caregiver He or patient will be contacting office to schedule appointments.    On  8/29/2021 Creatinine level 1,0 and  Calcium level 9.9 and GFR 54   Last Dexa Scan on 8/3/2020

## 2021-08-31 LAB
ORGANISM: ABNORMAL
URINE CULTURE, ROUTINE: ABNORMAL

## 2021-09-09 ASSESSMENT — ENCOUNTER SYMPTOMS
FACIAL SWELLING: 0
BACK PAIN: 0
ABDOMINAL PAIN: 0
ABDOMINAL DISTENTION: 0
EYE DISCHARGE: 0
CHEST TIGHTNESS: 0
COUGH: 0
WHEEZING: 0
BLOOD IN STOOL: 0
VOMITING: 0
COLOR CHANGE: 0
SHORTNESS OF BREATH: 0

## 2021-09-09 NOTE — PROGRESS NOTES
[facility]     Outpatient Cardiology         Chief Complaint   Patient presents with    Congestive Heart Failure       HPI     Edith Torres a 64 y.o. female here for follow up for HFpEF. Hx of CAD, HTN, DVT, and ILD. CAD, prior LAD occlusion with preserved LV function (no PCI), no angina tolerating well current medications. Hypertension,, well-controlled on current medications. Hyperlipidemia, on a statin, no side effects. Chronic diastolic heart failure, tolerating well current medications including diuretics. Overall doing okay from a cardiovascular perspective. Recently admitted for pneumonia and multiple UTIs. Since then she has lost strength and appears to be very weak. PMH  Past Medical History:   Diagnosis Date    Anxiety     Autoimmune disorder ([facility] [address]     CAD (coronary artery disease)     CHF (congestive heart failure) (HCC)     Chronic kidney disease     Depression     DVT of lower extremity (deep venous thrombosis) (HCC)     Falls     Heart disease     Hypertension     Lupus (HCC)     Stress incontinence     Systemic lupus erythematosus (HCC)     TIA (transient ischemic attack)     x3    Urinary incontinence     UTI (urinary tract infection)        PSH  Past Surgical History:   Procedure Laterality Date    BACK SURGERY  9/24/2014     DR. Morrison     COLONOSCOPY  7/12/2010    WNL-repeat in 10 years    FEMUR SURGERY Right     HIP SURGERY Left 12/03/2013    left hip trochanteric femoral nailing   Clifm Silvius  8/3/2010    Dr. Willi Reynolds, L5-S1    UPPER GASTROINTESTINAL ENDOSCOPY  7/19/10    reflux with no Barretts/no H-pylori    UPPER GASTROINTESTINAL ENDOSCOPY  01/2017    Dr. Natan Love - gastritis and duodinitis , neg H pylori     UPPER GASTROINTESTINAL ENDOSCOPY N/A 12/2/2019    EGD DILATION SAVORY 17mm/ 46 F performed by Ronda Galvez MD at [address] [place] [address] N/A 6/7/2021 EGD BIOPSY performed by Sherren Prayer, MD at [facility] N/A 2021    EGD DILATION SAVORY performed by Sherren Prayer, MD at [address] [facility]  Social History     Tobacco Use    Smoking status: Former Smoker     Packs/day: 0.50     Years: 30.00     Pack years: 15.00     Types: Cigarettes     Start date:      Quit date: 2014     Years since quittin.9    Smokeless tobacco: Never Used   Vaping Use    Vaping Use: Never used   Substance Use Topics    Alcohol use: Not Currently     Alcohol/week: 0.0 standard drinks     Comment: none for a year    Drug use: No       Family History  Family History   Problem Relation Age of Onset    High Blood Pressure Brother        Allergies   Allergies   Allergen Reactions    Medrol [Methylprednisolone]      multiple side effects , able to tolerate prednisone without side effects     Oxycontin [Oxycodone Hcl]      Pruritis, vomiting     Vicodin [Hydrocodone-Acetaminophen] Nausea Only       Medications:     Home Medications:  Were reviewed and are listed in nursing record. and/or listed below    Prior to Admission medications    Medication Sig Start Date End Date Taking? Authorizing Provider   sennosides-docusate sodium (SENOKOT-S) 8.6-50 MG tablet Take 1 tablet by mouth daily 21  Yes Leo Wells MD   azaTHIOprine (IMURAN) 50 MG tablet TAKE 1 TABLET BY MOUTH DAILY 21  Yes Colin Meehan MD   hydroxychloroquine (PLAQUENIL) 200 MG tablet Take 1 tab po BID 21  Yes Colin Meehan MD   triamcinolone (KENALOG) 0.1 % lotion Apply topically 3 times daily Apply topically 3 times daily.    Yes Historical Provider, MD   pantoprazole (PROTONIX) 20 MG tablet TAKE 1 TABLET BY MOUTH TWICE DAILY 21  Yes Brigette Sidhu MD   Cholecalciferol (VITAMIN D3) 1.25 MG (23548 UT) CAPS TAKE 1 CAPSULE BY MOUTH EVERY OTHER WEEK 21  Yes Colin Meehan MD   carvedilol (COREG) 6.25 MG tablet TAKE 1 TABLET BY MOUTH TWICE DAILY 5/10/21  Yes Ariana Reddy MD   clopidogrel (PLAVIX) 75 MG tablet TAKE 1 TABLET BY MOUTH DAILY 5/3/21  Yes Ariana Reddy MD   predniSONE (DELTASONE) 5 MG tablet TAKE 1 TABLET BY MOUTH DAILY 5/3/21  Yes Manolo Biswas MD   buPROPion (WELLBUTRIN XL) 300 MG extended release tablet TAKE 1 TABLET BY MOUTH EVERY MORNING 4/26/21  Yes Ariana Reddy MD   mirtazapine (REMERON) 7.5 MG tablet Take 1 tablet by mouth nightly 3/15/21  Yes Ariana Reddy MD   ondansetron (ZOFRAN) 4 MG tablet TAKE 1 TABLET BY MOUTH EVERY 12 HOURS AS NEEDED FOR NAUSEA OR VOMITING 2/17/20  Yes Ariana Reddy MD   docusate (COLACE, DULCOLAX) 100 MG CAPS Take 100 mg by mouth 2 times daily 12/2/19  Yes Najma Casillas,    acetaminophen 650 MG TABS Take 650 mg by mouth every 6 hours as needed for Pain or Fever (Fever >100.5 F (38 C)). 12/9/13  Yes Patsy Sinclair MD   rosuvastatin (CRESTOR) 20 MG tablet Take 1 tablet by mouth daily  Patient not taking: Reported on 9/13/2021 5/10/21   Ariana Reddy MD   albuterol sulfate HFA (VENTOLIN HFA) 108 (90 Base) MCG/ACT inhaler Inhale 2 puffs into the lungs 4 times daily as needed for Wheezing  Patient not taking: Reported on 6/3/2021 4/12/21   Ariana Reddy MD   furosemide (LASIX) 20 MG tablet Take 1 tablet by mouth daily  Patient taking differently: Take 20 mg by mouth daily Indications: uses q other day  12/7/20 6/7/21  Ariana Reddy MD        Review of Systems   Constitutional: Positive for fatigue. Negative for activity change, appetite change, diaphoresis, fever and unexpected weight change. HENT: Negative for congestion, facial swelling, mouth sores and nosebleeds. Eyes: Negative for discharge and visual disturbance. Respiratory: Negative for cough, chest tightness, shortness of breath and wheezing. Cardiovascular: Negative for chest pain, palpitations and leg swelling.    Gastrointestinal: Negative for abdominal distention, abdominal pain, blood in stool and vomiting. Endocrine: Negative for cold intolerance, heat intolerance and polyuria. Genitourinary: Negative for difficulty urinating, dysuria, frequency and hematuria. Musculoskeletal: Negative for back pain, joint swelling, myalgias and neck pain. Skin: Negative for color change, pallor and rash. Allergic/Immunologic: Negative for immunocompromised state. Neurological: Negative for dizziness, syncope, weakness, light-headedness, numbness and headaches. Hematological: Negative for adenopathy. Does not bruise/bleed easily. Psychiatric/Behavioral: Negative for behavioral problems, confusion, decreased concentration and suicidal ideas. The patient is not nervous/anxious. Vitals:    09/13/21 1255   BP: 100/64   Pulse: 84    Weight: 150 lb 12.8 oz (68.4 kg)       Vitals:    09/13/21 1255   BP: 100/64   Site: Left Upper Arm   Position: Sitting   Cuff Size: Medium Adult   Pulse: 84   Weight: 150 lb 12.8 oz (68.4 kg)       BP Readings from Last 3 Encounters:   09/13/21 100/64   08/29/21 110/80   08/20/21 118/68       Wt Readings from Last 3 Encounters:   09/13/21 150 lb 12.8 oz (68.4 kg)   08/20/21 168 lb 3.2 oz (76.3 kg)   06/17/21 190 lb 6.4 oz (86.4 kg)       Physical Exam  Constitutional:       General: She is not in acute distress. Appearance: She is well-developed. She is not diaphoretic. HENT:      Head: Normocephalic and atraumatic. Eyes:      Pupils: Pupils are equal, round, and reactive to light. Neck:      Thyroid: No thyromegaly. Vascular: No JVD. Cardiovascular:      Rate and Rhythm: Normal rate and regular rhythm. Chest Wall: PMI is not displaced. Heart sounds: Normal heart sounds, S1 normal and S2 normal. No murmur heard. No friction rub. No gallop. Pulmonary:      Effort: Pulmonary effort is normal. No respiratory distress. Breath sounds: Normal breath sounds. No stridor.  No wheezing or rales.   Chest:      Chest wall: No tenderness. Abdominal:      General: Bowel sounds are normal. There is no distension. Palpations: Abdomen is soft. Tenderness: There is no abdominal tenderness. There is no guarding or rebound. Musculoskeletal:         General: Swelling present. No tenderness. Normal range of motion. Cervical back: Normal range of motion. Right lower leg: Edema present. Left lower leg: Edema present. Lymphadenopathy:      Cervical: No cervical adenopathy. Skin:     General: Skin is warm and dry. Findings: No erythema or rash. Neurological:      Mental Status: She is alert and oriented to person, place, and time. Coordination: Coordination normal.   Psychiatric:         Behavior: Behavior normal.         Thought Content:  Thought content normal.         Judgment: Judgment normal.         Labs:       Lab Results   Component Value Date    WBC 7.1 08/29/2021    HGB 12.0 08/29/2021    HCT 35.3 (L) 08/29/2021    MCV 93.1 08/29/2021     08/29/2021     Lab Results   Component Value Date     (L) 08/29/2021    K 3.5 08/29/2021    CL 96 (L) 08/29/2021    CO2 22 08/29/2021    BUN 27 (H) 08/29/2021    CREATININE 1.0 08/29/2021    GLUCOSE 97 08/29/2021    CALCIUM 9.9 08/29/2021    PROT 7.1 08/29/2021    LABALBU 3.0 (L) 08/29/2021    BILITOT 0.5 08/29/2021    ALKPHOS 442 (H) 08/29/2021    AST 66 (H) 08/29/2021    ALT 56 (H) 08/29/2021    LABGLOM 54 (A) 08/29/2021    GFRAA >60 08/29/2021    AGRATIO 0.7 (L) 08/29/2021    GLOB 4.1 08/29/2021         Lab Results   Component Value Date    CHOL 166 03/26/2021    CHOL 126 10/10/2019    CHOL 140 05/03/2018     Lab Results   Component Value Date    TRIG 60 03/26/2021    TRIG 70 10/10/2019    TRIG 112 05/03/2018     Lab Results   Component Value Date    HDL 77 (H) 03/26/2021    HDL 66 (H) 10/10/2019    HDL 66 (H) 05/03/2018     Lab Results   Component Value Date    LDLCALC 77 03/26/2021    LDLCALC 46 10/10/2019 LDLCALC 52 05/03/2018     Lab Results   Component Value Date    LABVLDL 12 03/26/2021    LABVLDL 14 10/10/2019    LABVLDL 22 05/03/2018     No results found for: [facility]    Lab Results   Component Value Date    INR 0.99 11/26/2019    INR 1.03 08/22/2016    INR 2.19 (H) 02/13/2015    PROTIME 11.5 11/26/2019    PROTIME 11.7 08/22/2016    PROTIME 24.6 (H) 02/13/2015       The ASCVD Risk score (Nathaly Lopez, et al., 2013) failed to calculate for the following reasons: The patient has a prior MI or stroke diagnosis      Imaging:       Last ECG (if available):  Normal sinus rhythm, old anterior infarct. Last Monitor/Holter    Last Stress (if available):    Last Cath (if available): 10/1/14  Impression[de-identified]   1. Single vessel coronary artery disease with occlusion of the   ostial/proximal LAD  2. Severely reduced LV systolic function estimated ejection   fraction 30-35%  3. Elevated LV diastolic pressure 25 mm Hg  4. Normal valve function     Last TTE/MIMI(if available): 2/26/21   Summary   Normal left ventricle size and systolic function with an estimated ejection   fraction of 55-60%. Moderate concentric left ventricular hypertrophy. No regional wall motion abnormalities are seen. Normal diastolic function. The right ventricle is normal in size and function. Mild aortic valve sclerosis with reduced leaflet excursion but no stenosis. Trivial tricuspid regurgitation. Estimated pulmonary artery systolic pressure is at 12 mmHg assuming a right   atrial pressure of 3 mmHg. 8/25/19  Summary   Left ventricular cavity size is normal. Normal left ventricular wall   thickness. Overall left ventricular systolic function appears normal with an   ejection fraction of 55%. No regional wall motion abnormalities are noted. Indeterminate diastolic function. The aortic valve appears slightly thickened but opens adequately. Trivial tricuspid regurgitation.    Estimated pulmonary artery systolic pressure is at 27 mmHg assuming a right   atrial pressure of 8 mmHg. Mild pulmonic regurgitation present. The right ventricle is not well visualized, function appears normal.    Last CMR  (if available):      Assessment / Plan:     Mixed hyperlipidemia  Currently not taking Crestor, in the past she was tolerating that okay. Diastolic congestive heart failure (HCC)  Compensated, continue current medications including as needed Lasix  Blood pressure is well controlled on carvedilol    Coronary artery disease involving native coronary artery of native heart without angina pectoris  Continue aspirin and Plavix. Asymptomatic. Essential hypertension, benign  Well-controlled on carvedilol      Follow up in 6 months. I had the opportunity to review the clinical symptoms and presentation of Nathalie Mon.     Patient's allergies and medications were reviewed and updated. Patient's past medical, surgical, social and family history were reviewed and updated. Patient's testing including laboratory, ECGs, monitor, imaging (TTE,MIMI,CMR,cath) were reviewed. Tobacco use was discussed with the patient and educated on the negative effects. I have asked the patient to not utilize these agents. All questions and concerns were addressed to the patient/family. Alternatives to my treatment were discussed. The note was completed using EMR. Every effort wasmade to ensure accuracy; however, inadvertent computerized transcription errors may be present. Thank you for allowing me to participate in thelonnie or Micki Fernandez MD, [facility], [facility]

## 2021-09-13 ENCOUNTER — OFFICE VISIT (OUTPATIENT)
Dept: CARDIOLOGY CLINIC | Age: 76
End: 2021-09-13
Payer: MEDICARE

## 2021-09-13 VITALS
WEIGHT: 150.8 LBS | DIASTOLIC BLOOD PRESSURE: 64 MMHG | BODY MASS INDEX: 29.45 KG/M2 | HEART RATE: 84 BPM | SYSTOLIC BLOOD PRESSURE: 100 MMHG

## 2021-09-13 DIAGNOSIS — I25.10 CORONARY ARTERY DISEASE INVOLVING NATIVE CORONARY ARTERY OF NATIVE HEART WITHOUT ANGINA PECTORIS: ICD-10-CM

## 2021-09-13 DIAGNOSIS — I50.32 CHRONIC DIASTOLIC CONGESTIVE HEART FAILURE (HCC): ICD-10-CM

## 2021-09-13 DIAGNOSIS — E78.2 MIXED HYPERLIPIDEMIA: ICD-10-CM

## 2021-09-13 DIAGNOSIS — I10 ESSENTIAL HYPERTENSION, BENIGN: ICD-10-CM

## 2021-09-13 PROCEDURE — 1123F ACP DISCUSS/DSCN MKR DOCD: CPT | Performed by: INTERNAL MEDICINE

## 2021-09-13 PROCEDURE — 1036F TOBACCO NON-USER: CPT | Performed by: INTERNAL MEDICINE

## 2021-09-13 PROCEDURE — G8427 DOCREV CUR MEDS BY ELIG CLIN: HCPCS | Performed by: INTERNAL MEDICINE

## 2021-09-13 PROCEDURE — G8399 PT W/DXA RESULTS DOCUMENT: HCPCS | Performed by: INTERNAL MEDICINE

## 2021-09-13 PROCEDURE — 99214 OFFICE O/P EST MOD 30 MIN: CPT | Performed by: INTERNAL MEDICINE

## 2021-09-13 PROCEDURE — 1090F PRES/ABSN URINE INCON ASSESS: CPT | Performed by: INTERNAL MEDICINE

## 2021-09-13 PROCEDURE — 4040F PNEUMOC VAC/ADMIN/RCVD: CPT | Performed by: INTERNAL MEDICINE

## 2021-09-13 PROCEDURE — G8417 CALC BMI ABV UP PARAM F/U: HCPCS | Performed by: INTERNAL MEDICINE

## 2021-09-13 NOTE — ASSESSMENT & PLAN NOTE
Compensated, continue current medications including as needed Lasix  Blood pressure is well controlled on carvedilol

## 2021-10-11 RX ORDER — AZATHIOPRINE 50 MG/1
TABLET ORAL
Qty: 90 TABLET | Refills: 0 | OUTPATIENT
Start: 2021-10-11

## 2021-10-12 ENCOUNTER — PATIENT MESSAGE (OUTPATIENT)
Dept: RHEUMATOLOGY | Age: 76
End: 2021-10-12

## 2021-10-13 RX ORDER — AZATHIOPRINE 50 MG/1
50 TABLET ORAL DAILY
Qty: 90 TABLET | Refills: 0 | Status: SHIPPED | OUTPATIENT
Start: 2021-10-13 | End: 2021-10-14 | Stop reason: SDUPTHER

## 2021-10-13 NOTE — TELEPHONE ENCOUNTER
From: Jory Barron  To:  Nadja Loyola MD  Sent: 10/12/2021 5:51 PM EDT  Subject: Prescription Question    Please approve Tracie Pascual azathioprine   Thank you

## 2021-10-14 RX ORDER — AZATHIOPRINE 50 MG/1
50 TABLET ORAL DAILY
Qty: 30 TABLET | Refills: 0 | Status: SHIPPED | OUTPATIENT
Start: 2021-10-14

## 2021-10-14 NOTE — TELEPHONE ENCOUNTER
Pt's caregiver, David Ching, called very upset and angry that the pt's medication refill went to [facility] and not [facility], I tried explaining the mistake and that the last refill was filled by [facility]. I've e-scribed a 30 day supply to [facility] as requested.

## 2021-10-18 RX ORDER — CHOLECALCIFEROL (VITAMIN D3) 1250 MCG
CAPSULE ORAL
Qty: 4 CAPSULE | Refills: 2 | Status: SHIPPED | OUTPATIENT
Start: 2021-10-18

## 2021-11-10 DIAGNOSIS — J84.9 ILD (INTERSTITIAL LUNG DISEASE) (HCC): ICD-10-CM

## 2021-11-10 RX ORDER — PREDNISONE 1 MG/1
TABLET ORAL
Qty: 90 TABLET | Refills: 0 | Status: SHIPPED | OUTPATIENT
Start: 2021-11-10 | End: 2022-02-07 | Stop reason: SDUPTHER

## 2022-02-07 DIAGNOSIS — J84.9 ILD (INTERSTITIAL LUNG DISEASE) (HCC): ICD-10-CM

## 2022-02-07 RX ORDER — PREDNISONE 1 MG/1
TABLET ORAL
Qty: 90 TABLET | Refills: 0 | Status: SHIPPED | OUTPATIENT
Start: 2022-02-07

## 2022-02-07 NOTE — TELEPHONE ENCOUNTER
Pt's caregiver called requesting Prednisone script refill.  Rx has been pended for approval     Last OV 8/24/21  No Future OV Scheduled (pt currently enrolled in hospice)  Recent Labs 8/29/21

## 2023-08-22 NOTE — TELEPHONE ENCOUNTER
Added azithromycin in addition to amoxicillin Attempt to contact pt to schedule colonoscopy.  Pt's voicemail full, unable to leave message.

## 2025-06-04 NOTE — PROGRESS NOTES
Provider location: [facility]:  During the last visit Pt set goals to 1) create 2 lists out of current list (what you have control over and what you don't) 2) check in senior services re: ACP 3) continue to work with PCP on medication 4) return for f/u in 1 week    Pt reports she is doing \"ok. \" Made two separate lists - has been focusing the list she can control but having a hard time. Talked with Azeem Vargas about finances and while she is able to barely get by, isn't comfortable as she has always had a cushion. Very frustrated with lack of son's participation in her care and his behavior. Not paying her back and this doesn't feel right to her. Making legal decisions to make her more comfortable. Mood overall better. Sleep is ok. Feels medication is helping and not having sodium issues as she had suspected.       O:  MSE:     Appearance: good hygiene   Attitude: cooperative and friendly  Consciousness: alert  Orientation: oriented to person, place, time, general circumstance  Memory: impaired  Attention/Concentration: intact during session  Psychomotor Activity: normal  Eye Contact: normal  Speech: normal rate and volume, well-articulated  Mood: \"ok\"  Affect: euthymic and congruent  Perception: within normal limits  Thought Content: within normal limits  Thought Process: logical, coherent and goal-directed  Insight: fair  Judgment: intact  Ability to understand instructions: Yes  Ability to respond meaningfully: Yes  Morbid Ideation: no   Suicide Assessment: no suicidal ideation, plan, or intent  Homicidal Ideation: no        History:    Medications:   Current Outpatient Medications   Medication Sig Dispense Refill    azaTHIOprine (IMURAN) 50 MG tablet TAKE 1 TABLETS BY MOUTH DAILY 90 tablet 0    Cholecalciferol (VITAMIN D3) 1.25 MG (33375 UT) CAPS TAKE 1 CAPSULE BY MOUTH EVERY OTHER WEEK 4 capsule 2    predniSONE (DELTASONE) 5 MG tablet Take 1 tablet by mouth daily for 30 doses 30 tablet 2    [Outpatient] : Outpatient [Ambulatory] : Patient is ambulatory. [THIS CHAMBER HAS BEEN CLEANED / DISINFECTED] : This chamber has been cleaned / disinfected according to local and hospital policy and procedure prior to this treatment. [____] : Recheck: Time - [unfilled] [Patient demonstrated and verbalized proper technique for using air break mask] : Patient demonstrated and verbalized proper technique for using air break mask [Patient educated on the risks of SMOKING prior to HBOT with understanding] : Patient educated on the risks of SMOKING prior to HBOT with understanding [Patient educated on the risks of CONSUMING ALCOHOL prior to HBOT with understanding] : Patient educated on the risks of CONSUMING ALCOHOL prior to HBOT with understanding [100% Cotton] : 100% cotton [Empty all pockets] : empty all pockets [No hair oils, wigs, hairpieces, pins] : no hair oils, wigs, hairpieces, pins  [Pre tx medications] : pre tx medications  [No make-up, creams] : no make-up, creams  [No jewelry] : no jewelry  [No matches, cigarettes, lighters] : no matches, cigarettes, lighters  [Hearing aid removed] : hearing aid removed [Dentures removed] : dentures removed [Ground bracelet on pt's wrist] : ground bracelet on pt's wrist  [Contacts removed] : contacts removed  [Remove nail polish] : remove nail polish  [No reading material] : no reading material  [Bra, undergarments removed] : bra, undergarments removed  [No contraindicated dressings] : no contraindicated dressings [Ground Wire - VISUAL Verification - Intact/Free of Obstruction] : Ground Wire - VISUAL Verification - Intact/Free of Obstruction  [Number: ___] : Number: [unfilled] [Diagnosis: ___] : Diagnosis: [unfilled] [___] : Recheck: Value - [unfilled] mg/dL [Clear all fields] : clear all fields [] : No [FreeTextEntry4] : 100 [FreeTextEntry6] : 1022 [FreeTextEntry8] : 1032 [de-identified] : 1100 [de-identified] : 1103 [de-identified] : 1083 [de-identified] : 4627 [de-identified] : 1218 [de-identified] : 2547 [de-identified] : 120

## (undated) DEVICE — AMPLATZ EXTRA STIFF WIRE GUIDE: Brand: AMPLATZ

## (undated) DEVICE — Z DUP USE 2139353 GUIDEWIRE URO L145CM DIA0.035IN PTFE STR AMPLTZ SUP STIFF

## (undated) DEVICE — FORCEPS BX L240CM JAW DIA2.4MM ORNG L CAP W/ NDL DISP RAD